# Patient Record
Sex: FEMALE | Race: WHITE | NOT HISPANIC OR LATINO | Employment: FULL TIME | URBAN - METROPOLITAN AREA
[De-identification: names, ages, dates, MRNs, and addresses within clinical notes are randomized per-mention and may not be internally consistent; named-entity substitution may affect disease eponyms.]

---

## 2017-01-10 ENCOUNTER — ALLSCRIPTS OFFICE VISIT (OUTPATIENT)
Dept: OTHER | Facility: OTHER | Age: 32
End: 2017-01-10

## 2017-01-12 DIAGNOSIS — R35.89 OTHER POLYURIA: ICD-10-CM

## 2017-01-12 DIAGNOSIS — R35.0 FREQUENCY OF MICTURITION: ICD-10-CM

## 2017-01-12 DIAGNOSIS — E78.5 HYPERLIPIDEMIA: ICD-10-CM

## 2017-01-12 DIAGNOSIS — R53.83 OTHER FATIGUE: ICD-10-CM

## 2017-01-13 ENCOUNTER — LAB (OUTPATIENT)
Dept: LAB | Facility: CLINIC | Age: 32
End: 2017-01-13
Payer: COMMERCIAL

## 2017-01-13 DIAGNOSIS — R53.83 OTHER FATIGUE: ICD-10-CM

## 2017-01-13 DIAGNOSIS — E78.5 HYPERLIPIDEMIA: ICD-10-CM

## 2017-01-13 DIAGNOSIS — R35.0 FREQUENCY OF MICTURITION: ICD-10-CM

## 2017-01-13 DIAGNOSIS — R35.89 OTHER POLYURIA: ICD-10-CM

## 2017-01-13 LAB
ALBUMIN SERPL BCP-MCNC: 4 G/DL (ref 3.5–5)
ALP SERPL-CCNC: 68 U/L (ref 46–116)
ALT SERPL W P-5'-P-CCNC: 24 U/L (ref 12–78)
ANION GAP SERPL CALCULATED.3IONS-SCNC: 5 MMOL/L (ref 4–13)
AST SERPL W P-5'-P-CCNC: 16 U/L (ref 5–45)
BACTERIA UR QL AUTO: ABNORMAL /HPF
BILIRUB SERPL-MCNC: 0.55 MG/DL (ref 0.2–1)
BILIRUB UR QL STRIP: NEGATIVE
BUN SERPL-MCNC: 12 MG/DL (ref 5–25)
CALCIUM SERPL-MCNC: 9.7 MG/DL (ref 8.3–10.1)
CHLORIDE SERPL-SCNC: 99 MMOL/L (ref 100–108)
CHOLEST SERPL-MCNC: 228 MG/DL (ref 50–200)
CLARITY UR: ABNORMAL
CO2 SERPL-SCNC: 30 MMOL/L (ref 21–32)
COLOR UR: YELLOW
CREAT SERPL-MCNC: 0.83 MG/DL (ref 0.6–1.3)
EST. AVERAGE GLUCOSE BLD GHB EST-MCNC: 100 MG/DL
GFR SERPL CREATININE-BSD FRML MDRD: >60 ML/MIN/1.73SQ M
GLUCOSE SERPL-MCNC: 96 MG/DL (ref 65–140)
GLUCOSE UR STRIP-MCNC: NEGATIVE MG/DL
HBA1C MFR BLD: 5.1 % (ref 4.2–6.3)
HDLC SERPL-MCNC: 59 MG/DL (ref 40–60)
HGB UR QL STRIP.AUTO: NEGATIVE
HYALINE CASTS #/AREA URNS LPF: ABNORMAL /LPF
KETONES UR STRIP-MCNC: NEGATIVE MG/DL
LDLC SERPL CALC-MCNC: 154 MG/DL (ref 0–100)
LEUKOCYTE ESTERASE UR QL STRIP: ABNORMAL
NITRITE UR QL STRIP: NEGATIVE
NON-SQ EPI CELLS URNS QL MICRO: ABNORMAL /HPF
PH UR STRIP.AUTO: 6 [PH] (ref 4.5–8)
POTASSIUM SERPL-SCNC: 3.7 MMOL/L (ref 3.5–5.3)
PROT SERPL-MCNC: 8.1 G/DL (ref 6.4–8.2)
PROT UR STRIP-MCNC: ABNORMAL MG/DL
RBC #/AREA URNS AUTO: ABNORMAL /HPF
SODIUM SERPL-SCNC: 134 MMOL/L (ref 136–145)
SP GR UR STRIP.AUTO: 1.02 (ref 1–1.03)
TRIGL SERPL-MCNC: 74 MG/DL
UROBILINOGEN UR QL STRIP.AUTO: 0.2 E.U./DL
WBC #/AREA URNS AUTO: ABNORMAL /HPF

## 2017-01-13 PROCEDURE — 87086 URINE CULTURE/COLONY COUNT: CPT

## 2017-01-13 PROCEDURE — 36415 COLL VENOUS BLD VENIPUNCTURE: CPT

## 2017-01-13 PROCEDURE — 80061 LIPID PANEL: CPT

## 2017-01-13 PROCEDURE — 83036 HEMOGLOBIN GLYCOSYLATED A1C: CPT

## 2017-01-13 PROCEDURE — 81001 URINALYSIS AUTO W/SCOPE: CPT

## 2017-01-13 PROCEDURE — 87147 CULTURE TYPE IMMUNOLOGIC: CPT

## 2017-01-13 PROCEDURE — 80053 COMPREHEN METABOLIC PANEL: CPT

## 2017-01-16 ENCOUNTER — GENERIC CONVERSION - ENCOUNTER (OUTPATIENT)
Dept: OTHER | Facility: OTHER | Age: 32
End: 2017-01-16

## 2017-01-16 LAB
BACTERIA UR CULT: NORMAL
BACTERIA UR CULT: NORMAL

## 2017-05-18 ENCOUNTER — APPOINTMENT (OUTPATIENT)
Dept: LAB | Facility: CLINIC | Age: 32
End: 2017-05-18
Payer: COMMERCIAL

## 2017-05-18 ENCOUNTER — TRANSCRIBE ORDERS (OUTPATIENT)
Dept: LAB | Facility: CLINIC | Age: 32
End: 2017-05-18

## 2017-05-18 DIAGNOSIS — R31.0 GROSS HEMATURIA: ICD-10-CM

## 2017-05-18 DIAGNOSIS — R31.0 GROSS HEMATURIA: Primary | ICD-10-CM

## 2017-05-18 LAB
ANION GAP SERPL CALCULATED.3IONS-SCNC: 6 MMOL/L (ref 4–13)
BUN SERPL-MCNC: 16 MG/DL (ref 5–25)
CALCIUM SERPL-MCNC: 9.1 MG/DL (ref 8.3–10.1)
CHLORIDE SERPL-SCNC: 105 MMOL/L (ref 100–108)
CO2 SERPL-SCNC: 28 MMOL/L (ref 21–32)
CREAT SERPL-MCNC: 0.72 MG/DL (ref 0.6–1.3)
GFR SERPL CREATININE-BSD FRML MDRD: >60 ML/MIN/1.73SQ M
GLUCOSE P FAST SERPL-MCNC: 83 MG/DL (ref 65–99)
POTASSIUM SERPL-SCNC: 3.9 MMOL/L (ref 3.5–5.3)
SODIUM SERPL-SCNC: 139 MMOL/L (ref 136–145)

## 2017-05-18 PROCEDURE — 36415 COLL VENOUS BLD VENIPUNCTURE: CPT

## 2017-05-18 PROCEDURE — 80048 BASIC METABOLIC PNL TOTAL CA: CPT

## 2018-01-13 NOTE — RESULT NOTES
Verified Results  (1) URINALYSIS w URINE C/S REFLEX (will reflex a microscopy if leukocytes, occult blood, or nitrites are not within normal limits) [id number] 07:51AM Jodi Valdez Order Number: [id number]     Test Name Result Flag Reference   COLOR Yellow     CLARITY Cloudy     PH UA 6 0  4 5-8 0   LEUKOCYTE ESTERASE UA Small A Negative   NITRITE UA Negative  Negative   PROTEIN UA Trace mg/dl A Negative   GLUCOSE UA Negative mg/dl  Negative   KETONES UA Negative mg/dl  Negative   UROBILINOGEN UA 0 2 E U /dl  0 2, 1 0 E U /dl   BILIRUBIN UA Negative  Negative   BLOOD UA Negative  Negative   SPECIFIC GRAVITY UA 1 019  1 003-1 030   BACTERIA Occasional /hpf  None Seen, Occasional   EPITHELIAL CELLS Moderate /hpf A None Seen, Occasional   HYALINE CASTS 10-25 /lpf A None Seen   RBC UA None Seen /hpf  None Seen   WBC UA 10-20 /hpf A None Seen   CLINICAL REPORT (Report)     Test:        Urine culture  Specimen Type:   Urine  Specimen Date:   1/13/2017 7:51 AM  Result Date:    1/15/2017 12:22 PM  Result Status:   Final result  Resulting Lab:   [facility]             Tel: [phone]      CULTURE                                       ------------------                                   >100,000 cfu/ml Mixed Contaminants X4

## 2018-01-14 VITALS
WEIGHT: 168 LBS | SYSTOLIC BLOOD PRESSURE: 110 MMHG | TEMPERATURE: 96.4 F | RESPIRATION RATE: 14 BRPM | DIASTOLIC BLOOD PRESSURE: 64 MMHG | HEART RATE: 74 BPM | HEIGHT: 60 IN | BODY MASS INDEX: 32.98 KG/M2

## 2018-02-27 ENCOUNTER — TELEPHONE (OUTPATIENT)
Dept: FAMILY MEDICINE CLINIC | Facility: CLINIC | Age: 33
End: 2018-02-27

## 2018-02-27 ENCOUNTER — OFFICE VISIT (OUTPATIENT)
Dept: FAMILY MEDICINE CLINIC | Facility: CLINIC | Age: 33
End: 2018-02-27
Payer: COMMERCIAL

## 2018-02-27 VITALS
HEIGHT: 62 IN | WEIGHT: 159 LBS | HEART RATE: 80 BPM | TEMPERATURE: 101.6 F | SYSTOLIC BLOOD PRESSURE: 120 MMHG | DIASTOLIC BLOOD PRESSURE: 82 MMHG | BODY MASS INDEX: 29.26 KG/M2

## 2018-02-27 DIAGNOSIS — R11.2 NAUSEA AND VOMITING, INTRACTABILITY OF VOMITING NOT SPECIFIED, UNSPECIFIED VOMITING TYPE: ICD-10-CM

## 2018-02-27 DIAGNOSIS — J01.90 ACUTE NON-RECURRENT SINUSITIS, UNSPECIFIED LOCATION: Primary | ICD-10-CM

## 2018-02-27 DIAGNOSIS — G44.40 DRUG-INDUCED HEADACHE, NOT ELSEWHERE CLASSIFIED, NOT INTRACTABLE: ICD-10-CM

## 2018-02-27 DIAGNOSIS — G43.809 OTHER MIGRAINE WITHOUT STATUS MIGRAINOSUS, NOT INTRACTABLE: ICD-10-CM

## 2018-02-27 PROCEDURE — 99213 OFFICE O/P EST LOW 20 MIN: CPT | Performed by: FAMILY MEDICINE

## 2018-02-27 PROCEDURE — 96372 THER/PROPH/DIAG INJ SC/IM: CPT | Performed by: FAMILY MEDICINE

## 2018-02-27 PROCEDURE — 3008F BODY MASS INDEX DOCD: CPT | Performed by: FAMILY MEDICINE

## 2018-02-27 RX ORDER — KETOROLAC TROMETHAMINE 30 MG/ML
60 INJECTION, SOLUTION INTRAMUSCULAR; INTRAVENOUS ONCE
Status: COMPLETED | OUTPATIENT
Start: 2018-02-27 | End: 2018-02-27

## 2018-02-27 RX ORDER — PREDNISONE 10 MG/1
TABLET ORAL
Qty: 26 TABLET | Refills: 0 | Status: SHIPPED | OUTPATIENT
Start: 2018-02-27 | End: 2018-10-03 | Stop reason: ALTCHOICE

## 2018-02-27 RX ORDER — ALBUTEROL SULFATE 90 UG/1
AEROSOL, METERED RESPIRATORY (INHALATION)
COMMUNITY
End: 2019-03-18 | Stop reason: SDUPTHER

## 2018-02-27 RX ORDER — BETAMETHASONE DIPROPIONATE 0.5 MG/G
CREAM TOPICAL
COMMUNITY
Start: 2018-02-05 | End: 2019-11-11 | Stop reason: ALTCHOICE

## 2018-02-27 RX ORDER — DEXTROMETHORPHAN HYDROBROMIDE AND PROMETHAZINE HYDROCHLORIDE 15; 6.25 MG/5ML; MG/5ML
5 SYRUP ORAL 4 TIMES DAILY PRN
Qty: 180 ML | Refills: 0 | Status: SHIPPED | OUTPATIENT
Start: 2018-02-27 | End: 2018-10-03 | Stop reason: ALTCHOICE

## 2018-02-27 RX ORDER — OSELTAMIVIR PHOSPHATE 75 MG/1
CAPSULE ORAL
COMMUNITY
Start: 2018-02-25 | End: 2018-10-03 | Stop reason: ALTCHOICE

## 2018-02-27 RX ORDER — ALPRAZOLAM 0.5 MG/1
1 TABLET ORAL 2 TIMES DAILY
COMMUNITY
Start: 2017-01-10 | End: 2018-10-26 | Stop reason: SDUPTHER

## 2018-02-27 RX ORDER — AZITHROMYCIN 250 MG/1
TABLET, FILM COATED ORAL
Qty: 6 TABLET | Refills: 0 | Status: SHIPPED | OUTPATIENT
Start: 2018-02-27 | End: 2018-03-04

## 2018-02-27 RX ADMIN — KETOROLAC TROMETHAMINE 60 MG: 30 INJECTION, SOLUTION INTRAMUSCULAR; INTRAVENOUS at 15:02

## 2018-02-27 NOTE — TELEPHONE ENCOUNTER
[facility]ent to [facility] on Sunday night, for flu symptoms, they gave her Tamiflu, they did WBC, it was low  She has a real bad headache, energy was low, nasal congestion  They suggest she see you

## 2018-02-27 NOTE — LETTER
February 27, 2018     Patient: Sharron King   YOB: 1985   Date of Visit: 2/27/2018       To Whom it May Concern:    Utenoe Sal is under my professional care  She was seen in my office on 2/27/2018  She may return to work on 3/5/18  Her first day out of work was Monday 2/26/18       If you have any questions or concerns, please don't hesitate to call           Sincerely,          Benito Post DO        CC: No Recipients

## 2018-02-28 NOTE — PROGRESS NOTES
Patient ID: Pratik Graham is a 28 y o  female  HPI: 28 y  o female presenting with symptoms of sinus pain,pressure, nasal congestion, pnd dry cough , ear and throat pain  She has had an intractable sinus headache for 4 days (pain above eyes/maxillary pain), + nausea and vomiting  She has tried motrin, advil, etc without relief  SUBJECTIVE    No family history on file  Social History     Social History    Marital status: /Civil Union     Spouse name: N/A    Number of children: N/A    Years of education: N/A     Occupational History    Not on file  Social History Main Topics    Smoking status: Never Smoker    Smokeless tobacco: Not on file    Alcohol use No    Drug use: No    Sexual activity: Not on file     Other Topics Concern    Not on file     Social History Narrative    No narrative on file     Past Medical History:   Diagnosis Date    Seasonal allergies      No past surgical history on file  Allergies   Allergen Reactions    Penicillins      Other reaction(s):  Other (See Comments)  "poly cillin" lip swelling       Current Outpatient Prescriptions:     albuterol (PROAIR HFA) 90 mcg/act inhaler, Inhale, Disp: , Rfl:     ALPRAZolam (XANAX) 0 5 mg tablet, Take 1 tablet by mouth 2 (two) times a day, Disp: , Rfl:     betamethasone, augmented, (DIPROLENE-AF) 0 05 % cream, , Disp: , Rfl:     Cetirizine HCl (ZYRTEC ALLERGY) 10 MG CAPS, Take 1 tablet by mouth daily, Disp: , Rfl:     oseltamivir (TAMIFLU) 75 mg capsule, , Disp: , Rfl:     azithromycin (ZITHROMAX) 250 mg tablet, Take 2 tablets today then 1 tablet daily x 4 days, Disp: 6 tablet, Rfl: 0    naproxen (NAPROSYN) 500 mg tablet, Take 1 tablet by mouth 2 (two) times a day with meals for 30 days, Disp: 60 tablet, Rfl: 0    predniSONE 10 mg tablet, 3 tabs po bid x2 days, then 2 tabs po bid x2 days, then 1 tab bid x2 days, then 1 daily until done , Disp: 26 tablet, Rfl: 0    promethazine-dextromethorphan (PHENERGAN-DM) 6 25-15 mg/5 mL oral syrup, Take 5 mL by mouth 4 (four) times a day as needed for cough, Disp: 180 mL, Rfl: 0    Current Facility-Administered Medications:     trimethobenzamide (TIGAN) IM injection 200 mg, 200 mg, Intramuscular, Q6H PRN, Sunita Dixon Vilma, DO, 200 mg at 02/27/18 1502    Review of Systems  Constitutional:     Denies fever, chills, fatigue, weakness ,weight loss, weight gain      ENT: Denies earache, loss of hearing, nosebleed, nasal discharge,but complains of nasal congestion, sore throat,hoarseness and sinus pain and pressure    Pulmonary: Denies shortness of breath ,cough , dyspnea on exertionon, orthopnea ,+ PND   Cardiovascular:  Denies bradycardia , tachycardia ,palpations, lower extremity, edema leg, claudication  Breast:  Denies new or changing breast lumps,  nipple discharge, nipple changes,  Abdomen:  Denies abdominal pain , anorexia ,indigestion,, constipation , diarrhea + nausea and vomiting   Musculoskeletal: Denies myalgias, arthralgias, joint swelling, joint stiffness ,limb pain, limb swelling  Lymph:+ swollen glands  Gu: no dysuria or urinary frequency  Skin: Denies skin rash, skin lesion, skin wound, itching,dry skin  Neuro:+ intractable headache,denies numbness, tingling, confusion, loss of consciousness, dizziness ,vertigo  Psychiatric: Denies feelings of depression, suicidal ideation, anxiety, sleep disturbances    OBJECTIVE    Constitutional:   NAD, well appearing and well nourished      ENT:   Conjunctiva and lids: no injection, edema, or discharge    Pupils and iris: CASANDRA bilaterally   External inspection of ears and nose: normal without deformities or discharge  Otoscopic exam: Canals patent ; tm are dull, with with erythem and effusions  ENasal mucosa, septum and turbinates: Turbinae injection with discharge   Oropharynx:  Moist mucosa, normal tongue and tonsils without lesions  Erythema and injection  of post pharynx with pnd Pulmonary:Respiratory effort normal rate and rhythm, no increased work of breathing  Auscultation of lungs:  Clear bilaterally with no adventitious breath sounds       Cardiovascular: regular rate and rhythm, S1 and S2, no murmur, no edema and/or varicosities of LE      Abdomen: Soft and non-distended    Positive bowel sounds    No heptomegaly or splenomegaly    Lymphatic: Anterior  cervical lymphadenopathy         Muscskeletal:  Gait and station: Normal gait     Digits and nails normal without clubbing or cyanosis     Inspection/palpation of joints, bones, and muscles:  No joint tenderness, swelling, full active and passive range of motion      Gu: no suprabubic tenderness, CVA tenderness or urethral discharge  Skin: Normal skin turgor and no rashes    Neuro:    Normal reflexes   Psych:   alert and oriented to person, place and time  normal mood and affec      Assessment/Plan:Diagnoses and all orders for this visit:    Acute non-recurrent sinusitis, unspecified location  -     azithromycin (ZITHROMAX) 250 mg tablet; Take 2 tablets today then 1 tablet daily x 4 days  -     promethazine-dextromethorphan (PHENERGAN-DM) 6 25-15 mg/5 mL oral syrup; Take 5 mL by mouth 4 (four) times a day as needed for cough  -     ketorolac (TORADOL) 60 mg/2 mL IM injection 60 mg; Inject 2 mL (60 mg total) into the shoulder, thigh, or buttocks once     Drug-induced headache, not elsewhere classified, not intractable    Nausea and vomiting, intractability of vomiting not specified, unspecified vomiting type  -     trimethobenzamide (TIGAN) IM injection 200 mg; Inject 2 mL (200 mg total) into the shoulder, thigh, or buttocks every 6 (six) hours as needed for nausea or vomiting     Other migraine without status migrainosus, not intractable  -     predniSONE 10 mg tablet; 3 tabs po bid x2 days, then 2 tabs po bid x2 days, then 1 tab bid x2 days, then 1 daily until done  Other orders  -     ALPRAZolam (XANAX) 0 5 mg tablet;  Take 1 tablet by mouth 2 (two) times a day  -     oseltamivir (TAMIFLU) 75 mg capsule;   -     Cetirizine HCl (ZYRTEC ALLERGY) 10 MG CAPS; Take 1 tablet by mouth daily  -     albuterol (PROAIR HFA) 90 mcg/act inhaler; Inhale  -     betamethasone, augmented, (DIPROLENE-AF) 0 05 % cream;         Reviewed with patient plan to treat with the meds as listed above  If headache does not ease up, she is to call     Patient instructed to call in 72 hours if not feeling better or if symptoms worsen

## 2018-03-06 ENCOUNTER — TELEPHONE (OUTPATIENT)
Dept: FAMILY MEDICINE CLINIC | Facility: CLINIC | Age: 33
End: 2018-03-06

## 2018-03-26 ENCOUNTER — TELEPHONE (OUTPATIENT)
Dept: FAMILY MEDICINE CLINIC | Facility: CLINIC | Age: 33
End: 2018-03-26

## 2018-03-26 NOTE — TELEPHONE ENCOUNTER
Mireya afternoon,  Fell on sat , she has a wound on her knee that needs stitches and prob  To late, its cleaned    Also prob needs tetnus shot but doesn't know when her last one was

## 2018-06-07 DIAGNOSIS — F41.9 ANXIETY: Primary | ICD-10-CM

## 2018-06-07 RX ORDER — ALPRAZOLAM 0.5 MG/1
0.5 TABLET ORAL
Qty: 30 TABLET | Refills: 0 | Status: SHIPPED | OUTPATIENT
Start: 2018-06-07 | End: 2018-10-26 | Stop reason: DRUGHIGH

## 2018-10-03 ENCOUNTER — HOSPITAL ENCOUNTER (OUTPATIENT)
Dept: RADIOLOGY | Facility: HOSPITAL | Age: 33
Discharge: HOME | End: 2018-10-03
Payer: COMMERCIAL

## 2018-10-03 ENCOUNTER — OFFICE VISIT (OUTPATIENT)
Dept: FAMILY MEDICINE CLINIC | Facility: CLINIC | Age: 33
End: 2018-10-03
Payer: COMMERCIAL

## 2018-10-03 VITALS
TEMPERATURE: 97.9 F | HEART RATE: 80 BPM | WEIGHT: 162 LBS | SYSTOLIC BLOOD PRESSURE: 102 MMHG | BODY MASS INDEX: 29.81 KG/M2 | HEIGHT: 62 IN | DIASTOLIC BLOOD PRESSURE: 70 MMHG

## 2018-10-03 DIAGNOSIS — M79.661 PAIN OF RIGHT LOWER LEG: ICD-10-CM

## 2018-10-03 DIAGNOSIS — M79.661 PAIN IN RIGHT LOWER LEG: ICD-10-CM

## 2018-10-03 DIAGNOSIS — M79.661 PAIN IN RIGHT LOWER LEG: Primary | ICD-10-CM

## 2018-10-03 PROCEDURE — 73590 X-RAY EXAM OF LOWER LEG: CPT

## 2018-10-03 PROCEDURE — 99213 OFFICE O/P EST LOW 20 MIN: CPT | Performed by: FAMILY MEDICINE

## 2018-10-03 RX ORDER — MELOXICAM 15 MG/1
15 TABLET ORAL DAILY
Qty: 30 TABLET | Refills: 0 | Status: SHIPPED | OUTPATIENT
Start: 2018-10-03 | End: 2019-11-11 | Stop reason: ALTCHOICE

## 2018-10-04 NOTE — PROGRESS NOTES
Patient ID: Vannessa Rodriguez is a 35 y o  female  HPI: 35 y  o female presenting with burning in her right shin with no trauma  No swelling, bruising are noted and it hurts worse with weight-bearing  SUBJECTIVE    Family History   Problem Relation Age of Onset    Lung cancer Mother     Heart disease Maternal Grandmother      Social History     Social History    Marital status: /Civil Union     Spouse name: N/A    Number of children: N/A    Years of education: N/A     Occupational History    Not on file  Social History Main Topics    Smoking status: Never Smoker    Smokeless tobacco: Not on file    Alcohol use No      Comment: Social alcohol use as per [employer]    Drug use: No    Sexual activity: Not on file     Other Topics Concern    Not on file     Social History Narrative    No narrative on file     Past Medical History:   Diagnosis Date    Cardiac arrhythmia     Last Assessed: 1/10/2017    Seasonal allergies      Past Surgical History:   Procedure Laterality Date    CARDIAC CATHETERIZATION      Last Assessed: 1/10/2017    LASIK      Corneal  Last Assessed: 1/10/2017     Allergies   Allergen Reactions    Penicillins Other (See Comments)     "poly cillin" lip swelling  Other reaction(s):  Other (See Comments)  "poly cillin" lip swelling       Current Outpatient Prescriptions:     albuterol (PROAIR HFA) 90 mcg/act inhaler, Inhale, Disp: , Rfl:     ALPRAZolam (XANAX) 0 5 mg tablet, Take 1 tablet (0 5 mg total) by mouth daily at bedtime as needed for anxiety, Disp: 30 tablet, Rfl: 0    Cetirizine HCl (ZYRTEC ALLERGY) 10 MG CAPS, Take 1 tablet by mouth daily, Disp: , Rfl:     ALPRAZolam (XANAX) 0 5 mg tablet, Take 1 tablet by mouth 2 (two) times a day, Disp: , Rfl:     betamethasone, augmented, (DIPROLENE-AF) 0 05 % cream, , Disp: , Rfl:     meloxicam (MOBIC) 15 mg tablet, Take 1 tablet (15 mg total) by mouth daily for 30 days, Disp: 30 tablet, Rfl: 0    Current Facility-Administered Medications:     trimethobenzamide (TIGAN) IM injection 200 mg, 200 mg, Intramuscular, Q6H PRN, Eladio Vila Broadt, DO, 200 mg at 02/27/18 1502    Review of Systems  Constitutional:     Denies fever, chills ,fatigue ,weakness ,weight loss, weight gain     ENT: Denies earache ,loss of hearing ,nosebleed, nasal discharge,nasal congestion ,sore throat ,hoarseness  Pulmonary: Denies shortness of breath ,cough  ,dyspnea on exertion, orthopnea  ,PND   Cardiovascular:  Denies bradycardia , tachycardia  ,palpations, lower extremity edema leg, claudication  Breast:  Denies new or changing breast lumps ,nipple discharge ,nipple changes  Abdomen:  Denies abdominal pain , anorexia , indigestion, nausea, vomiting, constipation, diarrhea  Musculoskeletal: Denies myalgias, arthralgias, joint swelling, joint stiffness , limb pain, limb swelling  Gu: denies dysuria, polyuria  Skin: Denies skin rash, skin lesion, skin wound, itching, dry skin; burnng of right shin numbness, tingling, confusion, loss of consciousnesss, vertigo  Psychiatric: Denies feelings of depression, suicidal ideation, anxiety, sleep disturbances    OBJECTIVE  /70   Pulse 80   Temp 97 9 °F (36 6 °C)   Ht 5' 2" (1 575 m)   Wt 73 5 kg (162 lb)   BMI 29 63 kg/m²   Constitutional:   NAD, well appearing and well nourished      ENT:   Conjunctiva and lids: no injection, edema, or discharge     Pupils and iris: CASANDRA bilaterally    External inspection of ears and nose: normal without deformities or discharge  Otoscopic exam: Canals patent without erythema  Nasal mucosa, septum and turbinates: Normal or edema or discharge         Oropharynx:  Moist mucosa, normal tongue and tonsils without lesions  No erythema        Pulmonary:Respiratory effort normal rate and rhythm, no increased work of breathing   Auscultation of lungs:  Clear bilaterally with no adventitious breath sounds       Cardiovascular: regular rate and rhythm, S1 and S2, no murmur, no edema and/or varicosities of LE      Abdomen: Soft and non-distended     Positive bowel sounds      No heptomegaly or splenomegaly      Gu: no suprapubic tenderness or CVA tenderness, no urethral discharge  Lymphatic:  No anterior or posterior cervical lymphadenopathy         Musculoskeletal:  Gait and station: Normal gait      Digits and nails normal without clubbing or cyanosis       Inspection/palpation of joints, bones, and muscles:  No joint tenderness, swelling, full active and passive range of motion; negative destiny's on right shin   + tenderness on palpation of right shin  Skin: Normal skin turgor and no rashes      Neuro:    Normal  CN 2-12   Normal reflexes     Normal sensation    Psych:   alert and oriented to person, place and time     normal mood and affect       Assessment/Plan:Diagnoses and all orders for this visit:    Pain in right lower leg  -     meloxicam (MOBIC) 15 mg tablet; Take 1 tablet (15 mg total) by mouth daily for 30 days  -     XR tibia fibula 2 vw right; Future    Pain of right lower leg        Reviewed with patient plan to treat with plan as above      Patient instructed to call in 72 hours if not feeling better or if symptoms worsen

## 2018-10-25 ENCOUNTER — TELEPHONE (OUTPATIENT)
Dept: FAMILY MEDICINE CLINIC | Facility: CLINIC | Age: 33
End: 2018-10-25

## 2018-10-25 NOTE — TELEPHONE ENCOUNTER
Patient called for a medication refill:     Alprazolam 0 5mg- 30 tablets     [facility]

## 2018-10-26 DIAGNOSIS — F41.9 ANXIETY: Primary | ICD-10-CM

## 2018-10-26 RX ORDER — ALPRAZOLAM 0.5 MG/1
0.5 TABLET ORAL 2 TIMES DAILY
Qty: 60 TABLET | Refills: 3 | Status: SHIPPED | OUTPATIENT
Start: 2018-10-26 | End: 2019-11-11 | Stop reason: SDUPTHER

## 2019-01-03 ENCOUNTER — APPOINTMENT (OUTPATIENT)
Dept: RADIOLOGY | Facility: MEDICAL CENTER | Age: 34
End: 2019-01-03
Payer: COMMERCIAL

## 2019-01-03 ENCOUNTER — OFFICE VISIT (OUTPATIENT)
Dept: OBGYN CLINIC | Facility: MEDICAL CENTER | Age: 34
End: 2019-01-03
Payer: COMMERCIAL

## 2019-01-03 VITALS
WEIGHT: 166 LBS | HEART RATE: 89 BPM | SYSTOLIC BLOOD PRESSURE: 132 MMHG | BODY MASS INDEX: 30.55 KG/M2 | DIASTOLIC BLOOD PRESSURE: 81 MMHG | HEIGHT: 62 IN

## 2019-01-03 DIAGNOSIS — M25.512 LEFT SHOULDER PAIN, UNSPECIFIED CHRONICITY: ICD-10-CM

## 2019-01-03 DIAGNOSIS — M75.22 BICEPS TENDINITIS OF LEFT UPPER EXTREMITY: Primary | ICD-10-CM

## 2019-01-03 PROCEDURE — 99203 OFFICE O/P NEW LOW 30 MIN: CPT | Performed by: ORTHOPAEDIC SURGERY

## 2019-01-03 PROCEDURE — 73030 X-RAY EXAM OF SHOULDER: CPT

## 2019-01-03 RX ORDER — METHYLPREDNISOLONE 4 MG/1
TABLET ORAL
Qty: 21 TABLET | Refills: 0 | Status: SHIPPED | OUTPATIENT
Start: 2019-01-03 | End: 2019-11-11 | Stop reason: ALTCHOICE

## 2019-01-03 NOTE — PROGRESS NOTES
Patient Name:  Danis Justice  MRN:  [id number]    Assessment     1  Biceps tendinitis of left upper extremity  Ambulatory referral to Physical Therapy   2  Left shoulder pain, unspecified chronicity  XR shoulder 2+ vw left    CANCELED: XR shoulder 2+ vw left    CANCELED: XR shoulder 2+ vw left       Plan     1  Activity modification for activities that reproduce pain   2  A CSI, medrol Dosepak, taking an antiinflammatory or starting PT was discussed in the office today   3  Patient will take Aleve for 5 day's and start PT   4  Medrol Dosepak was ordered that she will take if the Aleve is not helping   5  Follow up as needed     Return if symptoms worsen or fail to improve  Chief Complaint     Left shoulder pain       History of the Present Illness     Danis Justice is a 35 y o  female who presents to the office today for left shoulder pain  The patient states that she has been experiencing anterior shoulder pain from aprox  5 months  The patient states that she was working out at [employer], doing curlers with bar bells when she started to experience numbness and tingling to her left forearm  The patient notes the numbness and tingling subsided aprox  1 week later but she was still experiencing shoulder pain  She describes her pain as a pinching sensation  Surendra Bolanos states her pain is made worse with lifting, driving or laying on her left side  She states her pain is better at rest  She states at its worst her pain is 14/10 and at its best its 6/10  She denies associated numbness and tingling  She is not taking anything for pain control           Physical Exam     /81   Pulse 89   Ht 5' 2" (1 575 m)   Wt 75 3 kg (166 lb)   BMI 30 36 kg/m²     Left shoulder:  Non TTP bicep's tendon   160 degree's FF bilaterally  180 abduction bilaterally   Internal T7 symmetrically  T2 symmetric ROM  5/5 internal, external, empty can, flexion, abduction, elbow flexion, speed's strength   - Neer's test   Supination and pronation causes little pain    Ashleigh Dock' test     Eyes:  Anicteric sclerae  Neck:  Supple  Lungs:  Unlabored breathing  Cardiovascular:  Capillary refill is less than 2 seconds  Skin:  Intact without erythema  Neurologic:  Sensation intact to light touch  Psychiatric:  Mood and affect are appropriate  Data Review     I have personally reviewed pertinent films in PACS, and my interpretation follows: No fracture or dislocation       Past Medical History:   Diagnosis Date    Cardiac arrhythmia     Last Assessed: 1/10/2017    Seasonal allergies        Past Surgical History:   Procedure Laterality Date    CARDIAC CATHETERIZATION      Last Assessed: 1/10/2017    LASIK      Corneal  Last Assessed: 1/10/2017       Allergies   Allergen Reactions    Penicillins Other (See Comments)     "poly cillin" lip swelling  Other reaction(s):  Other (See Comments)  "poly cillin" lip swelling       Current Outpatient Prescriptions on File Prior to Visit   Medication Sig Dispense Refill    albuterol (PROAIR HFA) 90 mcg/act inhaler Inhale      ALPRAZolam (XANAX) 0 5 mg tablet Take 1 tablet (0 5 mg total) by mouth 2 (two) times a day 60 tablet 3    betamethasone, augmented, (DIPROLENE-AF) 0 05 % cream       Cetirizine HCl (ZYRTEC ALLERGY) 10 MG CAPS Take 1 tablet by mouth daily      meloxicam (MOBIC) 15 mg tablet Take 1 tablet (15 mg total) by mouth daily for 30 days 30 tablet 0     Current Facility-Administered Medications on File Prior to Visit   Medication Dose Route Frequency Provider Last Rate Last Dose    trimethobenzamide (TIGAN) IM injection 200 mg  200 mg Intramuscular Q6H PRN Torsten Esparza, DO   200 mg at 02/27/18 1502       Social History   Substance Use Topics    Smoking status: Never Smoker    Smokeless tobacco: Never Used    Alcohol use No      Comment: Social alcohol use as per [employer]       Family History   Problem Relation Age of Onset    Lung cancer Mother     Heart disease Maternal Grandmother          Review of Systems     As stated in the HPI  All other systems were reviewed and are negative        Scribe Attestation    I,:   Jimmy Mckinney am acting as a scribe while in the presence of the attending physician :        I,:   Nena Sees, DO personally performed the services described in this documentation    as scribed in my presence :

## 2019-03-07 ENCOUNTER — OFFICE VISIT (OUTPATIENT)
Dept: FAMILY MEDICINE CLINIC | Facility: CLINIC | Age: 34
End: 2019-03-07
Payer: COMMERCIAL

## 2019-03-07 VITALS
HEART RATE: 82 BPM | SYSTOLIC BLOOD PRESSURE: 118 MMHG | BODY MASS INDEX: 31.03 KG/M2 | HEIGHT: 62 IN | TEMPERATURE: 97.9 F | DIASTOLIC BLOOD PRESSURE: 78 MMHG | WEIGHT: 168.6 LBS

## 2019-03-07 DIAGNOSIS — J01.90 ACUTE SINUSITIS, RECURRENCE NOT SPECIFIED, UNSPECIFIED LOCATION: Primary | ICD-10-CM

## 2019-03-07 DIAGNOSIS — E78.00 HYPERCHOLESTEROLEMIA: ICD-10-CM

## 2019-03-07 DIAGNOSIS — J30.9 ALLERGIC RHINITIS, UNSPECIFIED SEASONALITY, UNSPECIFIED TRIGGER: ICD-10-CM

## 2019-03-07 DIAGNOSIS — R53.83 OTHER FATIGUE: ICD-10-CM

## 2019-03-07 PROCEDURE — 99214 OFFICE O/P EST MOD 30 MIN: CPT | Performed by: FAMILY MEDICINE

## 2019-03-07 PROCEDURE — 3008F BODY MASS INDEX DOCD: CPT | Performed by: FAMILY MEDICINE

## 2019-03-07 RX ORDER — METHYLPREDNISOLONE 4 MG/1
TABLET ORAL
Qty: 21 EACH | Refills: 0 | Status: SHIPPED | OUTPATIENT
Start: 2019-03-07 | End: 2019-11-11 | Stop reason: ALTCHOICE

## 2019-03-07 RX ORDER — AZITHROMYCIN 250 MG/1
TABLET, FILM COATED ORAL
Qty: 6 TABLET | Refills: 0 | Status: SHIPPED | OUTPATIENT
Start: 2019-03-07 | End: 2019-03-11

## 2019-03-08 ENCOUNTER — TRANSCRIBE ORDERS (OUTPATIENT)
Dept: LAB | Facility: CLINIC | Age: 34
End: 2019-03-08

## 2019-03-08 ENCOUNTER — APPOINTMENT (OUTPATIENT)
Dept: LAB | Facility: CLINIC | Age: 34
End: 2019-03-08
Payer: COMMERCIAL

## 2019-03-08 DIAGNOSIS — E78.00 HYPERCHOLESTEROLEMIA: ICD-10-CM

## 2019-03-08 DIAGNOSIS — R53.83 OTHER FATIGUE: ICD-10-CM

## 2019-03-08 LAB
ALBUMIN SERPL BCP-MCNC: 4.1 G/DL (ref 3.5–5)
ALP SERPL-CCNC: 53 U/L (ref 46–116)
ALT SERPL W P-5'-P-CCNC: 27 U/L (ref 12–78)
ANION GAP SERPL CALCULATED.3IONS-SCNC: 8 MMOL/L (ref 4–13)
AST SERPL W P-5'-P-CCNC: 14 U/L (ref 5–45)
BASOPHILS # BLD AUTO: 0.05 THOUSANDS/ΜL (ref 0–0.1)
BASOPHILS NFR BLD AUTO: 2 % (ref 0–1)
BILIRUB SERPL-MCNC: 0.67 MG/DL (ref 0.2–1)
BUN SERPL-MCNC: 11 MG/DL (ref 5–25)
CALCIUM SERPL-MCNC: 9.2 MG/DL (ref 8.3–10.1)
CHLORIDE SERPL-SCNC: 102 MMOL/L (ref 100–108)
CHOLEST SERPL-MCNC: 220 MG/DL (ref 50–200)
CO2 SERPL-SCNC: 28 MMOL/L (ref 21–32)
CREAT SERPL-MCNC: 0.8 MG/DL (ref 0.6–1.3)
EOSINOPHIL # BLD AUTO: 0.04 THOUSAND/ΜL (ref 0–0.61)
EOSINOPHIL NFR BLD AUTO: 1 % (ref 0–6)
ERYTHROCYTE [DISTWIDTH] IN BLOOD BY AUTOMATED COUNT: 12.6 % (ref 11.6–15.1)
GFR SERPL CREATININE-BSD FRML MDRD: 97 ML/MIN/1.73SQ M
GLUCOSE P FAST SERPL-MCNC: 89 MG/DL (ref 65–99)
HCT VFR BLD AUTO: 39.3 % (ref 34.8–46.1)
HDLC SERPL-MCNC: 61 MG/DL (ref 40–60)
HGB BLD-MCNC: 12.8 G/DL (ref 11.5–15.4)
IMM GRANULOCYTES # BLD AUTO: 0.01 THOUSAND/UL (ref 0–0.2)
IMM GRANULOCYTES NFR BLD AUTO: 0 % (ref 0–2)
LDLC SERPL CALC-MCNC: 147 MG/DL (ref 0–100)
LYMPHOCYTES # BLD AUTO: 1.38 THOUSANDS/ΜL (ref 0.6–4.47)
LYMPHOCYTES NFR BLD AUTO: 43 % (ref 14–44)
MCH RBC QN AUTO: 28.1 PG (ref 26.8–34.3)
MCHC RBC AUTO-ENTMCNC: 32.6 G/DL (ref 31.4–37.4)
MCV RBC AUTO: 86 FL (ref 82–98)
MONOCYTES # BLD AUTO: 0.52 THOUSAND/ΜL (ref 0.17–1.22)
MONOCYTES NFR BLD AUTO: 16 % (ref 4–12)
NEUTROPHILS # BLD AUTO: 1.21 THOUSANDS/ΜL (ref 1.85–7.62)
NEUTS SEG NFR BLD AUTO: 38 % (ref 43–75)
NRBC BLD AUTO-RTO: 0 /100 WBCS
PLATELET # BLD AUTO: 212 THOUSANDS/UL (ref 149–390)
PMV BLD AUTO: 10.6 FL (ref 8.9–12.7)
POTASSIUM SERPL-SCNC: 3.9 MMOL/L (ref 3.5–5.3)
PROT SERPL-MCNC: 7.7 G/DL (ref 6.4–8.2)
RBC # BLD AUTO: 4.55 MILLION/UL (ref 3.81–5.12)
SODIUM SERPL-SCNC: 138 MMOL/L (ref 136–145)
TRIGL SERPL-MCNC: 61 MG/DL
TSH SERPL DL<=0.05 MIU/L-ACNC: 1.82 UIU/ML (ref 0.36–3.74)
WBC # BLD AUTO: 3.21 THOUSAND/UL (ref 4.31–10.16)

## 2019-03-08 PROCEDURE — 84443 ASSAY THYROID STIM HORMONE: CPT

## 2019-03-08 PROCEDURE — 86800 THYROGLOBULIN ANTIBODY: CPT

## 2019-03-08 PROCEDURE — 80061 LIPID PANEL: CPT

## 2019-03-08 PROCEDURE — 80053 COMPREHEN METABOLIC PANEL: CPT

## 2019-03-08 PROCEDURE — 36415 COLL VENOUS BLD VENIPUNCTURE: CPT

## 2019-03-08 PROCEDURE — 85025 COMPLETE CBC W/AUTO DIFF WBC: CPT

## 2019-03-08 NOTE — PROGRESS NOTES
Patient ID: León Puente is a 35 y o  female  HPI: 35 y  o female presenting with symptoms of sinus pain,pressure, nasal congestion, pnd dry cough , ear and throat pain  She has also been having ongoing allergy issues and complains of intense fatigue  She would also like to have her cholesterol checked        SUBJECTIVE    Family History   Problem Relation Age of Onset    Lung cancer Mother     Heart disease Maternal Grandmother      Social History     Socioeconomic History    Marital status: /Civil Union     Spouse name: Not on file    Number of children: Not on file    Years of education: Not on file    Highest education level: Not on file   Occupational History    Not on file   Social Needs    Financial resource strain: Not on file    Food insecurity:     Worry: Not on file     Inability: Not on file    Transportation needs:     Medical: Not on file     Non-medical: Not on file   Tobacco Use    Smoking status: Never Smoker    Smokeless tobacco: Never Used   Substance and Sexual Activity    Alcohol use: No     Comment: Social alcohol use as per [employer]    Drug use: No    Sexual activity: Not on file   Lifestyle    Physical activity:     Days per week: Not on file     Minutes per session: Not on file    Stress: Not on file   Relationships    Social connections:     Talks on phone: Not on file     Gets together: Not on file     Attends Oriental orthodox service: Not on file     Active member of club or organization: Not on file     Attends meetings of clubs or organizations: Not on file     Relationship status: Not on file    Intimate partner violence:     Fear of current or ex partner: Not on file     Emotionally abused: Not on file     Physically abused: Not on file     Forced sexual activity: Not on file   Other Topics Concern    Not on file   Social History Narrative    Not on file     Past Medical History:   Diagnosis Date    Cardiac arrhythmia     Last Assessed: 1/10/2017   Jr Carlton Seasonal allergies      Past Surgical History:   Procedure Laterality Date    CARDIAC CATHETERIZATION      Last Assessed: 1/10/2017    LASIK      Corneal  Last Assessed: 1/10/2017     Allergies   Allergen Reactions    Penicillins Other (See Comments)     "poly cillin" lip swelling  Other reaction(s):  Other (See Comments)  "poly cillin" lip swelling       Current Outpatient Medications:     albuterol (PROAIR HFA) 90 mcg/act inhaler, Inhale, Disp: , Rfl:     ALPRAZolam (XANAX) 0 5 mg tablet, Take 1 tablet (0 5 mg total) by mouth 2 (two) times a day, Disp: 60 tablet, Rfl: 3    azithromycin (ZITHROMAX) 250 mg tablet, Take 2 tablets today then 1 tablet daily x 4 days, Disp: 6 tablet, Rfl: 0    betamethasone, augmented, (DIPROLENE-AF) 0 05 % cream, , Disp: , Rfl:     Cetirizine HCl (ZYRTEC ALLERGY) 10 MG CAPS, Take 1 tablet by mouth daily, Disp: , Rfl:     meloxicam (MOBIC) 15 mg tablet, Take 1 tablet (15 mg total) by mouth daily for 30 days, Disp: 30 tablet, Rfl: 0    methylPREDNISolone 4 MG tablet therapy pack, Use as directed on package, Disp: 21 each, Rfl: 0    Methylprednisolone 4 MG TBPK, Use as directed on package, Disp: 21 tablet, Rfl: 0    Current Facility-Administered Medications:     trimethobenzamide (TIGAN) IM injection 200 mg, 200 mg, Intramuscular, Q6H PRN, [employer] Broadt, DO, 200 mg at 02/27/18 1502    Review of Systems  Constitutional:     Denies fever, chills, weakness ,weight loss, weight gain  + fatigue    ENT: Denies earache, loss of hearing, nosebleed, nasal discharge,but complains of nasal congestion, sore throat,hoarseness and sinus pain and pressure    Pulmonary: Denies shortness of breath ,cough , dyspnea on exertionon, orthopnea ,+ PND   Cardiovascular:  Denies bradycardia , tachycardia ,palpations, lower extremity, edema leg, claudication  Breast:  Denies new or changing breast lumps,  nipple discharge, nipple changes,  Abdomen:  Denies abdominal pain , anorexia ,indigestion, nausea ,vomiting, constipation , diarrhea  Musculoskeletal: Denies myalgias, arthralgias, joint swelling, joint stiffness ,limb pain, limb swelling  Lymph:+ swollen glands  Gu: no dysuria or urinary frequency  Skin: Denies skin rash, skin lesion, skin wound, itching,dry skin  Neuro: Denies headache, numbness, tingling, confusion, loss of consciousness, dizziness ,vertigo  Psychiatric: Denies feelings of depression, suicidal ideation, anxiety, sleep disturbances    OBJECTIVE  /78 (BP Location: Right arm, Patient Position: Sitting, Cuff Size: Standard)   Pulse 82   Temp 97 9 °F (36 6 °C)   Ht 5' 2" (1 575 m)   Wt 76 5 kg (168 lb 9 6 oz)   LMP 02/23/2019   BMI 30 84 kg/m²   Constitutional:   NAD, well appearing and well nourished      ENT:   Conjunctiva and lids: no injection, edema, or discharge    Pupils and iris: CASANDRA bilaterally   External inspection of ears and nose: normal without deformities or discharge  Otoscopic exam: Canals patent ; tm are dull, with with erythem and effusions  ENasal mucosa, septum and turbinates: Turbinae injection with discharge   Oropharynx:  Moist mucosa, normal tongue and tonsils without lesions  Erythema and injection  of post pharynx with pnd      Pulmonary:Respiratory effort normal rate and rhythm, no increased work of breathing   Auscultation of lungs:  Clear bilaterally with no adventitious breath sounds       Cardiovascular: regular rate and rhythm, S1 and S2, no murmur, no edema and/or varicosities of LE      Abdomen: Soft and non-distended    Positive bowel sounds    No heptomegaly or splenomegaly    Lymphatic: Anterior  cervical lymphadenopathy         Muscskeletal:  Gait and station: Normal gait     Digits and nails normal without clubbing or cyanosis     Inspection/palpation of joints, bones, and muscles:  No joint tenderness, swelling, full active and passive range of motion      Gu: no suprabubic tenderness, CVA tenderness or urethral discharge  Skin: Normal skin turgor and no rashes    Neuro:    Normal reflexes   Psych:   alert and oriented to person, place and time  normal mood and affec      Assessment/Plan:Diagnoses and all orders for this visit:    Acute sinusitis, recurrence not specified, unspecified location  -     azithromycin (ZITHROMAX) 250 mg tablet; Take 2 tablets today then 1 tablet daily x 4 days  -     methylPREDNISolone 4 MG tablet therapy pack; Use as directed on package    Allergic rhinitis, unspecified seasonality, unspecified trigger    Hypercholesterolemia  -     Lipid Panel with Direct LDL reflex; Future    Other fatigue  -     Comprehensive metabolic panel; Future  -     CBC and differential; Future  -     TSH, 3rd generation; Future  -     Anti-thyroglobulin antibody; Future        Reviewed with patient plan to treat with above plan    Will await lab results and treat accordingly,    Patient instructed to call in 72 hours if not feeling better or if symptoms worsen

## 2019-03-09 LAB — THYROGLOB AB SERPL-ACNC: <1 IU/ML (ref 0–0.9)

## 2019-03-18 DIAGNOSIS — R06.2 WHEEZING: Primary | ICD-10-CM

## 2019-03-19 RX ORDER — ALBUTEROL SULFATE 90 UG/1
2 AEROSOL, METERED RESPIRATORY (INHALATION) EVERY 6 HOURS PRN
Qty: 1 INHALER | Refills: 2 | Status: SHIPPED | OUTPATIENT
Start: 2019-03-19 | End: 2021-01-04 | Stop reason: SDUPTHER

## 2019-03-19 NOTE — TELEPHONE ENCOUNTER
Patient stated that her dog chewed her albuterol inhaler and she would like to have a new one  Also, the one she had was

## 2019-03-21 ENCOUNTER — TELEPHONE (OUTPATIENT)
Dept: FAMILY MEDICINE CLINIC | Facility: CLINIC | Age: 34
End: 2019-03-21

## 2019-03-21 DIAGNOSIS — J32.9 RECURRENT SINUS INFECTIONS: Primary | ICD-10-CM

## 2019-03-21 NOTE — TELEPHONE ENCOUNTER
She finished steroid last wk, she was doing fine but now has, swollen lymph node  , nausea ,dizzy again,  Dr lucia discussed seeing an ENT  Pl adv

## 2019-04-01 PROBLEM — R09.82 POST-NASAL DRIP: Status: ACTIVE | Noted: 2019-04-01

## 2019-04-01 PROBLEM — J30.1 NON-SEASONAL ALLERGIC RHINITIS DUE TO POLLEN: Status: ACTIVE | Noted: 2019-04-01

## 2019-04-01 PROBLEM — R09.81 NASAL CONGESTION: Status: ACTIVE | Noted: 2019-04-01

## 2019-04-23 ENCOUNTER — TELEPHONE (OUTPATIENT)
Dept: FAMILY MEDICINE CLINIC | Facility: CLINIC | Age: 34
End: 2019-04-23

## 2019-04-23 DIAGNOSIS — R11.2 NAUSEA AND VOMITING, INTRACTABILITY OF VOMITING NOT SPECIFIED, UNSPECIFIED VOMITING TYPE: Primary | ICD-10-CM

## 2019-04-23 RX ORDER — ONDANSETRON HYDROCHLORIDE 8 MG/1
8 TABLET, FILM COATED ORAL EVERY 8 HOURS PRN
Qty: 20 TABLET | Refills: 0 | Status: SHIPPED | OUTPATIENT
Start: 2019-04-23 | End: 2019-11-11 | Stop reason: ALTCHOICE

## 2019-08-05 PROBLEM — J45.909 ASTHMA: Status: ACTIVE | Noted: 2017-01-10

## 2019-08-05 PROBLEM — R09.82 POST-NASAL DRIP: Status: RESOLVED | Noted: 2019-04-01 | Resolved: 2019-08-05

## 2019-08-05 PROBLEM — R09.81 NASAL CONGESTION: Status: RESOLVED | Noted: 2019-04-01 | Resolved: 2019-08-05

## 2019-08-05 PROBLEM — F41.9 ANXIETY: Status: ACTIVE | Noted: 2017-01-10

## 2019-08-05 NOTE — PROGRESS NOTES
Assessment/Plan   Diagnoses and all orders for this visit:    Well woman exam    Other orders  -     GP Liquid-Based Pap + HPV Plus        Discussion    All questions have been answered to her satisfaction  RTO for APE or sooner if needed      Subjective     Pt for annual GYN exam  NP to our office  Pt in good health, paps always WNL in the past  Pt monogamous w   Just recently started trying for pregnancy  I r/w pt best time to try, cycle tracking apps, etc  I also r/w pt CF and SMn testing as well as folic acid supplementation  Pt had VAricella as a child and is UTD on all vaccines  Pt denies vaginal complaints  No problems w IC  Giuliana Mathis is a 35 y o  female who presents for annual well woman exam    Menarche -15 ; LMP - 7/27/19; Periods are reg q 28 days and last 5 days; No excessive bleeding; No intermenstrual bleeding or spotting; Cramps are tolerable  No vulvar itch/burn; No vaginal itch/burn; No abn discharge or odor; No urinary sx - burning/pain/frequency/hematuria  (+) SBEs - no breast masses, asymmetry, nipple discharge or bleeding, changes in skin of breast, or breast tenderness bilaterally  No abd/pelvic pain or HAs; No menopausal symptoms: No hot flashes/night sweats, problems with intercourse, vaginal dryness; sleeping well;   Pt is sexually active in a mutually monog/ sexual relationship; No issues with intercourse; She declines std/hiv/hep testing; Feels safe at home  Current contraception: none    (+) PCP for routine Bw/care; Last Pap - 2017  History of abnormal Pap smear: none    Review of Systems   Constitutional: Negative  Respiratory: Negative  Gastrointestinal: Negative  Endocrine: Negative  Genitourinary: Negative          The following portions of the patient's history were reviewed and updated as appropriate: current medications, past family history, past medical history, past social history, past surgical history and problem list          OB History        0    Para   0    Term   0       0    AB   0    Living   0       SAB   0    TAB   0    Ectopic   0    Multiple   0    Live Births   0                 Past Medical History:   Diagnosis Date    Cardiac arrhythmia     Last Assessed: 1/10/2017    Seasonal allergies        Past Surgical History:   Procedure Laterality Date    AUGMENTATION BREAST      CARDIAC CATHETERIZATION      Last Assessed: 1/10/2017    LASIK      Corneal  Last Assessed: 1/10/2017       Family History   Problem Relation Age of Onset    Lung cancer Mother     Cancer Mother     Heart disease Maternal Grandmother        Social History     Socioeconomic History    Marital status: /Civil Union     Spouse name: Not on file    Number of children: Not on file    Years of education: Not on file    Highest education level: Not on file   Occupational History    Not on file   Social Needs    Financial resource strain: Not on file    Food insecurity:     Worry: Not on file     Inability: Not on file    Transportation needs:     Medical: Not on file     Non-medical: Not on file   Tobacco Use    Smoking status: Never Smoker    Smokeless tobacco: Never Used   Substance and Sexual Activity    Alcohol use: No     Comment: Social alcohol use as per [employer]    Drug use: No    Sexual activity: Yes     Partners: Male     Birth control/protection: None   Lifestyle    Physical activity:     Days per week: Not on file     Minutes per session: Not on file    Stress: Not on file   Relationships    Social connections:     Talks on phone: Not on file     Gets together: Not on file     Attends Amish service: Not on file     Active member of club or organization: Not on file     Attends meetings of clubs or organizations: Not on file     Relationship status: Not on file    Intimate partner violence:     Fear of current or ex partner: Not on file     Emotionally abused: Not on file     Physically abused: Not on file Forced sexual activity: Not on file   Other Topics Concern    Not on file   Social History Narrative    Not on file         Current Outpatient Medications:     albuterol (PROAIR HFA) 90 mcg/act inhaler, Inhale 2 puffs every 6 (six) hours as needed for wheezing, Disp: 1 Inhaler, Rfl: 2    ALPRAZolam (XANAX) 0 5 mg tablet, Take 1 tablet (0 5 mg total) by mouth 2 (two) times a day, Disp: 60 tablet, Rfl: 3    betamethasone, augmented, (DIPROLENE-AF) 0 05 % cream, , Disp: , Rfl:     Cetirizine HCl (ZYRTEC ALLERGY) 10 MG CAPS, Take 1 tablet by mouth daily, Disp: , Rfl:     meloxicam (MOBIC) 15 mg tablet, Take 1 tablet (15 mg total) by mouth daily for 30 days, Disp: 30 tablet, Rfl: 0    methylPREDNISolone 4 MG tablet therapy pack, Use as directed on package (Patient not taking: Reported on 4/1/2019), Disp: 21 each, Rfl: 0    Methylprednisolone 4 MG TBPK, Use as directed on package (Patient not taking: Reported on 4/1/2019), Disp: 21 tablet, Rfl: 0    ondansetron (ZOFRAN) 8 mg tablet, Take 1 tablet (8 mg total) by mouth every 8 (eight) hours as needed for nausea or vomiting for up to 7 days, Disp: 20 tablet, Rfl: 0    Current Facility-Administered Medications:     trimethobenzamide (TIGAN) IM injection 200 mg, 200 mg, Intramuscular, Q6H PRN, Dee Dee Esparza, DO, 200 mg at 02/27/18 1502    Allergies   Allergen Reactions    Penicillins Other (See Comments)     "poly cillin" lip swelling  Other reaction(s): Other (See Comments)  "poly cillin" lip swelling       Objective   Vitals:    08/06/19 1105   BP: 118/80   BP Location: Left arm   Patient Position: Sitting   Cuff Size: Standard   Weight: 77 1 kg (170 lb)   Height: 5' 2" (1 575 m)     Physical Exam   Constitutional: She is oriented to person, place, and time  She appears well-developed and well-nourished  HENT:   Head: Normocephalic and atraumatic  Cardiovascular: Normal rate and regular rhythm     Pulmonary/Chest: Effort normal and breath sounds normal  Right breast exhibits no inverted nipple, no mass, no nipple discharge, no skin change and no tenderness  Left breast exhibits no inverted nipple, no mass, no nipple discharge, no skin change and no tenderness  Breasts are symmetrical    Abdominal: Soft  She exhibits no distension and no mass  There is no rebound and no guarding  Genitourinary: Vagina normal and uterus normal  No vaginal discharge found  Neurological: She is alert and oriented to person, place, and time  Skin: Skin is warm and dry  Psychiatric: She has a normal mood and affect  Patient Instructions   Pap done today  Will look into CF/SMN coverage  I gave her CPT codes as well as ICD-10 codes  All questions answered  Also given info for Ovia mookie to help track cycles and ovulation

## 2019-08-06 ENCOUNTER — OFFICE VISIT (OUTPATIENT)
Dept: OBGYN CLINIC | Facility: CLINIC | Age: 34
End: 2019-08-06
Payer: COMMERCIAL

## 2019-08-06 VITALS
DIASTOLIC BLOOD PRESSURE: 80 MMHG | BODY MASS INDEX: 31.28 KG/M2 | WEIGHT: 170 LBS | SYSTOLIC BLOOD PRESSURE: 118 MMHG | HEIGHT: 62 IN

## 2019-08-06 DIAGNOSIS — Z01.419 WELL WOMAN EXAM: Primary | ICD-10-CM

## 2019-08-06 PROCEDURE — S0610 ANNUAL GYNECOLOGICAL EXAMINA: HCPCS | Performed by: PHYSICIAN ASSISTANT

## 2019-08-06 NOTE — PATIENT INSTRUCTIONS
Pap done today  Will look into CF/SMN coverage  I gave her CPT codes as well as ICD-10 codes  All questions answered  Also given info for Ovia mookie to help track cycles and ovulation

## 2019-08-10 LAB
HPV HR 12 DNA CVX QL NAA+PROBE: DETECTED
HPV16 DNA SPEC QL NAA+PROBE: NOT DETECTED
HPV18 DNA SPEC QL NAA+PROBE: NOT DETECTED
THIN PREP CVX: ABNORMAL

## 2019-08-14 ENCOUNTER — TELEPHONE (OUTPATIENT)
Dept: OBGYN CLINIC | Facility: CLINIC | Age: 34
End: 2019-08-14

## 2019-08-14 ENCOUNTER — TELEPHONE (OUTPATIENT)
Dept: FAMILY MEDICINE CLINIC | Facility: CLINIC | Age: 34
End: 2019-08-14

## 2019-08-14 DIAGNOSIS — R87.610 ASCUS OF CERVIX WITH NEGATIVE HIGH RISK HPV: Primary | ICD-10-CM

## 2019-08-14 NOTE — TELEPHONE ENCOUNTER
Pt returned call, asking specific names of diagnosis  Very anxious, did state Dave Ramirez explained everything in full but wanted names since she has never been through anything before  Pt told to call back with any further questions

## 2019-08-15 ENCOUNTER — APPOINTMENT (OUTPATIENT)
Dept: LAB | Facility: CLINIC | Age: 34
End: 2019-08-15
Payer: COMMERCIAL

## 2019-08-15 DIAGNOSIS — R87.610 ASCUS OF CERVIX WITH NEGATIVE HIGH RISK HPV: ICD-10-CM

## 2019-08-15 LAB
ERYTHROCYTE [DISTWIDTH] IN BLOOD BY AUTOMATED COUNT: 13.2 % (ref 11.6–15.1)
HCT VFR BLD AUTO: 37.5 % (ref 34.8–46.1)
HGB BLD-MCNC: 12.1 G/DL (ref 11.5–15.4)
MCH RBC QN AUTO: 28.2 PG (ref 26.8–34.3)
MCHC RBC AUTO-ENTMCNC: 32.3 G/DL (ref 31.4–37.4)
MCV RBC AUTO: 87 FL (ref 82–98)
PLATELET # BLD AUTO: 220 THOUSANDS/UL (ref 149–390)
PMV BLD AUTO: 11.3 FL (ref 8.9–12.7)
RBC # BLD AUTO: 4.29 MILLION/UL (ref 3.81–5.12)
WBC # BLD AUTO: 5.56 THOUSAND/UL (ref 4.31–10.16)

## 2019-08-15 PROCEDURE — 36415 COLL VENOUS BLD VENIPUNCTURE: CPT

## 2019-08-15 PROCEDURE — 85027 COMPLETE CBC AUTOMATED: CPT

## 2019-09-11 ENCOUNTER — PROCEDURE VISIT (OUTPATIENT)
Dept: OBGYN CLINIC | Facility: CLINIC | Age: 34
End: 2019-09-11
Payer: COMMERCIAL

## 2019-09-11 VITALS
SYSTOLIC BLOOD PRESSURE: 118 MMHG | DIASTOLIC BLOOD PRESSURE: 80 MMHG | BODY MASS INDEX: 31.47 KG/M2 | WEIGHT: 171 LBS | HEIGHT: 62 IN

## 2019-09-11 DIAGNOSIS — R87.810 ASCUS WITH POSITIVE HIGH RISK HPV CERVICAL: Primary | ICD-10-CM

## 2019-09-11 DIAGNOSIS — R87.610 ASCUS WITH POSITIVE HIGH RISK HPV CERVICAL: Primary | ICD-10-CM

## 2019-09-11 PROCEDURE — 57456 ENDOCERV CURETTAGE W/SCOPE: CPT | Performed by: PHYSICIAN ASSISTANT

## 2019-09-11 NOTE — PROGRESS NOTES
Assessment/Plan:    ASCUS with positive high risk HPV cervical  No intercourse, tampon use, soaking in bath tub, or swimming for the next 5 days to avoid risk of infection  Call office with excessive bleeding, cramping, fever, or chills  Office will call with results and appropriate follow up  Problem List Items Addressed This Visit        Other    ASCUS with positive high risk HPV cervical - Primary     No intercourse, tampon use, soaking in bath tub, or swimming for the next 5 days to avoid risk of infection  Call office with excessive bleeding, cramping, fever, or chills  Office will call with results and appropriate follow up  Relevant Orders    GP ENDO-CERVICAL BIOPSY            Subjective:      Patient ID: Mariano Borden is a 29 y o  female  HPI  28 yo seen for colposcopy  Pap smear on 8/6/2019 ASCUS (+)HRHPV non 16, 18  No history of abnormal pap smears in the past  Patient is trying to conceive  The following portions of the patient's history were reviewed and updated as appropriate:   She  has a past medical history of Cardiac arrhythmia and Seasonal allergies  She   Patient Active Problem List    Diagnosis Date Noted    ASCUS with positive high risk HPV cervical 09/11/2019    Well woman exam 08/06/2019    Non-seasonal allergic rhinitis due to pollen 04/01/2019    Hyperlipidemia 01/12/2017    Anxiety 01/10/2017    Asthma 01/10/2017     She  has a past surgical history that includes Cardiac catheterization; LASIK; and AUGMENTATION BREAST  Her family history includes Cancer in her mother; Heart disease in her maternal grandmother; Lung cancer in her mother  She  reports that she has never smoked  She has never used smokeless tobacco  She reports that she does not drink alcohol or use drugs    Current Outpatient Medications   Medication Sig Dispense Refill    albuterol (PROAIR HFA) 90 mcg/act inhaler Inhale 2 puffs every 6 (six) hours as needed for wheezing 1 Inhaler 2  ALPRAZolam (XANAX) 0 5 mg tablet Take 1 tablet (0 5 mg total) by mouth 2 (two) times a day 60 tablet 3    betamethasone, augmented, (DIPROLENE-AF) 0 05 % cream       Cetirizine HCl (ZYRTEC ALLERGY) 10 MG CAPS Take 1 tablet by mouth daily      meloxicam (MOBIC) 15 mg tablet Take 1 tablet (15 mg total) by mouth daily for 30 days 30 tablet 0    methylPREDNISolone 4 MG tablet therapy pack Use as directed on package (Patient not taking: Reported on 4/1/2019) 21 each 0    Methylprednisolone 4 MG TBPK Use as directed on package (Patient not taking: Reported on 4/1/2019) 21 tablet 0    omeprazole (PriLOSEC) 20 mg delayed release capsule Take 1 capsule (20 mg total) by mouth 2 (two) times a day before meals 60 capsule 5    ondansetron (ZOFRAN) 8 mg tablet Take 1 tablet (8 mg total) by mouth every 8 (eight) hours as needed for nausea or vomiting for up to 7 days 20 tablet 0     Current Facility-Administered Medications   Medication Dose Route Frequency Provider Last Rate Last Dose    trimethobenzamide (TIGAN) IM injection 200 mg  200 mg Intramuscular Q6H PRN Dolly Lute Broadt, DO   200 mg at 02/27/18 1502     She is allergic to penicillins       Review of Systems   Constitutional: Negative for fatigue, fever and unexpected weight change  HENT: Negative for dental problem and sinus pressure  Eyes: Negative for visual disturbance  Respiratory: Negative for cough, shortness of breath and wheezing  Cardiovascular: Negative for chest pain  Gastrointestinal: Negative for abdominal pain, blood in stool, constipation, diarrhea, nausea and vomiting  Endocrine: Negative for polydipsia  Genitourinary: Negative for difficulty urinating, dyspareunia, dysuria, frequency, hematuria, pelvic pain and urgency  Musculoskeletal: Negative for arthralgias and back pain  Neurological: Negative for dizziness, seizures, light-headedness and headaches  Psychiatric/Behavioral: Negative for suicidal ideas   The patient is not nervous/anxious  Objective:      /80 (BP Location: Left arm, Patient Position: Sitting, Cuff Size: Standard)   Ht 5' 2" (1 575 m)   Wt 77 6 kg (171 lb)   LMP 08/28/2019 (Approximate)   BMI 31 28 kg/m²          Physical Exam   Constitutional: She is oriented to person, place, and time  She appears well-developed and well-nourished  Genitourinary: There is no rash, tenderness, lesion or injury on the right labia  There is no rash, tenderness, lesion or injury on the left labia  Cervix exhibits no motion tenderness, no discharge and no friability  No erythema, tenderness or bleeding in the vagina  No foreign body in the vagina  No signs of injury around the vagina  No vaginal discharge found  Neurological: She is alert and oriented to person, place, and time  Skin: Skin is warm and dry  No rash noted  No erythema  No pallor           Colposcopy  Date/Time: 9/11/2019 6:05 PM  Performed by: Davey Chavez PA-C  Authorized by: Davey Chavez PA-C     Consent:     Consent obtained:  Verbal and written    Consent given by:  Patient    Procedural risks discussed:  Bleeding, failure rate, infection, repeat procedure and possible continued pain    Patient questions answered: yes      Patient agrees, verbalizes understanding, and wants to proceed: yes      Educational handouts given: yes      Instructions and paperwork completed: yes    Universal protocol:     Patient states understanding of procedure being performed: yes      Relevant documents present and verified: yes      Test results available and properly labeled: yes    Pre-procedure:     Premeds:  Ibuprofen    Prepped with: acetic acid    Indication:     Indication:  ASC-US  Procedure:     Procedure: Colposcopy w/ endocervical curettage      [place] speculum was placed in the vagina: yes      Under colposcopic examination the transition zone was seen in entirety: no (TZ not fully seen)      Endocervix was curetted using a Kevorkian curette: yes      Monsel's solution was applied: yes      Biopsy(s): yes      Location:  Endocervical    Specimen to pathology: yes    Post-procedure:     Impression: Low grade cervical dysplasia      Patient tolerance of procedure:   Tolerated well, no immediate complications

## 2019-09-14 LAB — BIOPSY SPEC-IMP: NORMAL

## 2019-09-16 ENCOUNTER — TELEPHONE (OUTPATIENT)
Dept: OBGYN CLINIC | Facility: CLINIC | Age: 34
End: 2019-09-16

## 2019-09-23 ENCOUNTER — TELEPHONE (OUTPATIENT)
Dept: OBGYN CLINIC | Facility: CLINIC | Age: 34
End: 2019-09-23

## 2019-09-23 ENCOUNTER — HOSPITAL ENCOUNTER (EMERGENCY)
Facility: HOSPITAL | Age: 34
Discharge: HOME | End: 2019-09-24
Attending: EMERGENCY MEDICINE
Payer: COMMERCIAL

## 2019-09-23 VITALS
HEART RATE: 89 BPM | RESPIRATION RATE: 18 BRPM | DIASTOLIC BLOOD PRESSURE: 67 MMHG | SYSTOLIC BLOOD PRESSURE: 142 MMHG | TEMPERATURE: 98.8 F | OXYGEN SATURATION: 100 % | WEIGHT: 155 LBS | BODY MASS INDEX: 28.35 KG/M2

## 2019-09-23 DIAGNOSIS — R10.2 PELVIC PAIN IN FEMALE: Primary | ICD-10-CM

## 2019-09-23 DIAGNOSIS — R10.9 LEFT SIDED ABDOMINAL PAIN OF UNKNOWN CAUSE: Primary | ICD-10-CM

## 2019-09-23 PROCEDURE — 99284 EMERGENCY DEPT VISIT MOD MDM: CPT

## 2019-09-23 PROCEDURE — 99284 EMERGENCY DEPT VISIT MOD MDM: CPT | Performed by: EMERGENCY MEDICINE

## 2019-09-23 PROCEDURE — 81025 URINE PREGNANCY TEST: CPT | Performed by: EMERGENCY MEDICINE

## 2019-09-23 RX ORDER — HYDROCODONE BITARTRATE AND ACETAMINOPHEN 5; 325 MG/1; MG/1
1 TABLET ORAL ONCE
Status: COMPLETED | OUTPATIENT
Start: 2019-09-23 | End: 2019-09-24

## 2019-09-23 NOTE — TELEPHONE ENCOUNTER
Pt called to discuss left lower abd pain lasting for the past two days  Pt states that her period is normal and last 28 days her next period starts this weeks  Pt took a pregnancy test which was negative  Ordered a non OB US for pt to schedule, provided central scheduling number to pt

## 2019-09-24 ENCOUNTER — HOSPITAL ENCOUNTER (OUTPATIENT)
Dept: RADIOLOGY | Facility: MEDICAL CENTER | Age: 34
Discharge: HOME | End: 2019-09-24
Payer: COMMERCIAL

## 2019-09-24 DIAGNOSIS — R10.9 LEFT SIDED ABDOMINAL PAIN OF UNKNOWN CAUSE: ICD-10-CM

## 2019-09-24 LAB
BACTERIA UR QL AUTO: ABNORMAL /HPF
BILIRUB UR QL STRIP: NEGATIVE
CLARITY UR: CLEAR
COLOR UR: YELLOW
EXT PREG TEST URINE: NEGATIVE
EXT. CONTROL ED NAV: NORMAL
GLUCOSE UR STRIP-MCNC: NEGATIVE MG/DL
HGB UR QL STRIP.AUTO: NEGATIVE
KETONES UR STRIP-MCNC: ABNORMAL MG/DL
LEUKOCYTE ESTERASE UR QL STRIP: NEGATIVE
MUCOUS THREADS UR QL AUTO: ABNORMAL
NITRITE UR QL STRIP: NEGATIVE
NON-SQ EPI CELLS URNS QL MICRO: ABNORMAL /HPF
PH UR STRIP.AUTO: 6 [PH] (ref 4.5–8)
PROT UR STRIP-MCNC: ABNORMAL MG/DL
RBC #/AREA URNS AUTO: ABNORMAL /HPF
SP GR UR STRIP.AUTO: >=1.03 (ref 1–1.03)
UROBILINOGEN UR QL STRIP.AUTO: 0.2 E.U./DL
WBC #/AREA URNS AUTO: ABNORMAL /HPF

## 2019-09-24 PROCEDURE — 76856 US EXAM PELVIC COMPLETE: CPT

## 2019-09-24 PROCEDURE — 81001 URINALYSIS AUTO W/SCOPE: CPT

## 2019-09-24 PROCEDURE — 76830 TRANSVAGINAL US NON-OB: CPT

## 2019-09-24 RX ORDER — OXYCODONE HYDROCHLORIDE AND ACETAMINOPHEN 5; 325 MG/1; MG/1
1 TABLET ORAL EVERY 6 HOURS PRN
Qty: 8 TABLET | Refills: 0 | Status: SHIPPED | OUTPATIENT
Start: 2019-09-24 | End: 2019-10-04

## 2019-09-24 RX ADMIN — HYDROCODONE BITARTRATE AND ACETAMINOPHEN 1 TABLET: 5; 325 TABLET ORAL at 00:06

## 2019-09-24 NOTE — ED PROVIDER NOTES
History  Chief Complaint   Patient presents with    Pelvic Pain     patient reports left sided groin pain since yesterday  denies dysuria, N/V, or discharge       History provided by:  Patient   used: No     70-year-old female presented for evaluation of left lower quadrant/suprapubic pain beginning yesterday and remaining fairly constant  States that today it was more intense, more sharp and is now just achy but ongoing  No radiation  No associated symptoms  No urinary complaints, discharge, nausea, vomiting, constipation, diarrhea, fever, chills  No history of similar pain  No abdominal surgeries  LMP was 3-4 weeks ago  Patient took home pregnancy test which was negative  Called OBGYN and was ordered pelvic ultrasound which is scheduled for tomorrow at 3:00 p m  Sha Velásquez Patient states that she was uncomfortable with the pain  No relief with ibuprofen so she presented to the emergency department  On exam she has focal tenderness in the left suprapubic area  Differential diagnosis includes ovarian cyst, less likely diverticulitis, pregnancy, UTI  Prior to Admission Medications   Prescriptions Last Dose Informant Patient Reported? Taking?    ALPRAZolam (XANAX) 0 5 mg tablet  Self No No   Sig: Take 1 tablet (0 5 mg total) by mouth 2 (two) times a day   Cetirizine HCl (ZYRTEC ALLERGY) 10 MG CAPS  Self Yes No   Sig: Take 1 tablet by mouth daily   Methylprednisolone 4 MG TBPK  Self No No   Sig: Use as directed on package   Patient not taking: Reported on 4/1/2019   albuterol (PROAIR HFA) 90 mcg/act inhaler  Self No No   Sig: Inhale 2 puffs every 6 (six) hours as needed for wheezing   betamethasone, augmented, (DIPROLENE-AF) 0 05 % cream  Self Yes No   meloxicam (MOBIC) 15 mg tablet   No No   Sig: Take 1 tablet (15 mg total) by mouth daily for 30 days   methylPREDNISolone 4 MG tablet therapy pack  Self No No   Sig: Use as directed on package   Patient not taking: Reported on 4/1/2019 omeprazole (PriLOSEC) 20 mg delayed release capsule   No No   Sig: Take 1 capsule (20 mg total) by mouth 2 (two) times a day before meals   ondansetron (ZOFRAN) 8 mg tablet   No No   Sig: Take 1 tablet (8 mg total) by mouth every 8 (eight) hours as needed for nausea or vomiting for up to 7 days      Facility-Administered Medications Last Administration Doses Remaining   trimethobenzamide (TIGAN) IM injection 200 mg 2/27/2018  3:02 PM           Past Medical History:   Diagnosis Date    Cardiac arrhythmia     Last Assessed: 1/10/2017    Seasonal allergies        Past Surgical History:   Procedure Laterality Date    AUGMENTATION BREAST      CARDIAC CATHETERIZATION      Last Assessed: 1/10/2017    LASIK      Corneal  Last Assessed: 1/10/2017       Family History   Problem Relation Age of Onset    Lung cancer Mother     Cancer Mother     Heart disease Maternal Grandmother      I have reviewed and agree with the history as documented  Social History     Tobacco Use    Smoking status: Never Smoker    Smokeless tobacco: Never Used   Substance Use Topics    Alcohol use: No     Comment: Social alcohol use as per [employer]    Drug use: No        Review of Systems   Constitutional: Negative for activity change, appetite change, fatigue and fever  Respiratory: Negative for chest tightness and shortness of breath  Gastrointestinal: Positive for abdominal pain  Genitourinary: Positive for pelvic pain  Negative for difficulty urinating, dysuria and flank pain  Musculoskeletal: Negative for back pain and neck pain  Skin: Negative for color change and pallor  Neurological: Negative for dizziness and weakness  All other systems reviewed and are negative  Physical Exam  Physical Exam   Constitutional: She is oriented to person, place, and time  She appears well-developed and well-nourished  No distress  HENT:   Head: Normocephalic     Mouth/Throat: Oropharynx is clear and moist    Cardiovascular: Normal rate and regular rhythm  Pulmonary/Chest: Effort normal  No respiratory distress  Abdominal: Soft  She exhibits no distension  Focal tenderness in the left suprapubic area  Musculoskeletal: Normal range of motion  She exhibits no edema  Neurological: She is alert and oriented to person, place, and time  Skin: Skin is warm and dry  No rash noted  Psychiatric: She has a normal mood and affect  Her behavior is normal    Nursing note and vitals reviewed        Vital Signs  ED Triage Vitals [09/23/19 2221]   Temperature Pulse Respirations Blood Pressure SpO2   98 8 °F (37 1 °C) 89 18 142/67 100 %      Temp Source Heart Rate Source Patient Position - Orthostatic VS BP Location FiO2 (%)   Oral Monitor Lying Right arm --      Pain Score       --           Vitals:    09/23/19 2221   BP: 142/67   Pulse: 89   Patient Position - Orthostatic VS: Lying         Visual Acuity      ED Medications  Medications   HYDROcodone-acetaminophen (NORCO) 5-325 mg per tablet 1 tablet (1 tablet Oral Given 9/24/19 0006)       Diagnostic Studies  Results Reviewed     Procedure Component Value Units Date/Time    Urine Microscopic [[id number]]  (Abnormal) Collected:  09/24/19 0016    Lab Status:  Final result Specimen:  Urine Updated:  09/24/19 0016     RBC, UA None Seen /hpf      WBC, UA 1-2 /hpf      Epithelial Cells Occasional /hpf      Bacteria, UA Occasional /hpf      MUCUS THREADS Occasional    POCT pregnancy, urine [[id number]]  (Normal) Resulted:  09/24/19 0005    Lab Status:  Final result Updated:  09/24/19 0005     EXT PREG TEST UR (Ref: Negative) Negative     Control Valid    ED Urine Macroscopic [[id number]]  (Abnormal) Collected:  09/24/19 0016    Lab Status:  Final result Specimen:  Urine Updated:  09/24/19 0002     Color, UA Yellow     Clarity, UA Clear     pH, UA 6 0     Leukocytes, UA Negative     Nitrite, UA Negative     Protein, UA Trace mg/dl      Glucose, UA Negative mg/dl      Ketones, UA Trace mg/dl Urobilinogen, UA 0 2 E U /dl      Bilirubin, UA Negative     Blood, UA Negative     Specific Gravity, UA >=1 030    Narrative:       CLINITEK RESULT                 No orders to display              Procedures  Procedures       ED Course                               MDM  Number of Diagnoses or Management Options  Pelvic pain in female: new and requires workup  Diagnosis management comments:   51-year-old female presented with 2 days of left-sided pelvic pain  No associated nausea, vomiting, urinary symptoms, vaginal discharge, fever, chills  Some mild left suprapubic tenderness on exam   UA normal   Negative pregnancy  Patient had called her OBGYN and is scheduled for a pelvic ultrasound tomorrow  Discussed risks/benefits of CT imaging today versus waiting for ultrasound tomorrow  Patient comfortable with waiting  She is in no acute distress  Given analgesics and will discharge  To return with worsening symptoms  Amount and/or Complexity of Data Reviewed  Clinical lab tests: ordered and reviewed    Patient Progress  Patient progress: improved      Disposition  Final diagnoses:   Pelvic pain in female     Time reflects when diagnosis was documented in both MDM as applicable and the Disposition within this note     Time User Action Codes Description Comment    9/24/2019 12:25 AM Zelalem HANKINS Add [R10 2] Pelvic pain in female     9/24/2019 12:25 AM Ann Welsh Modify [R10 2] Pelvic pain in female       ED Disposition     ED Disposition Condition Date/Time Comment    Discharge Stable Tue Sep 24, 2019 12:25 AM Cameron Archibald 99 discharge to home/self care              Follow-up Information     Follow up With Specialties Details Why Contact Info Additional Information    [facility] Emergency Medicine  If symptoms worsen [address] [facility] [address], [address], [place], [place], [address] Discharge Medication List as of 9/24/2019 12:26 AM      START taking these medications    Details   oxyCODONE-acetaminophen (PERCOCET) 5-325 mg per tablet Take 1 tablet by mouth every 6 (six) hours as needed for moderate pain for up to 10 daysMax Daily Amount: 4 tablets, Starting Tue 9/24/2019, Until Fri 10/4/2019, Print         CONTINUE these medications which have NOT CHANGED    Details   albuterol (PROAIR HFA) 90 mcg/act inhaler Inhale 2 puffs every 6 (six) hours as needed for wheezing, Starting Tue 3/19/2019, Normal      ALPRAZolam (XANAX) 0 5 mg tablet Take 1 tablet (0 5 mg total) by mouth 2 (two) times a day, Starting Fri 10/26/2018, Normal      betamethasone, augmented, (DIPROLENE-AF) 0 05 % cream Starting Mon 2/5/2018, Historical Med      Cetirizine HCl (ZYRTEC ALLERGY) 10 MG CAPS Take 1 tablet by mouth daily, Historical Med      meloxicam (MOBIC) 15 mg tablet Take 1 tablet (15 mg total) by mouth daily for 30 days, Starting Wed 10/3/2018, Until Fri 11/2/2018, Normal      !! methylPREDNISolone 4 MG tablet therapy pack Use as directed on package, Normal      !! Methylprednisolone 4 MG TBPK Use as directed on package, Normal      omeprazole (PriLOSEC) 20 mg delayed release capsule Take 1 capsule (20 mg total) by mouth 2 (two) times a day before meals, Starting Thu 8/8/2019, Normal      ondansetron (ZOFRAN) 8 mg tablet Take 1 tablet (8 mg total) by mouth every 8 (eight) hours as needed for nausea or vomiting for up to 7 days, Starting Tue 4/23/2019, Until Tue 4/30/2019, Normal       !! - Potential duplicate medications found  Please discuss with provider  No discharge procedures on file      ED Provider  Electronically Signed by           Blade Blackburn MD  09/30/19 1119

## 2019-11-06 NOTE — PROGRESS NOTES
BMI Counseling: Body mass index is 30 84 kg/m²  The BMI is above normal  Nutrition recommendations include reducing portion sizes, decreasing overall calorie intake and 3-5 servings of fruits/vegetables daily  Exercise recommendations include exercising 3-5 times per week

## 2019-11-11 ENCOUNTER — OFFICE VISIT (OUTPATIENT)
Dept: FAMILY MEDICINE CLINIC | Facility: CLINIC | Age: 34
End: 2019-11-11
Payer: COMMERCIAL

## 2019-11-11 VITALS
BODY MASS INDEX: 31.28 KG/M2 | DIASTOLIC BLOOD PRESSURE: 70 MMHG | SYSTOLIC BLOOD PRESSURE: 110 MMHG | WEIGHT: 170 LBS | TEMPERATURE: 98.1 F | HEIGHT: 62 IN | HEART RATE: 72 BPM

## 2019-11-11 DIAGNOSIS — J03.90 ACUTE TONSILLITIS, UNSPECIFIED ETIOLOGY: Primary | ICD-10-CM

## 2019-11-11 DIAGNOSIS — F41.9 ANXIETY: ICD-10-CM

## 2019-11-11 LAB — S PYO AG THROAT QL: NEGATIVE

## 2019-11-11 PROCEDURE — 99213 OFFICE O/P EST LOW 20 MIN: CPT | Performed by: FAMILY MEDICINE

## 2019-11-11 PROCEDURE — 87880 STREP A ASSAY W/OPTIC: CPT | Performed by: FAMILY MEDICINE

## 2019-11-11 PROCEDURE — 96372 THER/PROPH/DIAG INJ SC/IM: CPT | Performed by: FAMILY MEDICINE

## 2019-11-11 RX ORDER — AZITHROMYCIN 250 MG/1
TABLET, FILM COATED ORAL
Qty: 6 TABLET | Refills: 0 | Status: SHIPPED | OUTPATIENT
Start: 2019-11-11 | End: 2019-11-15

## 2019-11-11 RX ORDER — METHYLPREDNISOLONE 4 MG/1
TABLET ORAL
Qty: 21 EACH | Refills: 0 | Status: SHIPPED | OUTPATIENT
Start: 2019-11-11 | End: 2020-02-17 | Stop reason: ALTCHOICE

## 2019-11-11 RX ORDER — OXYCODONE HYDROCHLORIDE 5 MG/1
5 TABLET ORAL EVERY 4 HOURS PRN
Qty: 30 TABLET | Refills: 0 | Status: SHIPPED | OUTPATIENT
Start: 2019-11-11 | End: 2020-02-17 | Stop reason: ALTCHOICE

## 2019-11-11 RX ORDER — KETOROLAC TROMETHAMINE 30 MG/ML
60 INJECTION, SOLUTION INTRAMUSCULAR; INTRAVENOUS ONCE
Status: COMPLETED | OUTPATIENT
Start: 2019-11-11 | End: 2019-11-11

## 2019-11-11 RX ORDER — ALPRAZOLAM 0.5 MG/1
0.5 TABLET ORAL 2 TIMES DAILY
Qty: 60 TABLET | Refills: 3 | Status: SHIPPED | OUTPATIENT
Start: 2019-11-11 | End: 2020-11-06

## 2019-11-11 RX ADMIN — KETOROLAC TROMETHAMINE 60 MG: 30 INJECTION, SOLUTION INTRAMUSCULAR; INTRAVENOUS at 10:58

## 2019-11-11 NOTE — LETTER
November 11, 2019     Patient: Yasmine King   YOB: 1985   Date of Visit: 11/11/2019       To Whom it May Concern:    Jax Hernandez is under my professional care  She was seen in my office on 11/11/2019  She may return to work on 11/13/19  If you have any questions or concerns, please don't hesitate to call           Sincerely,          Leni Ridley DO        CC: No Recipients

## 2019-11-12 NOTE — PROGRESS NOTES
Patient ID: Diego Blackburn is a 29 y o  female  HPI: 29 y  o female presenting with 2 day history of sore throat and swollen glands  It is painful to swallow  Her tonsils are significantly swollen and covered in pus  Pt denies any fever, chills, or body aches  BMI Counseling: Body mass index is 31 09 kg/m²  The BMI is above normal  Nutrition recommendations include reducing portion sizes and decreasing overall calorie intake    SUBJECTIVE    Family History   Problem Relation Age of Onset    Lung cancer Mother     Cancer Mother     Heart disease Maternal Grandmother      Social History     Socioeconomic History    Marital status: /Civil Union     Spouse name: Not on file    Number of children: Not on file    Years of education: Not on file    Highest education level: Not on file   Occupational History    Not on file   Social Needs    Financial resource strain: Not on file    Food insecurity:     Worry: Not on file     Inability: Not on file    Transportation needs:     Medical: Not on file     Non-medical: Not on file   Tobacco Use    Smoking status: Never Smoker    Smokeless tobacco: Never Used   Substance and Sexual Activity    Alcohol use: Yes     Frequency: Never     Drinks per session: 1 or 2     Binge frequency: Never    Drug use: Never    Sexual activity: Yes     Partners: Male     Birth control/protection: None   Lifestyle    Physical activity:     Days per week: Not on file     Minutes per session: Not on file    Stress: Not on file   Relationships    Social connections:     Talks on phone: Not on file     Gets together: Not on file     Attends Mosque service: Not on file     Active member of club or organization: Not on file     Attends meetings of clubs or organizations: Not on file     Relationship status: Not on file    Intimate partner violence:     Fear of current or ex partner: Not on file     Emotionally abused: Not on file     Physically abused: Not on file     Forced sexual activity: Not on file   Other Topics Concern    Not on file   Social History Narrative    Not on file     Past Medical History:   Diagnosis Date    Cardiac arrhythmia     Last Assessed: 1/10/2017    Seasonal allergies      Past Surgical History:   Procedure Laterality Date    AUGMENTATION BREAST      CARDIAC CATHETERIZATION      Last Assessed: 1/10/2017    LASIK      Corneal  Last Assessed: 1/10/2017     Allergies   Allergen Reactions    Penicillins Other (See Comments)     "poly cillin" lip swelling  Other reaction(s):  Other (See Comments)  "poly cillin" lip swelling       Current Outpatient Medications:     albuterol (PROAIR HFA) 90 mcg/act inhaler, Inhale 2 puffs every 6 (six) hours as needed for wheezing, Disp: 1 Inhaler, Rfl: 2    Cetirizine HCl (ZYRTEC ALLERGY) 10 MG CAPS, Take 1 tablet by mouth daily, Disp: , Rfl:     ALPRAZolam (XANAX) 0 5 mg tablet, Take 1 tablet (0 5 mg total) by mouth 2 (two) times a day, Disp: 60 tablet, Rfl: 3    azithromycin (ZITHROMAX) 250 mg tablet, Take 2 tablets today then 1 tablet daily x 4 days, Disp: 6 tablet, Rfl: 0    methylPREDNISolone 4 MG tablet therapy pack, Use as directed on package, Disp: 21 each, Rfl: 0    oxyCODONE (ROXICODONE) 5 mg immediate release tablet, Take 1 tablet (5 mg total) by mouth every 4 (four) hours as needed for moderate painMax Daily Amount: 30 mg, Disp: 30 tablet, Rfl: 0    Current Facility-Administered Medications:     trimethobenzamide (TIGAN) IM injection 200 mg, 200 mg, Intramuscular, Q6H PRN, Lili Esparza DO, 200 mg at 02/27/18 1502    Review of Systems  Constitutional:     Denies fever, chills ,fatigue ,weakness ,weight loss, weight gain     ENT: Denies loss of hearing ,nosebleed, nasal discharge ,hoarseness;  nasal congestion  and ear pain, but admits to sore throat and significant trouble and pain swallowing+ pus-covered tonsils  Pulmonary: Denies shortness of breath ,dyspnea on exertion, orthopnea, cough and  pnd  Cardiovascular:  Denies bradycardia , tachycardia  ,palpations, lower extremity edema leg, claudication  Breast:  Denies new or changing breast lumps ,nipple discharge ,nipple changes  Abdomen:  Denies abdominal pain , anorexia , indigestion, nausea, vomiting, constipation, diarrhea  Musculoskeletal: Denies myalgias, arthralgias, joint swelling, joint stiffness , limb pain, limb swelling  Gu: Denies polyuria or dysuria  Lymph:+ swollen glands that are painful  Skin: Denies skin rash, skin lesion, skin wound, itching, dry skin  Neuro: Denies headache, numbness, tingling, confusion, loss of consciousness, dizziness, vertigo  Psychiatric: Denies feelings of depression, suicidal ideation, anxiety, sleep disturbances    OBJECTIVE  /70   Pulse 72   Temp 98 1 °F (36 7 °C)   Ht 5' 2" (1 575 m)   Wt 77 1 kg (170 lb)   BMI 31 09 kg/m²   Constitutional:   NAD, well appearing and well nourished      ENT:   Conjunctiva and lids: no injection, edema, or discharge     Pupils and iris: CASANDRA bilaterally    External inspection of ears and nose: normal without deformities or discharge  Otoscopic exam: Canals patent without erythema or effusions  Nasal mucosa, septum and turbinates: Negative for turbinate injection, nasal discharge         Oropharynx:  Moist mucosa, normal tongue and tonsils without lesions  + erythema and injection of posterior pharynx; tonsilar hypertrophy with exudates covering tonsils  Pulmonary:Respiratory effort normal rate and rhythm, no increased work of breathing   Auscultation of lungs:  Clear bilaterally with no adventitious breath sounds       Cardiovascular: regular rate and rhythm, S1 and S2, no murmur, no edema and/or varicosities of LE      Abdomen: Soft and non-distended     Positive bowel sounds      No heptomegaly or splenomegaly      Gu: no suprapubic tenderness or CVA tenderness  Lymphatic: +significant  anterior and  posterior cervical lymphadenopathy with tenderness on palpaiton       Musculoskeletal:  Gait and station: Normal gait    Digits and nails normal without clubbing or cyanosis       Inspection/palpation of joints, bones, and muscles:  No joint tenderness, swelling, full active and passive range of motion       Skin: Normal skin turgor and no rashes      Neuro:      Normal reflexes     Psych:   alert and oriented to person, place and time     normal mood and affect       Assessment/Plan:Diagnoses and all orders for this visit:    Acute tonsillitis, unspecified etiology  Comments:  rx given for a z-pac as well as medrol dosepak  Orders:  -     POCT rapid strepA  -     ketorolac (TORADOL) injection 60 mg        Reviewed with patient plan to treat with above meds  Patient instructed to call in 72 hours if not feeling better or if she unable to swallow due to tonsillar swelling or swollen glands  Encouraged her to stay with soft foods and increase fluid intake

## 2020-01-21 ENCOUNTER — TELEPHONE (OUTPATIENT)
Dept: FAMILY MEDICINE CLINIC | Facility: CLINIC | Age: 35
End: 2020-01-21

## 2020-01-21 DIAGNOSIS — R11.2 NAUSEA AND VOMITING, INTRACTABILITY OF VOMITING NOT SPECIFIED, UNSPECIFIED VOMITING TYPE: Primary | ICD-10-CM

## 2020-01-21 RX ORDER — ONDANSETRON HYDROCHLORIDE 8 MG/1
8 TABLET, FILM COATED ORAL EVERY 8 HOURS PRN
Qty: 30 TABLET | Refills: 0 | Status: SHIPPED | OUTPATIENT
Start: 2020-01-21 | End: 2020-02-17 | Stop reason: ALTCHOICE

## 2020-01-21 NOTE — TELEPHONE ENCOUNTER
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

## 2020-01-22 DIAGNOSIS — J11.1 INFLUENZA: Primary | ICD-10-CM

## 2020-01-22 RX ORDER — OSELTAMIVIR PHOSPHATE 75 MG/1
75 CAPSULE ORAL DAILY
Qty: 10 CAPSULE | Refills: 0 | Status: SHIPPED | OUTPATIENT
Start: 2020-01-22 | End: 2020-02-01

## 2020-02-17 ENCOUNTER — OFFICE VISIT (OUTPATIENT)
Dept: FAMILY MEDICINE CLINIC | Facility: CLINIC | Age: 35
End: 2020-02-17
Payer: COMMERCIAL

## 2020-02-17 VITALS
WEIGHT: 170 LBS | SYSTOLIC BLOOD PRESSURE: 118 MMHG | DIASTOLIC BLOOD PRESSURE: 78 MMHG | HEART RATE: 72 BPM | TEMPERATURE: 98 F | HEIGHT: 62 IN | BODY MASS INDEX: 31.28 KG/M2

## 2020-02-17 DIAGNOSIS — R51.9 PERSISTENT HEADACHES: ICD-10-CM

## 2020-02-17 DIAGNOSIS — R11.2 NAUSEA AND VOMITING, INTRACTABILITY OF VOMITING NOT SPECIFIED, UNSPECIFIED VOMITING TYPE: Primary | ICD-10-CM

## 2020-02-17 PROCEDURE — 3008F BODY MASS INDEX DOCD: CPT | Performed by: FAMILY MEDICINE

## 2020-02-17 PROCEDURE — 1036F TOBACCO NON-USER: CPT | Performed by: FAMILY MEDICINE

## 2020-02-17 PROCEDURE — 99213 OFFICE O/P EST LOW 20 MIN: CPT | Performed by: FAMILY MEDICINE

## 2020-02-17 PROCEDURE — 96372 THER/PROPH/DIAG INJ SC/IM: CPT | Performed by: FAMILY MEDICINE

## 2020-02-17 RX ORDER — KETOROLAC TROMETHAMINE 30 MG/ML
30 INJECTION, SOLUTION INTRAMUSCULAR; INTRAVENOUS ONCE
Status: COMPLETED | OUTPATIENT
Start: 2020-02-17 | End: 2020-02-17

## 2020-02-17 RX ORDER — ONDANSETRON HYDROCHLORIDE 8 MG/1
8 TABLET, FILM COATED ORAL EVERY 8 HOURS PRN
Qty: 20 TABLET | Refills: 1 | Status: SHIPPED | OUTPATIENT
Start: 2020-02-17 | End: 2021-04-26

## 2020-02-17 RX ADMIN — KETOROLAC TROMETHAMINE 30 MG: 30 INJECTION, SOLUTION INTRAMUSCULAR; INTRAVENOUS at 11:22

## 2020-02-18 NOTE — PROGRESS NOTES
Patient ID: Natividad Turk is a 29 y o  female  HPI: 29 y  o female presenting with nausea and vomiting and diarrhea approx 2 hrs after eating at a diner  This has been going on for approx 48 hrs  She also has a headache, but no fever, chills, or myalgia        SUBJECTIVE    Family History   Problem Relation Age of Onset    Lung cancer Mother     Cancer Mother     Heart disease Maternal Grandmother      Social History     Socioeconomic History    Marital status: /Civil Union     Spouse name: Not on file    Number of children: Not on file    Years of education: Not on file    Highest education level: Not on file   Occupational History    Not on file   Social Needs    Financial resource strain: Not on file    Food insecurity:     Worry: Not on file     Inability: Not on file    Transportation needs:     Medical: Not on file     Non-medical: Not on file   Tobacco Use    Smoking status: Never Smoker    Smokeless tobacco: Never Used   Substance and Sexual Activity    Alcohol use: Yes     Frequency: Never     Drinks per session: 1 or 2     Binge frequency: Never    Drug use: Never    Sexual activity: Yes     Partners: Male     Birth control/protection: None   Lifestyle    Physical activity:     Days per week: Not on file     Minutes per session: Not on file    Stress: Not on file   Relationships    Social connections:     Talks on phone: Not on file     Gets together: Not on file     Attends Episcopal service: Not on file     Active member of club or organization: Not on file     Attends meetings of clubs or organizations: Not on file     Relationship status: Not on file    Intimate partner violence:     Fear of current or ex partner: Not on file     Emotionally abused: Not on file     Physically abused: Not on file     Forced sexual activity: Not on file   Other Topics Concern    Not on file   Social History Narrative    Not on file     Past Medical History:   Diagnosis Date    Cardiac arrhythmia     Last Assessed: 1/10/2017    Seasonal allergies      Past Surgical History:   Procedure Laterality Date    AUGMENTATION BREAST      CARDIAC CATHETERIZATION      Last Assessed: 1/10/2017    LASIK      Corneal  Last Assessed: 1/10/2017     Allergies   Allergen Reactions    Penicillins Other (See Comments)     "poly cillin" lip swelling  Other reaction(s):  Other (See Comments)  "poly cillin" lip swelling       Current Outpatient Medications:     albuterol (PROAIR HFA) 90 mcg/act inhaler, Inhale 2 puffs every 6 (six) hours as needed for wheezing, Disp: 1 Inhaler, Rfl: 2    ALPRAZolam (XANAX) 0 5 mg tablet, Take 1 tablet (0 5 mg total) by mouth 2 (two) times a day, Disp: 60 tablet, Rfl: 3    Cetirizine HCl (ZYRTEC ALLERGY) 10 MG CAPS, Take 1 tablet by mouth daily, Disp: , Rfl:     ondansetron (ZOFRAN) 8 mg tablet, Take 1 tablet (8 mg total) by mouth every 8 (eight) hours as needed for nausea or vomiting, Disp: 20 tablet, Rfl: 1    Current Facility-Administered Medications:     trimethobenzamide (TIGAN) IM injection 200 mg, 200 mg, Intramuscular, Q6H PRN, Jodie Abler Broadt, DO, 200 mg at 02/27/18 1502    trimethobenzamide (TIGAN) IM injection 200 mg, 200 mg, Intramuscular, Q6H PRN, Jodie Abler Broadt, DO, 200 mg at 02/17/20 1123    Review of Systems  Constitutional:     Denies fever, chills ,fatigue ,weakness ,weight loss, weight gain     ENT: Denies earache ,loss of hearing ,nosebleed, nasal discharge,nasal congestion ,sore throat ,hoarseness  Pulmonary: Denies shortness of breath ,cough  ,dyspnea on exertion, orthopnea  ,PND   Cardiovascular:  Denies bradycardia , tachycardia  ,palpations, lower extremity edema leg, claudication  Breast:  Denies new or changing breast lumps ,nipple discharge ,nipple changes  Abdomen:  Denies abdominal pain , anorexia , indigestion, +nausea, vomiting,, diarrhea  Musculoskeletal: Denies myalgias, arthralgias, joint swelling, joint stiffness , limb pain, limb swelling  Gu: denies dysuria, polyuria  Skin: Denies skin rash, skin lesion, skin wound, itching, dry skin  Neuro: + headache,denies any  numbness, tingling, confusion, loss of consciousness, dizziness, vertigo  Psychiatric: Denies feelings of depression, suicidal ideation, anxiety, sleep disturbances    OBJECTIVE  /78   Pulse 72   Temp 98 °F (36 7 °C)   Ht 5' 2" (1 575 m)   Wt 77 1 kg (170 lb)   LMP 01/23/2020   BMI 31 09 kg/m²   Constitutional:   NAD, well appearing and well nourished      ENT:   Conjunctiva and lids: no injection, edema, or discharge     Pupils and iris: CASANDRA bilaterally    External inspection of ears and nose: normal without deformities or discharge  Otoscopic exam: Canals patent without erythema  Nasal mucosa, septum and turbinates: Normal or edema or discharge         Oropharynx:  Moist mucosa, normal tongue and tonsils without lesions  No erythema        Pulmonary:Respiratory effort normal rate and rhythm, no increased work of breathing   Auscultation of lungs:  Clear bilaterally with no adventitious breath sounds       Cardiovascular: regular rate and rhythm, S1 and S2, no murmur, no edema and/or varicosities of LE      Abdomen: Soft and non-distended     Positive bowel sounds      No heptomegaly or splenomegaly      Gu: no suprapubic tenderness or CVA tenderness, no urethral discharge  Lymphatic:  No anterior or posterior cervical lymphadenopathy         Musculoskeletal:  Gait and station: Normal gait      Digits and nails normal without clubbing or cyanosis       Inspection/palpation of joints, bones, and muscles:  No joint tenderness, swelling, full active and passive range of motion       Skin: Normal skin turgor and no rashes      Neuro:    Normal reflexes and no nuchal rigidity     Psych:   alert and oriented to person, place and time     normal mood and affect       Assessment/Plan:Diagnoses and all orders for this visit:    Nausea and vomiting, intractability of vomiting not specified, unspecified vomiting type  -     trimethobenzamide (TIGAN) IM injection 200 mg  -     ondansetron (ZOFRAN) 8 mg tablet; Take 1 tablet (8 mg total) by mouth every 8 (eight) hours as needed for nausea or vomiting    Persistent headaches  -     ketorolac (TORADOL) injection 30 mg  -     ketorolac (TORADOL) injection 30 mg        Reviewed with patient plan to treat with above plan      Patient instructed to call in 72 hours if not feeling better or if symptoms worsen

## 2020-05-18 ENCOUNTER — TELEPHONE (OUTPATIENT)
Dept: FAMILY MEDICINE CLINIC | Facility: CLINIC | Age: 35
End: 2020-05-18

## 2020-05-18 DIAGNOSIS — N83.202 BILATERAL OVARIAN CYSTS: Primary | ICD-10-CM

## 2020-05-18 DIAGNOSIS — N83.201 BILATERAL OVARIAN CYSTS: Primary | ICD-10-CM

## 2020-05-18 RX ORDER — OXYCODONE HYDROCHLORIDE 5 MG/1
5 TABLET ORAL EVERY 6 HOURS PRN
Qty: 30 TABLET | Refills: 0 | Status: SHIPPED | OUTPATIENT
Start: 2020-05-18 | End: 2020-05-25

## 2020-07-22 DIAGNOSIS — N97.9 INFERTILITY, FEMALE, SECONDARY: Primary | ICD-10-CM

## 2020-08-17 ENCOUNTER — APPOINTMENT (OUTPATIENT)
Dept: LAB | Facility: CLINIC | Age: 35
End: 2020-08-17
Payer: COMMERCIAL

## 2020-08-17 DIAGNOSIS — N97.9 INFERTILITY, FEMALE, SECONDARY: ICD-10-CM

## 2020-08-17 LAB
ANION GAP SERPL CALCULATED.3IONS-SCNC: 6 MMOL/L (ref 4–13)
BUN SERPL-MCNC: 13 MG/DL (ref 5–25)
CALCIUM SERPL-MCNC: 9.4 MG/DL (ref 8.3–10.1)
CHLORIDE SERPL-SCNC: 106 MMOL/L (ref 100–108)
CO2 SERPL-SCNC: 26 MMOL/L (ref 21–32)
CREAT SERPL-MCNC: 0.76 MG/DL (ref 0.6–1.3)
ERYTHROCYTE [DISTWIDTH] IN BLOOD BY AUTOMATED COUNT: 12.8 % (ref 11.6–15.1)
ESTRADIOL SERPL-MCNC: 101 PG/ML
FSH SERPL-ACNC: 1.8 MIU/ML
GFR SERPL CREATININE-BSD FRML MDRD: 103 ML/MIN/1.73SQ M
GLUCOSE SERPL-MCNC: 95 MG/DL (ref 65–140)
HCT VFR BLD AUTO: 39.9 % (ref 34.8–46.1)
HGB BLD-MCNC: 13 G/DL (ref 11.5–15.4)
LH SERPL-ACNC: 3.9 MIU/ML
MCH RBC QN AUTO: 28.4 PG (ref 26.8–34.3)
MCHC RBC AUTO-ENTMCNC: 32.6 G/DL (ref 31.4–37.4)
MCV RBC AUTO: 87 FL (ref 82–98)
PLATELET # BLD AUTO: 220 THOUSANDS/UL (ref 149–390)
PMV BLD AUTO: 11.7 FL (ref 8.9–12.7)
POTASSIUM SERPL-SCNC: 4 MMOL/L (ref 3.5–5.3)
PROGEST SERPL-MCNC: 11.9 NG/ML
RBC # BLD AUTO: 4.58 MILLION/UL (ref 3.81–5.12)
SODIUM SERPL-SCNC: 138 MMOL/L (ref 136–145)
TSH SERPL DL<=0.05 MIU/L-ACNC: 1.79 UIU/ML (ref 0.36–3.74)
WBC # BLD AUTO: 5.81 THOUSAND/UL (ref 4.31–10.16)

## 2020-08-17 PROCEDURE — 80048 BASIC METABOLIC PNL TOTAL CA: CPT

## 2020-08-17 PROCEDURE — 84144 ASSAY OF PROGESTERONE: CPT

## 2020-08-17 PROCEDURE — 84402 ASSAY OF FREE TESTOSTERONE: CPT

## 2020-08-17 PROCEDURE — 84403 ASSAY OF TOTAL TESTOSTERONE: CPT

## 2020-08-17 PROCEDURE — 36415 COLL VENOUS BLD VENIPUNCTURE: CPT

## 2020-08-17 PROCEDURE — 83001 ASSAY OF GONADOTROPIN (FSH): CPT

## 2020-08-17 PROCEDURE — 85027 COMPLETE CBC AUTOMATED: CPT

## 2020-08-17 PROCEDURE — 84443 ASSAY THYROID STIM HORMONE: CPT

## 2020-08-17 PROCEDURE — 83002 ASSAY OF GONADOTROPIN (LH): CPT

## 2020-08-17 PROCEDURE — 82670 ASSAY OF TOTAL ESTRADIOL: CPT

## 2020-08-18 LAB
TESTOST FREE SERPL-MCNC: 1.4 PG/ML (ref 0–4.2)
TESTOST SERPL-MCNC: 13 NG/DL (ref 8–48)

## 2020-09-13 NOTE — TELEPHONE ENCOUNTER
Gyno suggested she have some lab work, a panel to help detect  She had abnormal cells, Ascus  She is going back for screening, but is having anxiety about this and would like to have labs now  No need to print, just going to [facility]  Call patient 
Patient called back she said the name of the blood work test is a CBC  Can we please call patient when order is placed 
Patient is going to call Gyno to ask more questions 
Pt aware
Would you please clarify? There aren't any labs I typically order for a patient with ASCUS  She just needs to have her f/u colposcopy with the GYN done  Please let me know, thanks!
no weight-bearing restrictions

## 2020-09-16 DIAGNOSIS — Z31.69 INFERTILITY COUNSELING: Primary | ICD-10-CM

## 2020-09-21 ENCOUNTER — OFFICE VISIT (OUTPATIENT)
Dept: FAMILY MEDICINE CLINIC | Facility: CLINIC | Age: 35
End: 2020-09-21
Payer: COMMERCIAL

## 2020-09-21 VITALS
WEIGHT: 164 LBS | SYSTOLIC BLOOD PRESSURE: 120 MMHG | HEIGHT: 62 IN | BODY MASS INDEX: 30.18 KG/M2 | HEART RATE: 70 BPM | DIASTOLIC BLOOD PRESSURE: 80 MMHG | TEMPERATURE: 98 F

## 2020-09-21 DIAGNOSIS — J03.90 TONSILLITIS: Primary | ICD-10-CM

## 2020-09-21 LAB — S PYO AG THROAT QL: NEGATIVE

## 2020-09-21 PROCEDURE — 99213 OFFICE O/P EST LOW 20 MIN: CPT | Performed by: NURSE PRACTITIONER

## 2020-09-21 PROCEDURE — 1036F TOBACCO NON-USER: CPT | Performed by: NURSE PRACTITIONER

## 2020-09-21 PROCEDURE — 3725F SCREEN DEPRESSION PERFORMED: CPT | Performed by: NURSE PRACTITIONER

## 2020-09-21 PROCEDURE — 87880 STREP A ASSAY W/OPTIC: CPT | Performed by: NURSE PRACTITIONER

## 2020-09-21 RX ORDER — AZITHROMYCIN 250 MG/1
TABLET, FILM COATED ORAL
Qty: 6 TABLET | Refills: 0 | Status: SHIPPED | OUTPATIENT
Start: 2020-09-21 | End: 2020-09-25

## 2020-09-21 NOTE — PROGRESS NOTES
Assessment/Plan:     Diagnoses and all orders for this visit:    Tonsillitis  -     POCT rapid strepA  -     azithromycin (ZITHROMAX) 250 mg tablet; Take 2 tabs on Day 1 than 1 tab Days 2-5        Patient instructed to call if no improvement in 72 hours or symptoms worsen    Subjective:      Patient ID: Robby Jones is a 28 y o  female  28 y  o female presenting with sore throat, cough and nasal congestion and white spots in back of her throat for the past few days  She denies fever, chills or generalized body aches being associated with her other symptoms  She reports having allergies and was cleaning out a basement with her father this weekend, so concerned it is just allergies          Family History   Problem Relation Age of Onset    Lung cancer Mother     Cancer Mother     Heart disease Maternal Grandmother      Social History     Socioeconomic History    Marital status: /Civil Union     Spouse name: Not on file    Number of children: Not on file    Years of education: Not on file    Highest education level: Not on file   Occupational History    Not on file   Social Needs    Financial resource strain: Not on file    Food insecurity     Worry: Not on file     Inability: Not on file   [employer] needs     Medical: Not on file     Non-medical: Not on file   Tobacco Use    Smoking status: Never Smoker    Smokeless tobacco: Never Used   Substance and Sexual Activity    Alcohol use: Yes     Frequency: Never     Drinks per session: 1 or 2     Binge frequency: Never    Drug use: Never    Sexual activity: Yes     Partners: Male     Birth control/protection: None   Lifestyle    Physical activity     Days per week: Not on file     Minutes per session: Not on file    Stress: Not on file   Relationships    Social connections     Talks on phone: Not on file     Gets together: Not on file     Attends Sabianism service: Not on file     Active member of club or organization: Not on file Attends meetings of clubs or organizations: Not on file     Relationship status: Not on file    Intimate partner violence     Fear of current or ex partner: Not on file     Emotionally abused: Not on file     Physically abused: Not on file     Forced sexual activity: Not on file   Other Topics Concern    Not on file   Social History Narrative    Not on file     E-Cigarette/Vaping     E-Cigarette/Vaping Substances     Past Medical History:   Diagnosis Date    Cardiac arrhythmia     Last Assessed: 1/10/2017    Seasonal allergies      Past Surgical History:   Procedure Laterality Date    AUGMENTATION BREAST      CARDIAC CATHETERIZATION      Last Assessed: 1/10/2017    LASIK      Corneal  Last Assessed: 1/10/2017     Allergies   Allergen Reactions    Penicillins Other (See Comments)     "poly cillin" lip swelling  Other reaction(s): Other (See Comments)  "poly cillin" lip swelling       Current Outpatient Medications:     albuterol (PROAIR HFA) 90 mcg/act inhaler, Inhale 2 puffs every 6 (six) hours as needed for wheezing, Disp: 1 Inhaler, Rfl: 2    ALPRAZolam (XANAX) 0 5 mg tablet, Take 1 tablet (0 5 mg total) by mouth 2 (two) times a day, Disp: 60 tablet, Rfl: 3    azithromycin (ZITHROMAX) 250 mg tablet, Take 2 tabs on Day 1 than 1 tab Days 2-5, Disp: 6 tablet, Rfl: 0    Cetirizine HCl (ZYRTEC ALLERGY) 10 MG CAPS, Take 1 tablet by mouth daily, Disp: , Rfl:     ondansetron (ZOFRAN) 8 mg tablet, Take 1 tablet (8 mg total) by mouth every 8 (eight) hours as needed for nausea or vomiting, Disp: 20 tablet, Rfl: 1    Current Facility-Administered Medications:     trimethobenzamide (TIGAN) IM injection 200 mg, 200 mg, Intramuscular, Q6H PRN, Fronie Mix Broadt, DO, 200 mg at 02/27/18 1502    trimethobenzamide (TIGAN) IM injection 200 mg, 200 mg, Intramuscular, Q6H PRN, Fronie Mix Broadt, DO, 200 mg at 02/17/20 1123    Review of Systems   Constitutional: Negative for chills and fever     HENT: Positive for congestion, postnasal drip and sore throat  Eyes: Negative for visual disturbance  Respiratory: Positive for cough  Negative for chest tightness, shortness of breath and wheezing  Cardiovascular: Negative for chest pain, palpitations and leg swelling  Gastrointestinal: Negative for abdominal pain, constipation, diarrhea and nausea  Endocrine: Negative  Genitourinary: Negative  Musculoskeletal: Negative  Allergic/Immunologic: Positive for environmental allergies  Negative for food allergies  Neurological: Negative for dizziness, weakness, light-headedness and headaches  Hematological: Positive for adenopathy  Psychiatric/Behavioral: Negative  Objective:    /80   Pulse 70   Temp 98 °F (36 7 °C)   Ht 5' 2" (1 575 m)   Wt 74 4 kg (164 lb)   BMI 30 00 kg/m² (Reviewed)     Physical Exam  Vitals signs reviewed  Constitutional:       General: She is not in acute distress  Appearance: Normal appearance  She is well-developed and well-groomed  She is not ill-appearing  HENT:      Head: Normocephalic and atraumatic  Right Ear: Ear canal and external ear normal  Tympanic membrane is erythematous  Left Ear: Ear canal and external ear normal  Tympanic membrane is erythematous  Nose: Mucosal edema and congestion present  Comments: Patient had a facial covering in place as per office protocol     Mouth/Throat:      Lips: Pink  Mouth: Mucous membranes are moist       Pharynx: Oropharynx is clear  Eyes:      General: Lids are normal       Extraocular Movements: Extraocular movements intact  Conjunctiva/sclera: Conjunctivae normal       Pupils: Pupils are equal, round, and reactive to light  Neck:      Musculoskeletal: Normal range of motion  Trachea: Trachea normal    Cardiovascular:      Rate and Rhythm: Normal rate and regular rhythm  Heart sounds: Normal heart sounds  No murmur     Pulmonary:      Effort: Pulmonary effort is normal       Breath sounds: Normal breath sounds  Lymphadenopathy:      Cervical: Cervical adenopathy present  Skin:     General: Skin is warm and dry  Capillary Refill: Capillary refill takes less than 2 seconds  Neurological:      General: No focal deficit present  Mental Status: She is alert and oriented to person, place, and time  Cranial Nerves: Cranial nerves are intact  Motor: Motor function is intact  Psychiatric:         Mood and Affect: Mood normal          Behavior: Behavior normal  Behavior is cooperative

## 2020-10-22 ENCOUNTER — TELEPHONE (OUTPATIENT)
Dept: FAMILY MEDICINE CLINIC | Facility: CLINIC | Age: 35
End: 2020-10-22

## 2020-10-23 ENCOUNTER — LAB (OUTPATIENT)
Dept: LAB | Facility: CLINIC | Age: 35
End: 2020-10-23
Payer: COMMERCIAL

## 2020-10-23 ENCOUNTER — TRANSCRIBE ORDERS (OUTPATIENT)
Dept: LAB | Facility: CLINIC | Age: 35
End: 2020-10-23

## 2020-10-23 DIAGNOSIS — Z31.41 FERTILITY TESTING: ICD-10-CM

## 2020-10-23 DIAGNOSIS — Z31.41 FERTILITY TESTING: Primary | ICD-10-CM

## 2020-10-23 PROCEDURE — 83516 IMMUNOASSAY NONANTIBODY: CPT

## 2020-10-28 LAB — MIS SERPL-MCNC: 3.22 NG/ML

## 2020-11-05 DIAGNOSIS — F41.9 ANXIETY: ICD-10-CM

## 2020-11-06 RX ORDER — ALPRAZOLAM 0.5 MG/1
0.5 TABLET ORAL 2 TIMES DAILY
Qty: 60 TABLET | Refills: 3 | Status: SHIPPED | OUTPATIENT
Start: 2020-11-06 | End: 2021-09-15

## 2021-01-04 DIAGNOSIS — N83.202 BILATERAL OVARIAN CYSTS: Primary | ICD-10-CM

## 2021-01-04 DIAGNOSIS — N83.201 BILATERAL OVARIAN CYSTS: Primary | ICD-10-CM

## 2021-01-04 DIAGNOSIS — R06.2 WHEEZING: ICD-10-CM

## 2021-01-04 RX ORDER — ALBUTEROL SULFATE 90 UG/1
2 AEROSOL, METERED RESPIRATORY (INHALATION) EVERY 6 HOURS PRN
Qty: 1 INHALER | Refills: 2 | Status: SHIPPED | OUTPATIENT
Start: 2021-01-04

## 2021-01-04 RX ORDER — OXYCODONE HYDROCHLORIDE 5 MG/1
5 TABLET ORAL EVERY 4 HOURS PRN
Qty: 30 TABLET | Refills: 0 | Status: SHIPPED | OUTPATIENT
Start: 2021-01-04 | End: 2021-04-26

## 2021-01-13 DIAGNOSIS — T21.21XA PARTIAL THICKNESS BURN OF CHEST WALL, INITIAL ENCOUNTER: Primary | ICD-10-CM

## 2021-02-05 ENCOUNTER — TRANSCRIBE ORDERS (OUTPATIENT)
Dept: LAB | Facility: CLINIC | Age: 36
End: 2021-02-05

## 2021-02-05 ENCOUNTER — LAB (OUTPATIENT)
Dept: LAB | Facility: CLINIC | Age: 36
End: 2021-02-05
Payer: COMMERCIAL

## 2021-02-05 DIAGNOSIS — Z13.0 SCREENING FOR IRON DEFICIENCY ANEMIA: ICD-10-CM

## 2021-02-05 DIAGNOSIS — Z13.29 THYROID DISORDER SCREENING: Primary | ICD-10-CM

## 2021-02-05 DIAGNOSIS — Z11.3 SCREENING FOR STDS (SEXUALLY TRANSMITTED DISEASES): ICD-10-CM

## 2021-02-05 DIAGNOSIS — Z13.1 SCREENING FOR DIABETES MELLITUS: ICD-10-CM

## 2021-02-05 DIAGNOSIS — Z11.59 NEED FOR HEPATITIS B SCREENING TEST: ICD-10-CM

## 2021-02-05 DIAGNOSIS — Z31.49 PROCREATIVE INVESTIGATION AND TESTING: ICD-10-CM

## 2021-02-05 DIAGNOSIS — Z11.4 ENCOUNTER FOR SCREENING FOR HIV: ICD-10-CM

## 2021-02-05 DIAGNOSIS — Z13.29 THYROID DISORDER SCREENING: ICD-10-CM

## 2021-02-05 DIAGNOSIS — Z01.83 ENCOUNTER FOR BLOOD TYPING: ICD-10-CM

## 2021-02-05 DIAGNOSIS — Z11.59 NEED FOR HEPATITIS C SCREENING TEST: ICD-10-CM

## 2021-02-05 PROCEDURE — 84439 ASSAY OF FREE THYROXINE: CPT

## 2021-02-05 PROCEDURE — 83036 HEMOGLOBIN GLYCOSYLATED A1C: CPT

## 2021-02-05 PROCEDURE — 86592 SYPHILIS TEST NON-TREP QUAL: CPT

## 2021-02-05 PROCEDURE — 87340 HEPATITIS B SURFACE AG IA: CPT

## 2021-02-05 PROCEDURE — 36415 COLL VENOUS BLD VENIPUNCTURE: CPT

## 2021-02-05 PROCEDURE — 87491 CHLMYD TRACH DNA AMP PROBE: CPT

## 2021-02-05 PROCEDURE — 87389 HIV-1 AG W/HIV-1&-2 AB AG IA: CPT

## 2021-02-05 PROCEDURE — 85025 COMPLETE CBC W/AUTO DIFF WBC: CPT

## 2021-02-05 PROCEDURE — 87591 N.GONORRHOEAE DNA AMP PROB: CPT

## 2021-02-05 PROCEDURE — 86803 HEPATITIS C AB TEST: CPT

## 2021-02-05 PROCEDURE — 86901 BLOOD TYPING SEROLOGIC RH(D): CPT

## 2021-02-05 PROCEDURE — 80053 COMPREHEN METABOLIC PANEL: CPT

## 2021-02-05 PROCEDURE — 86900 BLOOD TYPING SEROLOGIC ABO: CPT

## 2021-02-05 PROCEDURE — 84443 ASSAY THYROID STIM HORMONE: CPT

## 2021-02-06 LAB
ALBUMIN SERPL BCP-MCNC: 4.1 G/DL (ref 3.5–5)
ALP SERPL-CCNC: 53 U/L (ref 46–116)
ALT SERPL W P-5'-P-CCNC: 29 U/L (ref 12–78)
ANION GAP SERPL CALCULATED.3IONS-SCNC: 5 MMOL/L (ref 4–13)
AST SERPL W P-5'-P-CCNC: 35 U/L (ref 5–45)
BASOPHILS # BLD AUTO: 0.05 THOUSANDS/ΜL (ref 0–0.1)
BASOPHILS NFR BLD AUTO: 1 % (ref 0–1)
BILIRUB SERPL-MCNC: 0.39 MG/DL (ref 0.2–1)
BUN SERPL-MCNC: 15 MG/DL (ref 5–25)
CALCIUM SERPL-MCNC: 9.6 MG/DL (ref 8.3–10.1)
CHLORIDE SERPL-SCNC: 103 MMOL/L (ref 100–108)
CO2 SERPL-SCNC: 28 MMOL/L (ref 21–32)
CREAT SERPL-MCNC: 0.84 MG/DL (ref 0.6–1.3)
EOSINOPHIL # BLD AUTO: 0.04 THOUSAND/ΜL (ref 0–0.61)
EOSINOPHIL NFR BLD AUTO: 1 % (ref 0–6)
ERYTHROCYTE [DISTWIDTH] IN BLOOD BY AUTOMATED COUNT: 13 % (ref 11.6–15.1)
EST. AVERAGE GLUCOSE BLD GHB EST-MCNC: 103 MG/DL
GFR SERPL CREATININE-BSD FRML MDRD: 90 ML/MIN/1.73SQ M
GLUCOSE SERPL-MCNC: 68 MG/DL (ref 65–140)
HBA1C MFR BLD: 5.2 %
HBV SURFACE AG SER QL: NORMAL
HCT VFR BLD AUTO: 40.8 % (ref 34.8–46.1)
HCV AB SER QL: NORMAL
HGB BLD-MCNC: 12.8 G/DL (ref 11.5–15.4)
IMM GRANULOCYTES # BLD AUTO: 0.01 THOUSAND/UL (ref 0–0.2)
IMM GRANULOCYTES NFR BLD AUTO: 0 % (ref 0–2)
LYMPHOCYTES # BLD AUTO: 1.66 THOUSANDS/ΜL (ref 0.6–4.47)
LYMPHOCYTES NFR BLD AUTO: 22 % (ref 14–44)
MCH RBC QN AUTO: 27.9 PG (ref 26.8–34.3)
MCHC RBC AUTO-ENTMCNC: 31.4 G/DL (ref 31.4–37.4)
MCV RBC AUTO: 89 FL (ref 82–98)
MONOCYTES # BLD AUTO: 0.44 THOUSAND/ΜL (ref 0.17–1.22)
MONOCYTES NFR BLD AUTO: 6 % (ref 4–12)
NEUTROPHILS # BLD AUTO: 5.31 THOUSANDS/ΜL (ref 1.85–7.62)
NEUTS SEG NFR BLD AUTO: 70 % (ref 43–75)
NRBC BLD AUTO-RTO: 0 /100 WBCS
PLATELET # BLD AUTO: 253 THOUSANDS/UL (ref 149–390)
PMV BLD AUTO: 13 FL (ref 8.9–12.7)
POTASSIUM SERPL-SCNC: 3.8 MMOL/L (ref 3.5–5.3)
PROT SERPL-MCNC: 7.9 G/DL (ref 6.4–8.2)
RBC # BLD AUTO: 4.58 MILLION/UL (ref 3.81–5.12)
SODIUM SERPL-SCNC: 136 MMOL/L (ref 136–145)
T4 FREE SERPL-MCNC: 0.91 NG/DL (ref 0.76–1.46)
TSH SERPL DL<=0.05 MIU/L-ACNC: 1.25 UIU/ML (ref 0.36–3.74)
WBC # BLD AUTO: 7.51 THOUSAND/UL (ref 4.31–10.16)

## 2021-02-07 LAB
ABO GROUP BLD: NORMAL
RH BLD: POSITIVE

## 2021-02-08 LAB
HIV 1+2 AB+HIV1 P24 AG SERPL QL IA: NORMAL
RPR SER QL: NORMAL

## 2021-02-11 LAB
C TRACH DNA SPEC QL NAA+PROBE: NEGATIVE
N GONORRHOEA DNA SPEC QL NAA+PROBE: NEGATIVE

## 2021-04-26 ENCOUNTER — ANNUAL EXAM (OUTPATIENT)
Dept: OBGYN CLINIC | Facility: CLINIC | Age: 36
End: 2021-04-26
Payer: COMMERCIAL

## 2021-04-26 VITALS
HEIGHT: 62 IN | WEIGHT: 172.2 LBS | SYSTOLIC BLOOD PRESSURE: 128 MMHG | DIASTOLIC BLOOD PRESSURE: 76 MMHG | BODY MASS INDEX: 31.69 KG/M2

## 2021-04-26 DIAGNOSIS — Z01.419 WELL WOMAN EXAM: Primary | ICD-10-CM

## 2021-04-26 PROCEDURE — G0145 SCR C/V CYTO,THINLAYER,RESCR: HCPCS | Performed by: PATHOLOGY

## 2021-04-26 PROCEDURE — S0612 ANNUAL GYNECOLOGICAL EXAMINA: HCPCS | Performed by: PHYSICIAN ASSISTANT

## 2021-04-26 PROCEDURE — G0124 SCREEN C/V THIN LAYER BY MD: HCPCS | Performed by: PATHOLOGY

## 2021-04-26 PROCEDURE — 87624 HPV HI-RISK TYP POOLED RSLT: CPT | Performed by: PHYSICIAN ASSISTANT

## 2021-04-26 NOTE — PATIENT INSTRUCTIONS
Pap done  Will plan continued f/u w Dr Sigala Bolus for fertility care  F/u 1 year, earlier as needed based on results

## 2021-04-26 NOTE — PROGRESS NOTES
Assessment/Plan   Diagnoses and all orders for this visit:    Well woman exam  -     Liquid-based pap, screening        Discussion    All questions have been answered to her satisfaction  RTO for APE or sooner if needed      Subjective     HPI   Efrain Vicente is a 28 y o  female who presents for annual well woman exam     LMP - 3/20/21 ; Periods are normal  No excessive bleeding; No intermenstrual bleeding or spotting; Cramps are tolerable  Pt just finished first fresh IVF cycle  She has f/u planned later this week to plan next FET cycle  Pt did not like the bloating and wt gain on the meds but feels better this month off of them  She was noted to have a danielle small (3 5mm) polyp on a recent scan x 2 per pt  Her [facility] specialist does not seem concerned but pt is anxious about it remaining with a pregnancy  We did review the possible option to consider Ebx to try to disrupt stalk of the polyp  Will further review w [facility]  No vulvar itch/burn; No vaginal itch/burn; No abn discharge or odor; No urinary sx - burning/pain/frequency/hematuria    (+) SBEs - no breast masses, asymmetry, nipple discharge or bleeding, changes in skin of breast, or breast tenderness bilaterally    No abd/pelvic pain or HAs; No menopausal symptoms: No hot flashes/night sweats, problems with intercourse, vaginal dryness; sleeping well;     Pt is sexually active in a mutually monog/ sexual relationship; No issues with intercourse; She declines std/hiv/hep testing; Feels safe at home    Current contraception: none   Condom use:  Gardasil -   (+) PCP for routine Bw/care; Last Pap - 8/6/19 ASCUS + HPV, benign colpo      Review of Systems   Constitutional: Negative  Respiratory: Negative  Gastrointestinal: Negative  Endocrine: Negative  Genitourinary: Negative          The following portions of the patient's history were reviewed and updated as appropriate: allergies, current medications, past family history, past medical history, past social history, past surgical history and problem list          OB History        0    Para   0    Term   0       0    AB   0    Living   0       SAB   0    TAB   0    Ectopic   0    Multiple   0    Live Births   0                 Past Medical History:   Diagnosis Date    Cardiac arrhythmia     Last Assessed: 1/10/2017    Seasonal allergies        Past Surgical History:   Procedure Laterality Date    AUGMENTATION BREAST      CARDIAC CATHETERIZATION      Last Assessed: 1/10/2017    LASIK      Corneal  Last Assessed: 1/10/2017       Family History   Problem Relation Age of Onset    Lung cancer Mother     Cancer Mother     Heart disease Maternal Grandmother        Social History     Socioeconomic History    Marital status: /Civil Union     Spouse name: Not on file    Number of children: Not on file    Years of education: Not on file    Highest education level: Not on file   Occupational History    Not on file   Social Needs    Financial resource strain: Not on file    Food insecurity     Worry: Not on file     Inability: Not on file    Transportation needs     Medical: Not on file     Non-medical: Not on file   Tobacco Use    Smoking status: Never Smoker    Smokeless tobacco: Never Used   Substance and Sexual Activity    Alcohol use: Yes     Frequency: Never     Drinks per session: 1 or 2     Binge frequency: Never    Drug use: Never    Sexual activity: Yes     Partners: Male     Birth control/protection: None   Lifestyle    Physical activity     Days per week: Not on file     Minutes per session: Not on file    Stress: Not on file   Relationships    Social connections     Talks on phone: Not on file     Gets together: Not on file     Attends Faith service: Not on file     Active member of club or organization: Not on file     Attends meetings of clubs or organizations: Not on file     Relationship status: Not on file    Intimate partner violence Fear of current or ex partner: Not on file     Emotionally abused: Not on file     Physically abused: Not on file     Forced sexual activity: Not on file   Other Topics Concern    Not on file   Social History Narrative    Not on file         Current Outpatient Medications:     albuterol (ProAir HFA) 90 mcg/act inhaler, Inhale 2 puffs every 6 (six) hours as needed for wheezing, Disp: 1 Inhaler, Rfl: 2    ALPRAZolam (XANAX) 0 5 mg tablet, TAKE 1 TABLET (0 5 MG TOTAL) BY MOUTH 2 (TWO) TIMES A DAY, Disp: 60 tablet, Rfl: 3    Cetirizine HCl (ZYRTEC ALLERGY) 10 MG CAPS, Take 1 tablet by mouth daily, Disp: , Rfl:     Current Facility-Administered Medications:     trimethobenzamide (TIGAN) IM injection 200 mg, 200 mg, Intramuscular, Q6H PRN, Drena Nuvia Broadt, DO, 200 mg at 02/17/20 1123    Allergies   Allergen Reactions    Penicillins Other (See Comments)     "poly cillin" lip swelling  Other reaction(s): Other (See Comments)  "poly cillin" lip swelling       Objective   Vitals:    04/26/21 0758   BP: 128/76   BP Location: Left arm   Patient Position: Sitting   Cuff Size: Standard   Weight: 78 1 kg (172 lb 3 2 oz)   Height: 5' 2" (1 575 m)     Physical Exam  Constitutional:       Appearance: She is well-developed  HENT:      Head: Normocephalic and atraumatic  Cardiovascular:      Rate and Rhythm: Normal rate and regular rhythm  Pulmonary:      Effort: Pulmonary effort is normal       Breath sounds: Normal breath sounds  Chest:      Breasts: Breasts are symmetrical          Right: No inverted nipple, mass, nipple discharge, skin change or tenderness  Left: No inverted nipple, mass, nipple discharge, skin change or tenderness  Abdominal:      General: There is no distension  Palpations: Abdomen is soft  There is no mass  Tenderness: There is no guarding or rebound  Genitourinary:     Exam position: Supine  Labia:         Right: No rash  Left: No rash         Vagina: No signs of injury and foreign body  No vaginal discharge or erythema  Cervix: No cervical motion tenderness  Adnexa:         Right: No mass  Left: No mass  Skin:     General: Skin is warm and dry  Neurological:      Mental Status: She is alert and oriented to person, place, and time  Patient Instructions   Pap done  Will plan continued f/u w Dr Layla Turk for fertility care  F/u 1 year, earlier as needed based on results

## 2021-04-27 LAB
HPV HR 12 DNA CVX QL NAA+PROBE: NEGATIVE
HPV16 DNA CVX QL NAA+PROBE: NEGATIVE
HPV18 DNA CVX QL NAA+PROBE: NEGATIVE

## 2021-05-04 LAB
LAB AP GYN PRIMARY INTERPRETATION: ABNORMAL
Lab: ABNORMAL
PATH INTERP SPEC-IMP: ABNORMAL

## 2021-08-28 ENCOUNTER — OFFICE VISIT (OUTPATIENT)
Dept: URGENT CARE | Facility: CLINIC | Age: 36
End: 2021-08-28
Payer: COMMERCIAL

## 2021-08-28 DIAGNOSIS — Z3A.01 LESS THAN 8 WEEKS GESTATION OF PREGNANCY: ICD-10-CM

## 2021-08-28 DIAGNOSIS — J06.9 VIRAL UPPER RESPIRATORY TRACT INFECTION: Primary | ICD-10-CM

## 2021-08-28 DIAGNOSIS — Z20.822 ENCOUNTER FOR SCREENING LABORATORY TESTING FOR COVID-19 VIRUS: ICD-10-CM

## 2021-08-28 PROCEDURE — 0241U HB NFCT DS VIR RESP RNA 4 TRGT: CPT

## 2021-08-28 PROCEDURE — 99213 OFFICE O/P EST LOW 20 MIN: CPT | Performed by: NURSE PRACTITIONER

## 2021-08-28 RX ORDER — ESTRADIOL 2 MG/1
TABLET ORAL
COMMUNITY
Start: 2021-08-03 | End: 2021-09-15

## 2021-08-28 RX ORDER — PROGESTERONE 200 MG/1
CAPSULE ORAL
COMMUNITY
Start: 2021-08-06 | End: 2021-09-15

## 2021-08-28 NOTE — LETTER
August 28, 2021     Patient: Angeles King   YOB: 1985   Date of Visit: 8/28/2021       To Whom it May Concern:    Khloe Avina was seen in my clinic on 8/28/2021  She is currently awaiting COVID test results and should stay home from work, quarantining at home, until test results obtained at the earliest (anticipate 48-72 hours)  If you have any questions or concerns, please don't hesitate to call           Sincerely,          BE FORKS VIRGIE        CC: Khloe Avina

## 2021-08-28 NOTE — PROGRESS NOTES
[facility]        NAME: Casandra Pineda is a 28 y o  female  : 1985    MRN: [id number]  DATE: 2021  TIME: 3:28 PM    Assessment and Plan   Viral upper respiratory tract infection [J06 9]  1  Viral upper respiratory tract infection     2  Encounter for screening laboratory testing for COVID-19 virus  COVID19, Influenza A/B, RSV PCR, [facility]   3  Less than 8 weeks gestation of pregnancy           Patient Instructions       --Rest, drink plenty of fluids  --COVID testing sent  Will call with results  Average turn around time is 48-72 hours  --Advise self-quarantining until you hear back from us regarding test results (at the earliest)  This involves not leaving your house, wearing a mask at all times while outside your immediate living area, and disinfecting all commonly used surfaces and personal items  If your COVID test results are positive, you will need to self-quarantine at home for at least 10 days from the onset of your symptoms, until you are feeling better, and until you are without a fever for at least 72 hours  --For nasal/sinus congestion, helpful measures include steam, warm compresses, an OTC saline nasal spray or Neti pot  --For cough, you can take an OTC expectorant such as plain Robitussion or Mucinex (active ingredient guaifenesin)  A spoonful of honey at bedtime may also be helpful, as may a prescription cough medicine  Also recommended is the use of a cool mist humidifier (with or without Vicks) in the bedroom at night  --For sore throat, you can take OTC lozenges, use warm gargles (salt water or apple cider vinegar and honey), herbal teas, or an OTC throat spray (Chloraseptic)  --You can take Tylenol or Motrin/Advil as needed for fever, headache, body aches  Motrin/Advil should be avoided, however, if you have a history of heart disease, bleeding ulcers, or if you take blood thinners     --You should contact your primary care provider and/or go to the ER if your symptoms are not improved or get worse over the next 7 days  This includes new onset fever, localized ear pain, sinus pain, as well as worsening cough, chest pain, shortness of breath, or significant weakness/fatigue  Chief Complaint     Chief Complaint   Patient presents with    Cough     all started about three days ago    Sore Throat    Headache    Wheezing         History of Present Illness         Here with complaints of cough, headache, sore throat x 3 days  No nasal congestion, rhinorrhea, anosmia  No fever, body aches  Mild wheezing  No dyspnea  No known sick contacts  Has allergic asthma  Inhaler at home  6 weeks pregnant with some nausea  No vomiting, diarrhea  Not vaccinated against COVID  Review of Systems   Review of Systems   Constitutional: Negative for fever  HENT: Positive for sore throat  Respiratory: Positive for cough  Negative for shortness of breath  Gastrointestinal: Positive for nausea  Negative for vomiting  Musculoskeletal: Negative for myalgias  Neurological: Negative for headaches           Current Medications       Current Outpatient Medications:     albuterol (ProAir HFA) 90 mcg/act inhaler, Inhale 2 puffs every 6 (six) hours as needed for wheezing, Disp: 1 Inhaler, Rfl: 2    Cetirizine HCl (ZYRTEC ALLERGY) 10 MG CAPS, Take 1 tablet by mouth daily, Disp: , Rfl:     estradiol (ESTRACE) 2 MG tablet, TAKE 1 TABLET BY MOUTH TWICE A DAY AND TAKE 1 TABLET VAGINALLY AT BEDTIME, Disp: , Rfl:     Progesterone 200 MG CAPS, INSERT 1 VAGINALLY 3 TIMES A DAY, Disp: , Rfl:     ALPRAZolam (XANAX) 0 5 mg tablet, TAKE 1 TABLET (0 5 MG TOTAL) BY MOUTH 2 (TWO) TIMES A DAY (Patient not taking: Reported on 8/28/2021), Disp: 60 tablet, Rfl: 3    Current Facility-Administered Medications:     trimethobenzamide (TIGAN) IM injection 200 mg, 200 mg, Intramuscular, Q6H PRN, Somervell Mutton Broadt, DO, 200 mg at 02/17/20 1123    Current Allergies Allergies as of 08/28/2021 - Reviewed 08/28/2021   Allergen Reaction Noted    Penicillins Other (See Comments)             The following portions of the patient's history were reviewed and updated as appropriate: allergies, current medications, past family history, past medical history, past social history, past surgical history and problem list      Past Medical History:   Diagnosis Date    Cardiac arrhythmia     Last Assessed: 1/10/2017    Seasonal allergies        Past Surgical History:   Procedure Laterality Date    AUGMENTATION BREAST      CARDIAC CATHETERIZATION      Last Assessed: 1/10/2017    LASIK      Corneal  Last Assessed: 1/10/2017       Family History   Problem Relation Age of Onset    Lung cancer Mother     Cancer Mother     Heart disease Maternal Grandmother          Medications have been verified  Objective   There were no vitals taken for this visit  No LMP recorded  Physical Exam     Physical Exam  Constitutional:       General: She is not in acute distress  Appearance: She is not ill-appearing or diaphoretic  HENT:      Head: Normocephalic  Nose: No congestion  Mouth/Throat:      Pharynx: No oropharyngeal exudate or posterior oropharyngeal erythema  Comments: Normal appearing sore throat  Cardiovascular:      Rate and Rhythm: Normal rate and regular rhythm  Pulmonary:      Effort: Pulmonary effort is normal  No respiratory distress  Breath sounds: Normal breath sounds  No stridor  No wheezing, rhonchi or rales  Chest:      Chest wall: No tenderness  Lymphadenopathy:      Cervical: No cervical adenopathy  Neurological:      Mental Status: She is alert     Psychiatric:         Mood and Affect: Mood normal

## 2021-08-28 NOTE — PATIENT INSTRUCTIONS
--Rest, drink plenty of fluids  --COVID testing sent  Will call with results  Average turn around time is 48-72 hours  --Advise self-quarantining until you hear back from us regarding test results (at the earliest)  This involves not leaving your house, wearing a mask at all times while outside your immediate living area, and disinfecting all commonly used surfaces and personal items  If your COVID test results are positive, you will need to self-quarantine at home for at least 10 days from the onset of your symptoms, until you are feeling better, and until you are without a fever for at least 72 hours  --For nasal/sinus congestion, helpful measures include steam, warm compresses, an OTC saline nasal spray or Neti pot  --For cough, you can take an OTC expectorant such as plain Robitussion or Mucinex (active ingredient guaifenesin)  A spoonful of honey at bedtime may also be helpful, as may a prescription cough medicine  Also recommended is the use of a cool mist humidifier (with or without Vicks) in the bedroom at night  --For sore throat, you can take OTC lozenges, use warm gargles (salt water or apple cider vinegar and honey), herbal teas, or an OTC throat spray (Chloraseptic)  --You can take Tylenol or Motrin/Advil as needed for fever, headache, body aches  Motrin/Advil should be avoided, however, if you have a history of heart disease, bleeding ulcers, or if you take blood thinners  --You should contact your primary care provider and/or go to the ER if your symptoms are not improved or get worse over the next 7 days  This includes new onset fever, localized ear pain, sinus pain, as well as worsening cough, chest pain, shortness of breath, or significant weakness/fatigue

## 2021-08-29 LAB
FLUAV RNA RESP QL NAA+PROBE: NEGATIVE
FLUBV RNA RESP QL NAA+PROBE: NEGATIVE
RSV RNA RESP QL NAA+PROBE: NEGATIVE
SARS-COV-2 RNA RESP QL NAA+PROBE: NEGATIVE

## 2021-09-14 NOTE — PROGRESS NOTES
She transfer back to us from Dr Jesus Wei after an IVF pregnancy  NIKI 22 by IVF transfer date  Pap up to date, will plan to repeat pp  Will plan Chlam/GC cx off of urine today  Will plan to reorder ID labs as most screening labs were drawn  (>6 mths ago)  Info given to pt contact MFM to be schedule genetic testing and to establish with them  NOB done today  Pt presents for initial OB appointment  PE done in office today  Breast feeding/bottle feeding plans: will attempt breast feeding  MRSA:denies   Varicella: had as a child   STD history: denies   Drug/alcohol use history: denies   Slip written for initial OB panel plus carrier screening  Genetic testing: briefly r/w pt  Will plan to call to make appt w MFM today  US done in office today: TVUS + CRL c/w 9w0d  C/w dates by FET   bpm guerrier IUP  Consents signed: for appt and delivery    Pt with complaints of nausea  We reviewed OTC tc including vitamin B6 and Unisom  If no relief can trial Zofran  She does vomit 3 times per day most days  Aware of importance of hydration  To keep in contact with sx updates  PMHX:anxiety, cardiac arrythmia, h/o abnormal pap    POBHX: IVF pregnancy    PSURGHX:cardiac cath       All questions answered  Ultrasound Probe Disinfection    A transvaginal ultrasound was performed     Probe identification: probe serial number [id number]  Disinfection process: Disinfection was performed with High Level Disinfection Process ([employer])    Suzanne Zavala PA-C

## 2021-09-15 ENCOUNTER — ULTRASOUND (OUTPATIENT)
Dept: OBGYN CLINIC | Facility: CLINIC | Age: 36
End: 2021-09-15

## 2021-09-15 VITALS — WEIGHT: 165 LBS | DIASTOLIC BLOOD PRESSURE: 82 MMHG | BODY MASS INDEX: 30.18 KG/M2 | SYSTOLIC BLOOD PRESSURE: 122 MMHG

## 2021-09-15 DIAGNOSIS — Z34.90 PREGNANCY AT EARLY STAGE: Primary | ICD-10-CM

## 2021-09-15 PROCEDURE — 87491 CHLMYD TRACH DNA AMP PROBE: CPT | Performed by: PHYSICIAN ASSISTANT

## 2021-09-15 PROCEDURE — PNV: Performed by: PHYSICIAN ASSISTANT

## 2021-09-15 PROCEDURE — 87591 N.GONORRHOEAE DNA AMP PROB: CPT | Performed by: PHYSICIAN ASSISTANT

## 2021-09-16 ENCOUNTER — TELEPHONE (OUTPATIENT)
Dept: OBGYN CLINIC | Facility: CLINIC | Age: 36
End: 2021-09-16

## 2021-09-17 LAB
C TRACH DNA SPEC QL NAA+PROBE: NEGATIVE
N GONORRHOEA DNA SPEC QL NAA+PROBE: NEGATIVE

## 2021-09-20 ENCOUNTER — APPOINTMENT (OUTPATIENT)
Dept: LAB | Facility: CLINIC | Age: 36
End: 2021-09-20
Payer: COMMERCIAL

## 2021-09-20 DIAGNOSIS — Z34.90 PREGNANCY AT EARLY STAGE: ICD-10-CM

## 2021-09-20 LAB
ABO GROUP BLD: NORMAL
BACTERIA UR QL AUTO: ABNORMAL /HPF
BILIRUB UR QL STRIP: NEGATIVE
BLD GP AB SCN SERPL QL: NEGATIVE
CLARITY UR: ABNORMAL
COLOR UR: YELLOW
GLUCOSE UR STRIP-MCNC: NEGATIVE MG/DL
HGB UR QL STRIP.AUTO: NEGATIVE
KETONES UR STRIP-MCNC: NEGATIVE MG/DL
LEUKOCYTE ESTERASE UR QL STRIP: NEGATIVE
MUCOUS THREADS UR QL AUTO: ABNORMAL
NITRITE UR QL STRIP: NEGATIVE
NON-SQ EPI CELLS URNS QL MICRO: ABNORMAL /HPF
PH UR STRIP.AUTO: 6.5 [PH]
PROT UR STRIP-MCNC: ABNORMAL MG/DL
RBC #/AREA URNS AUTO: ABNORMAL /HPF
RH BLD: POSITIVE
SP GR UR STRIP.AUTO: 1.03 (ref 1–1.03)
SPECIMEN EXPIRATION DATE: NORMAL
UROBILINOGEN UR QL STRIP.AUTO: 0.2 E.U./DL
WBC #/AREA URNS AUTO: ABNORMAL /HPF

## 2021-09-20 PROCEDURE — 81329 SMN1 GENE DOS/DELETION ALYS: CPT

## 2021-09-20 PROCEDURE — 83020 HEMOGLOBIN ELECTROPHORESIS: CPT

## 2021-09-20 PROCEDURE — 86803 HEPATITIS C AB TEST: CPT

## 2021-09-20 PROCEDURE — 36415 COLL VENOUS BLD VENIPUNCTURE: CPT

## 2021-09-20 PROCEDURE — 81220 CFTR GENE COM VARIANTS: CPT

## 2021-09-20 PROCEDURE — 81001 URINALYSIS AUTO W/SCOPE: CPT | Performed by: PHYSICIAN ASSISTANT

## 2021-09-21 ENCOUNTER — TELEPHONE (OUTPATIENT)
Dept: OBGYN CLINIC | Facility: CLINIC | Age: 36
End: 2021-09-21

## 2021-09-21 LAB
HBV SURFACE AG SER QL: NORMAL
HCV AB SER QL: NORMAL
HIV 1+2 AB+HIV1 P24 AG SERPL QL IA: NORMAL
RPR SER QL: NORMAL
RUBV IGG SERPL IA-ACNC: >175 IU/ML

## 2021-09-21 NOTE — TELEPHONE ENCOUNTER
What are her sx? Would likely just hydrate well, avoid bladder irritants and tx based on culture results

## 2021-09-21 NOTE — TELEPHONE ENCOUNTER
Pt called and would like to talk about he Urine test results  She has questions and would like a call back

## 2021-09-21 NOTE — TELEPHONE ENCOUNTER
Reviewed urinalysis with patient  For some reason lab did not perform the culture, so requested it to be added  Patient is having some symptoms of a UTI, states has some burning  Aware will review with [facility] to see if she wants to treat based on results from urinalysis or if wants to wait for culture  Aware to push fluids for now

## 2021-09-22 LAB
HGB A MFR BLD: 2.7 % (ref 1.8–3.2)
HGB A MFR BLD: 97.3 % (ref 96.4–98.8)
HGB F MFR BLD: 0 % (ref 0–2)
HGB FRACT BLD-IMP: NORMAL
HGB S MFR BLD: 0 %

## 2021-09-22 NOTE — TELEPHONE ENCOUNTER
Called lab since culture was still showing as active and not in process  Lab did not draw for culture and urine discarded after 24 hours  Called patient to review  Patient aware need for recollection and order added  Patient still only having sx of burning with urination  Patient is aware to call if her symptoms were to change while we wait for results  Patient will likely be able to do sample tomorrow

## 2021-09-23 ENCOUNTER — APPOINTMENT (OUTPATIENT)
Dept: LAB | Facility: CLINIC | Age: 36
End: 2021-09-23
Payer: COMMERCIAL

## 2021-09-23 DIAGNOSIS — R30.0 BURNING WITH URINATION: ICD-10-CM

## 2021-09-23 PROCEDURE — 87086 URINE CULTURE/COLONY COUNT: CPT

## 2021-09-24 LAB
CF COMMENT: NORMAL
CFTR MUT ANL BLD/T: NORMAL

## 2021-09-25 LAB — BACTERIA UR CULT: NORMAL

## 2021-09-27 ENCOUNTER — OFFICE VISIT (OUTPATIENT)
Dept: URGENT CARE | Facility: CLINIC | Age: 36
End: 2021-09-27
Payer: COMMERCIAL

## 2021-09-27 ENCOUNTER — NURSE TRIAGE (OUTPATIENT)
Dept: OTHER | Facility: OTHER | Age: 36
End: 2021-09-27

## 2021-09-27 VITALS
BODY MASS INDEX: 30.73 KG/M2 | WEIGHT: 167 LBS | HEIGHT: 62 IN | HEART RATE: 91 BPM | RESPIRATION RATE: 16 BRPM | DIASTOLIC BLOOD PRESSURE: 71 MMHG | SYSTOLIC BLOOD PRESSURE: 123 MMHG | TEMPERATURE: 97.1 F

## 2021-09-27 DIAGNOSIS — R30.0 DYSURIA: Primary | ICD-10-CM

## 2021-09-27 LAB
SL AMB  POCT GLUCOSE, UA: NORMAL
SL AMB LEUKOCYTE ESTERASE,UA: NORMAL
SL AMB POCT BILIRUBIN,UA: NORMAL
SL AMB POCT BLOOD,UA: NORMAL
SL AMB POCT CLARITY,UA: NORMAL
SL AMB POCT COLOR,UA: YELLOW
SL AMB POCT KETONES,UA: NORMAL
SL AMB POCT NITRITE,UA: NORMAL
SL AMB POCT PH,UA: 7.5
SL AMB POCT SPECIFIC GRAVITY,UA: 1.02
SL AMB POCT URINE PROTEIN: NORMAL
SL AMB POCT UROBILINOGEN: 0.2

## 2021-09-27 PROCEDURE — 87086 URINE CULTURE/COLONY COUNT: CPT | Performed by: PREVENTIVE MEDICINE

## 2021-09-27 PROCEDURE — 81002 URINALYSIS NONAUTO W/O SCOPE: CPT | Performed by: PREVENTIVE MEDICINE

## 2021-09-27 PROCEDURE — 99213 OFFICE O/P EST LOW 20 MIN: CPT | Performed by: PREVENTIVE MEDICINE

## 2021-09-27 RX ORDER — CEPHALEXIN 250 MG/1
250 CAPSULE ORAL 4 TIMES DAILY
Qty: 20 CAPSULE | Refills: 0 | Status: SHIPPED | OUTPATIENT
Start: 2021-09-27 | End: 2021-10-02

## 2021-09-27 NOTE — TELEPHONE ENCOUNTER
Regarding:  Think I have a UTI and need for antibiotic to be call in   ----- Message from Jackie Jeffries sent at 9/27/2021  7:13 PM EDT -----  " I think I have a UTI infection and I would like for antibiotic to be call in "

## 2021-09-28 LAB — BACTERIA UR CULT: NORMAL

## 2021-09-30 LAB
CLINICAL INFO: NORMAL
ETHNIC BACKGROUND STATED: NORMAL
GENE MUT TESTED BLD/T: NORMAL
GENERAL COMMENTS:: NORMAL
LAB DIRECTOR NAME PROVIDER: NORMAL
REASON FOR REFERRAL (NARRATIVE): NORMAL
REF LAB TEST METHOD: NORMAL
SL AMB DISCLAIMER: NORMAL
SL AMB GENETIC COUNSELOR: NORMAL
SMN1 GENE MUT ANL BLD/T: NORMAL
SPECIMEN SOURCE: NORMAL

## 2021-10-05 ENCOUNTER — ULTRASOUND (OUTPATIENT)
Dept: OBGYN CLINIC | Facility: CLINIC | Age: 36
End: 2021-10-05

## 2021-10-05 VITALS
BODY MASS INDEX: 30.55 KG/M2 | SYSTOLIC BLOOD PRESSURE: 124 MMHG | HEIGHT: 62 IN | WEIGHT: 166 LBS | DIASTOLIC BLOOD PRESSURE: 78 MMHG

## 2021-10-05 DIAGNOSIS — Z34.91 FIRST TRIMESTER PREGNANCY: Primary | ICD-10-CM

## 2021-10-05 PROCEDURE — PNV: Performed by: PHYSICIAN ASSISTANT

## 2021-10-13 ENCOUNTER — TELEPHONE (OUTPATIENT)
Dept: PERINATAL CARE | Facility: OTHER | Age: 36
End: 2021-10-13

## 2021-10-20 ENCOUNTER — ROUTINE PRENATAL (OUTPATIENT)
Dept: PERINATAL CARE | Facility: OTHER | Age: 36
End: 2021-10-20
Payer: COMMERCIAL

## 2021-10-20 VITALS
SYSTOLIC BLOOD PRESSURE: 104 MMHG | HEART RATE: 92 BPM | WEIGHT: 166.45 LBS | DIASTOLIC BLOOD PRESSURE: 68 MMHG | HEIGHT: 62 IN | BODY MASS INDEX: 30.63 KG/M2

## 2021-10-20 DIAGNOSIS — O09.521 SUPERVISION OF ELDERLY MULTIGRAVIDA, FIRST TRIMESTER: Primary | ICD-10-CM

## 2021-10-20 DIAGNOSIS — Z3A.13 13 WEEKS GESTATION OF PREGNANCY: ICD-10-CM

## 2021-10-20 DIAGNOSIS — Z34.90 PREGNANCY AT EARLY STAGE: ICD-10-CM

## 2021-10-20 DIAGNOSIS — Z36.82 NUCHAL TRANSLUCENCY OF FETUS ON PRENATAL ULTRASOUND: ICD-10-CM

## 2021-10-20 DIAGNOSIS — O09.811 PREGNANCY RESULTING FROM IN VITRO FERTILIZATION IN FIRST TRIMESTER: ICD-10-CM

## 2021-10-20 PROCEDURE — 76801 OB US < 14 WKS SINGLE FETUS: CPT | Performed by: OBSTETRICS & GYNECOLOGY

## 2021-10-20 PROCEDURE — 76813 OB US NUCHAL MEAS 1 GEST: CPT | Performed by: OBSTETRICS & GYNECOLOGY

## 2021-10-20 PROCEDURE — 99241 PR OFFICE CONSULTATION NEW/ESTAB PATIENT 15 MIN: CPT | Performed by: OBSTETRICS & GYNECOLOGY

## 2021-10-20 PROCEDURE — 3008F BODY MASS INDEX DOCD: CPT | Performed by: OBSTETRICS & GYNECOLOGY

## 2021-10-28 LAB
# FETUSES US: 1
CFDNA.FET/TOTAL PLAS.CFDNA: NORMAL
FET CHR 13 TS PLAS.CFDNA QL: NEGATIVE
FET CHR 18 TS PLAS.CFDNA QL: NEGATIVE
FET CHR 21 TS PLAS.CFDNA QL: NEGATIVE
FET CHR X + Y ANEUP PLAS.CFDNA QL: NORMAL
FET CHROM X + Y ANEUP CFDNA IMP: NORMAL
FET Y CHROM PLAS.CFDNA QL: DETECTED
FET Y CHROM PLAS.CFDNA: NORMAL
GA (DAYS): 6 D
GA (WEEKS): 13 WK
MICRODELETION SYND BLD/T FISH: NORMAL
REF LAB TEST METHOD: NORMAL
SERVICE CMNT-IMP: NORMAL
SERVICE CMNT-IMP: NORMAL
SL AMB ABNORMAL MSS?: NORMAL
SL AMB ABNORMAL US?: NORMAL
SL AMB ADVANCED MATERNAL AGE?: NORMAL
SL AMB MICRODELETION INTERP: NORMAL
SL AMB MICRODELETION: NOT DETECTED
SL AMB PERSONAL/FAM HISTORY?: NORMAL
SL AMB SPECIFICATIONS: NORMAL

## 2021-11-01 ENCOUNTER — ROUTINE PRENATAL (OUTPATIENT)
Dept: OBGYN CLINIC | Facility: CLINIC | Age: 36
End: 2021-11-01

## 2021-11-01 VITALS
DIASTOLIC BLOOD PRESSURE: 74 MMHG | WEIGHT: 167 LBS | HEIGHT: 62 IN | BODY MASS INDEX: 30.73 KG/M2 | SYSTOLIC BLOOD PRESSURE: 116 MMHG

## 2021-11-01 DIAGNOSIS — O09.811 PREGNANCY RESULTING FROM IN VITRO FERTILIZATION IN FIRST TRIMESTER: ICD-10-CM

## 2021-11-01 DIAGNOSIS — Z3A.15 PREGNANCY WITH 15 COMPLETED WEEKS GESTATION: Primary | ICD-10-CM

## 2021-11-01 PROCEDURE — PNV: Performed by: OBSTETRICS & GYNECOLOGY

## 2021-11-01 RX ORDER — ASPIRIN 81 MG/1
162 TABLET ORAL DAILY
COMMUNITY
End: 2022-03-28

## 2021-11-10 ENCOUNTER — TELEPHONE (OUTPATIENT)
Dept: PERINATAL CARE | Facility: OTHER | Age: 36
End: 2021-11-10

## 2021-11-15 DIAGNOSIS — F41.9 ANXIETY: Primary | ICD-10-CM

## 2021-11-15 RX ORDER — HYDROXYZINE HYDROCHLORIDE 25 MG/1
25 TABLET, FILM COATED ORAL 3 TIMES DAILY PRN
Qty: 30 TABLET | Refills: 1 | Status: SHIPPED | OUTPATIENT
Start: 2021-11-15 | End: 2021-11-18

## 2021-11-18 ENCOUNTER — ROUTINE PRENATAL (OUTPATIENT)
Dept: OBGYN CLINIC | Facility: CLINIC | Age: 36
End: 2021-11-18

## 2021-11-18 VITALS
SYSTOLIC BLOOD PRESSURE: 112 MMHG | DIASTOLIC BLOOD PRESSURE: 80 MMHG | BODY MASS INDEX: 31.65 KG/M2 | HEIGHT: 62 IN | WEIGHT: 172 LBS

## 2021-11-18 DIAGNOSIS — O09.812 PREGNANCY RESULTING FROM IN VITRO FERTILIZATION IN SECOND TRIMESTER: ICD-10-CM

## 2021-11-18 DIAGNOSIS — Z3A.18 18 WEEKS GESTATION OF PREGNANCY: Primary | ICD-10-CM

## 2021-11-18 PROCEDURE — PNV: Performed by: OBSTETRICS & GYNECOLOGY

## 2021-11-18 PROCEDURE — 3008F BODY MASS INDEX DOCD: CPT | Performed by: OBSTETRICS & GYNECOLOGY

## 2021-12-02 ENCOUNTER — ROUTINE PRENATAL (OUTPATIENT)
Dept: PERINATAL CARE | Facility: CLINIC | Age: 36
End: 2021-12-02
Payer: COMMERCIAL

## 2021-12-02 VITALS
BODY MASS INDEX: 31.94 KG/M2 | SYSTOLIC BLOOD PRESSURE: 123 MMHG | HEART RATE: 93 BPM | WEIGHT: 173.6 LBS | DIASTOLIC BLOOD PRESSURE: 58 MMHG | HEIGHT: 62 IN

## 2021-12-02 DIAGNOSIS — Z3A.20 20 WEEKS GESTATION OF PREGNANCY: ICD-10-CM

## 2021-12-02 DIAGNOSIS — Z36.86 ENCOUNTER FOR ANTENATAL SCREENING FOR CERVICAL LENGTH: ICD-10-CM

## 2021-12-02 DIAGNOSIS — O09.812 PREGNANCY RESULTING FROM IN VITRO FERTILIZATION IN SECOND TRIMESTER: Primary | ICD-10-CM

## 2021-12-02 PROCEDURE — 76817 TRANSVAGINAL US OBSTETRIC: CPT | Performed by: OBSTETRICS & GYNECOLOGY

## 2021-12-02 PROCEDURE — 76811 OB US DETAILED SNGL FETUS: CPT | Performed by: OBSTETRICS & GYNECOLOGY

## 2021-12-02 PROCEDURE — 99214 OFFICE O/P EST MOD 30 MIN: CPT | Performed by: OBSTETRICS & GYNECOLOGY

## 2021-12-02 PROCEDURE — 3008F BODY MASS INDEX DOCD: CPT | Performed by: NURSE PRACTITIONER

## 2021-12-13 ENCOUNTER — TELEPHONE (OUTPATIENT)
Dept: PERINATAL CARE | Facility: OTHER | Age: 36
End: 2021-12-13

## 2021-12-15 ENCOUNTER — TELEMEDICINE (OUTPATIENT)
Dept: FAMILY MEDICINE CLINIC | Facility: CLINIC | Age: 36
End: 2021-12-15
Payer: COMMERCIAL

## 2021-12-15 DIAGNOSIS — B34.9 VIRAL INFECTION, UNSPECIFIED: ICD-10-CM

## 2021-12-15 DIAGNOSIS — J01.40 ACUTE NON-RECURRENT PANSINUSITIS: Primary | ICD-10-CM

## 2021-12-15 PROCEDURE — 87636 SARSCOV2 & INF A&B AMP PRB: CPT | Performed by: NURSE PRACTITIONER

## 2021-12-15 PROCEDURE — 99213 OFFICE O/P EST LOW 20 MIN: CPT | Performed by: NURSE PRACTITIONER

## 2021-12-15 PROCEDURE — 1036F TOBACCO NON-USER: CPT | Performed by: NURSE PRACTITIONER

## 2021-12-15 RX ORDER — AZITHROMYCIN 250 MG/1
TABLET, FILM COATED ORAL
Qty: 6 TABLET | Refills: 0 | Status: SHIPPED | OUTPATIENT
Start: 2021-12-15 | End: 2021-12-19

## 2021-12-16 ENCOUNTER — TELEPHONE (OUTPATIENT)
Dept: OBGYN CLINIC | Facility: CLINIC | Age: 36
End: 2021-12-16

## 2021-12-17 LAB
FLUAV RNA RESP QL NAA+PROBE: NEGATIVE
FLUBV RNA RESP QL NAA+PROBE: NEGATIVE
SARS-COV-2 RNA RESP QL NAA+PROBE: NEGATIVE

## 2021-12-21 DIAGNOSIS — B37.9 YEAST INFECTION: Primary | ICD-10-CM

## 2021-12-21 RX ORDER — FLUCONAZOLE 150 MG/1
150 TABLET ORAL EVERY OTHER DAY
Qty: 2 TABLET | Refills: 0 | Status: SHIPPED | OUTPATIENT
Start: 2021-12-21 | End: 2021-12-21

## 2021-12-23 ENCOUNTER — HOSPITAL ENCOUNTER (OUTPATIENT)
Facility: HOSPITAL | Age: 36
Discharge: HOME | End: 2021-12-23
Attending: OBSTETRICS & GYNECOLOGY | Admitting: OBSTETRICS & GYNECOLOGY
Payer: COMMERCIAL

## 2021-12-23 VITALS — RESPIRATION RATE: 18 BRPM | BODY MASS INDEX: 31.94 KG/M2 | TEMPERATURE: 98.6 F | WEIGHT: 173.6 LBS | HEIGHT: 62 IN

## 2021-12-23 DIAGNOSIS — B96.89 BACTERIAL VAGINOSIS IN PREGNANCY: Primary | ICD-10-CM

## 2021-12-23 DIAGNOSIS — O23.599 BACTERIAL VAGINOSIS IN PREGNANCY: Primary | ICD-10-CM

## 2021-12-23 LAB
BACTERIA UR QL AUTO: ABNORMAL /HPF
BILIRUB UR QL STRIP: NEGATIVE
CLARITY UR: CLEAR
COLOR UR: YELLOW
GLUCOSE UR STRIP-MCNC: NEGATIVE MG/DL
HGB UR QL STRIP.AUTO: ABNORMAL
KETONES UR STRIP-MCNC: NEGATIVE MG/DL
LEUKOCYTE ESTERASE UR QL STRIP: ABNORMAL
NITRITE UR QL STRIP: NEGATIVE
NON-SQ EPI CELLS URNS QL MICRO: ABNORMAL /HPF
PH UR STRIP.AUTO: 7 [PH] (ref 4.5–8)
PROT UR STRIP-MCNC: NEGATIVE MG/DL
RBC #/AREA URNS AUTO: ABNORMAL /HPF
SP GR UR STRIP.AUTO: 1.02 (ref 1–1.03)
UROBILINOGEN UR QL STRIP.AUTO: 0.2 E.U./DL
WBC #/AREA URNS AUTO: ABNORMAL /HPF

## 2021-12-23 PROCEDURE — 81001 URINALYSIS AUTO W/SCOPE: CPT

## 2021-12-23 PROCEDURE — G0463 HOSPITAL OUTPT CLINIC VISIT: HCPCS

## 2021-12-23 PROCEDURE — 99213 OFFICE O/P EST LOW 20 MIN: CPT

## 2021-12-23 PROCEDURE — NC001 PR NO CHARGE: Performed by: OBSTETRICS & GYNECOLOGY

## 2021-12-23 PROCEDURE — 87086 URINE CULTURE/COLONY COUNT: CPT | Performed by: STUDENT IN AN ORGANIZED HEALTH CARE EDUCATION/TRAINING PROGRAM

## 2021-12-23 RX ORDER — METRONIDAZOLE 500 MG/1
500 TABLET ORAL EVERY 12 HOURS SCHEDULED
Qty: 14 TABLET | Refills: 0 | Status: SHIPPED | OUTPATIENT
Start: 2021-12-23 | End: 2021-12-31 | Stop reason: SDUPTHER

## 2021-12-24 LAB — BACTERIA UR CULT: NORMAL

## 2021-12-27 PROBLEM — O09.522 MULTIGRAVIDA OF ADVANCED MATERNAL AGE IN SECOND TRIMESTER: Status: ACTIVE | Noted: 2021-10-20

## 2021-12-27 PROBLEM — Z3A.23 23 WEEKS GESTATION OF PREGNANCY: Status: ACTIVE | Noted: 2021-11-18

## 2021-12-28 ENCOUNTER — TELEPHONE (OUTPATIENT)
Dept: OBGYN CLINIC | Facility: CLINIC | Age: 36
End: 2021-12-28

## 2021-12-31 DIAGNOSIS — B96.89 BACTERIAL VAGINOSIS IN PREGNANCY: ICD-10-CM

## 2021-12-31 DIAGNOSIS — O23.599 BACTERIAL VAGINOSIS IN PREGNANCY: ICD-10-CM

## 2021-12-31 RX ORDER — METRONIDAZOLE 500 MG/1
500 TABLET ORAL EVERY 12 HOURS SCHEDULED
Qty: 14 TABLET | Refills: 0 | Status: SHIPPED | OUTPATIENT
Start: 2021-12-31 | End: 2022-01-07

## 2022-01-03 ENCOUNTER — ROUTINE PRENATAL (OUTPATIENT)
Dept: OBGYN CLINIC | Facility: CLINIC | Age: 37
End: 2022-01-03

## 2022-01-03 ENCOUNTER — TELEPHONE (OUTPATIENT)
Dept: PERINATAL CARE | Facility: CLINIC | Age: 37
End: 2022-01-03

## 2022-01-03 VITALS
WEIGHT: 176.4 LBS | SYSTOLIC BLOOD PRESSURE: 110 MMHG | BODY MASS INDEX: 32.26 KG/M2 | DIASTOLIC BLOOD PRESSURE: 70 MMHG | TEMPERATURE: 97.8 F

## 2022-01-03 DIAGNOSIS — Z3A.24 PREGNANCY WITH 24 COMPLETED WEEKS GESTATION: Primary | ICD-10-CM

## 2022-01-03 PROCEDURE — PNV: Performed by: OBSTETRICS & GYNECOLOGY

## 2022-01-03 NOTE — TELEPHONE ENCOUNTER
Received a VMM on nurse line from 12/31/2021 @ 1136  PT made urgent request for a return call r/t an medication that was ordered on 12/23/2021  After review of pts chart, it appears she sent a My Chart message to her OB and family medicine refilled her prescription  I did call and leave a VMM informing pt if she needed further assistance to contact her OB with requests for this medication

## 2022-01-03 NOTE — PROGRESS NOTES
Got first covid vaccine, Patient reports good fm, no, bleeding, loss of fluid, edema, dom violence, or smoking    blake pnv  Some reflux causing nausea vomiting discussed using Pepcid for reflux call if not improving given slip for Glucola CBC RPR to do a little prior to next visit return in 1 month or sooner as needed

## 2022-01-10 ENCOUNTER — TELEPHONE (OUTPATIENT)
Dept: OBGYN CLINIC | Facility: CLINIC | Age: 37
End: 2022-01-10

## 2022-01-10 ENCOUNTER — APPOINTMENT (OUTPATIENT)
Dept: LAB | Facility: CLINIC | Age: 37
End: 2022-01-10
Payer: COMMERCIAL

## 2022-01-10 DIAGNOSIS — R82.71 BACTERIA IN URINE: ICD-10-CM

## 2022-01-10 DIAGNOSIS — Z3A.24 PREGNANCY WITH 24 COMPLETED WEEKS GESTATION: ICD-10-CM

## 2022-01-10 DIAGNOSIS — Z3A.15 PREGNANCY WITH 15 COMPLETED WEEKS GESTATION: ICD-10-CM

## 2022-01-10 LAB
BASOPHILS # BLD AUTO: 0.02 THOUSANDS/ΜL (ref 0–0.1)
BASOPHILS NFR BLD AUTO: 0 % (ref 0–1)
EOSINOPHIL # BLD AUTO: 0.04 THOUSAND/ΜL (ref 0–0.61)
EOSINOPHIL NFR BLD AUTO: 1 % (ref 0–6)
ERYTHROCYTE [DISTWIDTH] IN BLOOD BY AUTOMATED COUNT: 13.2 % (ref 11.6–15.1)
GLUCOSE 1H P 50 G GLC PO SERPL-MCNC: 114 MG/DL (ref 40–134)
HCT VFR BLD AUTO: 34.2 % (ref 34.8–46.1)
HGB BLD-MCNC: 11 G/DL (ref 11.5–15.4)
IMM GRANULOCYTES # BLD AUTO: 0.01 THOUSAND/UL (ref 0–0.2)
IMM GRANULOCYTES NFR BLD AUTO: 0 % (ref 0–2)
LYMPHOCYTES # BLD AUTO: 1.37 THOUSANDS/ΜL (ref 0.6–4.47)
LYMPHOCYTES NFR BLD AUTO: 21 % (ref 14–44)
MCH RBC QN AUTO: 27.8 PG (ref 26.8–34.3)
MCHC RBC AUTO-ENTMCNC: 32.2 G/DL (ref 31.4–37.4)
MCV RBC AUTO: 86 FL (ref 82–98)
MONOCYTES # BLD AUTO: 0.34 THOUSAND/ΜL (ref 0.17–1.22)
MONOCYTES NFR BLD AUTO: 5 % (ref 4–12)
NEUTROPHILS # BLD AUTO: 4.8 THOUSANDS/ΜL (ref 1.85–7.62)
NEUTS SEG NFR BLD AUTO: 73 % (ref 43–75)
NRBC BLD AUTO-RTO: 0 /100 WBCS
PLATELET # BLD AUTO: 227 THOUSANDS/UL (ref 149–390)
PMV BLD AUTO: 10.4 FL (ref 8.9–12.7)
RBC # BLD AUTO: 3.96 MILLION/UL (ref 3.81–5.12)
RPR SER QL: NORMAL
WBC # BLD AUTO: 6.58 THOUSAND/UL (ref 4.31–10.16)

## 2022-01-10 PROCEDURE — 82105 ALPHA-FETOPROTEIN SERUM: CPT

## 2022-01-10 PROCEDURE — 36415 COLL VENOUS BLD VENIPUNCTURE: CPT

## 2022-01-10 PROCEDURE — 86592 SYPHILIS TEST NON-TREP QUAL: CPT

## 2022-01-10 PROCEDURE — 82950 GLUCOSE TEST: CPT

## 2022-01-10 PROCEDURE — 85025 COMPLETE CBC W/AUTO DIFF WBC: CPT

## 2022-01-10 PROCEDURE — 87086 URINE CULTURE/COLONY COUNT: CPT

## 2022-01-10 NOTE — TELEPHONE ENCOUNTER
Reviewed results with patient  Aware to increase iron in diet, all other completed labs normal for pregnancy  Aware 2 labs are still pending

## 2022-01-11 LAB — BACTERIA UR CULT: NORMAL

## 2022-01-13 PROBLEM — Z3A.26 26 WEEKS GESTATION OF PREGNANCY: Status: ACTIVE | Noted: 2021-11-18

## 2022-01-14 ENCOUNTER — ROUTINE PRENATAL (OUTPATIENT)
Dept: OBGYN CLINIC | Facility: CLINIC | Age: 37
End: 2022-01-14

## 2022-01-14 VITALS — DIASTOLIC BLOOD PRESSURE: 74 MMHG | SYSTOLIC BLOOD PRESSURE: 116 MMHG | BODY MASS INDEX: 32.01 KG/M2 | WEIGHT: 175 LBS

## 2022-01-14 DIAGNOSIS — Z3A.26 26 WEEKS GESTATION OF PREGNANCY: Primary | ICD-10-CM

## 2022-01-14 DIAGNOSIS — O09.812 PREGNANCY RESULTING FROM IN VITRO FERTILIZATION IN SECOND TRIMESTER: ICD-10-CM

## 2022-01-14 DIAGNOSIS — O09.522 MULTIGRAVIDA OF ADVANCED MATERNAL AGE IN SECOND TRIMESTER: ICD-10-CM

## 2022-01-14 PROCEDURE — PNV: Performed by: STUDENT IN AN ORGANIZED HEALTH CARE EDUCATION/TRAINING PROGRAM

## 2022-01-14 NOTE — PROGRESS NOTES
OB/GYN prenatal visit    S: 39 y o  Paloma Lind 26w1d here for PN visit  She has no obstetric complaints, including pelvic pain, contractions, vaginal bleeding, loss of fluid, or decreased fetal movement       O:  Vitals:    01/14/22 0900   BP: 116/74       Gen: no acute distress, nonlabored breathing  Fundal Height (cm): 26 cm  Fetal Heart Rate: 130    A/P:  IUP at 26w1d  IVF AMA, on baby ASA   Normal anatomy, but recommend fetal echo and growth--scheduled 02/21/22  Cancelled fetal echo, but will call to reschedule  No obstetric complaints today    Third tri labs wnl  Rh status POS    Flu vaccine--NA, TDAP vaccine--not due, covid vaccine--s/p part 1, due for part 2    Contraception: NA  Breastfeeding: NA  Birth plan: def    Discussed pre-term labor precautions and fetal kick counts    Return to office in 2 weeks    COVID 19 precautions were discussed with patient at length, reviewed symptoms, hygiene, social distancing, patient to call office 24/7 with questions/concerns      Marie Cedillo MD  1/14/2022  10:06 AM

## 2022-01-14 NOTE — PATIENT INSTRUCTIONS
Pregnancy at 23 to 26 Weeks   AMBULATORY CARE:   What changes are happening to your body: You are now close to or at the beginning of the third trimester  The third trimester starts at 24 weeks and ends with delivery  As your baby gets larger, you may develop certain symptoms  These may include pain in your back or down the sides of your abdomen  You may also have stretch marks on your abdomen, breasts, thighs, or buttocks  You may also have constipation  Seek care immediately if:   · You develop a severe headache that does not go away  · You have new or increased vision changes, such as blurred or spotted vision  · You have new or increased swelling in your face or hands  · You have vaginal spotting or bleeding  · Your water broke or you feel warm water gushing or trickling from your vagina  Call your doctor or obstetrician if:   · You have abdominal cramps, pressure, or tightening  · You have a change in vaginal discharge  · You have light bleeding  · You have chills or a fever  · You have vaginal itching, burning, or pain  · You have yellow, green, white, or foul-smelling vaginal discharge  · You have pain or burning when you urinate, less urine than usual, or pink or bloody urine  · You have questions or concerns about your condition or care  How to care for yourself at this stage of your pregnancy:       · Eat a variety of healthy foods  Healthy foods include fruits, vegetables, whole-grain breads, low-fat dairy foods, beans, lean meats, and fish  Drink liquids as directed  Ask how much liquid to drink each day and which liquids are best for you  Limit caffeine to less than 200 milligrams each day  Limit your intake of fish to 2 servings each week  Choose fish low in mercury such as canned light tuna, shrimp, salmon, cod, or tilapia  Do not  eat fish high in mercury such as swordfish, tilefish, basilio mackerel, and shark  · Manage back pain    Do not stand for long periods of time or lift heavy items  Use good posture while you stand, squat, or bend  Wear low-heeled shoes with good support  Rest may also help to relieve back pain  Ask your healthcare provider about exercises you can do to strengthen your back muscles  · Take prenatal vitamins as directed  Your need for certain vitamins and minerals, such as folic acid, increases during pregnancy  Prenatal vitamins provide some of the extra vitamins and minerals you need  Prenatal vitamins may also help to decrease the risk of certain birth defects  · Talk to your healthcare provider about exercise  Moderate exercise can help you stay fit  Your healthcare provider will help you plan an exercise program that is safe for you during pregnancy  · Do not smoke  Smoking increases your risk of a miscarriage and other health problems during your pregnancy  Smoking can cause your baby to be born too early or weigh less at birth  Ask your healthcare provider for information if you need help quitting  · Do not drink alcohol  Alcohol passes from your body to your baby through the placenta  It can affect your baby's brain development and cause fetal alcohol syndrome (FAS)  FAS is a group of conditions that causes mental, behavior, and growth problems  · Talk to your healthcare provider before you take any medicines  Many medicines may harm your baby if you take them when you are pregnant  Do not take any medicines, vitamins, herbs, or supplements without first talking to your healthcare provider  Never use illegal or street drugs (such as marijuana or cocaine) while you are pregnant  Safety tips during pregnancy:   · Avoid hot tubs and saunas  Do not use a hot tub or sauna while you are pregnant, especially during your first trimester  Hot tubs and saunas may raise your baby's temperature and increase the risk of birth defects  · Avoid toxoplasmosis    This is an infection caused by eating raw meat or being around infected cat feces  It can cause birth defects, miscarriages, and other problems  Wash your hands after you touch raw meat  Make sure any meat is well-cooked before you eat it  Avoid raw eggs and unpasteurized milk  Use gloves or ask someone else to clean your cat's litter box while you are pregnant  Changes that are happening with your baby:  By 26 weeks, your baby will weigh about 2 pounds  Your baby will be about 10 inches long from the top of the head to the rump (baby's bottom)  Your baby's movements are much stronger now  Your baby's eyes are almost completely formed and can partially open  Your baby also sleeps and wakes up  What you need to know about prenatal care: Your healthcare provider will check your blood pressure and weight  You may also need the following:  · A urine test  may also be done to check for sugar and protein  These can be signs of gestational diabetes or infection  Protein in your urine may also be a sign of preeclampsia  Preeclampsia is a condition that can develop during week 20 or later of your pregnancy  It causes high blood pressure, and it can cause problems with your kidneys and other organs  · Fundal height  is a measurement of your uterus to check your baby's growth  This number is usually the same as the number of weeks that you have been pregnant  · Your baby's heart rate  will be checked  Follow up with your doctor or obstetrician as directed:  Write down your questions so you remember to ask them during your visits  © [employer] 2021 Information is for End User's use only and may not be sold, redistributed or otherwise used for commercial purposes  All illustrations and images included in CareNotes® are the copyrighted property of A D A M , Inc  or Kavitha Joseph   The above information is an  only  It is not intended as medical advice for individual conditions or treatments   Talk to your doctor, nurse or pharmacist before following any medical regimen to see if it is safe and effective for you

## 2022-01-20 LAB — 2ND TRIMESTER 4 SCREEN SERPL-IMP: NORMAL

## 2022-01-29 ENCOUNTER — NURSE TRIAGE (OUTPATIENT)
Dept: OTHER | Facility: OTHER | Age: 37
End: 2022-01-29

## 2022-01-29 NOTE — TELEPHONE ENCOUNTER
Reason for Disposition   Back pain    Answer Assessment - Initial Assessment Questions  1  ONSET: "When did the pain begin?"       The other night  2  LOCATION: "Where does it hurt?" (upper, mid or lower back)      Lower back in the middle  3  SEVERITY: "How bad is the pain?"  (e g , Scale 1-10; mild, moderate, or severe)    - MILD (1-3): doesn't interfere with normal activities     - MODERATE (4-7): interferes with normal activities or awakens from sleep     - SEVERE (8-10): excruciating pain, unable to do any normal activities       Under 5  4  PATTERN: "Is the pain constant?" (e g , yes, no; constant, intermittent)       Comes and goes  5  RADIATION: "Does the pain shoot into your legs or elsewhere?"      Did a little bit the other night  6  CAUSE:  "What do you think is causing the back pain?"       Unsure  7  BACK OVERUSE:  "Any recent lifting of heavy objects, strenuous work or exercise?"      Denies  8  MEDICATIONS: "What have you taken so far for the pain?" (e g , nothing, acetaminophen)      Denies today; took tylenol the other night, effective  9  NEUROLOGIC SYMPTOMS: "Do you have any weakness, numbness, or problems with bowel/bladder control?"      Denies  10  OTHER SYMPTOMS: "Do you have any other symptoms?" (e g , fever, abdominal pain, burning with urination, blood in urine, fluid leaking from vagina)        Denies  11   NIKI: "What date are you expecting to deliver?"        4/21    Protocols used: PREGNANCY - BACK PAIN-ADULT-

## 2022-01-29 NOTE — TELEPHONE ENCOUNTER
Regarding: Bad cramping/[facility]  ----- Message from Jero Souza sent at 1/29/2022 12:01 PM EST -----  "I am about 28 weeks pregnant, and I am have lower back cramping "

## 2022-02-04 DIAGNOSIS — B96.89 BV (BACTERIAL VAGINOSIS): Primary | ICD-10-CM

## 2022-02-04 DIAGNOSIS — N76.0 BV (BACTERIAL VAGINOSIS): Primary | ICD-10-CM

## 2022-02-04 DIAGNOSIS — Z3A.29 29 WEEKS GESTATION OF PREGNANCY: ICD-10-CM

## 2022-02-04 RX ORDER — METRONIDAZOLE 500 MG/1
500 TABLET ORAL 2 TIMES DAILY
Qty: 14 TABLET | Refills: 0 | Status: SHIPPED | OUTPATIENT
Start: 2022-02-04 | End: 2022-02-11

## 2022-02-04 NOTE — TELEPHONE ENCOUNTER
Pt 29w1d vaginal discomfort, burning, discharge- yellow/white, mix of watery/thick  Same symptoms as in December when she was treated with the Flagyl, 12/31/21  Denies vaginal pain or pelvic pain  Denies spotting or bleeding  Reviewed probiotic  Allergy free products

## 2022-02-04 NOTE — TELEPHONE ENCOUNTER
Shai test sent to Dr Myranda Stallings - Can try Monistat and offer visit next week, If flagyl worked last time can have it again   Next OB check 12/10/22 with Dr Tyler Gauthier

## 2022-02-04 NOTE — TELEPHONE ENCOUNTER
Reviewed with pt  Pt aware if s/s change to contact the on call service over the weekend  Rx sent to Dr Neal Parada

## 2022-02-06 PROBLEM — Z3A.29 29 WEEKS GESTATION OF PREGNANCY: Status: ACTIVE | Noted: 2021-11-18

## 2022-02-07 ENCOUNTER — ROUTINE PRENATAL (OUTPATIENT)
Dept: PERINATAL CARE | Facility: OTHER | Age: 37
End: 2022-02-07
Payer: COMMERCIAL

## 2022-02-07 VITALS
WEIGHT: 183.64 LBS | BODY MASS INDEX: 33.79 KG/M2 | DIASTOLIC BLOOD PRESSURE: 66 MMHG | HEIGHT: 62 IN | SYSTOLIC BLOOD PRESSURE: 123 MMHG | HEART RATE: 93 BPM

## 2022-02-07 DIAGNOSIS — Z3A.29 29 WEEKS GESTATION OF PREGNANCY: Primary | ICD-10-CM

## 2022-02-07 DIAGNOSIS — O09.812 PREGNANCY RESULTING FROM IN VITRO FERTILIZATION IN SECOND TRIMESTER: ICD-10-CM

## 2022-02-07 PROCEDURE — 93325 DOPPLER ECHO COLOR FLOW MAPG: CPT | Performed by: OBSTETRICS & GYNECOLOGY

## 2022-02-07 PROCEDURE — 76820 UMBILICAL ARTERY ECHO: CPT | Performed by: OBSTETRICS & GYNECOLOGY

## 2022-02-07 PROCEDURE — 76827 ECHO EXAM OF FETAL HEART: CPT | Performed by: OBSTETRICS & GYNECOLOGY

## 2022-02-07 PROCEDURE — 99213 OFFICE O/P EST LOW 20 MIN: CPT | Performed by: OBSTETRICS & GYNECOLOGY

## 2022-02-07 PROCEDURE — 76825 ECHO EXAM OF FETAL HEART: CPT | Performed by: OBSTETRICS & GYNECOLOGY

## 2022-02-07 NOTE — LETTER
February 7, 2022     César Murcia MD  [address]    Patient: Tarah King   YOB: 1985   Date of Visit: 2/7/2022       Dear Dr Tierney Hagan: Thank you for referring Deidrechelita Reyeslon to me for evaluation  Below are my notes for this consultation  If you have questions, please do not hesitate to call me  I look forward to following your patient along with you  Sincerely,        Lex Smith MD        CC: No Recipients  Lex Smith MD  2/7/2022 12:01 PM  Sign when Signing Visit  [address]: Ms Vika Mares was seen today at 29w4d for fetal echocardiogram   See ultrasound report under "OB Procedures" tab    Please don't hesitate to contact our office with any concerns or questions   -Lex Smith MD

## 2022-02-07 NOTE — PROGRESS NOTES
[facility] Ifeoma Henderson was seen today at 29w4d for fetal echocardiogram   See ultrasound report under "OB Procedures" tab    Please don't hesitate to contact our office with any concerns or questions   -Almita Carpenter MD

## 2022-02-10 ENCOUNTER — ROUTINE PRENATAL (OUTPATIENT)
Dept: OBGYN CLINIC | Facility: CLINIC | Age: 37
End: 2022-02-10
Payer: COMMERCIAL

## 2022-02-10 VITALS — WEIGHT: 185 LBS | BODY MASS INDEX: 33.84 KG/M2 | SYSTOLIC BLOOD PRESSURE: 118 MMHG | DIASTOLIC BLOOD PRESSURE: 74 MMHG

## 2022-02-10 DIAGNOSIS — Z23 NEED FOR TDAP VACCINATION: Primary | ICD-10-CM

## 2022-02-10 DIAGNOSIS — O09.812 PREGNANCY RESULTING FROM IN VITRO FERTILIZATION IN SECOND TRIMESTER: ICD-10-CM

## 2022-02-10 DIAGNOSIS — Z3A.30 30 WEEKS GESTATION OF PREGNANCY: ICD-10-CM

## 2022-02-10 PROBLEM — O23.599 BACTERIAL VAGINOSIS IN PREGNANCY: Status: RESOLVED | Noted: 2021-12-23 | Resolved: 2022-02-10

## 2022-02-10 PROBLEM — B96.89 BACTERIAL VAGINOSIS IN PREGNANCY: Status: RESOLVED | Noted: 2021-12-23 | Resolved: 2022-02-10

## 2022-02-10 PROBLEM — O09.522 MULTIGRAVIDA OF ADVANCED MATERNAL AGE IN SECOND TRIMESTER: Status: RESOLVED | Noted: 2021-10-20 | Resolved: 2022-02-10

## 2022-02-10 PROCEDURE — PNV: Performed by: OBSTETRICS & GYNECOLOGY

## 2022-02-10 PROCEDURE — 90715 TDAP VACCINE 7 YRS/> IM: CPT | Performed by: OBSTETRICS & GYNECOLOGY

## 2022-02-10 PROCEDURE — 90471 IMMUNIZATION ADMIN: CPT | Performed by: OBSTETRICS & GYNECOLOGY

## 2022-02-10 NOTE — PROGRESS NOTES
Visit:  Good FM - urine neg / neg - given checking in information - to get tdap - has f/u with  MFM scheduled

## 2022-02-16 ENCOUNTER — OFFICE VISIT (OUTPATIENT)
Dept: FAMILY MEDICINE CLINIC | Facility: CLINIC | Age: 37
End: 2022-02-16
Payer: COMMERCIAL

## 2022-02-16 VITALS
SYSTOLIC BLOOD PRESSURE: 112 MMHG | OXYGEN SATURATION: 98 % | HEIGHT: 62 IN | HEART RATE: 102 BPM | WEIGHT: 184.8 LBS | BODY MASS INDEX: 34.01 KG/M2 | TEMPERATURE: 97.7 F | DIASTOLIC BLOOD PRESSURE: 68 MMHG

## 2022-02-16 DIAGNOSIS — H69.83 DYSFUNCTION OF BOTH EUSTACHIAN TUBES: Primary | ICD-10-CM

## 2022-02-16 PROCEDURE — 1036F TOBACCO NON-USER: CPT | Performed by: FAMILY MEDICINE

## 2022-02-16 PROCEDURE — 99213 OFFICE O/P EST LOW 20 MIN: CPT | Performed by: FAMILY MEDICINE

## 2022-02-16 PROCEDURE — 3725F SCREEN DEPRESSION PERFORMED: CPT | Performed by: FAMILY MEDICINE

## 2022-02-16 RX ORDER — ONDANSETRON HYDROCHLORIDE 8 MG/1
8 TABLET, FILM COATED ORAL EVERY 8 HOURS PRN
Qty: 30 TABLET | Refills: 1 | Status: SHIPPED | OUTPATIENT
Start: 2022-02-16 | End: 2022-03-28

## 2022-02-16 RX ORDER — PREDNISONE 10 MG/1
TABLET ORAL
Qty: 26 TABLET | Refills: 0 | Status: SHIPPED | OUTPATIENT
Start: 2022-02-16 | End: 2022-03-28

## 2022-02-16 RX ORDER — AZITHROMYCIN 250 MG/1
TABLET, FILM COATED ORAL
Qty: 6 TABLET | Refills: 0 | Status: SHIPPED | OUTPATIENT
Start: 2022-02-16 | End: 2022-02-20

## 2022-02-21 NOTE — PROGRESS NOTES
Patient ID: Zaida Jackman is a 39 y o  female  HPI: 39 y  o female presenting with symptoms of ear pressure, crackling of ears and dizziness associated with positional changes  Symptoms worsening over the past two weeks  She is 7 mos pregnant  SUBJECTIVE    Family History   Problem Relation Age of Onset    Lung cancer Mother     Cancer Mother     Heart disease Maternal Grandmother      Social History     Socioeconomic History    Marital status: /Civil Union     Spouse name: Not on file    Number of children: Not on file    Years of education: Not on file    Highest education level: Not on file   Occupational History    Not on file   Tobacco Use    Smoking status: Never Smoker    Smokeless tobacco: Never Used   Substance and Sexual Activity    Alcohol use: Yes    Drug use: Never    Sexual activity: Yes     Partners: Male     Birth control/protection: None   Other Topics Concern    Not on file   Social History Narrative    Not on file     Social Determinants of Health     Financial Resource Strain: Not on file   Food Insecurity: Not on file   Transportation Needs: Not on file   Physical Activity: Not on file   Stress: Not on file   Social Connections: Not on file   Intimate Partner Violence: Not on file   Housing Stability: Not on file     Past Medical History:   Diagnosis Date    Abnormal Pap smear of cervix     Cardiac arrhythmia     Last Assessed: 1/10/2017    Female infertility     Seasonal allergies      Past Surgical History:   Procedure Laterality Date    AUGMENTATION BREAST      CARDIAC CATHETERIZATION      Last Assessed: 1/10/2017    LASIK      Corneal  Last Assessed: 1/10/2017     Allergies   Allergen Reactions    Penicillins Other (See Comments)     "poly cillin" lip swelling  Other reaction(s):  Other (See Comments)  "poly cillin" lip swelling       Current Outpatient Medications:     albuterol (ProAir HFA) 90 mcg/act inhaler, Inhale 2 puffs every 6 (six) hours as needed for wheezing, Disp: 1 Inhaler, Rfl: 2    aspirin (ECOTRIN LOW STRENGTH) 81 mg EC tablet, Take 162 mg by mouth daily, Disp: , Rfl:     Prenatal Vit-Fe Fumarate-FA (PRENATAL PO), Take by mouth, Disp: , Rfl:     ondansetron (ZOFRAN) 8 mg tablet, Take 1 tablet (8 mg total) by mouth every 8 (eight) hours as needed for nausea or vomiting, Disp: 30 tablet, Rfl: 1    predniSONE 10 mg tablet, 3 tabs po bid x2 days, then 2 tabs po bid x2 days, then 1 tab bid x2 days, then 1 daily until done , Disp: 26 tablet, Rfl: 0    Review of Systems  Constitutional:     Denies fever, chills, fatigue, weakness ,weight loss, weight gain       ENT: Denies earache, loss of hearing, nosebleed, nasal discharge,nasal congestion, sore throat,hoarseness, but complains of ear pressure,and crackling    Pulmonary: Denies shortness of breath ,cough , dyspnea on exertionon, orthopnea , PND   Cardiovascular:  Denies bradycardia , tachycardia ,palpations, lower extremity, edema leg, claudication  Breast:  Denies new or changing breast lumps,  nipple discharge, nipple changes,  Abdomen:  Denies abdominal pain , anorexia ,indigestion, nausea ,vomiting, constipation , diarrhea  Musculoskeletal: Denies myalgias, arthralgias, joint swelling, joint stiffness ,limb pain, limb swelling  Lymph:denies swollen glands  Gu: no dysuria or urinary frequency  Skin: Denies skin rash, skin lesion, skin wound, itching,dry skin  Neuro: Denies headache, numbness, tingling, confusion, loss of consciousness, but complains of postitional vertigo  Psychiatric: Denies feelings of depression, suicidal ideation, anxiety, sleep disturbances    OBJECTIVE  /68   Pulse 102   Temp 97 7 °F (36 5 °C)   Ht 5' 2" (1 575 m)   Wt 83 8 kg (184 lb 12 8 oz)   LMP 07/15/2021   SpO2 98%   BMI 33 80 kg/m²   Constitutional:   NAD, well appearing and well nourished      ENT:   Conjunctiva and lids: no injection, edema, or discharge     Pupils and iris: CASANDRA bilaterally External inspection of ears and nose: normal without deformities or discharge  Otoscopic exam: Canals patent with tm dull, no erythema,but large effusions noted bilaterally  ENasal mucosa, septum and turbinates: Turbinae injection with discharge   Oropharynx:  Moist mucosa, normal tongue and tonsils without lesions  Erythema and injection  of post pharynx with pnd      Pulmonary:Respiratory effort normal rate and rhythm, no increased work of breathing  Auscultation of lungs:  Clear bilaterally with no adventitious breath sounds       Cardiovascular: regular rate and rhythm, S1 and S2, no murmur, no edema and/or varicosities of LE      Abdomen: Soft and non-distended     Positive bowel sounds      No heptomegaly or splenomegaly      Lymphatic: Anterior and posterior cervical lymphadenopathy         Muscskeletal:  Gait and station: Normal gait      Digits and nails normal without clubbing or cyanosis       Inspection/palpation of joints, bones, and muscles:  No joint tenderness, swelling, full active and passive range of motion       Gu: no suprabubic tenderness, CVA tenderness or urethral discharge  Skin: Normal skin turgor and no rashes      Neuro:    Normal reflexes      Psych:   alert and oriented to person, place and time     normal mood and affect       Assessment/Plan:Diagnoses and all orders for this visit:    Dysfunction of both eustachian tubes  -     predniSONE 10 mg tablet; 3 tabs po bid x2 days, then 2 tabs po bid x2 days, then 1 tab bid x2 days, then 1 daily until done  -     azithromycin (ZITHROMAX) 250 mg tablet; Take 2 tablets today then 1 tablet daily x 4 days  -     ondansetron (ZOFRAN) 8 mg tablet; Take 1 tablet (8 mg total) by mouth every 8 (eight) hours as needed for nausea or vomiting        Reviewed with patient plan to treat with above plan      Patient instructed to call in 72 hours if not feeling better or if symptoms worsen

## 2022-02-24 ENCOUNTER — ROUTINE PRENATAL (OUTPATIENT)
Dept: OBGYN CLINIC | Facility: CLINIC | Age: 37
End: 2022-02-24

## 2022-02-24 VITALS
SYSTOLIC BLOOD PRESSURE: 130 MMHG | BODY MASS INDEX: 32.76 KG/M2 | HEIGHT: 62 IN | WEIGHT: 178 LBS | DIASTOLIC BLOOD PRESSURE: 78 MMHG

## 2022-02-24 DIAGNOSIS — O09.813 PREGNANCY RESULTING FROM IN VITRO FERTILIZATION IN THIRD TRIMESTER: ICD-10-CM

## 2022-02-24 DIAGNOSIS — Z3A.32 32 WEEKS GESTATION OF PREGNANCY: Primary | ICD-10-CM

## 2022-02-24 PROCEDURE — PNV: Performed by: OBSTETRICS & GYNECOLOGY

## 2022-02-24 PROCEDURE — 3008F BODY MASS INDEX DOCD: CPT | Performed by: FAMILY MEDICINE

## 2022-02-24 NOTE — PROGRESS NOTES
Visit:  Good FM - has growth US next week - urine tr / neg - some edema that resolves with rest - few cramps and reviewed

## 2022-03-01 ENCOUNTER — ULTRASOUND (OUTPATIENT)
Dept: PERINATAL CARE | Facility: OTHER | Age: 37
End: 2022-03-01
Payer: COMMERCIAL

## 2022-03-01 VITALS
DIASTOLIC BLOOD PRESSURE: 68 MMHG | SYSTOLIC BLOOD PRESSURE: 102 MMHG | WEIGHT: 187.17 LBS | HEART RATE: 109 BPM | BODY MASS INDEX: 34.23 KG/M2

## 2022-03-01 DIAGNOSIS — Z3A.32 32 WEEKS GESTATION OF PREGNANCY: ICD-10-CM

## 2022-03-01 DIAGNOSIS — O09.813 PREGNANCY RESULTING FROM IN VITRO FERTILIZATION IN THIRD TRIMESTER: Primary | ICD-10-CM

## 2022-03-01 PROCEDURE — 99213 OFFICE O/P EST LOW 20 MIN: CPT | Performed by: OBSTETRICS & GYNECOLOGY

## 2022-03-01 PROCEDURE — 76816 OB US FOLLOW-UP PER FETUS: CPT | Performed by: OBSTETRICS & GYNECOLOGY

## 2022-03-02 DIAGNOSIS — O23.599 BACTERIAL VAGINOSIS IN PREGNANCY: Primary | ICD-10-CM

## 2022-03-02 DIAGNOSIS — B96.89 BACTERIAL VAGINOSIS IN PREGNANCY: Primary | ICD-10-CM

## 2022-03-02 RX ORDER — METRONIDAZOLE 500 MG/1
500 TABLET ORAL EVERY 12 HOURS SCHEDULED
Qty: 14 TABLET | Refills: 0 | Status: SHIPPED | OUTPATIENT
Start: 2022-03-02 | End: 2022-03-09

## 2022-03-06 NOTE — PROGRESS NOTES
The patient was seen today for an ultrasound  Please see ultrasound report (located under Ob Procedures) for additional details  Thank you very much for allowing us to participate in the care of this very nice patient  Should you have any questions, please do not hesitate to contact me  Teo Del Rio MD [address]  Attending Physician, Adrian

## 2022-03-10 ENCOUNTER — ROUTINE PRENATAL (OUTPATIENT)
Dept: OBGYN CLINIC | Facility: CLINIC | Age: 37
End: 2022-03-10

## 2022-03-10 VITALS
BODY MASS INDEX: 35.79 KG/M2 | WEIGHT: 194.5 LBS | DIASTOLIC BLOOD PRESSURE: 80 MMHG | HEIGHT: 62 IN | SYSTOLIC BLOOD PRESSURE: 124 MMHG

## 2022-03-10 DIAGNOSIS — O09.813 PREGNANCY RESULTING FROM IN VITRO FERTILIZATION IN THIRD TRIMESTER: Primary | ICD-10-CM

## 2022-03-10 PROCEDURE — PNV: Performed by: PHYSICIAN ASSISTANT

## 2022-03-10 NOTE — PROGRESS NOTES
Pt doing well  No vb/lof  Good Fm  Doing FKCs  Will plan 36 weeks APFS per MFM  Does note increased edema over the last few weeks  Denies headaches, no visual changes, no RUQ pain  We reviewed s/sx preE to call with  Pt denies any sx other than edema  Urine tr/neg  BP repeat 122/82  We also reviewed overall weight gain in pregnancy and current weight  Pt with increased low back and pelvic discomfort  Getting more uncomfortable  More insomnia as well  Can trial maternity support belt  GBS next visit  Had tdap,  will also plan tdap

## 2022-03-18 ENCOUNTER — ROUTINE PRENATAL (OUTPATIENT)
Dept: OBGYN CLINIC | Facility: CLINIC | Age: 37
End: 2022-03-18

## 2022-03-18 VITALS — WEIGHT: 197.8 LBS | SYSTOLIC BLOOD PRESSURE: 120 MMHG | DIASTOLIC BLOOD PRESSURE: 84 MMHG | BODY MASS INDEX: 36.18 KG/M2

## 2022-03-18 DIAGNOSIS — O09.813 PREGNANCY RESULTING FROM IN VITRO FERTILIZATION IN THIRD TRIMESTER: ICD-10-CM

## 2022-03-18 DIAGNOSIS — Z3A.35 35 WEEKS GESTATION OF PREGNANCY: Primary | ICD-10-CM

## 2022-03-18 PROCEDURE — PNV: Performed by: OBSTETRICS & GYNECOLOGY

## 2022-03-18 NOTE — PROGRESS NOTES
Patient reports good fm, no  Headache, bleeding, loss of fluid, dom violence, or smoking    blake pnv urine tr/neg, increased edema, reviewed, support band helping but still uncomfortable, return in 1 week for gbs, nst

## 2022-03-25 ENCOUNTER — ROUTINE PRENATAL (OUTPATIENT)
Dept: OBGYN CLINIC | Facility: CLINIC | Age: 37
End: 2022-03-25

## 2022-03-25 VITALS — DIASTOLIC BLOOD PRESSURE: 80 MMHG | SYSTOLIC BLOOD PRESSURE: 112 MMHG | WEIGHT: 202.6 LBS | BODY MASS INDEX: 37.06 KG/M2

## 2022-03-25 DIAGNOSIS — Z3A.36 PREGNANCY WITH 36 COMPLETED WEEKS GESTATION: ICD-10-CM

## 2022-03-25 PROCEDURE — PNV: Performed by: OBSTETRICS & GYNECOLOGY

## 2022-03-25 NOTE — PROGRESS NOTES
Patient reports good fm, no n/v, headache, bleeding, loss of fluid, dom violence, or smoking  blake pnv urine +1 protein neg glu, some increased swelling  Has pnc on Monday return in 1 week or sooner as needed   gbs done labor talk next visit

## 2022-03-27 PROBLEM — Z3A.36 36 WEEKS GESTATION OF PREGNANCY: Status: ACTIVE | Noted: 2021-11-18

## 2022-03-27 LAB — GP B STREP DNA SPEC QL NAA+PROBE: NEGATIVE

## 2022-03-28 ENCOUNTER — ULTRASOUND (OUTPATIENT)
Dept: PERINATAL CARE | Facility: OTHER | Age: 37
End: 2022-03-28
Payer: COMMERCIAL

## 2022-03-28 VITALS
WEIGHT: 204.37 LBS | HEIGHT: 62 IN | DIASTOLIC BLOOD PRESSURE: 73 MMHG | BODY MASS INDEX: 37.61 KG/M2 | SYSTOLIC BLOOD PRESSURE: 112 MMHG | HEART RATE: 88 BPM

## 2022-03-28 DIAGNOSIS — O09.813 PREGNANCY RESULTING FROM IN VITRO FERTILIZATION IN THIRD TRIMESTER: Primary | ICD-10-CM

## 2022-03-28 DIAGNOSIS — Z3A.36 36 WEEKS GESTATION OF PREGNANCY: ICD-10-CM

## 2022-03-28 PROCEDURE — 76815 OB US LIMITED FETUS(S): CPT | Performed by: OBSTETRICS & GYNECOLOGY

## 2022-03-28 PROCEDURE — 59025 FETAL NON-STRESS TEST: CPT | Performed by: OBSTETRICS & GYNECOLOGY

## 2022-03-28 NOTE — LETTER
March 28, 2022     [facility], [address] [id number] [address]    Patient: Nayan King   YOB: 1985   Date of Visit: 3/28/2022       Dear Dr Mary Gomes: Thank you for referring Bo Peraza to me for evaluation  Below are my notes for this consultation  If you have questions, please do not hesitate to call me  I look forward to following your patient along with you  Sincerely,        Emma Chandler MD        CC: No Recipients  Emma Chandler MD  3/28/2022  3:59 PM  Sign when Signing Visit  NST is reactive       Emma Chandler MD

## 2022-03-28 NOTE — PATIENT INSTRUCTIONS
Kick Counts in Pregnancy   AMBULATORY CARE:   Kick counts  measure how much your baby is moving in your womb  A kick from your baby can be felt as a twist, turn, swish, roll, or jab  It is common to feel your baby kicking at 26 to 28 weeks of pregnancy  You may feel your baby kick as early as 20 weeks of pregnancy  You may want to start counting at 28 weeks  Contact your doctor immediately if:   · You feel a change in the number of kicks or movements of your baby  · You feel fewer than 10 kicks within 2 hours  · You have questions or concerns about your baby's movements  Why measure kick counts:  Your baby's movement may provide information about your baby's health  He or she may move less, or not at all, if there are problems  Your baby may move less if he or she is not getting enough oxygen or nutrition from the placenta  Do not smoke while you are pregnant  Smoking decreases the amount of oxygen that gets to your baby  Talk to your healthcare provider if you need help to quit smoking  Tell your healthcare provider as soon as you feel a change in your baby's movements  When to measure kick counts:   · Measure kick counts at the same time every day  · Measure kick counts when your baby is awake and most active  Your baby may be most active in the evening  How to measure kick counts:  Check that your baby is awake before you measure kick counts  You can wake up your baby by lightly pushing on your belly, walking, or drinking something cold  Your healthcare provider may tell you different ways to measure kick counts  You may be told to do the following:  · Use a chart or clock to keep track of the time you start and finish counting  · Sit in a chair or lie on your left side  · Place your hands on the largest part of your belly  · Count until you reach 10 kicks  Write down how much time it takes to count 10 kicks  · It may take 30 minutes to 2 hours to count 10 kicks   It should not take more than 2 hours to count 10 kicks  Follow up with your doctor as directed:  Write down your questions so you remember to ask them during your visits  © Copyright [employer] 2022 Information is for End User's use only and may not be sold, redistributed or otherwise used for commercial purposes  All illustrations and images included in CareNotes® are the copyrighted property of A D [employer] M [employer] Inc  or [facility] Jack Joseph   The above information is an  only  It is not intended as medical advice for individual conditions or treatments  Talk to your doctor, nurse or pharmacist before following any medical regimen to see if it is safe and effective for you

## 2022-03-28 NOTE — LETTER
LG sleeve cover sheet    Patient name: Earle Jenkins  : 1985  MRN: [id number]    NIKI: Estimated Date of Delivery: 22    Obstetrician: [facility]    Reason(s) for testing:    IVF  __________________________________________      Testing frequency:    ___ 2x/wk  __X_ 1x/wk  ___ Dopplers  ___ BPP?       Last growth scan: __________________________________________

## 2022-03-31 ENCOUNTER — ROUTINE PRENATAL (OUTPATIENT)
Dept: OBGYN CLINIC | Facility: CLINIC | Age: 37
End: 2022-03-31

## 2022-03-31 VITALS
WEIGHT: 201 LBS | HEIGHT: 62 IN | DIASTOLIC BLOOD PRESSURE: 74 MMHG | SYSTOLIC BLOOD PRESSURE: 118 MMHG | BODY MASS INDEX: 36.99 KG/M2

## 2022-03-31 DIAGNOSIS — Z3A.37 37 WEEKS GESTATION OF PREGNANCY: Primary | ICD-10-CM

## 2022-03-31 DIAGNOSIS — O09.813 PREGNANCY RESULTING FROM IN VITRO FERTILIZATION IN THIRD TRIMESTER: ICD-10-CM

## 2022-03-31 PROCEDURE — 3008F BODY MASS INDEX DOCD: CPT | Performed by: OBSTETRICS & GYNECOLOGY

## 2022-03-31 PROCEDURE — PNV: Performed by: STUDENT IN AN ORGANIZED HEALTH CARE EDUCATION/TRAINING PROGRAM

## 2022-03-31 NOTE — PATIENT INSTRUCTIONS
Pregnancy at 28 to 38 Weeks   AMBULATORY CARE:   Changes happening with your body: You are considered full term at the beginning of 37 weeks  Your breathing may be easier if your baby has moved down into a head-down position  You may need to urinate more often because the baby may be pressing on your bladder  You may also feel more discomfort and get tired easily  Seek care immediately if:   · You develop a severe headache that does not go away  · You have new or increased vision changes, such as blurred or spotted vision  · You have new or increased swelling in your face or hands  · You have vaginal spotting or bleeding  · Your water broke or you feel warm water gushing or trickling from your vagina  Call your obstetrician if:   · You have more than 5 contractions in 1 hour  · You notice any changes in your baby's movements  · You have abdominal cramps, pressure, or tightening  · You have a change in vaginal discharge  · You have chills or a fever  · You have vaginal itching, burning, or pain  · You have yellow, green, white, or foul-smelling vaginal discharge  · You have pain or burning when you urinate, less urine than usual, or pink or bloody urine  · You have questions or concerns about your condition or care  How to care for yourself at this stage of your pregnancy:       · Eat a variety of healthy foods  Healthy foods include fruits, vegetables, whole-grain breads, low-fat dairy foods, beans, lean meats, and fish  Drink liquids as directed  Ask how much liquid to drink each day and which liquids are best for you  Limit caffeine to less than 200 milligrams each day  Limit your intake of fish to 2 servings each week  Choose fish low in mercury such as canned light tuna, shrimp, salmon, cod, or tilapia  Do not  eat fish high in mercury such as swordfish, tilefish, basilio mackerel, and shark  · Take prenatal vitamins as directed    Your need for certain vitamins and minerals, such as folic acid, increases during pregnancy  Prenatal vitamins provide some of the extra vitamins and minerals you need  Prenatal vitamins may also help to decrease the risk of certain birth defects  · Rest as needed  Put your feet up if you have swelling in your ankles and feet  · Talk to your healthcare provider about exercise  Moderate exercise can help you stay fit  Your healthcare provider will help you plan an exercise program that is safe for you during pregnancy  · Do not smoke  Smoking increases your risk of a miscarriage and other health problems during your pregnancy  Smoking can cause your baby to be born early or weigh less at birth  Ask your healthcare provider for information if you need help quitting  · Do not drink alcohol  Alcohol passes from your body to your baby through the placenta  It can affect your baby's brain development and cause fetal alcohol syndrome (FAS)  FAS is a group of conditions that causes mental, behavior, and growth problems  · Talk to your healthcare provider before you take any medicines  Many medicines may harm your baby if you take them when you are pregnant  Do not take any medicines, vitamins, herbs, or supplements without first talking to your healthcare provider  Never use illegal or street drugs (such as marijuana or cocaine) while you are pregnant  Safety tips during pregnancy:   · Avoid hot tubs and saunas  Do not use a hot tub or sauna while you are pregnant, especially during your first trimester  Hot tubs and saunas may raise your baby's temperature and increase the risk of birth defects  · Avoid toxoplasmosis  This is an infection caused by eating raw meat or being around infected cat feces  It can cause birth defects, miscarriages, and other problems  Wash your hands after you touch raw meat  Make sure any meat is well-cooked before you eat it  Avoid raw eggs and unpasteurized milk   Use gloves or ask someone else to clean your cat's litter box while you are pregnant  · Ask your healthcare provider about travel  The most comfortable time to travel is during the second trimester  Ask your provider if you can travel after 36 weeks  You may not be able to travel in an airplane after 36 weeks  He or she may also recommend you avoid long road trips  Changes happening with your baby:  By 38 weeks, your baby may weigh between 6 and 9 pounds  Your baby may be about 14 inches long from the top of the head to the rump (baby's bottom)  Your baby hears well enough to know your voice  As your baby gets larger, you may feel fewer kicks and more stretching and rolling  Your baby may move into a head-down position  Your baby will also rest lower in your abdomen  What you need to know about prenatal care: Your healthcare provider will check your blood pressure and weight  You may also need the following:  · A urine test  may also be done to check for sugar and protein  These can be signs of gestational diabetes or infection  Protein in your urine may also be a sign of preeclampsia  Preeclampsia is a condition that can develop during week 20 or later of your pregnancy  It causes high blood pressure, and it can cause problems with your kidneys and other organs  · A gestational diabetes screen  may be done  Your healthcare provider may order either a 1-step or 2-step oral glucose tolerance test (OGTT)  ? 1-step OGTT:  Your blood sugar level will be tested after you have not eaten for 8 hours (fasting)  You will then be given a glucose drink  Your level will be tested again 1 hour and 2 hours after you finish the drink  ? 2-step OGTT:  You do not have to fast for the first part of the test  You will have the glucose drink at any time of day  Your blood sugar level will be checked 1 hour later  If your blood sugar is higher than a certain level, another test will be ordered   You will fast and your blood sugar level will be tested  You will have the glucose drink  Your blood will be tested again 1 hour, 2 hours, and 3 hours after you finish the glucose drink  · A blood test  may be done to check for anemia (low iron level)  · A Tdap vaccine  may be recommended by your healthcare provider  · A group B strep test  is a test that is done to check for group B strep infection  Group B strep is a type of bacteria that may be found in the vagina or rectum  It can be passed to your baby during delivery if you have it  Your healthcare provider will take swab your vagina or rectum and send the sample to the lab for tests  · Fundal height  is a measurement of your uterus to check your baby's growth  This number is usually the same as the number of weeks that you have been pregnant  Your healthcare provider may also check your baby's position  · Your baby's heart rate  will be checked  Follow up with your obstetrician as directed:  Write down your questions so you remember to ask them during your visits  © Copyright [employer] 2022 Information is for End User's use only and may not be sold, redistributed or otherwise used for commercial purposes  All illustrations and images included in CareNotes® are the copyrighted property of A [employer]  or Kavitha Joseph   The above information is an  only  It is not intended as medical advice for individual conditions or treatments  Talk to your doctor, nurse or pharmacist before following any medical regimen to see if it is safe and effective for you

## 2022-04-03 NOTE — PATIENT INSTRUCTIONS
Please go to Labor and Delivery now for evaluation  The address of [address] [place] [address] is [facility] [address] ([facility])  Nonstress Test for Pregnancy   WHAT YOU NEED TO KNOW:   What do I need to know about a nonstress test?  A nonstress test measures your baby's heart rate and movements  Nonstress means that no stress will be placed on your baby during the test   How do I prepare for a nonstress test?  Your healthcare provider will talk to you about how to prepare for this test  He or she may tell you to eat and drink plenty of fluids before your test  If you smoke, you may be asked not to smoke within 2 hours before the test  He or she will also tell you which medicines to take or not take on the day of your test   What will happen during a nonstress test?  You may be asked to lie down or recline back for the test on a bed  One or 2 belts with sensors will be placed around your abdomen  Your baby's heart rate will be recorded with a machine  If your baby does not move, your baby may be asleep  Your healthcare provider may make a noise near your abdomen to try to wake your baby  The test usually takes about 20 minutes, but can take longer if your baby needs to be awakened  What do I need to know about the test results? Your baby will be expected to move at least 2 times for a certain amount of time  Your baby's heart rate will be expected to go up by a certain number of beats per minute during movement  If your baby does not move as expected, the test may need to be repeated or you may need other tests  CARE AGREEMENT:   You have the right to help plan your care  Learn about your health condition and how it may be treated  Discuss treatment options with your healthcare providers to decide what care you want to receive  You always have the right to refuse treatment  The above information is an  only   It is not intended as medical advice for individual conditions or treatments  Talk to your doctor, nurse or pharmacist before following any medical regimen to see if it is safe and effective for you  © Copyright [employer] 2022 Information is for End User's use only and may not be sold, redistributed or otherwise used for commercial purposes  All illustrations and images included in CareNotes® are the copyrighted property of A [employer]  or [email or web address] [facility] Durga Amezcua in Pregnancy   AMBULATORY CARE:   Kick counts  measure how much your baby is moving in your womb  A kick from your baby can be felt as a twist, turn, swish, roll, or jab  It is common to feel your baby kicking at 26 to 28 weeks of pregnancy  You may feel your baby kick as early as 20 weeks of pregnancy  You may want to start counting at 28 weeks  Contact your doctor immediately if:   · You feel a change in the number of kicks or movements of your baby  · You feel fewer than 10 kicks within 2 hours  · You have questions or concerns about your baby's movements  Why measure kick counts:  Your baby's movement may provide information about your baby's health  He or she may move less, or not at all, if there are problems  Your baby may move less if he or she is not getting enough oxygen or nutrition from the placenta  Do not smoke while you are pregnant  Smoking decreases the amount of oxygen that gets to your baby  Talk to your healthcare provider if you need help to quit smoking  Tell your healthcare provider as soon as you feel a change in your baby's movements  When to measure kick counts:   · Measure kick counts at the same time every day  · Measure kick counts when your baby is awake and most active  Your baby may be most active in the evening  How to measure kick counts:  Check that your baby is awake before you measure kick counts  You can wake up your baby by lightly pushing on your belly, walking, or drinking something cold   Your healthcare provider may tell you different ways to measure kick counts  You may be told to do the following:  · Use a chart or clock to keep track of the time you start and finish counting  · Sit in a chair or lie on your left side  · Place your hands on the largest part of your belly  · Count until you reach 10 kicks  Write down how much time it takes to count 10 kicks  · It may take 30 minutes to 2 hours to count 10 kicks  It should not take more than 2 hours to count 10 kicks  Follow up with your doctor as directed:  Write down your questions so you remember to ask them during your visits  © Copyright [employer] 2022 Information is for End User's use only and may not be sold, redistributed or otherwise used for commercial purposes  All illustrations and images included in CareNotes® are the copyrighted property of A [employer]  or Kavitha Joseph   The above information is an  only  It is not intended as medical advice for individual conditions or treatments  Talk to your doctor, nurse or pharmacist before following any medical regimen to see if it is safe and effective for you

## 2022-04-05 ENCOUNTER — ULTRASOUND (OUTPATIENT)
Dept: PERINATAL CARE | Facility: CLINIC | Age: 37
End: 2022-04-05
Payer: COMMERCIAL

## 2022-04-05 ENCOUNTER — HOSPITAL ENCOUNTER (INPATIENT)
Facility: HOSPITAL | Age: 37
LOS: 8 days | Discharge: HOME | End: 2022-04-14
Attending: STUDENT IN AN ORGANIZED HEALTH CARE EDUCATION/TRAINING PROGRAM | Admitting: STUDENT IN AN ORGANIZED HEALTH CARE EDUCATION/TRAINING PROGRAM
Payer: COMMERCIAL

## 2022-04-05 VITALS
HEIGHT: 62 IN | SYSTOLIC BLOOD PRESSURE: 123 MMHG | HEART RATE: 80 BPM | DIASTOLIC BLOOD PRESSURE: 68 MMHG | WEIGHT: 204.8 LBS | BODY MASS INDEX: 37.69 KG/M2

## 2022-04-05 DIAGNOSIS — R60.0 BILATERAL LOWER EXTREMITY EDEMA: ICD-10-CM

## 2022-04-05 DIAGNOSIS — Z86.79 H/O CARDIAC ARRHYTHMIA: Primary | ICD-10-CM

## 2022-04-05 DIAGNOSIS — Z3A.37 37 WEEKS GESTATION OF PREGNANCY: ICD-10-CM

## 2022-04-05 DIAGNOSIS — Z98.891 S/P CESAREAN SECTION: ICD-10-CM

## 2022-04-05 DIAGNOSIS — D62 ANEMIA DUE TO ACUTE BLOOD LOSS: ICD-10-CM

## 2022-04-05 DIAGNOSIS — O09.813 PREGNANCY RESULTING FROM IN VITRO FERTILIZATION IN THIRD TRIMESTER: Primary | ICD-10-CM

## 2022-04-05 DIAGNOSIS — A41.9 SEPSIS (HCC): ICD-10-CM

## 2022-04-05 LAB
ABO GROUP BLD: NORMAL
BASOPHILS # BLD AUTO: 0.03 THOUSANDS/ΜL (ref 0–0.1)
BASOPHILS NFR BLD AUTO: 0 % (ref 0–1)
BLD GP AB SCN SERPL QL: NEGATIVE
EOSINOPHIL # BLD AUTO: 0.03 THOUSAND/ΜL (ref 0–0.61)
EOSINOPHIL NFR BLD AUTO: 0 % (ref 0–6)
ERYTHROCYTE [DISTWIDTH] IN BLOOD BY AUTOMATED COUNT: 13.6 % (ref 11.6–15.1)
HCT VFR BLD AUTO: 35.2 % (ref 34.8–46.1)
HGB BLD-MCNC: 11.2 G/DL (ref 11.5–15.4)
HOLD SPECIMEN: NORMAL
IMM GRANULOCYTES # BLD AUTO: 0.07 THOUSAND/UL (ref 0–0.2)
IMM GRANULOCYTES NFR BLD AUTO: 1 % (ref 0–2)
LYMPHOCYTES # BLD AUTO: 1.68 THOUSANDS/ΜL (ref 0.6–4.47)
LYMPHOCYTES NFR BLD AUTO: 16 % (ref 14–44)
MCH RBC QN AUTO: 26 PG (ref 26.8–34.3)
MCHC RBC AUTO-ENTMCNC: 31.8 G/DL (ref 31.4–37.4)
MCV RBC AUTO: 82 FL (ref 82–98)
MONOCYTES # BLD AUTO: 0.67 THOUSAND/ΜL (ref 0.17–1.22)
MONOCYTES NFR BLD AUTO: 6 % (ref 4–12)
NEUTROPHILS # BLD AUTO: 7.91 THOUSANDS/ΜL (ref 1.85–7.62)
NEUTS SEG NFR BLD AUTO: 77 % (ref 43–75)
NRBC BLD AUTO-RTO: 0 /100 WBCS
PLATELET # BLD AUTO: 236 THOUSANDS/UL (ref 149–390)
PMV BLD AUTO: 11.7 FL (ref 8.9–12.7)
RBC # BLD AUTO: 4.3 MILLION/UL (ref 3.81–5.12)
RH BLD: POSITIVE
SPECIMEN EXPIRATION DATE: NORMAL
WBC # BLD AUTO: 10.39 THOUSAND/UL (ref 4.31–10.16)

## 2022-04-05 PROCEDURE — 76818 FETAL BIOPHYS PROFILE W/NST: CPT | Performed by: OBSTETRICS & GYNECOLOGY

## 2022-04-05 PROCEDURE — 86920 COMPATIBILITY TEST SPIN: CPT

## 2022-04-05 PROCEDURE — 59025 FETAL NON-STRESS TEST: CPT | Performed by: OBSTETRICS & GYNECOLOGY

## 2022-04-05 PROCEDURE — 86900 BLOOD TYPING SEROLOGIC ABO: CPT | Performed by: STUDENT IN AN ORGANIZED HEALTH CARE EDUCATION/TRAINING PROGRAM

## 2022-04-05 PROCEDURE — 86592 SYPHILIS TEST NON-TREP QUAL: CPT | Performed by: STUDENT IN AN ORGANIZED HEALTH CARE EDUCATION/TRAINING PROGRAM

## 2022-04-05 PROCEDURE — 1036F TOBACCO NON-USER: CPT | Performed by: OBSTETRICS & GYNECOLOGY

## 2022-04-05 PROCEDURE — 85025 COMPLETE CBC W/AUTO DIFF WBC: CPT | Performed by: STUDENT IN AN ORGANIZED HEALTH CARE EDUCATION/TRAINING PROGRAM

## 2022-04-05 PROCEDURE — NC001 PR NO CHARGE: Performed by: OBSTETRICS & GYNECOLOGY

## 2022-04-05 PROCEDURE — 4A1HXCZ MONITORING OF PRODUCTS OF CONCEPTION, CARDIAC RATE, EXTERNAL APPROACH: ICD-10-PCS | Performed by: STUDENT IN AN ORGANIZED HEALTH CARE EDUCATION/TRAINING PROGRAM

## 2022-04-05 PROCEDURE — G0463 HOSPITAL OUTPT CLINIC VISIT: HCPCS

## 2022-04-05 PROCEDURE — 99218 PR INITIAL OBSERVATION CARE/DAY 30 MINUTES: CPT | Performed by: STUDENT IN AN ORGANIZED HEALTH CARE EDUCATION/TRAINING PROGRAM

## 2022-04-05 PROCEDURE — 99214 OFFICE O/P EST MOD 30 MIN: CPT | Performed by: OBSTETRICS & GYNECOLOGY

## 2022-04-05 PROCEDURE — 99213 OFFICE O/P EST LOW 20 MIN: CPT

## 2022-04-05 PROCEDURE — 86901 BLOOD TYPING SEROLOGIC RH(D): CPT | Performed by: STUDENT IN AN ORGANIZED HEALTH CARE EDUCATION/TRAINING PROGRAM

## 2022-04-05 PROCEDURE — 86850 RBC ANTIBODY SCREEN: CPT | Performed by: STUDENT IN AN ORGANIZED HEALTH CARE EDUCATION/TRAINING PROGRAM

## 2022-04-05 RX ORDER — PANTOPRAZOLE SODIUM 20 MG/1
20 TABLET, DELAYED RELEASE ORAL DAILY
Status: DISCONTINUED | OUTPATIENT
Start: 2022-04-05 | End: 2022-04-14 | Stop reason: HOSPADM

## 2022-04-05 RX ORDER — ALBUTEROL SULFATE 90 UG/1
2 AEROSOL, METERED RESPIRATORY (INHALATION) EVERY 6 HOURS PRN
Status: DISCONTINUED | OUTPATIENT
Start: 2022-04-05 | End: 2022-04-08

## 2022-04-05 NOTE — PROGRESS NOTES
Repeat Non-Stress Testing:    Pt verbalizes +FM  Pt denies ALL:               Leaking of fluid   Contractions   Vaginal bleeding   Decreased fetal movement    Pt had a spontaneous decel , turn pt to [facility] and RLR  Dr Becky Frederick at bedside and viewed NST strip  Pulse ox applied, maternal pulse 89, 99 %  Bedside U/S done for location of FHR  FHR  100-110  BPP ordered by Dr Leslie Frias  and pt will be transferred  [address] for further evaluation after BPP  Tiger Text LD charge Ham Smith Rn  Confirmation received   Pt verbalized understanding of plan of care

## 2022-04-05 NOTE — LETTER
LG sleeve cover sheet    Patient name: Dallin Ramsay  : 1985  MRN: [id number]    NIKI: Estimated Date of Delivery: 22    Obstetrician: [facility]     Reason(s) for testing:  __________________________________________      Testing frequency:    ___ 2x/wk  ___ 1x/wk  ___ Dopplers  ___ BPP?       Last growth scan: __________________________________________

## 2022-04-05 NOTE — PROGRESS NOTES
[id number] [address]: Ms Oswaldo Tolbert was seen today for NST/DIETER  DIETER was done first and showed normal fluid, normal FHM, and vertex  NST was next; showed moderate variability with accelerations and with normal FHM (approxm 120s)  Subsequently a deceleration occured with spontaneous recovery to baseline of 100s-110  I spoke to her there and recommended repeat US assessment  FHM was repeatedly shows as normal in 110s-120s, with BPP 8 of 8  Advised extended monitoring on L&D and low threshold for delivery given 37w5d GA  OB team notified  Pt in agreement as was partner         MDM:   I  Diagnoses/Problems addressed:  Undiagnosed new problem(s) with uncertain prognosis: deceleration at 37 weeks 5 days  III  Risk of morbidity: Moderate (dispositioned to L&D)    Please don't hesitate to contact our office with any concerns or questions    Jose Salazar MD

## 2022-04-05 NOTE — PROGRESS NOTES
Triage Note - OB  Gopal King 39 y o  female MRN: [id number]  Pt of [facility]  Unit/Bed#: LD TRIAGE 1-01 Encounter: [id number]    Chief Complaint: Sent from clinic for spontaneous deceleration    SUBJECTIVE    HPI: 39 y o  Marcus Douse at 37w5d with sent over from [facility] for spontaneous deceleration  Patient was seen in the clinic today for NST/DIETER  DIETER showed normal fluid, normal FHM, and vertex with anterior placenta  NST showed mod variability with acceleration with baseline FHTs approximately 120s  Subsequently, FHM demonstrated deceleration with spontaneous recovery to baseline of 100s-110  Subsequent FHM shows 110s-120s with BPP 8 of 8  Patient sent to L&D for extended monitoring  Patient has low threshold for delivery given 37w5d GA  Patient feels tolerable contractions that she can talk through  Onset 2 days ago, today becoming more frequent      neg vaginal bleeding  neg loss of fluid  pos contractions since 2 days  pos fetal movement    Pain 2-3/10  Constitutional sx: Pt denies dysuria, hematuria, vaginal pruritis, vaginal pain, fever, chills, nausea, vomiting, diarrhea, constipation  OBJECTIVE  Complications of pregnancy: IVF    GBS: negative as of 36w1d  Blood type: A+    Estimated Date of Delivery: 4/21/22    Vitals: VSS  Vitals:    04/05/22 1100   BP: 127/78   Pulse: 88   Resp: 18   Temp:      There is no height or weight on file to calculate BMI  FHR: 120s, reactive  [place]: q3-5m    Physical Exam  Constitutional:       Appearance: Normal appearance  HENT:      Head: Normocephalic and atraumatic  Right Ear: External ear normal       Left Ear: External ear normal    Eyes:      Extraocular Movements: Extraocular movements intact  Conjunctiva/sclera: Conjunctivae normal    Cardiovascular:      Rate and Rhythm: Normal rate  Pulmonary:      Effort: Pulmonary effort is normal  No respiratory distress  Abdominal:      Tenderness: There is no abdominal tenderness        Comments: Freeman   Genitourinary:     General: Normal vulva  Musculoskeletal:      Right lower leg: Edema present  Left lower leg: Edema present  Skin:     General: Skin is warm and dry  Neurological:      Mental Status: She is alert  Motor: No weakness  Psychiatric:         Mood and Affect: Mood normal          Behavior: Behavior normal          Thought Content: Thought content normal          Judgment: Judgment normal           Labs:   No visits with results within 1 Day(s) from this visit  Latest known visit with results is:   Routine Prenatal on 2022   Component Date Value    Strep Grp B LUZ 2022 Negative            A/P  39 y o  female  at 37w6d with c/o ***  1) Discussed with   ***: d/c home with labor precautions  2) Discharge instructions *** given to patient and labor precautions reviewed      ***  2022 12:24 PM

## 2022-04-05 NOTE — NURSING NOTE
Spoke with Dr Tresa Bueno regarding pt's IV  Per MD, it is ok to leave pt's IV out at this time  Monitor strip is category 1

## 2022-04-05 NOTE — H&P
[address] [facility] 39 y o  female MRN: [id number]  Unit/Bed#: LD TRIAGE 1-01 Encounter: [id number]      Assessment: 39 y o  Jennifer [place] at 37w5d admitted for observation for FHM after being sent over from [facility] for spontaneous deceleration  SVE: 0 5/30/-3  FHT: 120s  Clinical EFW: 6 5-7lbs; Vertex confirmed by US on 4/5/22  GBS status: Negative as of 36w1d   Postpartum contraception plan: undecided    Plan:   · Admit for FHM  · Analgesia at maternal request  · Expectant management    Dr Madhuri Ponce aware      SUBJECTIVE:    Chief Complaint: none     HPI: Santana Bernard is a 39 y o  Jennifer [place] with an NIKI of 4/21/2022, by Last Menstrual Period at 37w6d who [facility] after being sent over from [facility] for spontaneous deceleration  She complains of uterine contractions, occurring every 5 minutes, has no LOF, and reports no VB  She states she has felt good FM  Patient was sent over from [facility] for spontaneous deceleration  Patient was seen in the clinic today for NST/DIETER  DIETER showed normal fluid, normal FHM, and vertex with anterior placenta  NST showed mod variability with acceleration with baseline FHTs approximately 120s  Subsequently, FHM demonstrated deceleration with spontaneous recovery to baseline of 100s-110  Subsequent FHM shows 110s-120s with BPP 8 of 8  Patient sent to L&D for extended monitoring  Patient has low threshold for delivery given 37w5d GA  Patient feels tolerable contractions that she can talk through  Onset 2 days ago, today becoming more frequent       Pregnancy complications: IVF pregnancy, advanced maternal age    [facility] complications/comments: none    Patient Active Problem List   Diagnosis    Non-seasonal allergic rhinitis due to pollen    Anxiety    Asthma    Hyperlipidemia    ASCUS with positive high risk HPV cervical    Pregnancy resulting from in vitro fertilization in third trimester    37 weeks gestation of pregnancy       OB History    Para Term  AB Living   1 0 0 0 0 0   SAB IAB Ectopic Multiple Live Births   0 0 0 0 0      # Outcome Date GA Lbr Santhosh/2nd Weight Sex Delivery Anes PTL Lv   1 Current                Past Medical History:   Diagnosis Date    Abnormal Pap smear of cervix     Cardiac arrhythmia     Last Assessed: 1/10/2017    Female infertility     Seasonal allergies        Past Surgical History:   Procedure Laterality Date    AUGMENTATION BREAST      CARDIAC CATHETERIZATION      Last Assessed: 1/10/2017    LASIK      Corneal  Last Assessed: 1/10/2017       Social History     Tobacco Use    Smoking status: Never Smoker    Smokeless tobacco: Never Used   Substance Use Topics    Alcohol use: Yes       Allergies   Allergen Reactions    Penicillins Other (See Comments)     "poly cillin" lip swelling  Other reaction(s): Other (See Comments)  "poly cillin" lip swelling       Medications Prior to Admission   Medication    albuterol (ProAir HFA) 90 mcg/act inhaler    Esomeprazole Magnesium (NEXIUM PO)    Prenatal Vit-Fe Fumarate-FA (PRENATAL PO)           OBJECTIVE:  Vitals:  Temp:  [98 1 °F (36 7 °C)] 98 1 °F (36 7 °C)  HR:  [80-88] 88  Resp:  [18] 18  BP: (123-127)/(68-78) 127/78  There is no height or weight on file to calculate BMI  Physical Exam:  Physical Exam  Constitutional:       Appearance: Normal appearance  HENT:      Head: Normocephalic and atraumatic  Right Ear: External ear normal       Left Ear: External ear normal    Eyes:      Extraocular Movements: Extraocular movements intact  Conjunctiva/sclera: Conjunctivae normal    Cardiovascular:      Rate and Rhythm: Normal rate  Pulmonary:      Effort: Pulmonary effort is normal  No respiratory distress  Abdominal:      Tenderness: There is no abdominal tenderness  Comments: Freeman   Genitourinary:     General: Normal vulva  Musculoskeletal:      Right lower leg: Edema present  Left lower leg: Edema present  Skin:     General: Skin is warm and dry  Neurological:      Mental Status: She is alert  Motor: No weakness  Psychiatric:         Mood and Affect: Mood normal          Behavior: Behavior normal          Thought Content:  Thought content normal          Judgment: Judgment normal       SVE: 0 5/soft 30/-3       FHT:  Baseline Rate: 120 bpm (115-120)  Variability: Moderate 6-25 bpm  Accelerations: 15 x 15 or greater,At variable times  Decelerations: None    TOCO:   Contraction Frequency (minutes): 1 5-5  Contraction Duration (seconds):   Contraction Quality: Mild    Lab Results   Component Value Date    WBC 6 58 01/10/2022    HGB 11 0 (L) 01/10/2022    HCT 34 2 (L) 01/10/2022     01/10/2022     Lab Results   Component Value Date    K 3 8 02/05/2021     02/05/2021    CO2 28 02/05/2021    BUN 15 02/05/2021    CREATININE 0 84 02/05/2021    AST 35 02/05/2021    ALT 29 02/05/2021       Prenatal Labs   Blood type: A+  Antibody: negative  Group B strep: negative  HIV: negative  Hepatitis B: negative  RPR: non-reactive  Rubella: Immune  Varicella: Unknown  1 hour Glucose: 114  3 hour glucose: n/a  Platelets: 876 on 8/75/42  Hgb: 11 on 1/10/22    <2 Midnights  OBSERVATION      JOHN Jara   PGY-1 Family Medicine  4/5/2022 2:17 PM

## 2022-04-05 NOTE — LETTER
2022    Ira Vo MD  [facility] [address]    Patient: Suma King   YOB: 1985   Date of Visit: 2022   Gestational age 37w6d   Gwendel Holes of this communication: priority:  she is coming to L&D for extended monitoring after deceleration       Dear Dr Halie Noble,    This patient was seen recently in our  office  The content of my evaluation today is in the ultrasound report under "OB Procedures" tab  Please don't hesitate to contact our office with any concerns or questions       Sincerely,      Sis Rendon MD  Attending Physician, [place]

## 2022-04-06 LAB — RPR SER QL: NORMAL

## 2022-04-06 PROCEDURE — NC001 PR NO CHARGE: Performed by: STUDENT IN AN ORGANIZED HEALTH CARE EDUCATION/TRAINING PROGRAM

## 2022-04-06 PROCEDURE — 3E033VJ INTRODUCTION OF OTHER HORMONE INTO PERIPHERAL VEIN, PERCUTANEOUS APPROACH: ICD-10-PCS | Performed by: STUDENT IN AN ORGANIZED HEALTH CARE EDUCATION/TRAINING PROGRAM

## 2022-04-06 RX ORDER — CETIRIZINE HYDROCHLORIDE 10 MG/1
10 TABLET ORAL DAILY
Status: DISCONTINUED | OUTPATIENT
Start: 2022-04-06 | End: 2022-04-08

## 2022-04-06 RX ORDER — CETIRIZINE HYDROCHLORIDE 10 MG/1
10 TABLET ORAL DAILY
Status: DISCONTINUED | OUTPATIENT
Start: 2022-04-06 | End: 2022-04-06

## 2022-04-06 RX ORDER — LORATADINE 10 MG/1
10 TABLET ORAL DAILY
Status: DISCONTINUED | OUTPATIENT
Start: 2022-04-06 | End: 2022-04-06

## 2022-04-06 RX ORDER — OXYTOCIN/RINGER'S LACTATE 30/500 ML
1-30 PLASTIC BAG, INJECTION (ML) INTRAVENOUS
Status: DISCONTINUED | OUTPATIENT
Start: 2022-04-06 | End: 2022-04-08

## 2022-04-06 RX ORDER — SODIUM CHLORIDE, SODIUM LACTATE, POTASSIUM CHLORIDE, CALCIUM CHLORIDE 600; 310; 30; 20 MG/100ML; MG/100ML; MG/100ML; MG/100ML
125 INJECTION, SOLUTION INTRAVENOUS CONTINUOUS
Status: DISCONTINUED | OUTPATIENT
Start: 2022-04-06 | End: 2022-04-08

## 2022-04-06 RX ORDER — BUTORPHANOL TARTRATE 1 MG/ML
1 INJECTION, SOLUTION INTRAMUSCULAR; INTRAVENOUS
Status: DISCONTINUED | OUTPATIENT
Start: 2022-04-06 | End: 2022-04-08

## 2022-04-06 RX ADMIN — SODIUM CHLORIDE, SODIUM LACTATE, POTASSIUM CHLORIDE, AND CALCIUM CHLORIDE 1000 ML: .6; .31; .03; .02 INJECTION, SOLUTION INTRAVENOUS at 12:35

## 2022-04-06 RX ADMIN — SODIUM CHLORIDE, SODIUM LACTATE, POTASSIUM CHLORIDE, AND CALCIUM CHLORIDE 125 ML/HR: .6; .31; .03; .02 INJECTION, SOLUTION INTRAVENOUS at 17:00

## 2022-04-06 RX ADMIN — Medication 10 MG: at 11:30

## 2022-04-06 RX ADMIN — PANTOPRAZOLE SODIUM 20 MG: 20 TABLET, DELAYED RELEASE ORAL at 10:54

## 2022-04-06 RX ADMIN — Medication 2 MILLI-UNITS/MIN: at 14:33

## 2022-04-06 RX ADMIN — BUTORPHANOL TARTRATE 1 MG: 1 INJECTION, SOLUTION INTRAMUSCULAR; INTRAVENOUS at 13:03

## 2022-04-06 RX ADMIN — BUTORPHANOL TARTRATE 1 MG: 1 INJECTION, SOLUTION INTRAMUSCULAR; INTRAVENOUS at 20:57

## 2022-04-06 NOTE — OB LABOR/OXYTOCIN SAFETY PROGRESS
Labor Progress Note - Laura King 39 y o  female MRN: [id number]    Unit/Bed#: -01 Encounter: [id number]    Dose (kori-units/min) Oxytocin: 8 kori-units/min  Contraction Frequency (minutes): irregular/irritability  Contraction Quality: Mild  Tachysystole: No   Cervical Dilation: 1-2        Cervical Effacement: 50  Fetal Station: -3  Baseline Rate: 120 bpm  Fetal Heart Rate: 138 BPM  FHR Category: Category I             Vital Signs:   Vitals:    04/06/22 1608   BP: 122/67   Pulse: (!) 114   Resp:    Temp:    SpO2:        Notes/comments:  SVE 1 5/50/-3, post, soft  Discussed can reattempt FB, but prefers to defer at this time given progressing on pitocin  Will continue to uptitrate per protocol  Will change position and continue moving prior to PCEA in order to help labor progress  Cat I tracing  All questions answered to the best of my ability     Francine Cagle MD 4/6/2022 5:13 PM

## 2022-04-06 NOTE — PLAN OF CARE
Problem: ANTEPARTUM  Goal: Maintain pregnancy as long as maternal and/or fetal condition is stable  Description: INTERVENTIONS:  - Maternal surveillance  - Fetal surveillance  - Monitor uterine activity  - Medications as ordered  - Bedrest  Outcome: Progressing     Problem: PAIN - ADULT  Goal: Verbalizes/displays adequate comfort level or baseline comfort level  Description: Interventions:  - Encourage patient to monitor pain and request assistance  - Assess pain using appropriate pain scale  - Administer analgesics based on type and severity of pain and evaluate response  - Implement non-pharmacological measures as appropriate and evaluate response  - Consider cultural and social influences on pain and pain management  - Notify physician/advanced practitioner if interventions unsuccessful or patient reports new pain  Outcome: Progressing     Problem: INFECTION - ADULT  Goal: Absence or prevention of progression during hospitalization  Description: INTERVENTIONS:  - Assess and monitor for signs and symptoms of infection  - Monitor lab/diagnostic results  - Monitor all insertion sites, i e  indwelling lines, tubes, and drains  - Monitor endotracheal if appropriate and nasal secretions for changes in amount and color  - [place] appropriate cooling/warming therapies per order  - Administer medications as ordered  - Instruct and encourage patient and family to use good hand hygiene technique  - Identify and instruct in appropriate isolation precautions for identified infection/condition  Outcome: Progressing  Goal: Absence of fever/infection during neutropenic period  Description: INTERVENTIONS:  - Monitor WBC    Outcome: Progressing     Problem: SAFETY ADULT  Goal: Patient will remain free of falls  Description: INTERVENTIONS:  - Educate patient/family on patient safety including physical limitations  - Instruct patient to call for assistance with activity   - Consult OT/PT to assist with strengthening/mobility   - Keep Call bell within reach  - Keep bed low and locked with side rails adjusted as appropriate  - Keep care items and personal belongings within reach  - Initiate and maintain comfort rounds  - Make Fall Risk Sign visible to staff  - Offer Toileting in advance of need  - Obtain necessary fall risk management equipment  - Apply yellow socks and bracelet for high fall risk patients  - Consider moving patient to room near nurses station  Outcome: Progressing  Goal: Maintain or return to baseline ADL function  Description: INTERVENTIONS:  -  Assess patient's ability to carry out ADLs; assess patient's baseline for ADL function and identify physical deficits which impact ability to perform ADLs (bathing, care of mouth/teeth, toileting, grooming, dressing, etc )  - Assess/evaluate cause of self-care deficits   - Assess range of motion  - Assess patient's mobility; develop plan if impaired  - Assess patient's need for assistive devices and provide as appropriate  - Encourage maximum independence but intervene and supervise when necessary  - Involve family in performance of ADLs  - Assess for home care needs following discharge   - Consider OT consult to assist with ADL evaluation and planning for discharge  - Provide patient education as appropriate  Outcome: Progressing  Goal: Maintains/Returns to pre admission functional level  Description: INTERVENTIONS:  - Perform BMAT or MOVE assessment daily    - Set and communicate daily mobility goal to care team and patient/family/caregiver     - Collaborate with rehabilitation services on mobility goals if consulted  - Perform Range of Motion    - Reposition patient   - Dangle patient   - Stand patient  - Ambulate patient   - Out of bed to chair   - Out of bed for meals   - Out of bed for toileting  - Record patient progress and toleration of activity level   Outcome: Progressing     Problem: Knowledge Deficit  Goal: Patient/family/caregiver demonstrates understanding of disease process, treatment plan, medications, and discharge instructions  Description: Complete learning assessment and assess knowledge base    Interventions:  - Provide teaching at level of understanding  - Provide teaching via preferred learning methods  Outcome: Progressing     Problem: DISCHARGE PLANNING  Goal: Discharge to home or other facility with appropriate resources  Description: INTERVENTIONS:  - Identify barriers to discharge w/patient and caregiver  - Arrange for needed discharge resources and transportation as appropriate  - Identify discharge learning needs (meds, wound care, etc )  - Arrange for interpretive services to assist at discharge as needed  - Refer to Case Management Department for coordinating discharge planning if the patient needs post-hospital services based on physician/advanced practitioner order or complex needs related to functional status, cognitive ability, or social support system  Outcome: Progressing

## 2022-04-06 NOTE — PROGRESS NOTES
Progress Note - OB/GYN   Josue Soulier Bilotti 39 y o  female MRN: [id number]  Unit/Bed#:  330-01 Encounter: [id number]    Assessment:  39 y o  Konrad Styles @37w6d who is currently admitted for extended fetal monitoring  Pregnancy complicated by AMA; conceived via IVF  Patient recovering well, Stable    Plan:  1  Extended Monitoring  At [facility] yesterday, DIETER/NST performed, "moderate variability with accelerations and with normal FHM (approxm 120s)  Subsequently a deceleration occured with spontaneous recovery to baseline of 100s-110 "  - Decel to 60s  BPP then performed, 8/8  Fetal monitoring overnight with baseline of 115, moderate variability, multiple positive 15x15 accels, no decels  Occasional baseline in 105s      2  Discharge  Anticipate d/c today pending continued reactive tracing  Plan for close follow-up in office      Subjective/Objective   Chief Complaint:    Deedee Nicely in office; extended fetal monitoring    Subjective:   Pain: no  Tolerating PO: yes  Voiding: yes  Flatus: yes  BM: yes  Ambulating: yes  Chest pain: no  Shortness of breath: no  Leg pain: no    Objective:     Vitals: Temp:  [98 °F (36 7 °C)-99 °F (37 2 °C)] 98 4 °F (36 9 °C)  HR:  [80-99] 94  Resp:  [18] 18  BP: (107-131)/(59-80) 129/59     No intake or output data in the 24 hours ending 04/06/22 0558      Physical Exam:   General: NAD, alert, oriented  Cardio: Regular rate and rhythm, no murmur  Resp: nonlabored breathing, clear to auscultation bilaterally  Abdomen: Soft, no distension/rebound/guarding/tenderness, gravid  Lower Extremities: Non-tender, no palpable cords    Medications:  Current Facility-Administered Medications   Medication Dose Route Frequency    albuterol (PROVENTIL HFA,VENTOLIN HFA) inhaler 2 puff  2 puff Inhalation Q6H PRN    lactated ringers bolus 1,000 mL  1,000 mL Intravenous Once    pantoprazole (PROTONIX) EC tablet 20 mg  20 mg Oral Daily    prenatal multivitamin tablet 1 tablet  1 tablet Oral Daily       Labs:   Recent Results (from the past 24 hour(s))   Type and screen    Collection Time: 04/05/22  2:36 PM   Result Value Ref Range    ABO Grouping A     Rh Factor Positive     Antibody Screen Negative     Specimen Expiration Date [id number]    CBC and differential    Collection Time: 04/05/22  2:36 PM   Result Value Ref Range    WBC 10 39 (H) 4 31 - 10 16 Thousand/uL    RBC 4 30 3 81 - 5 12 Million/uL    Hemoglobin 11 2 (L) 11 5 - 15 4 g/dL    Hematocrit 35 2 34 8 - 46 1 %    MCV 82 82 - 98 fL    MCH 26 0 (L) 26 8 - 34 3 pg    MCHC 31 8 31 4 - 37 4 g/dL    RDW 13 6 11 6 - 15 1 %    MPV 11 7 8 9 - 12 7 fL    Platelets 624 173 - 679 Thousands/uL    nRBC 0 /100 WBCs    Neutrophils Relative 77 (H) 43 - 75 %    Immat GRANS % 1 0 - 2 %    Lymphocytes Relative 16 14 - 44 %    Monocytes Relative 6 4 - 12 %    Eosinophils Relative 0 0 - 6 %    Basophils Relative 0 0 - 1 %    Neutrophils Absolute 7 91 (H) 1 85 - 7 62 Thousands/µL    Immature Grans Absolute 0 07 0 00 - 0 20 Thousand/uL    Lymphocytes Absolute 1 68 0 60 - 4 47 Thousands/µL    Monocytes Absolute 0 67 0 17 - 1 22 Thousand/µL    Eosinophils Absolute 0 03 0 00 - 0 61 Thousand/µL    Basophils Absolute 0 03 0 00 - 0 10 Thousands/µL   Green Top 4 ml on hold    Collection Time: 04/05/22  2:37 PM   Result Value Ref Range    Extra Tube on hold          Denise Barraza  Ob/Gyn PGY-2  4/6/2022  5:58 AM

## 2022-04-06 NOTE — UTILIZATION REVIEW
Initial Clinical Review    04-05-22 OBS @ 0302 converted to inpatient admission 04-06-22 @ 1006 for IOL DUE TO NON REACTIVE FETAL HEART WITH PROLONG FETAL DECELERATIONS      Ordered     04/06/22 1006  Inpatient Admission  (Inpatient Admission)  Once        Transfer Service: OB/GYN       Question Answer Comment   Level of Care Med Surg    Estimated length of stay More than 2 Midnights    Certification I certify that inpatient services are medically necessary for this patient for a duration of greater than two midnights  See H&P and MD Progress Notes for additional information about the patient's course of treatment  04/06/22 1006               Admission: Date/Time/Statement:   Admission Orders (From admission, onward)     Ordered        04/05/22 1419  Place in Observation  Once                      Orders Placed This Encounter   Procedures    Place in Observation     Standing Status:   Standing     Number of Occurrences:   1     Order Specific Question:   Level of Care     Answer:   Med Surg [16]       Chief Complaint   Patient presents with    Decreased Fetal Heart Tones     decel in MFM office       Initial Presentation: 39 y o  female presented to L&D as observation for extended fetal monitoring @ 37 6/7 weeks  G 1  Patient was sent over from [facility] for spontaneous deceleration  Patient was seen in the clinic today for NST/DIETER  DIETER showed normal fluid, normal FHM, and vertex with anterior placenta  NST showed mod variability with acceleration with baseline FHTs approximately 120s  Subsequently, FHM demonstrated deceleration with spontaneous recovery to baseline of 100s-110  Subsequent FHM shows 110s-120s with BPP 8 of 8        OB  Triage Vitals   Temperature Pulse Respirations Blood Pressure SpO2   04/05/22 1057 04/05/22 1100 04/05/22 1100 04/05/22 1100 04/05/22 1600   98 1 °F (36 7 °C) 88 18 127/78 99 %      Temp Source Heart Rate Source Patient Position - Orthostatic VS BP Location FiO2 (%)   04/05/22 1057 04/05/22 1100 04/05/22 1100 04/05/22 1100 --   Oral Monitor Lying Left arm       Pain Score       04/05/22 1057       3          Wt Readings from Last 1 Encounters:   04/05/22 92 9 kg (204 lb 12 8 oz)     Additional Vital Signs:   Date/Time Temp Pulse Resp BP MAP (mmHg) SpO2 O2 Device Cardiac (WDL) Patient Position - Orthostatic VS   04/06/22 0413 98 4 °F (36 9 °C) 94 18 129/59 -- 96 % None (Room air) -- Sitting   04/06/22 0010 98 °F (36 7 °C) 88 18 107/64 -- 99 % None (Room air) -- Lying   04/05/22 2034 98 °F (36 7 °C) 91 18 126/80 99 99 % None (Room air) -- Sitting   04/05/22 2030 -- -- -- -- -- -- None (Room air) WDL --   04/05/22 1628 99 °F (37 2 °C) 99 18 131/59 85 99 % None (Room air) -- Sitting   04/05/22 1600 -- 86 -- -- -- 99 % --                     Pertinent Labs/Diagnostic Test Results:   No orders to display         Results from last 7 days   Lab Units 04/05/22  1436   WBC Thousand/uL 10 39*   HEMOGLOBIN g/dL 11 2*   HEMATOCRIT % 35 2   PLATELETS Thousands/uL 236   NEUTROS ABS Thousands/µL 7 91*         Past Medical History:   Diagnosis Date    Abnormal Pap smear of cervix     Asthma     only with allergies, utilizes rescue inhaler     Cardiac arrhythmia     Last Assessed: 1/10/2017    Female infertility     Seasonal allergies      Present on Admission:  **None**      Admitting Diagnosis: Antepartum variable deceleration [O36 8390]  37 weeks gestation of pregnancy [Z3A 37]  Age/Sex: 39 y o  female  Admission Orders:  Scheduled Medications:  lactated ringers, 1,000 mL, Intravenous, Once  pantoprazole, 20 mg, Oral, Daily  prenatal multivitamin, 1 tablet, Oral, Daily      Continuous IV Infusions:     PRN Meds:  albuterol, 2 puff, Inhalation, Q6H PRN        None    Network Utilization Review Department  ATTENTION: Please call with any questions or concerns to [phone] and carefully listen to the prompts so that you are directed to the right person   All voicemails are confidential   Apoorva Chowdhury all requests for admission clinical reviews, approved or denied determinations and any other requests to dedicated fax number below belonging to the campus where the patient is receiving treatment   List of dedicated fax numbers for the Facilities:  [address] [place] [address] DENIALS (Administrative/Medical Necessity) [phone]   [address]  (Maternity/NICU/Pediatrics) [phone]   [address]  [address] [place] [id number] [address] [facility] [facility] [phone]

## 2022-04-06 NOTE — OB LABOR/OXYTOCIN SAFETY PROGRESS
Labor Progress Note - Raul King 39 y o  female MRN: [id number]    Unit/Bed#: -01 Encounter: [id number]       Contraction Frequency (minutes): irregular/irritability  Contraction Quality: Mild  Tachysystole: No   Cervical Dilation: Closed        Cervical Effacement: 30  Fetal Station: -3  Baseline Rate: 125 bpm  Fetal Heart Rate: 122 BPM  FHR Category: Category I           Vital Signs:   Vitals:    04/06/22 1005   BP: 123/73   Pulse: (!) 106   Resp: 18   Temp:    SpO2:        Notes/comments:   After multiple attempts at both manual and speculum kessler placement  Kessler not successful  Very soft and posterior cervix  Very narrow pubic arch and tight pelvic floor muscle prevent manual kessler  placement suspect would be successful with full relaxation after PCEA  Will plan for pitocin at this time  Pit bundle completed    Halle Loja MD 4/6/2022 1:40 PM

## 2022-04-06 NOTE — PLAN OF CARE
Problem: PAIN - ADULT  Goal: Verbalizes/displays adequate comfort level or baseline comfort level  Description: Interventions:  - Encourage patient to monitor pain and request assistance  - Assess pain using appropriate pain scale  - Administer analgesics based on type and severity of pain and evaluate response  - Implement non-pharmacological measures as appropriate and evaluate response  - Consider cultural and social influences on pain and pain management  - Notify physician/advanced practitioner if interventions unsuccessful or patient reports new pain  Outcome: Progressing     Problem: INFECTION - ADULT  Goal: Absence or prevention of progression during hospitalization  Description: INTERVENTIONS:  - Assess and monitor for signs and symptoms of infection  - Monitor lab/diagnostic results  - Monitor all insertion sites, i e  indwelling lines, tubes, and drains  - Monitor endotracheal if appropriate and nasal secretions for changes in amount and color  - [place] appropriate cooling/warming therapies per order  - Administer medications as ordered  - Instruct and encourage patient and family to use good hand hygiene technique  - Identify and instruct in appropriate isolation precautions for identified infection/condition  Outcome: Progressing  Goal: Absence of fever/infection during neutropenic period  Description: INTERVENTIONS:  - Monitor WBC    Outcome: Progressing     Problem: SAFETY ADULT  Goal: Patient will remain free of falls  Description: INTERVENTIONS:  - Educate patient/family on patient safety including physical limitations  - Instruct patient to call for assistance with activity   - Consult OT/PT to assist with strengthening/mobility   - Keep Call bell within reach  - Keep bed low and locked with side rails adjusted as appropriate  - Keep care items and personal belongings within reach  - Initiate and maintain comfort rounds  - Make Fall Risk Sign visible to staff  - Offer Toileting in advance of need  - Obtain necessary fall risk management equipment  - Apply yellow socks and bracelet for high fall risk patients  - Consider moving patient to room near nurses station  Outcome: Progressing  Goal: Maintain or return to baseline ADL function  Description: INTERVENTIONS:  -  Assess patient's ability to carry out ADLs; assess patient's baseline for ADL function and identify physical deficits which impact ability to perform ADLs (bathing, care of mouth/teeth, toileting, grooming, dressing, etc )  - Assess/evaluate cause of self-care deficits   - Assess range of motion  - Assess patient's mobility; develop plan if impaired  - Assess patient's need for assistive devices and provide as appropriate  - Encourage maximum independence but intervene and supervise when necessary  - Involve family in performance of ADLs  - Assess for home care needs following discharge   - Consider OT consult to assist with ADL evaluation and planning for discharge  - Provide patient education as appropriate  Outcome: Progressing  Goal: Maintains/Returns to pre admission functional level  Description: INTERVENTIONS:  - Perform BMAT or MOVE assessment daily    - Set and communicate daily mobility goal to care team and patient/family/caregiver  - Collaborate with rehabilitation services on mobility goals if consulted  - Perform Range of Motion    - Reposition patient   - Dangle patient   - Stand patient  - Ambulate patient   - Out of bed to chair   - Out of bed for meals   - Out of bed for toileting  - Record patient progress and toleration of activity level   Outcome: Progressing     Problem: Knowledge Deficit  Goal: Patient/family/caregiver demonstrates understanding of disease process, treatment plan, medications, and discharge instructions  Description: Complete learning assessment and assess knowledge base    Interventions:  - Provide teaching at level of understanding  - Provide teaching via preferred learning methods  Outcome: Progressing  Goal: Verbalizes understanding of labor plan  Description: Assess patient/family/caregiver's baseline knowledge level and ability to understand information  Provide education via patient/family/caregiver's preferred learning method at appropriate level of understanding  1  Provide teaching at level of understanding  2  Provide teaching via preferred learning method(s)  Outcome: Progressing     Problem: DISCHARGE PLANNING  Goal: Discharge to home or other facility with appropriate resources  Description: INTERVENTIONS:  - Identify barriers to discharge w/patient and caregiver  - Arrange for needed discharge resources and transportation as appropriate  - Identify discharge learning needs (meds, wound care, etc )  - Arrange for interpretive services to assist at discharge as needed  - Refer to Case Management Department for coordinating discharge planning if the patient needs post-hospital services based on physician/advanced practitioner order or complex needs related to functional status, cognitive ability, or social support system  Outcome: Progressing     Problem: Labor & Delivery  Goal: Manages discomfort  Description: Assess and monitor for signs and symptoms of discomfort  Assess patient's pain level regularly and per hospital policy  Administer medications as ordered  Support use of nonpharmacological methods to help control pain such as distraction, imagery, relaxation, and application of heat and cold  Collaborate with interdisciplinary team and patient to determine appropriate pain management plan  1  Include patient in decisions related to comfort  2  Offer non-pharmacological pain management interventions  3  Report ineffective pain management to physician  Outcome: Progressing  Goal: Patient vital signs are stable  Description: 1  Assess vital signs - vaginal delivery    Outcome: Progressing     Problem: BIRTH - VAGINAL/ SECTION  Goal: Fetal and maternal status remain reassuring during the birth process  Description: INTERVENTIONS:  - Monitor vital signs  - Monitor fetal heart rate  - Monitor uterine activity  - Monitor labor progression (vaginal delivery)  - DVT prophylaxis  - Antibiotic prophylaxis  Outcome: Progressing  Goal: Emotionally satisfying birthing experience for mother/fetus  Description: Interventions:  - Assess, plan, implement and evaluate the nursing care given to the patient in labor  - Advocate the philosophy that each childbirth experience is a unique experience and support the family's chosen level of involvement and control during the labor process   - Actively participate in both the patient's and family's teaching of the birth process  - Consider cultural, [employer]ology and age-specific factors and plan care for the patient in labor  Outcome: Progressing

## 2022-04-06 NOTE — OB LABOR/OXYTOCIN SAFETY PROGRESS
Labor Progress Note - Maxi King 39 y o  female MRN: [id number]    Unit/Bed#: LD  Encounter: [id number]       Contraction Frequency (minutes): none  Contraction Quality: Mild  Tachysystole: No   Cervical Dilation: Fingertip        Cervical Effacement: 30  Fetal Station: -3  Baseline Rate: 125 bpm  Fetal Heart Rate: 123 BPM  FHR Category: Category I             Vital Signs:   Vitals:    22 1005   BP: 123/73   Pulse: (!) 106   Resp: 18   Temp:    SpO2:        Notes/comments: In to see patient for spontaneous 6-minute decel nadiring to 62s    Resolved with repositioning  Returned to baseline on 110s with mod variability   Abdomen soft  Discussed if frequently recurrent or prolonged without resolution (terminal bradycardia) would need , however, still believe reasonable to pursue induction  Kessler balloon placement attempted but unsuccessful given patient discomfort  If tracing continues cat I for 20  Minutes, will give stadol and reattempt kessler placement otherwise will need PCEA prior to FB    Colonel Alessandro MD 2022 12:51 PM

## 2022-04-06 NOTE — PLAN OF CARE
Problem: ANTEPARTUM  Goal: Maintain pregnancy as long as maternal and/or fetal condition is stable  Description: INTERVENTIONS:  - Maternal surveillance  - Fetal surveillance  - Monitor uterine activity  - Medications as ordered  - Bedrest  Outcome: Completed     Problem: BIRTH - VAGINAL/ SECTION  Goal: Fetal and maternal status remain reassuring during the birth process  Description: INTERVENTIONS:  - Monitor vital signs  - Monitor fetal heart rate  - Monitor uterine activity  - Monitor labor progression (vaginal delivery)  - DVT prophylaxis  - Antibiotic prophylaxis  Outcome: Progressing  Goal: Emotionally satisfying birthing experience for mother/fetus  Description: Interventions:  - Assess, plan, implement and evaluate the nursing care given to the patient in labor  - Advocate the philosophy that each childbirth experience is a unique experience and support the family's chosen level of involvement and control during the labor process   - Actively participate in both the patient's and family's teaching of the birth process  - Consider cultural, L[employer] and age-specific factors and plan care for the patient in labor  Outcome: Progressing     Problem: PAIN - ADULT  Goal: Verbalizes/displays adequate comfort level or baseline comfort level  Description: Interventions:  - Encourage patient to monitor pain and request assistance  - Assess pain using appropriate pain scale  - Administer analgesics based on type and severity of pain and evaluate response  - Implement non-pharmacological measures as appropriate and evaluate response  - Consider cultural and social influences on pain and pain management  - Notify physician/advanced practitioner if interventions unsuccessful or patient reports new pain  Outcome: Progressing     Problem: INFECTION - ADULT  Goal: Absence or prevention of progression during hospitalization  Description: INTERVENTIONS:  - Assess and monitor for signs and symptoms of infection  - Monitor lab/diagnostic results  - Monitor all insertion sites, i e  indwelling lines, tubes, and drains  - Monitor endotracheal if appropriate and nasal secretions for changes in amount and color  - [place] appropriate cooling/warming therapies per order  - Administer medications as ordered  - Instruct and encourage patient and family to use good hand hygiene technique  - Identify and instruct in appropriate isolation precautions for identified infection/condition  Outcome: Progressing  Goal: Absence of fever/infection during neutropenic period  Description: INTERVENTIONS:  - Monitor WBC    Outcome: Progressing     Problem: SAFETY ADULT  Goal: Patient will remain free of falls  Description: INTERVENTIONS:  - Educate patient/family on patient safety including physical limitations  - Instruct patient to call for assistance with activity   - Consult OT/PT to assist with strengthening/mobility   - Keep Call bell within reach  - Keep bed low and locked with side rails adjusted as appropriate  - Keep care items and personal belongings within reach  - Initiate and maintain comfort rounds  - Make Fall Risk Sign visible to staff  - Offer Toileting in advance of need  - Obtain necessary fall risk management equipment  - Apply yellow socks and bracelet for high fall risk patients  - Consider moving patient to room near nurses station  Outcome: Progressing  Goal: Maintain or return to baseline ADL function  Description: INTERVENTIONS:  -  Assess patient's ability to carry out ADLs; assess patient's baseline for ADL function and identify physical deficits which impact ability to perform ADLs (bathing, care of mouth/teeth, toileting, grooming, dressing, etc )  - Assess/evaluate cause of self-care deficits   - Assess range of motion  - Assess patient's mobility; develop plan if impaired  - Assess patient's need for assistive devices and provide as appropriate  - Encourage maximum independence but intervene and supervise when necessary  - Involve family in performance of ADLs  - Assess for home care needs following discharge   - Consider OT consult to assist with ADL evaluation and planning for discharge  - Provide patient education as appropriate  Outcome: Progressing  Goal: Maintains/Returns to pre admission functional level  Description: INTERVENTIONS:  - Perform BMAT or MOVE assessment daily    - Set and communicate daily mobility goal to care team and patient/family/caregiver  - Collaborate with rehabilitation services on mobility goals if consulted  - Perform Range of Motion    - Reposition patient   - Dangle patient   - Stand patient  - Ambulate patient   - Out of bed to chair   - Out of bed for meals   - Out of bed for toileting  - Record patient progress and toleration of activity level   Outcome: Progressing     Problem: Knowledge Deficit  Goal: Patient/family/caregiver demonstrates understanding of disease process, treatment plan, medications, and discharge instructions  Description: Complete learning assessment and assess knowledge base  Interventions:  - Provide teaching at level of understanding  - Provide teaching via preferred learning methods  Outcome: Progressing  Goal: Verbalizes understanding of labor plan  Description: Assess patient/family/caregiver's baseline knowledge level and ability to understand information  Provide education via patient/family/caregiver's preferred learning method at appropriate level of understanding  1  Provide teaching at level of understanding  2  Provide teaching via preferred learning method(s)    Outcome: Progressing     Problem: DISCHARGE PLANNING  Goal: Discharge to home or other facility with appropriate resources  Description: INTERVENTIONS:  - Identify barriers to discharge w/patient and caregiver  - Arrange for needed discharge resources and transportation as appropriate  - Identify discharge learning needs (meds, wound care, etc )  - Arrange for interpretive services to assist at discharge as needed  - Refer to Case Management Department for coordinating discharge planning if the patient needs post-hospital services based on physician/advanced practitioner order or complex needs related to functional status, cognitive ability, or social support system  Outcome: Progressing     Problem: Labor & Delivery  Goal: Manages discomfort  Description: Assess and monitor for signs and symptoms of discomfort  Assess patient's pain level regularly and per hospital policy  Administer medications as ordered  Support use of nonpharmacological methods to help control pain such as distraction, imagery, relaxation, and application of heat and cold  Collaborate with interdisciplinary team and patient to determine appropriate pain management plan  1  Include patient in decisions related to comfort  2  Offer non-pharmacological pain management interventions  3  Report ineffective pain management to physician  Outcome: Progressing  Goal: Patient vital signs are stable  Description: 1  Assess vital signs - vaginal delivery    Outcome: Progressing

## 2022-04-07 ENCOUNTER — ANESTHESIA EVENT (INPATIENT)
Dept: LABOR AND DELIVERY | Facility: HOSPITAL | Age: 37
End: 2022-04-07
Payer: COMMERCIAL

## 2022-04-07 ENCOUNTER — ANESTHESIA (INPATIENT)
Dept: LABOR AND DELIVERY | Facility: HOSPITAL | Age: 37
End: 2022-04-07
Payer: COMMERCIAL

## 2022-04-07 PROCEDURE — 10907ZC DRAINAGE OF AMNIOTIC FLUID, THERAPEUTIC FROM PRODUCTS OF CONCEPTION, VIA NATURAL OR ARTIFICIAL OPENING: ICD-10-PCS | Performed by: OBSTETRICS & GYNECOLOGY

## 2022-04-07 RX ORDER — ROPIVACAINE HYDROCHLORIDE 2 MG/ML
INJECTION, SOLUTION EPIDURAL; INFILTRATION; PERINEURAL AS NEEDED
Status: DISCONTINUED | OUTPATIENT
Start: 2022-04-07 | End: 2022-04-08

## 2022-04-07 RX ORDER — HYDROXYZINE HYDROCHLORIDE 25 MG/1
25 TABLET, FILM COATED ORAL ONCE
Status: COMPLETED | OUTPATIENT
Start: 2022-04-07 | End: 2022-04-07

## 2022-04-07 RX ORDER — BUPIVACAINE HYDROCHLORIDE 2.5 MG/ML
INJECTION, SOLUTION EPIDURAL; INFILTRATION; INTRACAUDAL AS NEEDED
Status: DISCONTINUED | OUTPATIENT
Start: 2022-04-07 | End: 2022-04-08

## 2022-04-07 RX ORDER — BUPIVACAINE HYDROCHLORIDE 5 MG/ML
INJECTION, SOLUTION EPIDURAL; INTRACAUDAL AS NEEDED
Status: DISCONTINUED | OUTPATIENT
Start: 2022-04-07 | End: 2022-04-08

## 2022-04-07 RX ORDER — ONDANSETRON 2 MG/ML
4 INJECTION INTRAMUSCULAR; INTRAVENOUS EVERY 6 HOURS PRN
Status: DISCONTINUED | OUTPATIENT
Start: 2022-04-07 | End: 2022-04-08

## 2022-04-07 RX ORDER — LIDOCAINE HYDROCHLORIDE AND EPINEPHRINE 15; 5 MG/ML; UG/ML
INJECTION, SOLUTION EPIDURAL
Status: COMPLETED | OUTPATIENT
Start: 2022-04-07 | End: 2022-04-07

## 2022-04-07 RX ADMIN — LIDOCAINE HYDROCHLORIDE AND EPINEPHRINE 2 ML: 15; 5 INJECTION, SOLUTION EPIDURAL at 14:07

## 2022-04-07 RX ADMIN — HYDROXYZINE HYDROCHLORIDE 25 MG: 25 TABLET ORAL at 21:20

## 2022-04-07 RX ADMIN — BUPIVACAINE HYDROCHLORIDE 2.5 ML: 2.5 INJECTION, SOLUTION EPIDURAL; INFILTRATION; INTRACAUDAL at 20:37

## 2022-04-07 RX ADMIN — BUPIVACAINE HYDROCHLORIDE 2.5 ML: 5 INJECTION, SOLUTION EPIDURAL; INTRACAUDAL at 20:33

## 2022-04-07 RX ADMIN — SODIUM CHLORIDE, SODIUM LACTATE, POTASSIUM CHLORIDE, AND CALCIUM CHLORIDE 125 ML/HR: .6; .31; .03; .02 INJECTION, SOLUTION INTRAVENOUS at 00:13

## 2022-04-07 RX ADMIN — SODIUM CHLORIDE, SODIUM LACTATE, POTASSIUM CHLORIDE, AND CALCIUM CHLORIDE 125 ML/HR: .6; .31; .03; .02 INJECTION, SOLUTION INTRAVENOUS at 09:00

## 2022-04-07 RX ADMIN — Medication 10 MG: at 09:41

## 2022-04-07 RX ADMIN — ROPIVACAINE HYDROCHLORIDE 4 MG: 2 INJECTION, SOLUTION EPIDURAL; INFILTRATION at 14:09

## 2022-04-07 RX ADMIN — BUTORPHANOL TARTRATE 1 MG: 1 INJECTION, SOLUTION INTRAMUSCULAR; INTRAVENOUS at 02:26

## 2022-04-07 RX ADMIN — ROPIVACAINE HYDROCHLORIDE: 2 INJECTION, SOLUTION EPIDURAL; INFILTRATION at 14:17

## 2022-04-07 RX ADMIN — Medication 2 MILLI-UNITS/MIN: at 09:33

## 2022-04-07 RX ADMIN — ROPIVACAINE HYDROCHLORIDE: 2 INJECTION, SOLUTION EPIDURAL; INFILTRATION at 20:49

## 2022-04-07 RX ADMIN — ONDANSETRON 4 MG: 2 INJECTION INTRAMUSCULAR; INTRAVENOUS at 16:57

## 2022-04-07 RX ADMIN — BUPIVACAINE HYDROCHLORIDE 2.5 ML: 2.5 INJECTION, SOLUTION EPIDURAL; INFILTRATION; INTRACAUDAL at 20:33

## 2022-04-07 RX ADMIN — SODIUM CHLORIDE, SODIUM LACTATE, POTASSIUM CHLORIDE, AND CALCIUM CHLORIDE 125 ML/HR: .6; .31; .03; .02 INJECTION, SOLUTION INTRAVENOUS at 14:42

## 2022-04-07 RX ADMIN — LIDOCAINE HYDROCHLORIDE AND EPINEPHRINE 3 ML: 15; 5 INJECTION, SOLUTION EPIDURAL at 14:02

## 2022-04-07 RX ADMIN — PANTOPRAZOLE SODIUM 20 MG: 20 TABLET, DELAYED RELEASE ORAL at 09:41

## 2022-04-07 RX ADMIN — SODIUM CHLORIDE, SODIUM LACTATE, POTASSIUM CHLORIDE, AND CALCIUM CHLORIDE 125 ML/HR: .6; .31; .03; .02 INJECTION, SOLUTION INTRAVENOUS at 22:54

## 2022-04-07 RX ADMIN — BUPIVACAINE HYDROCHLORIDE 2.5 ML: 5 INJECTION, SOLUTION EPIDURAL; INTRACAUDAL at 20:37

## 2022-04-07 NOTE — OB LABOR/OXYTOCIN SAFETY PROGRESS
Oxytocin Safety Progress Check Note - Fer King 39 y o  female MRN: [id number]    Unit/Bed#: -01 Encounter: [id number]    Dose (kori-units/min) Oxytocin: 22 kori-units/min  Contraction Frequency (minutes): 3  Contraction Quality: Mild  Tachysystole: No   Cervical Dilation: 1-2        Cervical Effacement: 60  Fetal Station: -3  Baseline Rate: 115 bpm  Fetal Heart Rate: 115 BPM  FHR Category: Category I           Vital Signs:   Vitals:    04/06/22 2308   BP: 110/59   Pulse: 89   Resp:    Temp:    SpO2:        Notes/comments:   Pt is resting comfortably  SVE deferred  FHT cat 1 with contractions q2-7 mins  Will recheck in 2h or as clinically indicated      Dr Blanca Yarbrough aware     Burton Odonnell MD 4/6/2022 11:27 PM

## 2022-04-07 NOTE — OB LABOR/OXYTOCIN SAFETY PROGRESS
Oxytocin Safety Progress Check Note - Constanza King 39 y o  female MRN: [id number]    Unit/Bed#: -01 Encounter: [id number]    Dose (kori-units/min) Oxytocin: 12 kori-units/min  Contraction Frequency (minutes): irreg on toco  Contraction Quality: Mild  Tachysystole: No     Cervical Dilation: 2-3  Cervical Effacement: 70  Fetal Station: -2     Baseline Rate: 130 bpm  Fetal Heart Rate: 130 BPM  FHR Category: Category I    Pt nervous about allowing an 8th nelson placement  Discussed risks and benefits; patient agreed  Nelson placed without issue  Continue pit and uptitrate as able  PROCEDURE:  NELSON BALLOON PLACEMENT    A 24F nelson with a 30cc balloon was selected, SVE was performed and cervix was located, nelson was introduced over sterile gloved hands  Balloon advanced through cervix beyond the internal cervical os  A small amount amount of sterile saline solution was instilled in the balloon to confirm placement  Placement was confirmed to be beyond the internal cervical os  A total of 60cc of sterile saline solution was placed into the balloon  Pt tolerated well  Instructions left with RN to place nelson to gravity with a 1L bag of IV fluid        Vital Signs:   Vitals:    04/07/22 1423   BP: 119/57   Pulse: 104   Resp:    Temp:    SpO2: 100%       D/w Dr Leah Ashton MD 4/7/2022 3:35 PM

## 2022-04-07 NOTE — OB LABOR/OXYTOCIN SAFETY PROGRESS
Oxytocin Safety Progress Check Note - Suma King 39 y o  female MRN: [id number]    Unit/Bed#: -01 Encounter: [id number]    Dose (kori-units/min) Oxytocin: 14 kori-units/min  Contraction Frequency (minutes): irreg on toco  Contraction Quality: Mild  Tachysystole: No   Cervical Dilation: 4        Cervical Effacement: 70  Fetal Station: -3  Baseline Rate: 130 bpm  Fetal Heart Rate: 130 BPM  FHR Category: Category I      Vital Signs:   Vitals:    04/07/22 1638   BP: 115/57   Pulse: 98   Resp:    Temp:    SpO2:        Notes/comments:   Stewart balloon came out, continue to monitor and manage, hopefully can arom after more descent of head      Donte Mendez MD 4/7/2022 4:52 PM

## 2022-04-07 NOTE — OB LABOR/OXYTOCIN SAFETY PROGRESS
Oxytocin Safety Progress Check Note - Vic King 39 y o  female MRN: [id number]    Unit/Bed#: -01 Encounter: [id number]    Dose (kori-units/min) Oxytocin: 0 kori-units/min  Contraction Frequency (minutes): 1 5-2 5  Contraction Quality: Mild  Tachysystole: No   Cervical Dilation: 2        Cervical Effacement: 60  Fetal Station: -3  Baseline Rate: 115 bpm  Fetal Heart Rate: 118 BPM  FHR Category: Category I    Vital Signs:   Vitals:    04/07/22 0712   BP: 141/67   Pulse: 89   Resp:    Temp:    SpO2:        Notes/comments:     Vic Tay is feeling well  Defer cervical check at this time  FHT is category I  Plan for a pit break and shower/eat  Plan to restart pitocin  D/W Dr Modesto Holt           [place], [place] 4/7/2022 9:17 AM

## 2022-04-07 NOTE — OB LABOR/OXYTOCIN SAFETY PROGRESS
Oxytocin Safety Progress Check Note - Bradly King 39 y o  female MRN: [id number]    Unit/Bed#: -01 Encounter: [id number]    Dose (kori-units/min) Oxytocin: 22 kori-units/min  Contraction Frequency (minutes): 1 5-6  Contraction Quality: Mild,Moderate  Tachysystole: No   Cervical Dilation: 1-2        Cervical Effacement: 60  Fetal Station: -3  Baseline Rate: 110 bpm  Fetal Heart Rate: 140 BPM  FHR Category: Category I           Vital Signs:   Vitals:    04/07/22 0157   BP: 120/72   Pulse: 80   Resp:    Temp:    SpO2:        Notes/comments:   Pt is comfortable in bed  SVE as above  FHT cat 1 with contractions q5-7 mins  Will continue titrating pitocin at this time      Dr Phil Heard MD 4/7/2022 2:11 AM

## 2022-04-07 NOTE — OB LABOR/OXYTOCIN SAFETY PROGRESS
Oxytocin Safety Progress Check Note - Chaya King 39 y o  female MRN: [id number]    Unit/Bed#: -01 Encounter: [id number]    Dose (kori-units/min) Oxytocin: 26 kori-units/min  Contraction Frequency (minutes): 1 5-4  Contraction Quality: Mild  Tachysystole: No   Cervical Dilation: 2        Cervical Effacement: 60  Fetal Station: -3  Baseline Rate: 120 bpm  Fetal Heart Rate: 120 BPM  FHR Category: Category I           Vital Signs:   Vitals:    04/07/22 0612   BP: 126/74   Pulse: 81   Resp:    Temp:    SpO2:        Notes/comments:   Pt is comfortable  SVE as above  FHT cat 1 stacy q3 mins  Will continue titrating pitocin at this time and recheck in 2h or as clinically indicated      Dr Chinyere Wu MD 4/7/2022 6:28 AM

## 2022-04-07 NOTE — OB LABOR/OXYTOCIN SAFETY PROGRESS
Oxytocin Safety Progress Check Note - Jb King 39 y o  female MRN: [id number]    Unit/Bed#: -01 Encounter: [id number]    Dose (kori-units/min) Oxytocin: 18 kori-units/min  Contraction Frequency (minutes): 3-4  Contraction Quality: Mild  Tachysystole: No   Cervical Dilation: 4        Cervical Effacement: 70  Fetal Station: -3  Baseline Rate: 120 bpm  Fetal Heart Rate: 122 BPM  FHR Category: Category I           Vital Signs:   Vitals:    04/07/22 1858   BP: 118/59   Pulse: 94   Resp:    Temp:    SpO2:        Notes/comments:   Pt is resting comfortably  SVE as above and unchanged  FHT cat 1 with contractions q3-4 mins  Will continue titrating pitocin at this time  Plan to AROM at next check      Dr Dennys Rushing aware     July Reddy MD 4/7/2022 7:17 PM

## 2022-04-07 NOTE — OB LABOR/OXYTOCIN SAFETY PROGRESS
Oxytocin Safety Progress Check Note - Anton King 39 y o  female MRN: [id number]    Unit/Bed#: -01 Encounter: [id number]    Dose (kori-units/min) Oxytocin: 8 kori-units/min  Contraction Frequency (minutes): 4-6  Contraction Quality: Mild  Tachysystole: No   Cervical Dilation: 2        Cervical Effacement: 60  Fetal Station: -3  Baseline Rate: 125 bpm  Fetal Heart Rate: 132 BPM  FHR Category: Category I    Vital Signs:   Vitals:    04/07/22 1100   BP: 126/58   Pulse: 97   Resp:    Temp:    SpO2:        Notes/comments:     Anton Ochoa is doing well  Defer cervical exam at this time  FHT is category I  D/W Dr Yuan Holliday           [place], [place] 4/7/2022 11:32 AM

## 2022-04-07 NOTE — OB LABOR/OXYTOCIN SAFETY PROGRESS
Oxytocin Safety Progress Check Note - Laura King 39 y o  female MRN: [id number]    Unit/Bed#: -01 Encounter: [id number]    Dose (kori-units/min) Oxytocin: 12 kori-units/min  Contraction Frequency (minutes): 4-5  Contraction Quality: Mild  Tachysystole: No   Cervical Dilation: 2-3        Cervical Effacement: 70  Fetal Station: -2  Baseline Rate: 115 bpm  Fetal Heart Rate: 110 BPM  FHR Category: Category I      Vital Signs:   Vitals:    04/07/22 1423   BP: 119/57   Pulse: 104   Resp:    Temp:    SpO2: 100%       Notes/comments:   Patient comfortable with epidural, attempted kessler balloon unable to place, consulted residents for placement    (was not able to have stylet that fit down catheter)    Rneay Epstein MD 4/7/2022 3:18 PM

## 2022-04-07 NOTE — PLAN OF CARE
Problem: PAIN - ADULT  Goal: Verbalizes/displays adequate comfort level or baseline comfort level  Description: Interventions:  - Encourage patient to monitor pain and request assistance  - Assess pain using appropriate pain scale  - Administer analgesics based on type and severity of pain and evaluate response  - Implement non-pharmacological measures as appropriate and evaluate response  - Consider cultural and social influences on pain and pain management  - Notify physician/advanced practitioner if interventions unsuccessful or patient reports new pain  Outcome: Progressing     Problem: INFECTION - ADULT  Goal: Absence or prevention of progression during hospitalization  Description: INTERVENTIONS:  - Assess and monitor for signs and symptoms of infection  - Monitor lab/diagnostic results  - Monitor all insertion sites, i e  indwelling lines, tubes, and drains  - Monitor endotracheal if appropriate and nasal secretions for changes in amount and color  - [place] appropriate cooling/warming therapies per order  - Administer medications as ordered  - Instruct and encourage patient and family to use good hand hygiene technique  - Identify and instruct in appropriate isolation precautions for identified infection/condition  Outcome: Progressing  Goal: Absence of fever/infection during neutropenic period  Description: INTERVENTIONS:  - Monitor WBC    Outcome: Progressing     Problem: SAFETY ADULT  Goal: Patient will remain free of falls  Description: INTERVENTIONS:  - Educate patient/family on patient safety including physical limitations  - Instruct patient to call for assistance with activity   - Consult OT/PT to assist with strengthening/mobility   - Keep Call bell within reach  - Keep bed low and locked with side rails adjusted as appropriate  - Keep care items and personal belongings within reach  - Initiate and maintain comfort rounds  - Make Fall Risk Sign visible to staff  - Offer Toileting in advance of need  - Obtain necessary fall risk management equipment  - Apply yellow socks and bracelet for high fall risk patients  - Consider moving patient to room near nurses station  Outcome: Progressing  Goal: Maintain or return to baseline ADL function  Description: INTERVENTIONS:  -  Assess patient's ability to carry out ADLs; assess patient's baseline for ADL function and identify physical deficits which impact ability to perform ADLs (bathing, care of mouth/teeth, toileting, grooming, dressing, etc )  - Assess/evaluate cause of self-care deficits   - Assess range of motion  - Assess patient's mobility; develop plan if impaired  - Assess patient's need for assistive devices and provide as appropriate  - Encourage maximum independence but intervene and supervise when necessary  - Involve family in performance of ADLs  - Assess for home care needs following discharge   - Consider OT consult to assist with ADL evaluation and planning for discharge  - Provide patient education as appropriate  Outcome: Progressing  Goal: Maintains/Returns to pre admission functional level  Description: INTERVENTIONS:  - Perform BMAT or MOVE assessment daily    - Set and communicate daily mobility goal to care team and patient/family/caregiver  - Collaborate with rehabilitation services on mobility goals if consulted  - Perform Range of Motion    - Reposition patient   - Dangle patient   - Stand patient  - Ambulate patient   - Out of bed to chair   - Out of bed for meals   - Out of bed for toileting  - Record patient progress and toleration of activity level   Outcome: Progressing     Problem: Knowledge Deficit  Goal: Patient/family/caregiver demonstrates understanding of disease process, treatment plan, medications, and discharge instructions  Description: Complete learning assessment and assess knowledge base    Interventions:  - Provide teaching at level of understanding  - Provide teaching via preferred learning methods  Outcome: Progressing  Goal: Verbalizes understanding of labor plan  Description: Assess patient/family/caregiver's baseline knowledge level and ability to understand information  Provide education via patient/family/caregiver's preferred learning method at appropriate level of understanding  1  Provide teaching at level of understanding  2  Provide teaching via preferred learning method(s)  Outcome: Progressing     Problem: DISCHARGE PLANNING  Goal: Discharge to home or other facility with appropriate resources  Description: INTERVENTIONS:  - Identify barriers to discharge w/patient and caregiver  - Arrange for needed discharge resources and transportation as appropriate  - Identify discharge learning needs (meds, wound care, etc )  - Arrange for interpretive services to assist at discharge as needed  - Refer to Case Management Department for coordinating discharge planning if the patient needs post-hospital services based on physician/advanced practitioner order or complex needs related to functional status, cognitive ability, or social support system  Outcome: Progressing     Problem: Labor & Delivery  Goal: Manages discomfort  Description: Assess and monitor for signs and symptoms of discomfort  Assess patient's pain level regularly and per hospital policy  Administer medications as ordered  Support use of nonpharmacological methods to help control pain such as distraction, imagery, relaxation, and application of heat and cold  Collaborate with interdisciplinary team and patient to determine appropriate pain management plan  1  Include patient in decisions related to comfort  2  Offer non-pharmacological pain management interventions  3  Report ineffective pain management to physician  Outcome: Progressing  Goal: Patient vital signs are stable  Description: 1  Assess vital signs - vaginal delivery    Outcome: Progressing     Problem: BIRTH - VAGINAL/ SECTION  Goal: Fetal and maternal status remain reassuring during the birth process  Description: INTERVENTIONS:  - Monitor vital signs  - Monitor fetal heart rate  - Monitor uterine activity  - Monitor labor progression (vaginal delivery)  - DVT prophylaxis  - Antibiotic prophylaxis  Outcome: Progressing  Goal: Emotionally satisfying birthing experience for mother/fetus  Description: Interventions:  - Assess, plan, implement and evaluate the nursing care given to the patient in labor  - Advocate the philosophy that each childbirth experience is a unique experience and support the family's chosen level of involvement and control during the labor process   - Actively participate in both the patient's and family's teaching of the birth process  - Consider cultural, [employer] and age-specific factors and plan care for the patient in labor  Outcome: Progressing

## 2022-04-07 NOTE — ANESTHESIA PREPROCEDURE EVALUATION
Procedure:  LABOR ANALGESIA    Relevant Problems   CARDIO   (+) Hyperlipidemia      GYN   (+) 37 weeks gestation of pregnancy      NEURO/PSYCH   (+) Anxiety      PULMONARY   (+) Asthma             Anesthesia Plan  ASA Score- 2     Anesthesia Type- epidural with ASA Monitors  Additional Monitors:   Airway Plan:     Comment: Discussed the risks/benefits of neuraxial anesthesia, including risk of bleeding/infection/damage to underlying structures, the likely side effect of nausea, and unlikely complication of spinal headache          Plan Factors-    Induction-     Postoperative Plan-     Informed Consent- Anesthetic plan and risks discussed with patient  I personally reviewed this patient with the CRNA  Discussed and agreed on the Anesthesia Plan with the CRNA  Mirtha Schultz

## 2022-04-07 NOTE — OB LABOR/OXYTOCIN SAFETY PROGRESS
Labor Progress Note - Ariana King 39 y o  female MRN: [id number]    Unit/Bed#: -01 Encounter: [id number]    Dose (kori-units/min) Oxytocin: 14 kori-units/min  Contraction Frequency (minutes): 3-4  Contraction Quality: Mild  Tachysystole: No   Cervical Dilation: 1-2        Cervical Effacement: 60  Fetal Station: -3  Baseline Rate: 115 bpm  Fetal Heart Rate: 137 BPM  FHR Category: Category I           Vital Signs:   Vitals:    04/06/22 2039   BP: 117/60   Pulse: 90   Resp:    Temp:    SpO2:        Notes/comments:   SVE minimally changed at 1 5/60/-3  FB attempted again after stadol, but still unsuccessful limited by patient discomfort   Reviewed options of PCEA for FB or simply continuing to go up on pitocin  Reviewed once PCEA placed will be confined to bed which may prolong labor  Utilization of FB may shorten labor  Either option reasonable  Ariana Chase is comfortable now and would prefer to keep walking and being able to sit in chair so will continue increasing pitocin  recommend no further attempts at [facility]   All questions answered to the best of my ability        Elyse Burk MD 4/6/2022 9:29 PM

## 2022-04-07 NOTE — ANESTHESIA PROCEDURE NOTES
Epidural Block    Start time: 4/7/2022 2:00 PM  Reason for block: procedure for pain and at surgeon's request  Staffing  Performed: CRNA   Anesthesiologist: Dorean Kanner, MD  Resident/CRNA: Hany Rousseau CRNA  Preanesthetic Checklist  Completed: patient identified, IV checked, site marked, risks and benefits discussed, surgical consent, monitors and equipment checked, pre-op evaluation and timeout performed  Epidural  Patient position: sitting  Prep: ChloraPrep  Patient monitoring: cardiac monitor and frequent blood pressure checks  Approach: midline  Location: lumbar  Injection technique: CATY air  Needle  Needle type: Tuohy   Needle gauge: 18 G  Catheter type: side hole  Catheter size: 20 G  Test dose: negativelidocaine 1 5% with epinephrine 1:200,000 test dose, 3 mL  Assessment  Sensory level: T10  Number of attempts: 1negative aspiration for CSF, negative aspiration for heme and no paresthesia on injection  patient tolerated the procedure well with no immediate complications  Additional Notes  Unremarkable epidural

## 2022-04-07 NOTE — OB LABOR/OXYTOCIN SAFETY PROGRESS
Oxytocin Safety Progress Check Note - Erven Mail Christine 39 y o  female MRN: [id number]    Unit/Bed#: -01 Encounter: [id number]    Dose (kori-units/min) Oxytocin: 8 kori-units/min  Contraction Frequency (minutes): 4-4 5  Contraction Quality: Mild  Tachysystole: No   Cervical Dilation: 2        Cervical Effacement: 70  Fetal Station: -2  Baseline Rate: 125 bpm  Fetal Heart Rate: 136 BPM  FHR Category: Category I      Vital Signs:   Vitals:    04/07/22 1200   BP: 113/57   Pulse: 100   Resp:    Temp:    SpO2:        Notes/comments:   Reviewed options with patient of epidural and arom or epidural and kessler ball both of which would help accelerate induction process  Patient feels she could be ready for epidural in about 30 minutes and would like to consider arom after comfortable      Rajesh Triana MD 4/7/2022 12:35 PM

## 2022-04-08 PROBLEM — Z98.891 S/P CESAREAN SECTION: Status: ACTIVE | Noted: 2022-04-08

## 2022-04-08 LAB
ANION GAP SERPL CALCULATED.3IONS-SCNC: 10 MMOL/L (ref 4–13)
ATRIAL RATE: 113 BPM
BASE EXCESS BLDCOA CALC-SCNC: -1.9 MMOL/L (ref 3–11)
BASE EXCESS BLDCOV CALC-SCNC: -3.1 MMOL/L (ref 1–9)
BUN SERPL-MCNC: 10 MG/DL (ref 5–25)
CALCIUM SERPL-MCNC: 8.4 MG/DL (ref 8.3–10.1)
CHLORIDE SERPL-SCNC: 100 MMOL/L (ref 100–108)
CO2 SERPL-SCNC: 22 MMOL/L (ref 21–32)
CREAT SERPL-MCNC: 0.91 MG/DL (ref 0.6–1.3)
ERYTHROCYTE [DISTWIDTH] IN BLOOD BY AUTOMATED COUNT: 13.3 % (ref 11.6–15.1)
ERYTHROCYTE [DISTWIDTH] IN BLOOD BY AUTOMATED COUNT: 14.1 % (ref 11.6–15.1)
GFR SERPL CREATININE-BSD FRML MDRD: 81 ML/MIN/1.73SQ M
GLUCOSE SERPL-MCNC: 154 MG/DL (ref 65–140)
HCO3 BLDCOA-SCNC: 24.7 MMOL/L (ref 17.3–27.3)
HCO3 BLDCOV-SCNC: 22.3 MMOL/L (ref 12.2–28.6)
HCT VFR BLD AUTO: 21.8 % (ref 34.8–46.1)
HCT VFR BLD AUTO: 21.9 % (ref 34.8–46.1)
HGB BLD-MCNC: 7 G/DL (ref 11.5–15.4)
HGB BLD-MCNC: 7.3 G/DL (ref 11.5–15.4)
MCH RBC QN AUTO: 26.5 PG (ref 26.8–34.3)
MCH RBC QN AUTO: 27.8 PG (ref 26.8–34.3)
MCHC RBC AUTO-ENTMCNC: 32 G/DL (ref 31.4–37.4)
MCHC RBC AUTO-ENTMCNC: 33.5 G/DL (ref 31.4–37.4)
MCV RBC AUTO: 83 FL (ref 82–98)
MCV RBC AUTO: 83 FL (ref 82–98)
O2 CT VFR BLDCOA CALC: 7.1 ML/DL
OXYHGB MFR BLDCOA: 34.8 %
OXYHGB MFR BLDCOV: 63 %
P AXIS: 66 DEGREES
PCO2 BLDCOA: 49.1 MM[HG] (ref 30–60)
PCO2 BLDCOV: 41.3 MM HG (ref 27–43)
PH BLDCOA: 7.32 [PH] (ref 7.23–7.43)
PH BLDCOV: 7.35 [PH] (ref 7.19–7.49)
PLATELET # BLD AUTO: 173 THOUSANDS/UL (ref 149–390)
PLATELET # BLD AUTO: 199 THOUSANDS/UL (ref 149–390)
PMV BLD AUTO: 11 FL (ref 8.9–12.7)
PMV BLD AUTO: 11.4 FL (ref 8.9–12.7)
PO2 BLDCOA: 17 MM HG (ref 5–25)
PO2 BLDCOV: 26.2 MM HG (ref 15–45)
POTASSIUM SERPL-SCNC: 4.4 MMOL/L (ref 3.5–5.3)
PR INTERVAL: 126 MS
QRS AXIS: 9 DEGREES
QRSD INTERVAL: 76 MS
QT INTERVAL: 324 MS
QTC INTERVAL: 444 MS
RBC # BLD AUTO: 2.63 MILLION/UL (ref 3.81–5.12)
RBC # BLD AUTO: 2.64 MILLION/UL (ref 3.81–5.12)
SAO2 % BLDCOV: 12.9 ML/DL
SODIUM SERPL-SCNC: 132 MMOL/L (ref 136–145)
T WAVE AXIS: 42 DEGREES
VENTRICULAR RATE: 113 BPM
WBC # BLD AUTO: 18.87 THOUSAND/UL (ref 4.31–10.16)
WBC # BLD AUTO: 18.93 THOUSAND/UL (ref 4.31–10.16)

## 2022-04-08 PROCEDURE — 30233N1 TRANSFUSION OF NONAUTOLOGOUS RED BLOOD CELLS INTO PERIPHERAL VEIN, PERCUTANEOUS APPROACH: ICD-10-PCS | Performed by: OBSTETRICS & GYNECOLOGY

## 2022-04-08 PROCEDURE — 82805 BLOOD GASES W/O2 SATURATION: CPT | Performed by: OBSTETRICS & GYNECOLOGY

## 2022-04-08 PROCEDURE — 99024 POSTOP FOLLOW-UP VISIT: CPT | Performed by: OBSTETRICS & GYNECOLOGY

## 2022-04-08 PROCEDURE — P9016 RBC LEUKOCYTES REDUCED: HCPCS

## 2022-04-08 PROCEDURE — 93005 ELECTROCARDIOGRAM TRACING: CPT

## 2022-04-08 PROCEDURE — 80048 BASIC METABOLIC PNL TOTAL CA: CPT | Performed by: OBSTETRICS & GYNECOLOGY

## 2022-04-08 PROCEDURE — 93010 ELECTROCARDIOGRAM REPORT: CPT | Performed by: INTERNAL MEDICINE

## 2022-04-08 PROCEDURE — 85027 COMPLETE CBC AUTOMATED: CPT | Performed by: OBSTETRICS & GYNECOLOGY

## 2022-04-08 PROCEDURE — 59510 CESAREAN DELIVERY: CPT | Performed by: OBSTETRICS & GYNECOLOGY

## 2022-04-08 RX ORDER — KETOROLAC TROMETHAMINE 30 MG/ML
30 INJECTION, SOLUTION INTRAMUSCULAR; INTRAVENOUS EVERY 6 HOURS
Status: DISCONTINUED | OUTPATIENT
Start: 2022-04-08 | End: 2022-04-08 | Stop reason: SDUPTHER

## 2022-04-08 RX ORDER — HYDROMORPHONE HCL/PF 1 MG/ML
0.5 SYRINGE (ML) INJECTION
Status: DISPENSED | OUTPATIENT
Start: 2022-04-08 | End: 2022-04-09

## 2022-04-08 RX ORDER — ACETAMINOPHEN 325 MG/1
650 TABLET ORAL EVERY 6 HOURS
Status: DISCONTINUED | OUTPATIENT
Start: 2022-04-08 | End: 2022-04-08 | Stop reason: SDUPTHER

## 2022-04-08 RX ORDER — FENTANYL CITRATE 50 UG/ML
INJECTION, SOLUTION INTRAMUSCULAR; INTRAVENOUS
Status: COMPLETED
Start: 2022-04-08 | End: 2022-04-08

## 2022-04-08 RX ORDER — HYDRALAZINE HYDROCHLORIDE 20 MG/ML
5 INJECTION INTRAMUSCULAR; INTRAVENOUS
Status: DISCONTINUED | OUTPATIENT
Start: 2022-04-08 | End: 2022-04-14 | Stop reason: HOSPADM

## 2022-04-08 RX ORDER — SODIUM CHLORIDE, SODIUM LACTATE, POTASSIUM CHLORIDE, CALCIUM CHLORIDE 600; 310; 30; 20 MG/100ML; MG/100ML; MG/100ML; MG/100ML
125 INJECTION, SOLUTION INTRAVENOUS CONTINUOUS
Status: DISCONTINUED | OUTPATIENT
Start: 2022-04-08 | End: 2022-04-11

## 2022-04-08 RX ORDER — SIMETHICONE 80 MG
80 TABLET,CHEWABLE ORAL 4 TIMES DAILY PRN
Status: DISCONTINUED | OUTPATIENT
Start: 2022-04-08 | End: 2022-04-14 | Stop reason: HOSPADM

## 2022-04-08 RX ORDER — MIDAZOLAM HYDROCHLORIDE 2 MG/2ML
INJECTION, SOLUTION INTRAMUSCULAR; INTRAVENOUS AS NEEDED
Status: DISCONTINUED | OUTPATIENT
Start: 2022-04-08 | End: 2022-04-08

## 2022-04-08 RX ORDER — OXYCODONE HYDROCHLORIDE 5 MG/1
5 TABLET ORAL EVERY 4 HOURS PRN
Status: DISCONTINUED | OUTPATIENT
Start: 2022-04-09 | End: 2022-04-14 | Stop reason: HOSPADM

## 2022-04-08 RX ORDER — ACETAMINOPHEN 325 MG/1
650 TABLET ORAL EVERY 6 HOURS SCHEDULED
Status: DISPENSED | OUTPATIENT
Start: 2022-04-08 | End: 2022-04-13

## 2022-04-08 RX ORDER — PROMETHAZINE HYDROCHLORIDE 25 MG/ML
12.5 INJECTION, SOLUTION INTRAMUSCULAR; INTRAVENOUS ONCE AS NEEDED
Status: DISCONTINUED | OUTPATIENT
Start: 2022-04-08 | End: 2022-04-14 | Stop reason: HOSPADM

## 2022-04-08 RX ORDER — DEXAMETHASONE SODIUM PHOSPHATE 4 MG/ML
8 INJECTION, SOLUTION INTRA-ARTICULAR; INTRALESIONAL; INTRAMUSCULAR; INTRAVENOUS; SOFT TISSUE ONCE AS NEEDED
Status: ACTIVE | OUTPATIENT
Start: 2022-04-08 | End: 2022-04-09

## 2022-04-08 RX ORDER — ALBUTEROL SULFATE 2.5 MG/3ML
2.5 SOLUTION RESPIRATORY (INHALATION) ONCE AS NEEDED
Status: DISCONTINUED | OUTPATIENT
Start: 2022-04-08 | End: 2022-04-14 | Stop reason: HOSPADM

## 2022-04-08 RX ORDER — BUPIVACAINE HYDROCHLORIDE 7.5 MG/ML
INJECTION, SOLUTION INTRASPINAL AS NEEDED
Status: DISCONTINUED | OUTPATIENT
Start: 2022-04-08 | End: 2022-04-08

## 2022-04-08 RX ORDER — NALOXONE HYDROCHLORIDE 0.4 MG/ML
0.1 INJECTION, SOLUTION INTRAMUSCULAR; INTRAVENOUS; SUBCUTANEOUS
Status: ACTIVE | OUTPATIENT
Start: 2022-04-08 | End: 2022-04-09

## 2022-04-08 RX ORDER — ONDANSETRON 2 MG/ML
4 INJECTION INTRAMUSCULAR; INTRAVENOUS ONCE AS NEEDED
Status: COMPLETED | OUTPATIENT
Start: 2022-04-08 | End: 2022-04-10

## 2022-04-08 RX ORDER — IBUPROFEN 600 MG/1
600 TABLET ORAL EVERY 6 HOURS
Status: DISCONTINUED | OUTPATIENT
Start: 2022-04-10 | End: 2022-04-14 | Stop reason: HOSPADM

## 2022-04-08 RX ORDER — FENTANYL CITRATE/PF 50 MCG/ML
25 SYRINGE (ML) INJECTION
Status: DISCONTINUED | OUTPATIENT
Start: 2022-04-08 | End: 2022-04-13

## 2022-04-08 RX ORDER — DIPHENHYDRAMINE HYDROCHLORIDE 50 MG/ML
25 INJECTION INTRAMUSCULAR; INTRAVENOUS EVERY 6 HOURS PRN
Status: DISCONTINUED | OUTPATIENT
Start: 2022-04-08 | End: 2022-04-08 | Stop reason: SDUPTHER

## 2022-04-08 RX ORDER — ONDANSETRON 2 MG/ML
4 INJECTION INTRAMUSCULAR; INTRAVENOUS EVERY 4 HOURS PRN
Status: DISCONTINUED | OUTPATIENT
Start: 2022-04-08 | End: 2022-04-08 | Stop reason: SDUPTHER

## 2022-04-08 RX ORDER — METOCLOPRAMIDE HYDROCHLORIDE 5 MG/ML
5 INJECTION INTRAMUSCULAR; INTRAVENOUS EVERY 6 HOURS PRN
Status: ACTIVE | OUTPATIENT
Start: 2022-04-08 | End: 2022-04-09

## 2022-04-08 RX ORDER — CALCIUM CARBONATE 200(500)MG
1000 TABLET,CHEWABLE ORAL DAILY PRN
Status: DISCONTINUED | OUTPATIENT
Start: 2022-04-08 | End: 2022-04-14 | Stop reason: HOSPADM

## 2022-04-08 RX ORDER — DIPHENHYDRAMINE HCL 25 MG
25 TABLET ORAL EVERY 6 HOURS PRN
Status: DISCONTINUED | OUTPATIENT
Start: 2022-04-08 | End: 2022-04-14 | Stop reason: HOSPADM

## 2022-04-08 RX ORDER — OXYTOCIN/RINGER'S LACTATE 30/500 ML
62.5 PLASTIC BAG, INJECTION (ML) INTRAVENOUS ONCE
Status: COMPLETED | OUTPATIENT
Start: 2022-04-08 | End: 2022-04-08

## 2022-04-08 RX ORDER — ONDANSETRON 2 MG/ML
4 INJECTION INTRAMUSCULAR; INTRAVENOUS EVERY 8 HOURS PRN
Status: DISCONTINUED | OUTPATIENT
Start: 2022-04-08 | End: 2022-04-14 | Stop reason: HOSPADM

## 2022-04-08 RX ORDER — DIAPER,BRIEF,INFANT-TODD,DISP
1 EACH MISCELLANEOUS DAILY PRN
Status: DISCONTINUED | OUTPATIENT
Start: 2022-04-08 | End: 2022-04-14 | Stop reason: HOSPADM

## 2022-04-08 RX ORDER — DOCUSATE SODIUM 100 MG/1
100 CAPSULE, LIQUID FILLED ORAL 2 TIMES DAILY
Status: DISCONTINUED | OUTPATIENT
Start: 2022-04-08 | End: 2022-04-14 | Stop reason: HOSPADM

## 2022-04-08 RX ORDER — METOCLOPRAMIDE HYDROCHLORIDE 5 MG/ML
10 INJECTION INTRAMUSCULAR; INTRAVENOUS ONCE AS NEEDED
Status: DISCONTINUED | OUTPATIENT
Start: 2022-04-08 | End: 2022-04-14 | Stop reason: HOSPADM

## 2022-04-08 RX ORDER — ONDANSETRON 2 MG/ML
INJECTION INTRAMUSCULAR; INTRAVENOUS AS NEEDED
Status: DISCONTINUED | OUTPATIENT
Start: 2022-04-08 | End: 2022-04-08

## 2022-04-08 RX ORDER — DEXAMETHASONE SODIUM PHOSPHATE 4 MG/ML
INJECTION, SOLUTION INTRA-ARTICULAR; INTRALESIONAL; INTRAMUSCULAR; INTRAVENOUS; SOFT TISSUE AS NEEDED
Status: DISCONTINUED | OUTPATIENT
Start: 2022-04-08 | End: 2022-04-08

## 2022-04-08 RX ORDER — MORPHINE SULFATE 1 MG/ML
INJECTION, SOLUTION EPIDURAL; INTRATHECAL; INTRAVENOUS AS NEEDED
Status: DISCONTINUED | OUTPATIENT
Start: 2022-04-08 | End: 2022-04-08

## 2022-04-08 RX ORDER — HYDROMORPHONE HCL/PF 1 MG/ML
0.2 SYRINGE (ML) INJECTION
Status: DISCONTINUED | OUTPATIENT
Start: 2022-04-08 | End: 2022-04-14 | Stop reason: HOSPADM

## 2022-04-08 RX ORDER — LABETALOL 20 MG/4 ML (5 MG/ML) INTRAVENOUS SYRINGE
10
Status: DISCONTINUED | OUTPATIENT
Start: 2022-04-08 | End: 2022-04-14 | Stop reason: HOSPADM

## 2022-04-08 RX ORDER — HYDROMORPHONE HCL/PF 1 MG/ML
0.5 SYRINGE (ML) INJECTION
Status: DISCONTINUED | OUTPATIENT
Start: 2022-04-08 | End: 2022-04-14 | Stop reason: HOSPADM

## 2022-04-08 RX ORDER — KETOROLAC TROMETHAMINE 30 MG/ML
INJECTION, SOLUTION INTRAMUSCULAR; INTRAVENOUS AS NEEDED
Status: DISCONTINUED | OUTPATIENT
Start: 2022-04-08 | End: 2022-04-08

## 2022-04-08 RX ORDER — HYDROMORPHONE HCL/PF 1 MG/ML
1 SYRINGE (ML) INJECTION EVERY 2 HOUR PRN
Status: DISCONTINUED | OUTPATIENT
Start: 2022-04-09 | End: 2022-04-14 | Stop reason: HOSPADM

## 2022-04-08 RX ORDER — NALBUPHINE HCL 10 MG/ML
5 AMPUL (ML) INJECTION EVERY 4 HOURS PRN
Status: DISCONTINUED | OUTPATIENT
Start: 2022-04-08 | End: 2022-04-14 | Stop reason: HOSPADM

## 2022-04-08 RX ORDER — FENTANYL CITRATE 50 UG/ML
INJECTION, SOLUTION INTRAMUSCULAR; INTRAVENOUS AS NEEDED
Status: DISCONTINUED | OUTPATIENT
Start: 2022-04-08 | End: 2022-04-08

## 2022-04-08 RX ORDER — SODIUM CHLORIDE, SODIUM LACTATE, POTASSIUM CHLORIDE, CALCIUM CHLORIDE 600; 310; 30; 20 MG/100ML; MG/100ML; MG/100ML; MG/100ML
125 INJECTION, SOLUTION INTRAVENOUS CONTINUOUS
Status: DISCONTINUED | OUTPATIENT
Start: 2022-04-08 | End: 2022-04-08

## 2022-04-08 RX ORDER — OXYTOCIN/RINGER'S LACTATE 30/500 ML
PLASTIC BAG, INJECTION (ML) INTRAVENOUS CONTINUOUS PRN
Status: DISCONTINUED | OUTPATIENT
Start: 2022-04-08 | End: 2022-04-08

## 2022-04-08 RX ORDER — KETOROLAC TROMETHAMINE 30 MG/ML
30 INJECTION, SOLUTION INTRAMUSCULAR; INTRAVENOUS EVERY 6 HOURS SCHEDULED
Status: COMPLETED | OUTPATIENT
Start: 2022-04-08 | End: 2022-04-09

## 2022-04-08 RX ORDER — CLINDAMYCIN PHOSPHATE 900 MG/50ML
900 INJECTION INTRAVENOUS ONCE
Status: COMPLETED | OUTPATIENT
Start: 2022-04-08 | End: 2022-04-08

## 2022-04-08 RX ORDER — OXYCODONE HYDROCHLORIDE 5 MG/1
10 TABLET ORAL EVERY 4 HOURS PRN
Status: DISCONTINUED | OUTPATIENT
Start: 2022-04-09 | End: 2022-04-14 | Stop reason: HOSPADM

## 2022-04-08 RX ADMIN — SODIUM CHLORIDE, SODIUM LACTATE, POTASSIUM CHLORIDE, AND CALCIUM CHLORIDE 125 ML/HR: .6; .31; .03; .02 INJECTION, SOLUTION INTRAVENOUS at 07:00

## 2022-04-08 RX ADMIN — SODIUM CHLORIDE, SODIUM LACTATE, POTASSIUM CHLORIDE, AND CALCIUM CHLORIDE 999 ML/HR: .6; .31; .03; .02 INJECTION, SOLUTION INTRAVENOUS at 09:31

## 2022-04-08 RX ADMIN — KETOROLAC TROMETHAMINE 30 MG: 30 INJECTION, SOLUTION INTRAMUSCULAR at 05:47

## 2022-04-08 RX ADMIN — ONDANSETRON 4 MG: 2 INJECTION INTRAMUSCULAR; INTRAVENOUS at 05:13

## 2022-04-08 RX ADMIN — NALBUPHINE HYDROCHLORIDE 5 MG: 10 INJECTION, SOLUTION INTRAMUSCULAR; INTRAVENOUS; SUBCUTANEOUS at 08:21

## 2022-04-08 RX ADMIN — KETOROLAC TROMETHAMINE 30 MG: 30 INJECTION, SOLUTION INTRAMUSCULAR at 13:45

## 2022-04-08 RX ADMIN — DOCUSATE SODIUM 100 MG: 100 CAPSULE, LIQUID FILLED ORAL at 19:23

## 2022-04-08 RX ADMIN — MORPHINE SULFATE 0.2 MG: 1 INJECTION, SOLUTION EPIDURAL; INTRATHECAL; INTRAVENOUS at 04:58

## 2022-04-08 RX ADMIN — BUPIVACAINE HYDROCHLORIDE 2.5 ML: 5 INJECTION, SOLUTION EPIDURAL; INTRACAUDAL at 01:21

## 2022-04-08 RX ADMIN — DIPHENHYDRAMINE HCL 25 MG: 25 TABLET, COATED ORAL at 13:44

## 2022-04-08 RX ADMIN — ACETAMINOPHEN 650 MG: 325 TABLET, FILM COATED ORAL at 13:44

## 2022-04-08 RX ADMIN — BUPIVACAINE HYDROCHLORIDE IN DEXTROSE 1.6 ML: 7.5 INJECTION, SOLUTION SUBARACHNOID at 04:58

## 2022-04-08 RX ADMIN — BUPIVACAINE HYDROCHLORIDE 2.5 ML: 2.5 INJECTION, SOLUTION EPIDURAL; INFILTRATION; INTRACAUDAL at 01:23

## 2022-04-08 RX ADMIN — SODIUM CHLORIDE, SODIUM LACTATE, POTASSIUM CHLORIDE, AND CALCIUM CHLORIDE 125 ML/HR: .6; .31; .03; .02 INJECTION, SOLUTION INTRAVENOUS at 10:41

## 2022-04-08 RX ADMIN — PHENYLEPHRINE HYDROCHLORIDE 50 MCG/MIN: 10 INJECTION INTRAVENOUS at 04:59

## 2022-04-08 RX ADMIN — DEXAMETHASONE SODIUM PHOSPHATE 10 MG: 4 INJECTION INTRA-ARTICULAR; INTRALESIONAL; INTRAMUSCULAR; INTRAVENOUS; SOFT TISSUE at 05:13

## 2022-04-08 RX ADMIN — GENTAMICIN SULFATE 250 MG: 40 INJECTION, SOLUTION INTRAMUSCULAR; INTRAVENOUS at 05:00

## 2022-04-08 RX ADMIN — FENTANYL CITRATE 100 MCG: 50 INJECTION, SOLUTION INTRAMUSCULAR; INTRAVENOUS at 01:21

## 2022-04-08 RX ADMIN — Medication 500 MG: at 05:05

## 2022-04-08 RX ADMIN — ACETAMINOPHEN 650 MG: 325 TABLET, FILM COATED ORAL at 19:23

## 2022-04-08 RX ADMIN — Medication 62.5 MILLI-UNITS/MIN: at 06:39

## 2022-04-08 RX ADMIN — KETOROLAC TROMETHAMINE 30 MG: 30 INJECTION, SOLUTION INTRAMUSCULAR at 19:23

## 2022-04-08 RX ADMIN — CLINDAMYCIN PHOSPHATE 900 MG: 18 INJECTION, SOLUTION INTRAMUSCULAR; INTRAVENOUS at 04:43

## 2022-04-08 RX ADMIN — BUPIVACAINE HYDROCHLORIDE 2.5 ML: 2.5 INJECTION, SOLUTION EPIDURAL; INFILTRATION; INTRACAUDAL at 01:21

## 2022-04-08 RX ADMIN — BUPIVACAINE HYDROCHLORIDE 2.5 ML: 5 INJECTION, SOLUTION EPIDURAL; INTRACAUDAL at 01:23

## 2022-04-08 RX ADMIN — HYDROMORPHONE HYDROCHLORIDE 0.5 MG: 1 INJECTION, SOLUTION INTRAMUSCULAR; INTRAVENOUS; SUBCUTANEOUS at 22:20

## 2022-04-08 RX ADMIN — Medication 800 MILLI-UNITS/MIN: at 05:20

## 2022-04-08 RX ADMIN — MIDAZOLAM HYDROCHLORIDE 2 MG: 1 INJECTION, SOLUTION INTRAMUSCULAR; INTRAVENOUS at 05:42

## 2022-04-08 NOTE — OP NOTE
OPERATIVE REPORT  PATIENT NAME: Asif Current    :  1985  MRN: [id number]  Pt Location: AN L&D OR ROOM 02    SURGERY DATE: 2022    Surgeon(s) and Role:     * Eleazar Guerra MD - Primary     * July Reddy MD - Assisting    Preop Diagnosis:  Maternal exhaustion complicating labor and delivery [O75 81]    Post-Op Diagnosis Codes:     * Maternal exhaustion complicating labor and delivery [O75 81]    Procedure(s) (LRB):   SECTION () (N/A)    Specimen(s):  ID Type Source Tests Collected by Time Destination   A : gases Cord Blood Cord BLOOD GAS, VENOUS, CORD, BLOOD GAS, ARTERIAL, CORD Eleazar Guerra MD 2022 9997    B :  Tissue (Placenta on Hold) OB Only Placenta PLACENTA IN STORAGE Eleazar Guerra MD 2022 0521        Quantitative Blood Loss: 364 cc    Drains:  Urethral Catheter Non-latex 16 Fr  (Active)   Reasons to continue Urinary Catheter  Post-operative urological requirements 22 0452   Goal for Removal Remove POD#1 22 0452   Site Assessment Clean;Skin intact; Pink 22 0452   Stewart Care Done 22 0452   Collection Container Standard drainage bag 22 1933   Securement Method Securing device (Describe) 22   Output (mL) 425 mL 22 2345   Number of days: 1       Anesthesia Type:   Spinal    Operative Indications:  Maternal exhaustion complicating labor and delivery [O75 81]    Operative Findings:  1  Viable male  at 0 with APGARs of 8 and 8 at 1 and 5 minutes  Fetus weighted 6lb 8oz  2  Normal intact placenta with centrally inserted 3VC  3  Normal uterus, bilateral tubes and ovaries      Umbilical Cord Venous Blood Gas:  Results from last 7 days   Lab Units 22  0521   PH COV  7 350   PCO2 COV mm HG 41 3   HCO3 COV mmol/L 22 3   BASE EXC COV mmol/L -3 1*   O2 CT CD VB mL/dL 12 9   O2 HGB, VENOUS CORD % 76 4     Umbilical Cord Arterial Blood Gas:  Results from last 7 days   Lab Units 22  0521   PH COA  7 320 PCO2 COA  49 1   PO2 COA mm HG 17 0   HCO3 COA mmol/L 24 7   BASE EXC COA mmol/L -1 9*   O2 CONTENT CORD ART ml/dl 7 1   O2 HGB, ARTERIAL CORD % 34 8     Procedure: The patient was taken to the operating room  Spinal anesthesia was adequately established and Clindamycin 900 mg IV, Gentamicin 5 mg/kg, and Azithromycin 500 mg IV were given for preoperative prophylaxis  The patient was then placed in the dorsal supine position with a left tilt of the hips  The patient was then prepped with betadine for vaginal prep and chloraprep for abdominal prep and draped in the usual sterile fashion for a Pfannenstiel skin incision  A time out was performed to confirm correct patient and correct procedure  An incision was made in the skin with a surgical scalpel and sharp dissection was carried out over subsequent layers of tissue including the fascia  The fascia was incised at the midline and the fascial incision was extended bilaterally using the curved Duffy scissors  The superior edge of the fascial incision was grasped with Kocher clamps, tented up and the underlying rectus muscles were dissected off bluntly and sharply using the Duffy scissors  The rectus muscles were then divided at midline and the peritoneum was identified, tented up at its upper margin taking care to avoid the bladder, and then entered  The peritoneal incision was extended superiorly and inferiorly  The Renzo retractor was inserted and a transverse incision was made in the lower uterine segment using a new surgical blade  The uterine incision was extended cephalad and caudal using blunt dissection  The amniotic sac was entered and the amniotic fluid was noted to be clear  The surgeon's hand was placed into the uterine cavity  The fetal head was identified and elevated through the uterine incision with the assistance of fundal pressure  With gentle traction, the shoulder was delivered, followed by the rest of the fetal body   There was one loose loop of nuchal cord noted and reduced easily  On delivery the cord was doubly clamped and cut after delayed cord clamping  The infant was then passed off the table to the awaiting  staff  The  was noted to cry spontaneously and moved all extremities  Venous and arterial blood gas, cord blood, and portion of cord was obtained for analysis and routine blood testing  The placenta delivery was then sent to storage  Placenta was noted to be intact with a centrally inserted three-vessel cord  Oxytocin was administered by IV infusion to enhance uterine contraction  The uterus was cleared of all clots and remaining products of conception  The uterine incision was re approximated using a 0-Monocryl in a running locked fashion  A second horizontal imbricating stitch with 0-Monocryl was applied  The uterine incision was examined and noted to be hemostatic  The posterior cul-de-sac was cleared of all clots and products of conception  The pericolic gutters were cleared of all clots  The uterine incision was once again reexamined and noted to be hemostatic  The Renzo retractor was removed  The fascia was re approximated using 0-Vicryl in a running nonlocked fashion  The subcutaneous tissue was irrigated and cleared of all clots and debris  Good hemostasis was noted  The subcutaneous tissue was reapproximated with 3-0 plain gut suture  The skin incision was closed using 4-0 Vicryl  Good hemostasis was noted  Patient tolerated the procedure well  All needle, sponge, and instrument counts were noted to be correct x 2 at the end of the procedure  Patient was transferred to the recovery room in stable condition  Dr Erna Neff was present for the procedure        Anjel Lucio MD  DATE: 2022  TIME: 6:12 AM

## 2022-04-08 NOTE — ANESTHESIA PROCEDURE NOTES
Spinal Block    Patient location during procedure: OR  Start time: 4/8/2022 4:58 AM  Reason for block: procedure for pain and at surgeon's request  Staffing  Performed: Anesthesiologist   Anesthesiologist: Cheryl Alvarado MD  Resident/CRNA: Roselia Shen CRNA  Preanesthetic Checklist  Completed: patient identified, IV checked, site marked, risks and benefits discussed, surgical consent, monitors and equipment checked, pre-op evaluation and timeout performed  Spinal Block  Patient position: sitting  Prep: ChloraPrep  Patient monitoring: cardiac monitor and frequent blood pressure checks  Approach: midline  Location: L3-4  Injection technique: single-shot  Needle  Needle type: pencil-tip   Needle gauge: 25 G  Needle length: 10 cm  Assessment  Sensory level: T4  Injection Assessment:  negative aspiration for heme, no paresthesia on injection and positive aspiration for clear CSF    Post-procedure:  adhesive bandage applied, pressure dressing applied, secured with tape, site cleaned and sterile dressing applied

## 2022-04-08 NOTE — UTILIZATION REVIEW
Inpatient Admission Authorization Request   Notification of Maternity/Delivery &  Birth Information for Admission   SERVICING FACILITY:   [address]  Tax ID: [id number]  NPI: [id number]  Place of Service: Inpatient [facility]  [address]  Place of Service Code: 24     ATTENDING PROVIDER:  Attending Name and NPI#: Lisette Saezma [[id number]]  Address: [place] [address]  Phone: [phone]     UTILIZATION REVIEW CONTACT:  Giancarlo James, Utilization Review Supervisor  Network Utilization Review Department  Phone: [phone]  Fax [phone]  Email: [email or web address]     PHYSICIAN ADVISORY SERVICES:  FOR [id number] REVIEW - MEDICAL NECESSITY DENIAL  Phone: [phone]  Fax: [phone]  Email: [email or web address]     TYPE OF REQUEST:  Inpatient Status     ADMISSION INFORMATION:  ADMISSION DATE/TIME: 22  9:30 AM  PATIENT DIAGNOSIS CODE/DESCRIPTION:  Antepartum variable deceleration [O36 8390]  37 weeks gestation of pregnancy [Z3A 37]  Encounter for  delivery without indication [O82] The encounter diagnosis was Maternal exhaustion complicating labor and delivery  1  Maternal exhaustion complicating labor and delivery      DISCHARGE DATE/TIME: No discharge date for patient encounter  MOTHER AND  INFORMATION:  Mother: Santana Bernard 1985   Delivering clinician:    OB History        1    Para   0    Term   0       0    AB   0    Living   0       SAB   0    IAB   0    Ectopic   0    Multiple   0    Live Births   0                Name & MRN:   Information for the patient's :  Gopal Chin CampbelltonEdmund [[id number]]      Delivery Information:  Sex: male  Delivered 2022 5:19 AM by , Low Transverse; Gestational Age: 43w4d     Measurements:  Weight: 6 lb 8 2 oz (2955 g);   Height: 19"    APGAR 1 minute 5 minutes 10 minutes   Totals: 8 8       Birth Information: 39 y o  female MRN: [id number] Unit/Bed#: -01 Estimated Date of Delivery: 22  Birthweight: No birth weight on file  Gestational Age: <None> Delivery Type: , Low Transverse    IMPORTANT INFORMATION:  Please contact the [facility] directly with any questions or concerns regarding this request  Department voicemails are confidential     Send requests for admission clinical reviews, concurrent reviews, approvals, and administrative denials due to lack of clinical to fax [phone]  Initial Clinical Review    22 OBS @ 5680 converted to inpatient admission 22 @ 1006 for IOL DUE TO NON REACTIVE FETAL HEART WITH PROLONG FETAL DECELERATIONS      Ordered     22 1006  Inpatient Admission  (Inpatient Admission)  Once        Transfer Service: OB/GYN       Question Answer Comment   Level of Care Med Surg    Estimated length of stay More than 2 Midnights    Certification I certify that inpatient services are medically necessary for this patient for a duration of greater than two midnights  See H&P and MD Progress Notes for additional information about the patient's course of treatment          22 1006               Admission: Date/Time/Statement:   Admission Orders (From admission, onward)     Ordered        22 1006  Inpatient Admission  Once            22 1419  Place in Observation  Once                      Orders Placed This Encounter   Procedures    Place in Observation     Standing Status:   Standing     Number of Occurrences:   1     Order Specific Question:   Level of Care     Answer:   Med Surg [16]    Inpatient Admission     Standing Status:   Standing     Number of Occurrences:   1     Order Specific Question:   Level of Care     Answer:   Med Surg [16]     Order Specific Question:   Estimated length of stay     Answer:   More than 2 Midnights     Order Specific Question: Certification     Answer:   I certify that inpatient services are medically necessary for this patient for a duration of greater than two midnights  See H&P and MD Progress Notes for additional information about the patient's course of treatment  Chief Complaint   Patient presents with    Decreased Fetal Heart Tones     decel in MFM office       Initial Presentation: 39 y o  female presented to L&D as observation for extended fetal monitoring @ 37 6/7 weeks  G 1  Patient was sent over from [facility] for spontaneous deceleration  Patient was seen in the clinic today for NST/DIETER  DIETER showed normal fluid, normal FHM, and vertex with anterior placenta  NST showed mod variability with acceleration with baseline FHTs approximately 120s  Subsequently, FHM demonstrated deceleration with spontaneous recovery to baseline of 100s-110  Subsequent FHM shows 110s-120s with BPP 8 of 8        OB  Triage Vitals   Temperature Pulse Respirations Blood Pressure SpO2   04/05/22 1057 04/05/22 1100 04/05/22 1100 04/05/22 1100 04/05/22 1600   98 1 °F (36 7 °C) 88 18 127/78 99 %      Temp Source Heart Rate Source Patient Position - Orthostatic VS BP Location FiO2 (%)   04/05/22 1057 04/05/22 1100 04/05/22 1100 04/05/22 1100 --   Oral Monitor Lying Left arm       Pain Score       04/05/22 1057       3          Wt Readings from Last 1 Encounters:   04/06/22 92 5 kg (204 lb)     Additional Vital Signs:   Date/Time Temp Pulse Resp BP MAP (mmHg) SpO2 O2 Device Cardiac (WDL) Patient Position - Orthostatic VS   04/06/22 0413 98 4 °F (36 9 °C) 94 18 129/59 -- 96 % None (Room air) -- Sitting   04/06/22 0010 98 °F (36 7 °C) 88 18 107/64 -- 99 % None (Room air) -- Lying   04/05/22 2034 98 °F (36 7 °C) 91 18 126/80 99 99 % None (Room air) -- Sitting   04/05/22 2030 -- -- -- -- -- -- None (Room air) WDL --   04/05/22 1628 99 °F (37 2 °C) 99 18 131/59 85 99 % None (Room air) -- Sitting   04/05/22 1600 -- 86 -- -- -- 99 % -- Pertinent Labs/Diagnostic Test Results:   No orders to display         Results from last 7 days   Lab Units 22  1436   WBC Thousand/uL 10 39*   HEMOGLOBIN g/dL 11 2*   HEMATOCRIT % 35 2   PLATELETS Thousands/uL 236   NEUTROS ABS Thousands/µL 7 91*         Past Medical History:   Diagnosis Date    Abnormal Pap smear of cervix     Asthma     only with allergies, utilizes rescue inhaler     Cardiac arrhythmia     Last Assessed: 1/10/2017    Female infertility     Seasonal allergies      Present on Admission:  **None**      Admitting Diagnosis: Antepartum variable deceleration [O36 8390]  37 weeks gestation of pregnancy [Z3A 37]  Encounter for  delivery without indication [O82]  Age/Sex: 39 y o  female  Admission Orders:  Scheduled Medications:  acetaminophen, 650 mg, Oral, Q6H [address]  docusate sodium, 100 mg, Oral, BID  ketorolac, 30 mg, Intravenous, Q6H [address]   Followed by  Alex Vigil ON 4/10/2022] ibuprofen, 600 mg, Oral, Q6H  pantoprazole, 20 mg, Oral, Daily      Continuous IV Infusions:  lactated ringers, 125 mL/hr, Intravenous, Continuous      PRN Meds:  albuterol, 2 5 mg, Nebulization, Once PRN  benzocaine-menthol-lanolin-aloe, 1 application, Topical, H4Z PRN  calcium carbonate, 1,000 mg, Oral, Daily PRN  dexamethasone, 8 mg, Intravenous, Once PRN  diphenhydrAMINE, 25 mg, Oral, Q6H PRN  fentaNYL, 25 mcg, Intravenous, Q5 Min PRN  hydrALAZINE, 5 mg, Intravenous, Q20 Min PRN  hydrocortisone, 1 application, Topical, Daily PRN  HYDROmorphone, 0 2 mg, Intravenous, Q5 Min PRN  HYDROmorphone, 0 5 mg, Intravenous, Q5 Min PRN  HYDROmorphone, 0 5 mg, Intravenous, Q1H PRN  [START ON 2022] HYDROmorphone, 1 mg, Intravenous, Q2H PRN  ipratropium, 0 5 mg, Nebulization, Once PRN  Labetalol HCl, 10 mg, Intravenous, Q10 Min PRN  metoclopramide, 10 mg, Intravenous, Once PRN  metoclopramide, 5 mg, Intravenous, Q6H PRN  nalbuphine, 5 mg, Intravenous, Q4H PRN  naloxone, 0 1 mg, Intravenous, Q3 min PRN  ondansetron, 4 mg, Intravenous, Q8H PRN  ondansetron, 4 mg, Intravenous, Once PRN  [START ON 4/9/2022] oxyCODONE, 10 mg, Oral, Q4H PRN  [START ON 4/9/2022] oxyCODONE, 5 mg, Oral, Q4H PRN  promethazine, 12 5 mg, Intravenous, Once PRN  simethicone, 80 mg, Oral, 4x Daily PRN  witch hazel-glycerin, 1 pad, Topical, Q4H PRN        None    Network Utilization Review Department  ATTENTION: Please call with any questions or concerns to [phone] and carefully listen to the prompts so that you are directed to the right person  All voicemails are confidential   Ashu Robertson all requests for admission clinical reviews, approved or denied determinations and any other requests to dedicated fax number below belonging to the campus where the patient is receiving treatment   List of dedicated fax numbers for the Facilities:  [address] DENIALS (Administrative/Medical Necessity) [phone]   [address] (Maternity/NICU/Pediatrics) [phone]   [address] [place] [address] [address] [place] [address]  [address] [facility] [place] [address] [address] [place] [address] [address] [facility] [place] [address] [phone]

## 2022-04-08 NOTE — OB LABOR/OXYTOCIN SAFETY PROGRESS
Oxytocin Safety Progress Check Note - Ariana King 39 y o  female MRN: [id number]    Unit/Bed#: -01 Encounter: [id number]    Dose (kori-units/min) Oxytocin: 0 kori-units/min  Contraction Frequency (minutes): 1 5-4  Contraction Quality: Moderate  Tachysystole: No   Cervical Dilation: 4        Cervical Effacement: 70  Fetal Station: -3  Baseline Rate: 125 bpm  Fetal Heart Rate: 135 BPM  FHR Category: Category I           Vital Signs:   Vitals:    04/08/22 0045   BP: 107/58   Pulse: 102   Resp:    Temp:    SpO2:        Notes/comments:   Pt is complaining of vaginal cramping and pain with contractions  SVE as above  Anesthesia bolus given  FHT noted to have recurrent late decelerations with prolonged deceleration lasting 2 mins with kimberly of 80s immediately after  Pitocin held, IVF bolus started, O2 given, and maternal positioning changed with return to baseline and resolution of decelerations  FHT now cat 1  Patient received relief of vaginal pain after bolus  Will restart pitocin after 20-30 mins of cat 1 FHT      Dr Jose Antonio Adair MD 4/8/2022 1:55 AM

## 2022-04-08 NOTE — LACTATION NOTE
This note was copied from a baby's chart  Follow up Lactation: mom is attempting to latch to the right breast  Cross cradle  Education on alignment of nipple to nose, drag down to chin to achieve a wide, deep latch  Few sucks, Gaudencio is placing tongue up at palate  Enc  s2s for feeds and non-nutritive suck  Enc  To watch for signs of satiation and small sucking bursts with breast compression  Family came to visit  En  To call lactation for next latch  Provided education on alignment of nose to breast; bring baby to breast and not breast to baby; support head with opp  Hand in cross cradle; use pillows to lift baby to be belly to belly; ear, shoulder, hip alignment; Support mother's back and place self in comfortable position to support bringing baby to the breast  Shoulders should be down and away from ears  Mom is encouraged to place baby skin to skin for feedings  Skin to skin education provided for baby placement on mother's chest, baby only in diaper, blankets below shoulders on baby's back  Skin to skin is encouraged to continue at home for feedings and between feedings  Compress your breast to make it narrow in the same direction your baby's  mouth is positioned  Move your baby onto your breast so their chin touches first and their head tips back  Your nipple should be at their nose  When they open their mouth wide, move them onto the breast so your nipple enters their mouth pointing upward

## 2022-04-08 NOTE — DISCHARGE SUMMARY
Discharge Summary - Josue Soulier Bilotti 39 y o  female MRN: [id number]    Unit/Bed#: LD  Encounter: [id number]    Admission Date: 2022     Discharge Date: 22    Admitting Attending: Dr Gabriel Coats  Delivering Attending: Dr Viki Quiroz  Discharge Attending: Vkii uQiroz    Diagnosis:  1  Pregnancy conceived by IVF  2  Obesity, BMI 37    Procedures: primary low transverse  section     Complications: none apparent     Patti Worley is a 38 yo  who was admitted for an induction of labor for non-reassuring fetal heart tones at 37w5d  Her induction was started with a pitocin titration  Multiple attempt at kessler balloon placement were made, which were eventually successful  She received an epidural for analgesia, and was artificially ruptured for clear fluid  After failing to make change for several hours, the patient elected to undergo a primary  delivery  She underwent an uncomplicated  delivery  She delivered a viable male  at 0 on 2022  Weight was 6lbs 8oz (2955g) with APGARs of 8 and 8 at 1 and 5 minutes  Her preoperative Hb was 11 2  Her postoperative Hb was 6 7  Her postoperative course was complicated by possible transfusion reaction and enterococcus in urine and blood, more recent blood cultures negative  Patient had iv antibiotics and was seen by cardiology for increased heart rate and ID for infection  Condition at discharge: good     On day of discharge pain was well controlled, patient was tolerating PO, passing flatus, had a bowel movement  She was discharged with standard post partum/ post operative instructions to follow up with her physician in 1 week for an incision check and in 3-6 weeks for a postpartum appointment  Discharge instructions/Information to patient and family:   -Do not place anything (no partner, tampons or douche) in your vagina for 6 weeks    -You may walk for exercise for the first 6 weeks then gradually return to your usual activities    -Please do not drive for 1 week if you have no stitches and for 2 weeks if you have stitches or underwent a  delivery     -You may take baths or shower per your preference    -Please look at your bust (breasts) in the mirror daily and call for redness or tenderness or increased warmth    -Please call us for temperature > 100 4*F or 38* C, worsening pain or a foul discharge       Discharge Medications:   Prenatal vitamin daily for 6 months or the duration of nursing whichever is longer  Motrin 600 mg orally every 6 hours as needed for pain  Tylenol (over the counter) per bottle directions as needed for pain: do NOT use with percocet  Hydrocortisone cream 1% (over the counter) applied 1-2x daily to hemorrhoids as needed  Percocet as needed  Zyvox  Lasix  Potassium  Iron    Provisions for Follow-Up Care: Follow up with your doctor in 1 week for incision site check       Planned Readmission: no

## 2022-04-08 NOTE — PLAN OF CARE
Problem: Knowledge Deficit  Goal: Patient/family/caregiver demonstrates understanding of disease process, treatment plan, medications, and discharge instructions  Description: Complete learning assessment and assess knowledge base  Interventions:  - Provide teaching at level of understanding  - Provide teaching via preferred learning methods  Outcome: Completed  Goal: Verbalizes understanding of labor plan  Description: Assess patient/family/caregiver's baseline knowledge level and ability to understand information  Provide education via patient/family/caregiver's preferred learning method at appropriate level of understanding  1  Provide teaching at level of understanding  2  Provide teaching via preferred learning method(s)  Outcome: Completed     Problem: Knowledge Deficit  Goal: Patient/family/caregiver demonstrates understanding of disease process, treatment plan, medications, and discharge instructions  Description: Complete learning assessment and assess knowledge base  Interventions:  - Provide teaching at level of understanding  - Provide teaching via preferred learning methods  Outcome: Completed  Goal: Verbalizes understanding of labor plan  Description: Assess patient/family/caregiver's baseline knowledge level and ability to understand information  Provide education via patient/family/caregiver's preferred learning method at appropriate level of understanding  1  Provide teaching at level of understanding  2  Provide teaching via preferred learning method(s)    Outcome: Completed     Problem: BIRTH - VAGINAL/ SECTION  Goal: Fetal and maternal status remain reassuring during the birth process  Description: INTERVENTIONS:  - Monitor vital signs  - Monitor fetal heart rate  - Monitor uterine activity  - Monitor labor progression (vaginal delivery)  - DVT prophylaxis  - Antibiotic prophylaxis  Outcome: Completed  Goal: Emotionally satisfying birthing experience for mother/fetus  Description: Interventions:  - Assess, plan, implement and evaluate the nursing care given to the patient in labor  - Advocate the philosophy that each childbirth experience is a unique experience and support the family's chosen level of involvement and control during the labor process   - Actively participate in both the patient's and family's teaching of the birth process  - Consider cultural, Nondenominational and age-specific factors and plan care for the patient in labor  Outcome: Completed     Problem: Labor & Delivery  Goal: Manages discomfort  Description: Assess and monitor for signs and symptoms of discomfort  Assess patient's pain level regularly and per hospital policy  Administer medications as ordered  Support use of nonpharmacological methods to help control pain such as distraction, imagery, relaxation, and application of heat and cold  Collaborate with interdisciplinary team and patient to determine appropriate pain management plan  1  Include patient in decisions related to comfort  2  Offer non-pharmacological pain management interventions  3  Report ineffective pain management to physician  Outcome: Completed  Goal: Patient vital signs are stable  Description: 1  Assess vital signs - vaginal delivery    Outcome: Completed

## 2022-04-08 NOTE — OB LABOR/OXYTOCIN SAFETY PROGRESS
Oxytocin Safety Progress Check Note - Shon King 39 y o  female MRN: [id number]  RAMÓN NOTE  Unit/Bed#: LD  Encounter: [id number]    Dose (kori-units/min) Oxytocin: 0 kori-units/min  Contraction Frequency (minutes): 1 5-4  Contraction Quality: Moderate  Tachysystole: No   Cervical Dilation: 5        Cervical Effacement: 80  Fetal Station: -2  Baseline Rate: 125 bpm  Fetal Heart Rate: 135 BPM  FHR Category: Category I      Patient had oxytocin up to 26 then oxytocin off for fhr deceleration  Good recovery and oxytocin back on at 20  Extensive discussion with patient and family  Reviewed risks and benefits and procedure of  with patient and family as well as expected outcomes  After family had further discussion patient and family request  delivery  They report no further questions and desire to proceed  Vital Signs:   Vitals:    22 0216   BP: 103/55   Pulse: 102   Resp:    Temp:    SpO2:        Notes/comments:    section for maternal request, maternal concern for fetal well being and maternal exhaustion  Patient and family fully counseled to  section procedure, risks and benefits, expected outcomes  They report no further questions and desire to proceed      Derrell Enrique MD 2022 3:01 AM

## 2022-04-08 NOTE — PLAN OF CARE
Problem: PAIN - ADULT  Goal: Verbalizes/displays adequate comfort level or baseline comfort level  Description: Interventions:  - Encourage patient to monitor pain and request assistance  - Assess pain using appropriate pain scale  - Administer analgesics based on type and severity of pain and evaluate response  - Implement non-pharmacological measures as appropriate and evaluate response  - Consider cultural and social influences on pain and pain management  - Notify physician/advanced practitioner if interventions unsuccessful or patient reports new pain  Outcome: Progressing     Problem: INFECTION - ADULT  Goal: Absence or prevention of progression during hospitalization  Description: INTERVENTIONS:  - Assess and monitor for signs and symptoms of infection  - Monitor lab/diagnostic results  - Monitor all insertion sites, i e  indwelling lines, tubes, and drains  - Monitor endotracheal if appropriate and nasal secretions for changes in amount and color  - [place] appropriate cooling/warming therapies per order  - Administer medications as ordered  - Instruct and encourage patient and family to use good hand hygiene technique  - Identify and instruct in appropriate isolation precautions for identified infection/condition  Outcome: Progressing  Goal: Absence of fever/infection during neutropenic period  Description: INTERVENTIONS:  - Monitor WBC    Outcome: Progressing     Problem: SAFETY ADULT  Goal: Patient will remain free of falls  Description: INTERVENTIONS:  - Educate patient/family on patient safety including physical limitations  - Instruct patient to call for assistance with activity   - Consult OT/PT to assist with strengthening/mobility   - Keep Call bell within reach  - Keep bed low and locked with side rails adjusted as appropriate  - Keep care items and personal belongings within reach  - Initiate and maintain comfort rounds  - Make Fall Risk Sign visible to staff  - Offer Toileting in advance of need  - Obtain necessary fall risk management equipment  - Apply yellow socks and bracelet for high fall risk patients  - Consider moving patient to room near nurses station  Outcome: Progressing  Goal: Maintain or return to baseline ADL function  Description: INTERVENTIONS:  -  Assess patient's ability to carry out ADLs; assess patient's baseline for ADL function and identify physical deficits which impact ability to perform ADLs (bathing, care of mouth/teeth, toileting, grooming, dressing, etc )  - Assess/evaluate cause of self-care deficits   - Assess range of motion  - Assess patient's mobility; develop plan if impaired  - Assess patient's need for assistive devices and provide as appropriate  - Encourage maximum independence but intervene and supervise when necessary  - Involve family in performance of ADLs  - Assess for home care needs following discharge   - Consider OT consult to assist with ADL evaluation and planning for discharge  - Provide patient education as appropriate  Outcome: Progressing  Goal: Maintains/Returns to pre admission functional level  Description: INTERVENTIONS:  - Perform BMAT or MOVE assessment daily    - Set and communicate daily mobility goal to care team and patient/family/caregiver     - Collaborate with rehabilitation services on mobility goals if consulted  - Perform Range of Motion    - Reposition patient   - Dangle patient   - Stand patient  - Ambulate patient   - Out of bed to chair   - Out of bed for meals   - Out of bed for toileting  - Record patient progress and toleration of activity level   Outcome: Progressing     Problem: DISCHARGE PLANNING  Goal: Discharge to home or other facility with appropriate resources  Description: INTERVENTIONS:  - Identify barriers to discharge w/patient and caregiver  - Arrange for needed discharge resources and transportation as appropriate  - Identify discharge learning needs (meds, wound care, etc )  - Arrange for interpretive services to assist at discharge as needed  - Refer to Case Management Department for coordinating discharge planning if the patient needs post-hospital services based on physician/advanced practitioner order or complex needs related to functional status, cognitive ability, or social support system  Outcome: Progressing     Problem: ALTERATION IN THE BREAST  Goal: Optimize infant feeding at the breast  Description: INTERVENTIONS:  - Latch, breast and nipple assessment  - Assess prior breast feeding history  - Hand expression of breast milk  - For cracked, bleeding and or sore nipples reassess latch, treat damaged nipple  -Educate mother on feeding cues  -Positioning/latch techniques  Outcome: Progressing     Problem: INADEQUATE LATCH, SUCK OR SWALLOW  Goal: Demonstrate ability to latch and sustain latch, audible swallowing and satiety  Description: INTERVENTIONS:  - Assess oral anatomy, notify Physician/AP for abnormal findings  - Establish milk expression  - Maximize feeding opportunity (skin to skin, behavioral state)  - Position/latch techniques  - Discourage use of pacifier-artificial nipple  - Mechanical pumping  - Nipple Shield  - Supplemental formula feeding (Physician/AP order)  - Alternative feeding method  Outcome: Progressing     Problem: POSTPARTUM  Goal: Experiences normal postpartum course  Description: INTERVENTIONS:  - Monitor maternal vital signs  - Assess uterine involution and lochia  Outcome: Progressing  Goal: Appropriate maternal -  bonding  Description: INTERVENTIONS:  - Identify family support  - Assess for appropriate maternal/infant bonding   -Encourage maternal/infant bonding opportunities  - Referral to  or  as needed  Outcome: Progressing  Goal: Establishment of infant feeding pattern  Description: INTERVENTIONS:  - Assess breast/bottle feeding  - Refer to lactation as needed  Outcome: Progressing  Goal: Incision(s), wounds(s) or drain site(s) healing without S/S of infection  Description: INTERVENTIONS  - Assess and document dressing, incision, wound bed, drain sites and surrounding tissue  - Provide patient and family education  - Perform skin care/dressing changes every day  Outcome: Progressing

## 2022-04-08 NOTE — PLAN OF CARE
Problem: PAIN - ADULT  Goal: Verbalizes/displays adequate comfort level or baseline comfort level  Description: Interventions:  - Encourage patient to monitor pain and request assistance  - Assess pain using appropriate pain scale  - Administer analgesics based on type and severity of pain and evaluate response  - Implement non-pharmacological measures as appropriate and evaluate response  - Consider cultural and social influences on pain and pain management  - Notify physician/advanced practitioner if interventions unsuccessful or patient reports new pain  Outcome: Progressing     Problem: INFECTION - ADULT  Goal: Absence or prevention of progression during hospitalization  Description: INTERVENTIONS:  - Assess and monitor for signs and symptoms of infection  - Monitor lab/diagnostic results  - Monitor all insertion sites, i e  indwelling lines, tubes, and drains  - Monitor endotracheal if appropriate and nasal secretions for changes in amount and color  - [place] appropriate cooling/warming therapies per order  - Administer medications as ordered  - Instruct and encourage patient and family to use good hand hygiene technique  - Identify and instruct in appropriate isolation precautions for identified infection/condition  Outcome: Progressing  Goal: Absence of fever/infection during neutropenic period  Description: INTERVENTIONS:  - Monitor WBC    Outcome: Progressing     Problem: SAFETY ADULT  Goal: Patient will remain free of falls  Description: INTERVENTIONS:  - Educate patient/family on patient safety including physical limitations  - Instruct patient to call for assistance with activity   - Consult OT/PT to assist with strengthening/mobility   - Keep Call bell within reach  - Keep bed low and locked with side rails adjusted as appropriate  - Keep care items and personal belongings within reach  - Initiate and maintain comfort rounds  - Make Fall Risk Sign visible to staff  - Offer Toileting in advance of need  - Obtain necessary fall risk management equipment  - Apply yellow socks and bracelet for high fall risk patients  - Consider moving patient to room near nurses station  Outcome: Progressing  Goal: Maintain or return to baseline ADL function  Description: INTERVENTIONS:  -  Assess patient's ability to carry out ADLs; assess patient's baseline for ADL function and identify physical deficits which impact ability to perform ADLs (bathing, care of mouth/teeth, toileting, grooming, dressing, etc )  - Assess/evaluate cause of self-care deficits   - Assess range of motion  - Assess patient's mobility; develop plan if impaired  - Assess patient's need for assistive devices and provide as appropriate  - Encourage maximum independence but intervene and supervise when necessary  - Involve family in performance of ADLs  - Assess for home care needs following discharge   - Consider OT consult to assist with ADL evaluation and planning for discharge  - Provide patient education as appropriate  Outcome: Progressing  Goal: Maintains/Returns to pre admission functional level  Description: INTERVENTIONS:  - Perform BMAT or MOVE assessment daily    - Set and communicate daily mobility goal to care team and patient/family/caregiver  - Collaborate with rehabilitation services on mobility goals if consulted  - Perform Range of Motion    - Reposition patient   - Dangle patient   - Stand patient  - Ambulate patient   - Out of bed to chair   - Out of bed for meals   - Out of bed for toileting  - Record patient progress and toleration of activity level   Outcome: Progressing     Problem: Knowledge Deficit  Goal: Patient/family/caregiver demonstrates understanding of disease process, treatment plan, medications, and discharge instructions  Description: Complete learning assessment and assess knowledge base    Interventions:  - Provide teaching at level of understanding  - Provide teaching via preferred learning methods  Outcome: Completed  Goal: Verbalizes understanding of labor plan  Description: Assess patient/family/caregiver's baseline knowledge level and ability to understand information  Provide education via patient/family/caregiver's preferred learning method at appropriate level of understanding  1  Provide teaching at level of understanding  2  Provide teaching via preferred learning method(s)    Outcome: Completed     Problem: DISCHARGE PLANNING  Goal: Discharge to home or other facility with appropriate resources  Description: INTERVENTIONS:  - Identify barriers to discharge w/patient and caregiver  - Arrange for needed discharge resources and transportation as appropriate  - Identify discharge learning needs (meds, wound care, etc )  - Arrange for interpretive services to assist at discharge as needed  - Refer to Case Management Department for coordinating discharge planning if the patient needs post-hospital services based on physician/advanced practitioner order or complex needs related to functional status, cognitive ability, or social support system  Outcome: Progressing     Problem: ALTERATION IN THE BREAST  Goal: Optimize infant feeding at the breast  Description: INTERVENTIONS:  - Latch, breast and nipple assessment  - Assess prior breast feeding history  - Hand expression of breast milk  - For cracked, bleeding and or sore nipples reassess latch, treat damaged nipple  -Educate mother on feeding cues  -Positioning/latch techniques  Outcome: Progressing     Problem: INADEQUATE LATCH, SUCK OR SWALLOW  Goal: Demonstrate ability to latch and sustain latch, audible swallowing and satiety  Description: INTERVENTIONS:  - Assess oral anatomy, notify Physician/AP for abnormal findings  - Establish milk expression  - Maximize feeding opportunity (skin to skin, behavioral state)  - Position/latch techniques  - Discourage use of pacifier-artificial nipple  - Mechanical pumping  - Nipple Shield  - Supplemental formula feeding (Physician/AP order)  - Alternative feeding method  Outcome: Progressing

## 2022-04-08 NOTE — UTILIZATION REVIEW
Continued Stay Review    Date: 04-08-22                       Current Patient Class: inpatient   Current Level of Care: M/S    HPI:36 y o  female initially admitted on 04-06-22 for NRFHT and IOL @ 37 6/7 weeks     Assessment/Plan: 04-06-22  @ 1300  Contraction Frequency (minutes): none  Contraction Quality: Mild  Tachysystole: No   Cervical Dilation: Fingertip  Cervical Effacement: 30  Fetal Station: -3  Baseline Rate: 125 bpm  Fetal Heart Rate: 123 BPM  FHR Category: Category I  for spontaneous 6-minute decel nadiring to 60s    IV Pitocin started @ 1340    @ 1715  Dose (kori-units/min) Oxytocin: 8 kori-units/min  Contraction Frequency (minutes): irregular/irritability  Contraction Quality: Mild  Tachysystole: No   Cervical Dilation: 1-2  Cervical Effacement: 50  Fetal Station: -3  Baseline Rate: 120 bpm  Fetal Heart Rate: 138 BPM  FHR Category: Category I    @ 2130  Dose (kori-units/min) Oxytocin: 14 kori-units/min  Contraction Frequency (minutes): 3-4  Contraction Quality: Mild  Tachysystole: No   Cervical Dilation: 1-2  Cervical Effacement: 60  Fetal Station: -3  Baseline Rate: 115 bpm  Fetal Heart Rate: 137 BPM    04-07-22  @0200  Dose (kori-units/min) Oxytocin: 22 kori-units/min  Contraction Frequency (minutes): 1 5-6  Contraction Quality: Mild,Moderate  Tachysystole: No   Cervical Dilation: 1-2  Cervical Effacement: 60  Fetal Station: -3  Baseline Rate: 110 bpm  Fetal Heart Rate: 140 BPM  FHR Category: Category I    @ 0915  Dose (kori-units/min) Oxytocin: 0 kori-units/min  Contraction Frequency (minutes): 1 5-2 5  Contraction Quality: Mild  Tachysystole: No   Cervical Dilation: 2  Cervical Effacement: 60  Fetal Station: -3  Baseline Rate: 115 bpm  Fetal Heart Rate: 118 BPM  FHR Category: Category I    @ 1515   No change in cervical dilation   Epidural     @ 1700  Dose (kori-units/min) Oxytocin: 14 kori-units/min  Contraction Frequency (minutes): irreg on toco  Contraction Quality: Mild  Tachysystole: No   Cervical Dilation: 4  Cervical Effacement: 70  Fetal Station: -3  Baseline Rate: 130 bpm  Fetal Heart Rate: 130 BPM  FHR Category: Category I    @1915  Dose (kori-units/min) Oxytocin: 18 kori-units/min  Contraction Frequency (minutes): 3-4  Contraction Quality: Mild  Tachysystole: No   Cervical Dilation: 4  Cervical Effacement: 70  Fetal Station: -3  Baseline Rate: 120 bpm  Fetal Heart Rate: 122 BPM  FHR Category: Category I    @ 2245  Dose (kori-units/min) Oxytocin: 22 kori-units/min  Contraction Frequency (minutes): 3-3 5  Contraction Quality: Mild,Moderate  Tachysystole: No   Cervical Dilation: 4  Cervical Effacement: 70  Fetal Station: -3  Baseline Rate: 120 bpm  Fetal Heart Rate: 125 BPM  FHR Category: Category II    22  @ 0200  Dose (kori-units/min) Oxytocin: 0 kori-units/min  Contraction Frequency (minutes): 1 5-4  Contraction Quality: Moderate  Tachysystole: No   Cervical Dilation: 4  Cervical Effacement: 70  Fetal Station: -3  Baseline Rate: 125 bpm  Fetal Heart Rate: 135 BPM  FHR Category: Category I  FHT noted to have recurrent late decelerations with prolonged deceleration lasting 2 mins with kimberly of 80s immediately after  Pitocin held, IVF bolus started, O2 given, and maternal positioning changed with return to baseline and resolution of decelerations    @ 0519 38 1/7 weeks   Male  Apgar 8/8  Wt  2955 grams  Infant [facility] to [facility] for routine new born care        Pertinent Labs/Diagnostic Results:       Results from last 7 days   Lab Units 22  1100 22  1436   WBC Thousand/uL 18 93* 10 39*   HEMOGLOBIN g/dL 7 0* 11 2*   HEMATOCRIT % 21 9* 35 2   PLATELETS Thousands/uL 173 236   NEUTROS ABS Thousands/µL  --  7 91*         Results from last 7 days   Lab Units 22  1100   SODIUM mmol/L 132*   POTASSIUM mmol/L 4 4   CHLORIDE mmol/L 100   CO2 mmol/L 22   ANION GAP mmol/L 10   BUN mg/dL 10   CREATININE mg/dL 0 91   EGFR ml/min/1 73sq m 81   CALCIUM mg/dL 8 4     Results from last 7 days   Lab Units 04/08/22  1100   GLUCOSE RANDOM mg/dL 154*         Medications:   Scheduled Medications:  acetaminophen, 650 mg, Oral, Q6H [address]  docusate sodium, 100 mg, Oral, BID  ketorolac, 30 mg, Intravenous, Q6H [address]   Followed by  Clau Joseph ON 4/10/2022] ibuprofen, 600 mg, Oral, Q6H  pantoprazole, 20 mg, Oral, Daily      Continuous IV Infusions:  lactated ringers, 125 mL/hr, Intravenous, Continuous      PRN Meds:  albuterol, 2 5 mg, Nebulization, Once PRN  benzocaine-menthol-lanolin-aloe, 1 application, Topical, L6R PRN  calcium carbonate, 1,000 mg, Oral, Daily PRN  dexamethasone, 8 mg, Intravenous, Once PRN  diphenhydrAMINE, 25 mg, Oral, Q6H PRN  fentaNYL, 25 mcg, Intravenous, Q5 Min PRN  hydrALAZINE, 5 mg, Intravenous, Q20 Min PRN  hydrocortisone, 1 application, Topical, Daily PRN  HYDROmorphone, 0 2 mg, Intravenous, Q5 Min PRN  HYDROmorphone, 0 5 mg, Intravenous, Q5 Min PRN  HYDROmorphone, 0 5 mg, Intravenous, Q1H PRN  [START ON 4/9/2022] HYDROmorphone, 1 mg, Intravenous, Q2H PRN  ipratropium, 0 5 mg, Nebulization, Once PRN  Labetalol HCl, 10 mg, Intravenous, Q10 Min PRN  metoclopramide, 10 mg, Intravenous, Once PRN  metoclopramide, 5 mg, Intravenous, Q6H PRN  nalbuphine, 5 mg, Intravenous, Q4H PRN  naloxone, 0 1 mg, Intravenous, Q3 min PRN  ondansetron, 4 mg, Intravenous, Q8H PRN  ondansetron, 4 mg, Intravenous, Once PRN  [START ON 4/9/2022] oxyCODONE, 10 mg, Oral, Q4H PRN  [START ON 4/9/2022] oxyCODONE, 5 mg, Oral, Q4H PRN  promethazine, 12 5 mg, Intravenous, Once PRN  simethicone, 80 mg, Oral, 4x Daily PRN  witch hazel-glycerin, 1 pad, Topical, Q4H PRN        Discharge Plan: home    Network Utilization Review Department  ATTENTION: Please call with any questions or concerns to [phone] and carefully listen to the prompts so that you are directed to the right person   All voicemails are confidential   Mony Duy all requests for admission clinical reviews, approved or denied determinations and any other requests to dedicated fax number below belonging to the campus where the patient is receiving treatment   List of dedicated fax numbers for the Facilities:  [address] [place] [address] DENIALS (Administrative/Medical Necessity) [phone]   [address]  [facility] (Maternity/NICU/Pediatrics) [phone]   [address]  [address] [place] [address] [id number] [place] [address] [address] [place] [address] [phone]

## 2022-04-08 NOTE — QUICK NOTE
Notified by RN around 1100 that patient was look pale, with BP 90/50s and tachycardia 140s  Presented to patient bedside with Dr Josette Dykes  Patient feeling okay, sitting up in bed  Does appear slightly pale, tired  She denies dizziness, chest pain, SOB  She has not yet got OOB since C/S and has kessler catheter in place  Urine appears dark  Vitals in room show tachycardia improving to 110-120s  Heart and lungs sound clear  Stat labs:  Lab Results   Component Value Date    WBC 18 93 (H) 04/08/2022    HGB 7 0 (L) 04/08/2022    HCT 21 9 (L) 04/08/2022    MCV 83 04/08/2022     04/08/2022     Lab Results   Component Value Date    SODIUM 132 (L) 04/08/2022    K 4 4 04/08/2022     04/08/2022    CO2 22 04/08/2022    AGAP 10 04/08/2022    BUN 10 04/08/2022    CREATININE 0 91 04/08/2022    GLUC 154 (H) 04/08/2022    GLUF 89 03/08/2019    CALCIUM 8 4 04/08/2022    AST 35 02/05/2021    ALT 29 02/05/2021    ALKPHOS 53 02/05/2021    TP 7 9 02/05/2021    TBILI 0 39 02/05/2021    EGFR 81 04/08/2022     A/P:  Given Hgb drop from 11 to 7 0, with abnormal vitals, will transfuse 1 U pRBCs  Repeat CBC later today post-transfusion or early tomorrow morning pending improvement in vitals  Blood transfusion consent signed  D/w Dr Josette Dykes

## 2022-04-08 NOTE — PLAN OF CARE
Problem: PAIN - ADULT  Goal: Verbalizes/displays adequate comfort level or baseline comfort level  Description: Interventions:  - Encourage patient to monitor pain and request assistance  - Assess pain using appropriate pain scale  - Administer analgesics based on type and severity of pain and evaluate response  - Implement non-pharmacological measures as appropriate and evaluate response  - Consider cultural and social influences on pain and pain management  - Notify physician/advanced practitioner if interventions unsuccessful or patient reports new pain  Outcome: Progressing     Problem: INFECTION - ADULT  Goal: Absence or prevention of progression during hospitalization  Description: INTERVENTIONS:  - Assess and monitor for signs and symptoms of infection  - Monitor lab/diagnostic results  - Monitor all insertion sites, i e  indwelling lines, tubes, and drains  - Monitor endotracheal if appropriate and nasal secretions for changes in amount and color  - [place] appropriate cooling/warming therapies per order  - Administer medications as ordered  - Instruct and encourage patient and family to use good hand hygiene technique  - Identify and instruct in appropriate isolation precautions for identified infection/condition  Outcome: Progressing  Goal: Absence of fever/infection during neutropenic period  Description: INTERVENTIONS:  - Monitor WBC    Outcome: Progressing     Problem: SAFETY ADULT  Goal: Patient will remain free of falls  Description: INTERVENTIONS:  - Educate patient/family on patient safety including physical limitations  - Instruct patient to call for assistance with activity   - Consult OT/PT to assist with strengthening/mobility   - Keep Call bell within reach  - Keep bed low and locked with side rails adjusted as appropriate  - Keep care items and personal belongings within reach  - Initiate and maintain comfort rounds  - Make Fall Risk Sign visible to staff  - Offer Toileting in advance of need  - Obtain necessary fall risk management equipment  - Apply yellow socks and bracelet for high fall risk patients  - Consider moving patient to room near nurses station  Outcome: Progressing  Goal: Maintain or return to baseline ADL function  Description: INTERVENTIONS:  -  Assess patient's ability to carry out ADLs; assess patient's baseline for ADL function and identify physical deficits which impact ability to perform ADLs (bathing, care of mouth/teeth, toileting, grooming, dressing, etc )  - Assess/evaluate cause of self-care deficits   - Assess range of motion  - Assess patient's mobility; develop plan if impaired  - Assess patient's need for assistive devices and provide as appropriate  - Encourage maximum independence but intervene and supervise when necessary  - Involve family in performance of ADLs  - Assess for home care needs following discharge   - Consider OT consult to assist with ADL evaluation and planning for discharge  - Provide patient education as appropriate  Outcome: Progressing  Goal: Maintains/Returns to pre admission functional level  Description: INTERVENTIONS:  - Perform BMAT or MOVE assessment daily    - Set and communicate daily mobility goal to care team and patient/family/caregiver  - Collaborate with rehabilitation services on mobility goals if consulted  - Perform Range of Motion    - Reposition patient   - Dangle patient   - Stand patient  - Ambulate patient   - Out of bed to chair   - Out of bed for meals   - Out of bed for toileting  - Record patient progress and toleration of activity level   Outcome: Progressing     Problem: Knowledge Deficit  Goal: Patient/family/caregiver demonstrates understanding of disease process, treatment plan, medications, and discharge instructions  Description: Complete learning assessment and assess knowledge base    Interventions:  - Provide teaching at level of understanding  - Provide teaching via preferred learning methods  Outcome: Progressing  Goal: Verbalizes understanding of labor plan  Description: Assess patient/family/caregiver's baseline knowledge level and ability to understand information  Provide education via patient/family/caregiver's preferred learning method at appropriate level of understanding  1  Provide teaching at level of understanding  2  Provide teaching via preferred learning method(s)  Outcome: Progressing     Problem: DISCHARGE PLANNING  Goal: Discharge to home or other facility with appropriate resources  Description: INTERVENTIONS:  - Identify barriers to discharge w/patient and caregiver  - Arrange for needed discharge resources and transportation as appropriate  - Identify discharge learning needs (meds, wound care, etc )  - Arrange for interpretive services to assist at discharge as needed  - Refer to Case Management Department for coordinating discharge planning if the patient needs post-hospital services based on physician/advanced practitioner order or complex needs related to functional status, cognitive ability, or social support system  Outcome: Progressing     Problem: Labor & Delivery  Goal: Manages discomfort  Description: Assess and monitor for signs and symptoms of discomfort  Assess patient's pain level regularly and per hospital policy  Administer medications as ordered  Support use of nonpharmacological methods to help control pain such as distraction, imagery, relaxation, and application of heat and cold  Collaborate with interdisciplinary team and patient to determine appropriate pain management plan  1  Include patient in decisions related to comfort  2  Offer non-pharmacological pain management interventions  3  Report ineffective pain management to physician  Outcome: Progressing  Goal: Patient vital signs are stable  Description: 1  Assess vital signs - vaginal delivery    Outcome: Progressing     Problem: BIRTH - VAGINAL/ SECTION  Goal: Fetal and maternal status remain reassuring during the birth process  Description: INTERVENTIONS:  - Monitor vital signs  - Monitor fetal heart rate  - Monitor uterine activity  - Monitor labor progression (vaginal delivery)  - DVT prophylaxis  - Antibiotic prophylaxis  Outcome: Progressing  Goal: Emotionally satisfying birthing experience for mother/fetus  Description: Interventions:  - Assess, plan, implement and evaluate the nursing care given to the patient in labor  - Advocate the philosophy that each childbirth experience is a unique experience and support the family's chosen level of involvement and control during the labor process   - Actively participate in both the patient's and family's teaching of the birth process  - Consider cultural, Bahai and age-specific factors and plan care for the patient in labor  Outcome: Progressing

## 2022-04-08 NOTE — LACTATION NOTE
This note was copied from a baby's chart  Follow Up Lactation: Gayathri Mcguire is demonstrating early feeding cues  Brought Gaudencio up to the left breast  Education on alignment of nipple to nose, Wide mouth, deep latch with education  Active, coordinated sucking  Reviewed timing of feeds, signs of satiation  Breast compressions demonstrated with teach back  Enc  To meet feeding log goals  Provided education on alignment of nose to breast; bring baby to breast and not breast to baby; support head with opp  Hand in cross cradle; use pillows to lift baby to be belly to belly; ear, shoulder, hip alignment; Support mother's back and place self in comfortable position to support bringing baby to the breast  Shoulders should be down and away from ears  - Start feedings on breast that last feeding ended   - allow no more than 3 hours between breast feeding sessions   - time between feedings is counted from the beginning of the first feed to the beginning of the next feeding session    Reviewed early signs of hunger, including tensing of hands and shoulders - no need to wait for open eyes  Crying is a late hunger sign  If baby is crying, soothe baby first and then attempt to latch  Reviewed normal sucking patterns: transition from stimulation to nutritive to release or non-nutritive  The goal is to see and hear lots of swallowing  Reviewed normal nursing pattern: infant could latch on one breast up to 30 minutes or until releases on own  Signs of satiation is open hand with fingers that do not grab your finger  Discussed difference in sensation of non-nutritive v nutritive sucking    Demonstrated with teach back breast compressions during a feeding to increase milk transfer and stimulate suckling after a breathing/muscle break

## 2022-04-08 NOTE — OB LABOR/OXYTOCIN SAFETY PROGRESS
Oxytocin Safety Progress Check Note - Gopal King 39 y o  female MRN: [id number]    Unit/Bed#: LD  Encounter: [id number]    Dose (kori-units/min) Oxytocin: 22 kori-units/min  Contraction Frequency (minutes): 3-3 5  Contraction Quality: Mild,Moderate  Tachysystole: No   Cervical Dilation: 4        Cervical Effacement: 70  Fetal Station: -3  Baseline Rate: 120 bpm  Fetal Heart Rate: 125 BPM  FHR Category: Category II               Vital Signs:   Vitals:    22 2145   BP: 123/60   Pulse: (!) 106   Resp:    Temp:    SpO2:        Notes/comments:   Artificially ruptured for clear fluid  SVE unchanged  FHT with occasional variable decelerations that resolve with repositioning  Patient states that she is starting to consider a  section but isn't there yet  Discussed with Dr Harrison Mark MD 2022 10:47 PM

## 2022-04-08 NOTE — LACTATION NOTE
This note was copied from a baby's chart  Follow up Lactation: mom called requesting formula  Took in pump parts  Education on missing a feed = pumping  Brought Gaudencio up to the left breast in laid back  With lights on bright, visible scar tissue around areola  When questioned, mom states she has implants  Active, coordinated sucking  Set mom up with pump  Pumped on right breast  Mom wanted to stop as pumping  As mom states pumping Was uncomfortable  Mom seems uninterested in breastfeeding as everytime baby latches or mom pumps, she moves away from the sensation  Yet, when questioned, mom states she wants to breastfeed  Demonstration how to syringe feed formula  Education on alternative feeding methods  Demonstration of how to feed with syringe  Switch Feeds:   Start every feeding at the breast  Offer both breasts or one breast and use breast compressions to achieve active suckling  Once baby is not actively suckling, bring baby in upright position and offer expressed milk and/or artificial supplementation via alternative feeding method (syringe, finger, paced bottle feeding)  Burp frequently between breasts and during paced bottle feeding  Once feed is complete, place baby back on breast for on-nutritive suck  Pump after the feeding session to supplement with expressed milk at next feed  encouraged to pump every 2 hours, meet early feeding cues, and follow up with formula if baby is still demonstrating hunger cues  Instructions given on pumping  Discussed when to start, frequency, different pumps available versus manual expression  Discussed hygiene of hands and supplies as well as assembly, placement of flanges, size of flanged, preparing the breast and cycles and suction settings on pump  Demonstrated use of hand pump  Discussed labeling of milk, storage, and preparation of stored milk      Pumping:   - When pumping, begin in stimulation mode (high cycle, low vacuum) until milk begins to express  Change pump to expression mode (low cycle, high vacuum)  Use hands on pumping techniques to assist with milk transfer  When milk stops expressing, change back to stimulation mode  When milk begins to flow, change to expression mode  You make cycle pump up to three times in a pumping session

## 2022-04-08 NOTE — ANESTHESIA POSTPROCEDURE EVALUATION
Post-Op Assessment Note    CV Status:  Stable  Pain Score: 0    Pain management: adequate     Mental Status:  Alert and awake   Hydration Status:  Euvolemic   PONV Controlled:  Controlled   Airway Patency:  Patent      Post Op Vitals Reviewed: Yes      Staff: Anesthesiologist, CRNA     Post-op block assessment: site cleaned, no complications and catheter intact      No complications documented      BP   142/63   Temp  99 2    Pulse  111   Resp   16   SpO2   97%

## 2022-04-08 NOTE — LACTATION NOTE
This note was copied from a baby's chart  CONSULT - LACTATION  Baby Boy Geoffrey Rogel Bilotti 0 days male MRN: [id number]    [facility] Room / Bed: (N)/(N) Encounter: [id number]    Maternal Information     MOTHER:  Lisset Aguirre  Maternal Age: 39 y o    OB History: # 1 - Date: None, Sex: None, Weight: None, GA: None, Delivery: None, Apgar1: None, Apgar5: None, Living: None, Birth Comments: None   Previouse breast reduction surgery? Yes; mom states she had implants; scar tissue around areola     Lactation history:   Has patient previously breast fed: No   How long had patient previously breast fed:     Previous breast feeding complications:       Past Surgical History:   Procedure Laterality Date    AUGMENTATION BREAST      CARDIAC CATHETERIZATION      Last Assessed: 1/10/2017    LASIK      Corneal  Last Assessed: 1/10/2017        Birth information:  YOB: 2022   Time of birth: 5:19 AM   Sex: male   Delivery type: , Low Transverse   Birth Weight: 2955 g (6 lb 8 2 oz)   Percent of Weight Change: 0%     Gestational Age: 43w4d   [unfilled]    Assessment     Breast and nipple assessment: no clinical assesemtn    [place] Assessment: no clinical assessment    Feeding assessment: no clinical assessment  LATCH:  Latch: Too sleepy or reluctant, no latch achieved   Audible Swallowing: A few with stimulation   Type of Nipple: Everted (After stimulation)   Comfort (Breast/Nipple): Soft/non-tender   Hold (Positioning): Partial assist, teach one side, mother does other, staff holds   [facility] Score: 6          Feeding recommendations:  breast feed on demand  Demonstration of hand expression, Encouraged to bring Gaudencio skin to skin  Reviewed early feeding cues, timing of feeds and signs of satiation  Encouraged to call lactation when baby demonstrates early feeding cues to latch  Mom has pump at home    RSB reviewed  DC at bedside - enc   To review    Information on hand expression given  Discussed benefits of knowing how to manually express breast including stimulating milk supply, softening nipple for latch and evacuating breast in the event of engorgement  Mom is encouraged to place baby skin to skin for feedings  Skin to skin education provided for baby placement on mother's chest, baby only in diaper, blankets below shoulders on baby's back  Skin to skin is encouraged to continue at home for feedings and between feedings  Worked on positioning infant up at chest level and starting to feed infant with nose arriving at the nipple  Then, using areolar compression to achieve a deep latch that is comfortable and exchanges optimum amounts of milk  - Start feedings on breast that last feeding ended   - allow no more than 3 hours between breast feeding sessions   - time between feedings is counted from the beginning of the first feed to the beginning of the next feeding session    Reviewed early signs of hunger, including tensing of hands and shoulders - no need to wait for open eyes  Crying is a late hunger sign  If baby is crying, soothe baby first and then attempt to latch  Reviewed normal sucking patterns: transition from stimulation to nutritive to release or non-nutritive  The goal is to see and hear lots of swallowing  Reviewed normal nursing pattern: infant could latch on one breast up to 30 minutes or until releases on own  Signs of satiation is open hand with fingers that do not grab your finger  Discussed difference in sensation of non-nutritive v nutritive sucking    Met with mother  Provided mother with Ready, Set, Baby booklet  Discussed Skin to Skin contact an benefits to mom and baby  Talked about the delay of the first bath until baby has adjusted  Spoke about the benefits of rooming in  Feeding on cue and what that means for recognizing infant's hunger  Avoidance of pacifiers for the first month discussed   Talked about exclusive breastfeeding for the first 6 months  Positioning and latch reviewed as well as showing images of other feeding positions  Discussed the properties of a good latch in any position  Reviewed hand/manual expression  Discussed s/s that baby is getting enough milk and some s/s that breastfeeding dyad may need further help  Gave information on common concerns, what to expect the first few weeks after delivery, preparing for other caregivers, and how partners can help  Resources for support also provided  Encouraged parents to call for assistance, questions, and concerns about breastfeeding  Extension provided      [place]ola, [address] [place] 4/8/2022 10:49 AM

## 2022-04-08 NOTE — DISCHARGE INSTRUCTIONS
WHAT YOU NEED TO KNOW:   A , or  section, is abdominal surgery to deliver your baby  DISCHARGE INSTRUCTIONS:   Call 911 for any of the following:   · You feel lightheaded, short of breath, and have chest pain  · You cough up blood  Seek care immediately if:   · Blood soaks through your bandage  · Your stitches come apart  · Your arm or leg feels warm, tender, and painful  It may look swollen and red  Contact your OB if:   · You have heavy vaginal bleeding that fills 1 or more sanitary pads in 1 hour  · You have a fever  · Your incision is swollen, red, or draining pus  · You have questions or concerns about yourself or your baby  Medicines: You may  need any of the following:  · Prescription pain medicine  may be given  Ask how to take this medicine safely  · Acetaminophen  decreases pain and fever  It is available without a doctor's order  Ask how much to take and how often to take it  Follow directions  Acetaminophen can cause liver damage if not taken correctly  · NSAIDs , such as ibuprofen, help decrease swelling, pain, and fever  NSAIDs can cause stomach bleeding or kidney problems in certain people  If you take blood thinner medicine, always ask your healthcare provider if NSAIDs are safe for you  Always read the medicine label and follow directions  · Take your medicine as directed  Contact your healthcare provider if you think your medicine is not helping or if you have side effects  Tell him or her if you are allergic to any medicine  Keep a list of the medicines, vitamins, and herbs you take  Include the amounts, and when and why you take them  Bring the list or the pill bottles to follow-up visits  Carry your medicine list with you in case of an emergency  Wound care:  Carefully wash your wound with soap and water every day  Keep your wound clean and dry  Wear loose, comfortable clothes that do not rub against your wound   Ask your OB about bathing and showering  Limit activity as directed:   · Ask when it is safe for you to drive, walk up stairs, lift heavy objects, and have sex  · Ask when it is okay to exercise, and what types of exercise to do  Start slowly and do more as you get stronger  Drink liquids as directed:  Liquids help keep you hydrated after your procedure and decrease your risk for a blood clot  Ask how much liquid to drink each day and which liquids are best for you  Follow up with your OB as directed: You may need to return to have your stitches or staples removed  Write down your questions so you remember to ask them during your visits  © 2017 [employer] Information is for End User's use only and may not be sold, redistributed or otherwise used for commercial purposes  All illustrations and images included in CareNotes® are the copyrighted property of [employer]  or Ismael Huang  The above information is an  only  It is not intended as medical advice for individual conditions or treatments  Talk to your doctor, nurse or pharmacist before following any medical regimen to see if it is safe and effective for you  Postpartum Perineal Care   WHAT YOU NEED TO KNOW:   Postpartum perineal care is care for your perineum after you have a baby  The perineum is your vagina and anus  DISCHARGE INSTRUCTIONS:   Care for your perineum:  Healthcare providers will give you a small squirt bottle and show you how to use it   Do the following after you use the toilet and before you put on a new pad:  · Remove the soiled pad    · Use the squirt bottle to rinse your perineum from front to back while you sit on the toilet     · Pat the area dry from front to back with toilet paper or a cotton cloth     · Put on a fresh pad    · Wash your hands  Decrease pain:  Ask your healthcare provider about these and other ways to decrease perineal pain:  · Sitz baths:  Healthcare providers may give you a portable sitz bath  This is a small tub that fits in the toilet  Fill the sitz bath or bathtub with 4 to 6 inches of warm water  Sit in the warm water for 20 minutes 2 to 3 times a day  · Ice:  Ice helps decrease swelling and pain  Ice may also help prevent tissue damage  Use an ice pack, or put crushed ice in a plastic bag  Cover it with a towel and place it on your perineum for 15 to 20 minutes every hour, or as directed  · Medicine spray, wipes, or pads:  Healthcare providers may give you a medicine spray or wipes soaked with numbing medicine to decrease the pain  Pads that contain an herb called witch hazel may also help reduce pain  Use these after perineal care or a sitz bath  Follow up with your healthcare provider as directed:  Write down your questions so you remember to ask them during your visits  Contact your healthcare provider if:   · You have heavy vaginal bleeding that fills 1 or more sanitary pads in 1 hour  · You have foul-smelling vaginal discharge  · You feel weak or lightheaded  · You have questions or concerns about your condition or care  Seek care immediately or call 911 if:   · You have large blood clots or bright red blood coming from your vagina  · You have abdominal pain, vomiting, and a fever  © 2017 [address] [facility] Information is for End User's use only and may not be sold, redistributed or otherwise used for commercial purposes  All illustrations and images included in CareNotes® are the copyrighted property of [employer]  or Ismael Huang  The above information is an  only  It is not intended as medical advice for individual conditions or treatments  Talk to your doctor, nurse or pharmacist before following any medical regimen to see if it is safe and effective for you  Postpartum Depression   WHAT YOU NEED TO KNOW:   What is postpartum depression?   Postpartum depression is a mood disorder that occurs after giving birth  A mood is an emotion or a feeling  Moods affect your behavior and how you feel about yourself and life in general  Depression is a sad mood that you cannot control  Women often feel sad, afraid, or nervous after their baby is born  These feelings are called postpartum blues or baby blues, and they usually go away in 1 to 2 weeks  With postpartum depression, these symptoms get worse and continue for more than 2 weeks  Postpartum depression is a serious condition that affects your daily activities and relationships  What causes postpartum depression? Healthcare providers do not know exactly what causes postpartum depression  It may be caused by a sudden drop in hormone levels after childbirth  A previous episode of postpartum depression or a family history of depression may increase your risk  Several things may trigger postpartum depression:  · Lack of support from the baby's father or other family members    · Feeling more tired than usual    · Stress, a poor diet, or lack of sleep    · Pain after childbirth or pain during breastfeeding    · Sudden change in lifestyle  How is postpartum depression diagnosed? Postpartum depression affects your daily activities and your relationships with other people  Healthcare providers will ask you questions about your signs and symptoms and how they are affecting your life  The symptoms of postpartum depression usually begin within 1 month after childbirth  You feel depressed or lose interest in activities you enjoy nearly every day for at least 2 weeks  You also have 4 or more of the following symptoms:  · You feel tired or have less energy than usual      · You feel unimportant or guilty most of the time  · You think about hurting or killing yourself  · Your appetite changes  You may lose your appetite and lose weight without trying  Your appetite may also increase and you may gain weight  · You are restless, irritable, or withdrawn      · You have trouble concentrating and remembering things  You have trouble doing daily tasks or making decisions  · You have trouble sleeping, even after the baby is asleep  How is postpartum depression treated? · Psychotherapy:  During therapy, you will talk with healthcare providers about how to cope with your feelings and moods  This can be done alone or in a group  It may also be done with family members or your partner  · Antidepressants: This medicine is given to decrease or stop the symptoms of depression  You usually need to take antidepressants for several weeks before you begin to feel better  Do not stop taking antidepressants unless your healthcare provider tells you to  Healthcare providers may try a different antidepressant if one type does not work  What can I do to feel better? · Rest:  Do not try to do everything all at the same time  Do only what is needed and let other things wait until later  Ask your family or friends for help, especially if you have other children  Ask your partner to help with night feedings or other baby care  Try to sleep when the baby naps  · Get emotional support:  Share your feelings with your partner, a friend, or another mother  · Take care of yourself:  Shower and dress each day  Do not skip meals  Try to get out of the house a little each day  Get regular exercise  Eat a healthy diet  Avoid alcohol because it can make your depression worse  Do not isolate yourself  Go for a walk or meet with a friend  It is also important that you have some time by yourself each day  How do I find support and more information? · [address] [place] [address], Public Information & Communication Branch  [address], [address], [address]  [place] [address]   Phone: [phone]  Phone: [phone]  Web Address: [facility]  When should I contact my healthcare provider?    · You cannot make it to your next visit     · Your depression does not get better with treatment or it gets worse  · You have questions or concerns about your condition or care  When should I seek immediate care or call 911? · You think about hurting or killing yourself, your baby, or someone else  · You feel like other people want to hurt you  · You hear voices telling you to hurt yourself or your baby  CARE AGREEMENT:   You have the right to help plan your care  Learn about your health condition and how it may be treated  Discuss treatment options with your caregivers to decide what care you want to receive  You always have the right to refuse treatment  The above information is an  only  It is not intended as medical advice for individual conditions or treatments  Talk to your doctor, nurse or pharmacist before following any medical regimen to see if it is safe and effective for you  ©  2600 [employer] Information is for End User's use only and may not be sold, redistributed or otherwise used for commercial purposes  All illustrations and images included in CareNotes® are the copyrighted property of [employer]  or Ismael Huang  Postpartum Bleeding   WHAT YOU NEED TO KNOW:   Postpartum bleeding is vaginal bleeding after childbirth  This bleeding is normal, whether your baby was born vaginally or by   It contains blood and the tissue that lined the inside of your uterus when you were pregnant  DISCHARGE INSTRUCTIONS:   What to expect with postpartum bleeding:  Postpartum bleeding usually lasts at least 10 days, and may last longer than 6 weeks  Your bleeding may range from light (barely staining a pad) to heavy (soaking a pad in 1 hour)  Usually, you have heavier bleeding right after childbirth, which slows over the next few weeks until it stops  The bleeding is red or dark brown with clots for the first 1 to 3 days   It then turns pink for several days, and then becomes a white or yellow discharge until it ends  Follow up with your obstetrician as directed:  Do not have sex until your obstetrician says it is okay  Write down your questions so you remember to ask them during your visits  Contact your healthcare provider or obstetrician if:   · Your bleeding increases, or you have heavy bleeding that soaks a pad in 1 hour for 2 hours in a row  · You pass large blood clots  · You are breathing faster than normal, or your heart is beating faster than normal     · You are urinating less than usual, or not at all  · You feel dizzy  · You have questions or concerns about your condition or care  Seek immediate care or call 911 if:   · You are suddenly short of breath and feel lightheaded  · You have sudden chest pain  © 2017 [address] [employer]  Information is for End User's use only and may not be sold, redistributed or otherwise used for commercial purposes  All illustrations and images included in CareNotes® are the copyrighted property of [employer]  or [employer]mael [employer]  The above information is an  only  It is not intended as medical advice for individual conditions or treatments  Talk to your doctor, nurse or pharmacist before following any medical regimen to see if it is safe and effective for you  Breast Care for the Breast Feeding Mother   WHAT YOU SHOULD KNOW:   Your breasts will go through normal changes while you are breastfeeding  Sometimes breast and nipple problems can develop while you are breastfeeding  Learn about changes that are normal and those that may be a problem  Breast care can help you prevent and manage problems so you and your baby can enjoy the benefits of breastfeeding  AFTER YOU LEAVE:   Breast changes while you are breastfeeding:   · For the first few days after your baby is born, your body makes a small amount of breast milk (colostrum)   Within about 2 to 5 days, your body will begin making mature milk  It may take up to 10 days or longer for mature milk to come in  When your mature milk comes in, your breasts will become full and firm  They may feel tender  · Breastfeeding your baby will decrease the full feeling in your breasts  You may feel a tingly sensation during feedings as milk is released from your breasts  This is called the milk let-down reflex  After 7 or more days, the fullness may feel like it has decreased  Your nipples should look the same as they did before you started breastfeeding  Breasts that feel full before and empty after breastfeeding are signs that breastfeeding is going well  Breast problems that can occur while you are breastfeeding:   · Nipple soreness  may occur when you begin to breastfeed your baby  You may also have nipple soreness if your baby is not latched on to your breast correctly  Correct positioning and latch-on may decrease or stop the pain in your nipples  Work with your caregivers to help your baby latch on correctly  It may also be helpful to place warm, wet compresses on your nipples to help decrease pain  · Plugged milk ducts  may cause painful breast lumps  Plugged ducts may be caused by not emptying your breasts completely during feedings  When your baby pauses during breastfeeding, massage and gently squeeze your breast  Gentle massage may unplug a blocked milk duct  Pump out any milk left in your breasts after your baby is done breastfeeding  Avoid wearing tight tops, tight bras, or under-wire bras, because they may put pressure on your breasts  · Engorgement  may occur as your milk comes in soon after you begin breastfeeding  Engorgement may cause your breasts to become swollen and painful  Your breasts may also become engorged if you miss a feeding or you do not breastfeed on demand  The best way to decrease engorgement symptoms is to empty your breasts by feeding your baby often   Engorgement can make it hard for your baby to latch on to your breast  If this happens, express a small amount of milk and then have your baby latch on  Cold compresses, gel packs, or ice packs on your breasts can help decrease pain and swelling  Ask your caregiver how often and how long you should use cold, or ice packs  · A breast infection called mastitis  can develop if you have plugged milk ducts or engorgement  Mastitis causes your breasts to become red, swollen, and painful  You may also have flu-like symptoms, such as chills and a fever  Place heat on your breasts to help decrease the pain  You may want to place a moist, warm cloth on the painful breast or both of your breasts  Ask how often to do this  Your primary healthcare provider [facility]) may suggest that you take an NSAID, such as ibuprofen, to decrease pain and swelling  He may also order antibiotics to treat mastitis  Ask about feeding your baby when you have a breast infection  How to help prevent or manage breast problems while you are breastfeeding:   · Learn how to position your baby and latch him on correctly  To latch your baby correctly to your breast, make sure that his mouth covers most of your areola (dark area around your nipple)  He should not be attached only to the nipple  Your baby is latched on well if you feel comfortable and do not feel pain  A correct latch helps him get enough milk and can help to prevent sore nipples and other breast problems  There are several breastfeeding positions that you can try  Find the position that works best for you and your baby  Ask your caregiver for more information about how to hold and breastfeed your baby  · Prevent biting  Your baby may get teeth at about 1to 3months of age  To help prevent biting, break his suction once he is finished or if he has fallen asleep  To break his suction, slip a finger into the side of his mouth  If your baby bites you, respond with surprise or unhappiness  Offer praise when he does not bite you       · Breastfeed your baby regularly  Feed your baby 8 to 12 times a day  You may need to wake up your baby at night to feed him  It is okay to feed from 1 or both breasts at each feeding  Your baby should breastfeed from both breasts equally over the course of a day  If your baby only feeds from 1 side during a feeding, offer your other breast to him first for the next feeding  · Schedule and keep follow-up visits  Talk to your baby's pediatrician or your PHP during follow-up visits if you have breast problems  Caregivers may suggest that you, or you and your partner, attend classes on breastfeeding  You also may want to join a breastfeeding support group  Caregivers may suggest that you see a lactation consultant  This is a caregiver who can help you with breastfeeding  Contact your PHP if:   · You have a fever and chills  · You have body aches and you feel like you do not have any energy  · One or both of your breasts is red, swollen or hard, painful, and feels warm or hot  · You have breast engorgement that does not get better within 24 hours  · You see or feel a lump in your breast that hurts when you touch it  · You have nipple pain during breastfeeding or between feedings  · Your nipples are red, dry, cracked, or bleeding, or they have scabs on them  · You have questions or concerns about your condition or care  © 2014 [address] is for End User's use only and may not be sold, redistributed or otherwise used for commercial purposes  All illustrations and images included in CareNotes® are the copyrighted property of [employer]  or [employer]el [employer]  The above information is an  only  It is not intended as medical advice for individual conditions or treatments  Talk to your doctor, nurse or pharmacist before following any medical regimen to see if it is safe and effective for you

## 2022-04-09 ENCOUNTER — APPOINTMENT (INPATIENT)
Dept: CT IMAGING | Facility: HOSPITAL | Age: 37
End: 2022-04-09
Payer: COMMERCIAL

## 2022-04-09 LAB
ABO GROUP BLD BPU: NORMAL
ABO GROUP BLD: NORMAL
ANION GAP SERPL CALCULATED.3IONS-SCNC: 6 MMOL/L (ref 4–13)
BASOPHILS # BLD AUTO: 0.04 THOUSANDS/ΜL (ref 0–0.1)
BASOPHILS NFR BLD AUTO: 0 % (ref 0–1)
BLD GP AB SCN SERPL QL: NEGATIVE
BPU ID: NORMAL
BUN SERPL-MCNC: 11 MG/DL (ref 5–25)
CALCIUM SERPL-MCNC: 8.6 MG/DL (ref 8.3–10.1)
CHLORIDE SERPL-SCNC: 102 MMOL/L (ref 100–108)
CO2 SERPL-SCNC: 26 MMOL/L (ref 21–32)
CREAT SERPL-MCNC: 0.89 MG/DL (ref 0.6–1.3)
CROSSMATCH: NORMAL
EOSINOPHIL # BLD AUTO: 0.07 THOUSAND/ΜL (ref 0–0.61)
EOSINOPHIL NFR BLD AUTO: 1 % (ref 0–6)
ERYTHROCYTE [DISTWIDTH] IN BLOOD BY AUTOMATED COUNT: 14 % (ref 11.6–15.1)
ERYTHROCYTE [DISTWIDTH] IN BLOOD BY AUTOMATED COUNT: 14.8 % (ref 11.6–15.1)
GFR SERPL CREATININE-BSD FRML MDRD: 83 ML/MIN/1.73SQ M
GLUCOSE SERPL-MCNC: 118 MG/DL (ref 65–140)
HCT VFR BLD AUTO: 20.4 % (ref 34.8–46.1)
HCT VFR BLD AUTO: 24.6 % (ref 34.8–46.1)
HGB BLD-MCNC: 7.1 G/DL (ref 11.5–15.4)
HGB BLD-MCNC: 8.1 G/DL (ref 11.5–15.4)
HGB UR QL STRIP.AUTO: ABNORMAL
IMM GRANULOCYTES # BLD AUTO: 0.11 THOUSAND/UL (ref 0–0.2)
IMM GRANULOCYTES NFR BLD AUTO: 1 % (ref 0–2)
LYMPHOCYTES # BLD AUTO: 1.81 THOUSANDS/ΜL (ref 0.6–4.47)
LYMPHOCYTES NFR BLD AUTO: 13 % (ref 14–44)
MAGNESIUM SERPL-MCNC: 2 MG/DL (ref 1.6–2.6)
MCH RBC QN AUTO: 27.4 PG (ref 26.8–34.3)
MCH RBC QN AUTO: 28.1 PG (ref 26.8–34.3)
MCHC RBC AUTO-ENTMCNC: 32.9 G/DL (ref 31.4–37.4)
MCHC RBC AUTO-ENTMCNC: 34.8 G/DL (ref 31.4–37.4)
MCV RBC AUTO: 79 FL (ref 82–98)
MCV RBC AUTO: 85 FL (ref 82–98)
MONOCYTES # BLD AUTO: 0.85 THOUSAND/ΜL (ref 0.17–1.22)
MONOCYTES NFR BLD AUTO: 6 % (ref 4–12)
NEUTROPHILS # BLD AUTO: 10.79 THOUSANDS/ΜL (ref 1.85–7.62)
NEUTS SEG NFR BLD AUTO: 79 % (ref 43–75)
NRBC BLD AUTO-RTO: 0 /100 WBCS
PLATELET # BLD AUTO: 181 THOUSANDS/UL (ref 149–390)
PLATELET # BLD AUTO: 183 THOUSANDS/UL (ref 149–390)
PMV BLD AUTO: 10.5 FL (ref 8.9–12.7)
PMV BLD AUTO: 10.5 FL (ref 8.9–12.7)
POTASSIUM SERPL-SCNC: 4.4 MMOL/L (ref 3.5–5.3)
RBC # BLD AUTO: 2.59 MILLION/UL (ref 3.81–5.12)
RBC # BLD AUTO: 2.88 MILLION/UL (ref 3.81–5.12)
RBC, URINE: ABNORMAL
RH BLD: POSITIVE
SODIUM SERPL-SCNC: 134 MMOL/L (ref 136–145)
SPECIMEN EXPIRATION DATE: NORMAL
UNIT DISPENSE STATUS: NORMAL
UNIT PRODUCT CODE: NORMAL
UNIT PRODUCT VOLUME: 350 ML
UNIT RH: NORMAL
WBC # BLD AUTO: 13.67 THOUSAND/UL (ref 4.31–10.16)
WBC # BLD AUTO: 15.25 THOUSAND/UL (ref 4.31–10.16)

## 2022-04-09 PROCEDURE — 86900 BLOOD TYPING SEROLOGIC ABO: CPT

## 2022-04-09 PROCEDURE — 71275 CT ANGIOGRAPHY CHEST: CPT

## 2022-04-09 PROCEDURE — 86850 RBC ANTIBODY SCREEN: CPT | Performed by: OBSTETRICS & GYNECOLOGY

## 2022-04-09 PROCEDURE — 83735 ASSAY OF MAGNESIUM: CPT | Performed by: OBSTETRICS & GYNECOLOGY

## 2022-04-09 PROCEDURE — 99024 POSTOP FOLLOW-UP VISIT: CPT | Performed by: STUDENT IN AN ORGANIZED HEALTH CARE EDUCATION/TRAINING PROGRAM

## 2022-04-09 PROCEDURE — 87040 BLOOD CULTURE FOR BACTERIA: CPT | Performed by: OBSTETRICS & GYNECOLOGY

## 2022-04-09 PROCEDURE — 86901 BLOOD TYPING SEROLOGIC RH(D): CPT

## 2022-04-09 PROCEDURE — 30233N1 TRANSFUSION OF NONAUTOLOGOUS RED BLOOD CELLS INTO PERIPHERAL VEIN, PERCUTANEOUS APPROACH: ICD-10-PCS | Performed by: OBSTETRICS & GYNECOLOGY

## 2022-04-09 PROCEDURE — 85025 COMPLETE CBC W/AUTO DIFF WBC: CPT | Performed by: OBSTETRICS & GYNECOLOGY

## 2022-04-09 PROCEDURE — 86901 BLOOD TYPING SEROLOGIC RH(D): CPT | Performed by: OBSTETRICS & GYNECOLOGY

## 2022-04-09 PROCEDURE — 81015 MICROSCOPIC EXAM OF URINE: CPT

## 2022-04-09 PROCEDURE — 86880 COOMBS TEST DIRECT: CPT

## 2022-04-09 PROCEDURE — 86900 BLOOD TYPING SEROLOGIC ABO: CPT | Performed by: OBSTETRICS & GYNECOLOGY

## 2022-04-09 PROCEDURE — P9016 RBC LEUKOCYTES REDUCED: HCPCS

## 2022-04-09 PROCEDURE — 81003 URINALYSIS AUTO W/O SCOPE: CPT

## 2022-04-09 PROCEDURE — 99232 SBSQ HOSP IP/OBS MODERATE 35: CPT

## 2022-04-09 PROCEDURE — 85027 COMPLETE CBC AUTOMATED: CPT | Performed by: OBSTETRICS & GYNECOLOGY

## 2022-04-09 PROCEDURE — 80048 BASIC METABOLIC PNL TOTAL CA: CPT | Performed by: OBSTETRICS & GYNECOLOGY

## 2022-04-09 PROCEDURE — 86923 COMPATIBILITY TEST ELECTRIC: CPT

## 2022-04-09 PROCEDURE — G1004 CDSM NDSC: HCPCS

## 2022-04-09 PROCEDURE — 74174 CTA ABD&PLVS W/CONTRAST: CPT

## 2022-04-09 RX ORDER — DIPHENHYDRAMINE HCL 25 MG
25 TABLET ORAL ONCE
Status: COMPLETED | OUTPATIENT
Start: 2022-04-09 | End: 2022-04-09

## 2022-04-09 RX ORDER — ACETAMINOPHEN 325 MG/1
650 TABLET ORAL ONCE
Status: COMPLETED | OUTPATIENT
Start: 2022-04-09 | End: 2022-04-09

## 2022-04-09 RX ADMIN — KETOROLAC TROMETHAMINE 30 MG: 30 INJECTION, SOLUTION INTRAMUSCULAR at 16:13

## 2022-04-09 RX ADMIN — IOHEXOL 100 ML: 350 INJECTION, SOLUTION INTRAVENOUS at 16:41

## 2022-04-09 RX ADMIN — OXYCODONE HYDROCHLORIDE 10 MG: 5 TABLET ORAL at 06:51

## 2022-04-09 RX ADMIN — ACETAMINOPHEN 650 MG: 325 TABLET, FILM COATED ORAL at 20:30

## 2022-04-09 RX ADMIN — OXYCODONE HYDROCHLORIDE 10 MG: 5 TABLET ORAL at 23:52

## 2022-04-09 RX ADMIN — OXYCODONE HYDROCHLORIDE 10 MG: 5 TABLET ORAL at 19:44

## 2022-04-09 RX ADMIN — OXYCODONE HYDROCHLORIDE 10 MG: 5 TABLET ORAL at 10:58

## 2022-04-09 RX ADMIN — SODIUM CHLORIDE, SODIUM LACTATE, POTASSIUM CHLORIDE, AND CALCIUM CHLORIDE 999 ML/HR: .6; .31; .03; .02 INJECTION, SOLUTION INTRAVENOUS at 19:45

## 2022-04-09 RX ADMIN — SODIUM CHLORIDE, SODIUM LACTATE, POTASSIUM CHLORIDE, AND CALCIUM CHLORIDE 125 ML/HR: .6; .31; .03; .02 INJECTION, SOLUTION INTRAVENOUS at 20:28

## 2022-04-09 RX ADMIN — ACETAMINOPHEN 650 MG: 325 TABLET, FILM COATED ORAL at 14:22

## 2022-04-09 RX ADMIN — DIPHENHYDRAMINE HCL 25 MG: 25 TABLET, COATED ORAL at 10:43

## 2022-04-09 RX ADMIN — KETOROLAC TROMETHAMINE 30 MG: 30 INJECTION, SOLUTION INTRAMUSCULAR at 01:55

## 2022-04-09 RX ADMIN — ACETAMINOPHEN 650 MG: 325 TABLET, FILM COATED ORAL at 01:55

## 2022-04-09 RX ADMIN — ENOXAPARIN SODIUM 40 MG: 40 INJECTION SUBCUTANEOUS at 10:42

## 2022-04-09 RX ADMIN — DOCUSATE SODIUM 100 MG: 100 CAPSULE, LIQUID FILLED ORAL at 19:47

## 2022-04-09 RX ADMIN — DIPHENHYDRAMINE HCL 25 MG: 25 TABLET, COATED ORAL at 10:44

## 2022-04-09 RX ADMIN — ACETAMINOPHEN 650 MG: 325 TABLET ORAL at 08:52

## 2022-04-09 RX ADMIN — KETOROLAC TROMETHAMINE 30 MG: 30 INJECTION, SOLUTION INTRAMUSCULAR at 08:52

## 2022-04-09 RX ADMIN — DOCUSATE SODIUM 100 MG: 100 CAPSULE, LIQUID FILLED ORAL at 10:45

## 2022-04-09 RX ADMIN — KETOROLAC TROMETHAMINE 30 MG: 30 INJECTION, SOLUTION INTRAMUSCULAR at 21:43

## 2022-04-09 NOTE — RAPID RESPONSE
Rapid Response Note  Raul King 39 y o  female MRN: [id number]  Unit/Bed#: -01 Encounter: [id number]    Rapid Response Notification(s):   Response called date/time:  2022 2:06 PM  Response team arrival date/time:  2022 2:10 PM  Response end date/time:  2022 2:35 PM  Level of care:  [facility]  Rapid response location:  OB/L&D  Primary reason for rapid response call:  Acute change in heart rhythm    Rapid Response Intervention(s):   Airway:  None  Breathing:  None  Circulation:  None  Fluids administered:  Lactated Ringer's  Medications administered: Other (comment) (Tylenol)       Assessment:   · Rapid response called to [facility], room 312 for rapid heart rate, SVT  · VS - temp 100 4,  sinus, /54, 100% Room Air  · Pt recently received 1 units of PRBC's  · Most recent hgb - 7 1 pre transfusion  · Pt recently underwent  with moderate blood loss  · ABD soft slightly tender  · No report of excessive vaginal bleeding or clots    Plan:   · Likely related to possible transfusion reaction  · Tylenol 650mg PO now  · Benadryl 25mg PO now  · 1L LR bolus  · STAT CBC, BMP, Mag  · No transfer of patient, remain on med surg  · Primary team aware  · Updated patient and family of plans     Rapid Response Outcome:   Transfer:  Remain on floor  Code Status: Level 1 (Full Code)      Family notified: Spoke with patients  and other family members present  Updated on current condition and plan  All questions answered to satisfaction  Background/Situation:   Conception Batter is a 39 y o  female with a PMHx: cardiac arrhythmia who is s/p caesarian section with moderate blood loss requiring transfusion 1 unit PRBCs post op  Today she was hypotensive and morning Hgb is 7 1, received another 1 unit PRBC this afternoon which had just recently finished  Post transfusion the pt had an elevated -160's rapid response was called   Upon arrival pt is awake, alert and oriented is in NAD but visibly anxious  VS  temp 100 4,  sinus, /54  1L LR started, tylenol 650mg for fever/possible transfusion reaction and 25mg benadryl for possible transfusion reaction  STAT labs ordered  No need to transfer pt at this time  Primary team and primary RN present  Review of Systems   Constitutional: Positive for fever  Thirst   HENT: Negative  Eyes: Negative  Respiratory: Negative  Cardiovascular: Positive for palpitations  Endocrine: Negative  Genitourinary: Negative  Musculoskeletal: Negative  Skin: Negative  Allergic/Immunologic: Negative  Neurological: Negative  Hematological: Negative  Psychiatric/Behavioral: Negative  All other systems reviewed and are negative  Objective:   Vitals:    04/09/22 1400 04/09/22 1415 04/09/22 1415 04/09/22 1428   BP: 139/69 112/54  129/60   BP Location:       Pulse: (!) 150 (!) 157  (!) 153   Resp: (!) 28   (!) 24   Temp: 99 3 °F (37 4 °C) 100 4 °F (38 °C)  99 5 °F (37 5 °C)   TempSrc: Oral Oral  Oral   SpO2:   100%    Weight:       Height:         Physical Exam  Vitals and nursing note reviewed  Constitutional:       General: She is awake  Appearance: She is obese  HENT:      Mouth/Throat:      Mouth: Mucous membranes are dry  Eyes:      Pupils: Pupils are equal, round, and reactive to light  Cardiovascular:      Rate and Rhythm: Tachycardia present  Pulmonary:      Effort: Pulmonary effort is normal       Breath sounds: Normal breath sounds  Abdominal:      Palpations: Abdomen is soft  Skin:     General: Skin is warm  Capillary Refill: Capillary refill takes less than 2 seconds  Neurological:      General: No focal deficit present  Mental Status: She is alert  Psychiatric:         Behavior: Behavior is cooperative  Portions of the record may have been created with voice recognition software    Occasional wrong word or "sound a like" substitutions may have occurred due to the inherent limitations of voice recognition software  Read the chart carefully and recognize, using context, where substitutions have occurred      [address] [place] [address]

## 2022-04-09 NOTE — PROGRESS NOTES
Progress Note - OB/GYN   Agnes King 39 y o  female MRN: [id number]  Unit/Bed#:  312-01 Encounter: [id number]    Assessment:  POD#1 s/p primary low transverse  section, stable    Plan:  1) Post-op care  - Encourage ambulation  - Encourage breastfeeding  - Continue current meds     2) Stewart catheter  - Stewart out  - s/p void trial     3) Pain control   - Increase in pain this AM  - Otherwise well controlled with PO analgesics     4) Postoperative Anemia   - QBL 364mL  - Hgb 11 2  -> 7 0 -> 1uPRBCs -> 7 3   - Hgb this AM 7 1    5) Rh positive  - Rhogam not indicated    6) DVT ppx   - BMI 37  - Lovenox 40 mg daily     7) Disposition:   - Anticipate discharge POD #2 vs POD #3    Subjective/Objective     Subjective: Doing well this AM  Reports that pain was worse this AM and that she just took PO analgesics  States that she is voiding and tolerating PO well  Pain: yes  Tolerating PO: yes  Voiding: yes  Flatus: yes  BM: no  Ambulating: yes  Breastfeeding: Breastfeeding and Bottle feeding  Chest pain: no  Shortness of breath: no  Leg pain: no  Lochia: wnl    Objective:     Vitals:  Vitals:    22 1645 22 0010 22 0500   BP: 94/50 123/81 103/57 90/51   BP Location: Right arm Left arm Left arm Right arm   Pulse: 97 100 (!) 109 99   Resp: 18 18 18 18   Temp: 97 5 °F (36 4 °C) 98 4 °F (36 9 °C) 98 2 °F (36 8 °C) 98 3 °F (36 8 °C)   TempSrc: Oral Oral Oral Oral   SpO2: 100% 100% 99% 99%   Weight:       Height:           Physical Exam:   GEN: The patient was alert and oriented x3, pleasant well-appearing female in no acute distress     CV: Regular rate  PULM: nonlabored respirations  MSK: Normal gait  Skin: warm, dry  Neuro: no focal deficits  Psych: normal affect and judgement, cooperative  Abd: soft, moderate tenderness throughout abdomen,   Uterine fundus firm and non-tender, at the umbilicus, Incision C/D/I        Lab Results   Component Value Date    WBC 13 67 (H) 2022 HGB 7 1 (L) 04/09/2022    HCT 20 4 (L) 04/09/2022    MCV 79 (L) 04/09/2022     04/09/2022           Larisa Suazo MD, PGY-1  Obstetrics & Gynecology  04/09/22  7:41 AM

## 2022-04-09 NOTE — QUICK NOTE
Reviewed CTA findings, no obvious PE or evidence of on-going bleeding  Patient stating that she feels chills  Temp 100F most recently  Will continue IV fluids and tylenol for management of     CTA no obvious embloism or abdominal bleeding  Feels chills  Last temp 100  IV fluids continued for now for febrile non-hemolytic transfusion reaction  Will recheck labs in AM or sooner as clinically indicated    Dr Mckayla Sun aware      Nader Waters MD, PGY-4  4/9/2022  11:01 PM

## 2022-04-09 NOTE — QUICK NOTE
Due to continued tachycardia, will order CTA to rule out PE  Per ICU team, recommend scanning abdomen as well  Patient notified  CTA chest, abdomen, pelvis ordered

## 2022-04-09 NOTE — PROGRESS NOTES
Notified by nursing that patient with incisional pain and HR 140s   Blood transfusion finished within the last hour    Upon my arrival to the room, patient was sitting in her chair looking flushed  She was verbalizing well and alert    Heart rate noted to be in the 160s to 170s bpm  Patient did report some numbness in her face and arms  Patient reporting a history of SVT in 2001, status post ablation  She reports similar symptoms during episodes of SVT, however the symptoms are less severe  Given history and elevated heart rate, rapid response was called  Please see documentation from rapid response for full details    Given development of temperature and elevated heart rate, symptoms likely secondary to blood transfusion reaction  Benadryl, Tylenol, IV fluids ordered  EKG: sinus tachycardia  Continue to monitor closely at this time    Dr Gualberto Gómez present at bedside  Dr Zara Barrios notified      Kevin Sanchez MD, PGY-4  4/9/2022  2:36 PM

## 2022-04-10 ENCOUNTER — APPOINTMENT (INPATIENT)
Dept: RADIOLOGY | Facility: HOSPITAL | Age: 37
End: 2022-04-10
Payer: COMMERCIAL

## 2022-04-10 LAB
ABO GROUP BLD BPU: NORMAL
ALBUMIN SERPL BCP-MCNC: 1.9 G/DL (ref 3.5–5)
ALBUMIN SERPL BCP-MCNC: 2 G/DL (ref 3.5–5)
ALP SERPL-CCNC: 81 U/L (ref 46–116)
ALP SERPL-CCNC: 86 U/L (ref 46–116)
ALT SERPL W P-5'-P-CCNC: 25 U/L (ref 12–78)
ALT SERPL W P-5'-P-CCNC: 29 U/L (ref 12–78)
ANION GAP SERPL CALCULATED.3IONS-SCNC: 7 MMOL/L (ref 4–13)
ANION GAP SERPL CALCULATED.3IONS-SCNC: 9 MMOL/L (ref 4–13)
AST SERPL W P-5'-P-CCNC: 29 U/L (ref 5–45)
AST SERPL W P-5'-P-CCNC: 31 U/L (ref 5–45)
BACTERIA UR QL AUTO: ABNORMAL /HPF
BASOPHILS # BLD AUTO: 0.02 THOUSANDS/ΜL (ref 0–0.1)
BASOPHILS # BLD AUTO: 0.03 THOUSANDS/ΜL (ref 0–0.1)
BASOPHILS NFR BLD AUTO: 0 % (ref 0–1)
BASOPHILS NFR BLD AUTO: 0 % (ref 0–1)
BILIRUB SERPL-MCNC: 0.26 MG/DL (ref 0.2–1)
BILIRUB SERPL-MCNC: 0.28 MG/DL (ref 0.2–1)
BILIRUB UR QL STRIP: NEGATIVE
BPU ID: NORMAL
BUN SERPL-MCNC: 5 MG/DL (ref 5–25)
BUN SERPL-MCNC: 6 MG/DL (ref 5–25)
CALCIUM ALBUM COR SERPL-MCNC: 10 MG/DL (ref 8.3–10.1)
CALCIUM ALBUM COR SERPL-MCNC: 9.7 MG/DL (ref 8.3–10.1)
CALCIUM SERPL-MCNC: 8 MG/DL (ref 8.3–10.1)
CALCIUM SERPL-MCNC: 8.4 MG/DL (ref 8.3–10.1)
CHLORIDE SERPL-SCNC: 100 MMOL/L (ref 100–108)
CHLORIDE SERPL-SCNC: 105 MMOL/L (ref 100–108)
CLARITY UR: ABNORMAL
CO2 SERPL-SCNC: 26 MMOL/L (ref 21–32)
CO2 SERPL-SCNC: 26 MMOL/L (ref 21–32)
COLOR UR: YELLOW
CREAT SERPL-MCNC: 0.68 MG/DL (ref 0.6–1.3)
CREAT SERPL-MCNC: 0.71 MG/DL (ref 0.6–1.3)
CROSSMATCH: NORMAL
EOSINOPHIL # BLD AUTO: 0 THOUSAND/ΜL (ref 0–0.61)
EOSINOPHIL # BLD AUTO: 0.01 THOUSAND/ΜL (ref 0–0.61)
EOSINOPHIL NFR BLD AUTO: 0 % (ref 0–6)
EOSINOPHIL NFR BLD AUTO: 0 % (ref 0–6)
ERYTHROCYTE [DISTWIDTH] IN BLOOD BY AUTOMATED COUNT: 14.6 % (ref 11.6–15.1)
ERYTHROCYTE [DISTWIDTH] IN BLOOD BY AUTOMATED COUNT: 14.9 % (ref 11.6–15.1)
ERYTHROCYTE [DISTWIDTH] IN BLOOD BY AUTOMATED COUNT: 15 % (ref 11.6–15.1)
FLUAV RNA RESP QL NAA+PROBE: NEGATIVE
FLUBV RNA RESP QL NAA+PROBE: NEGATIVE
GFR SERPL CREATININE-BSD FRML MDRD: 109 ML/MIN/1.73SQ M
GFR SERPL CREATININE-BSD FRML MDRD: 112 ML/MIN/1.73SQ M
GLUCOSE SERPL-MCNC: 117 MG/DL (ref 65–140)
GLUCOSE SERPL-MCNC: 99 MG/DL (ref 65–140)
GLUCOSE UR STRIP-MCNC: NEGATIVE MG/DL
HCT VFR BLD AUTO: 21 % (ref 34.8–46.1)
HCT VFR BLD AUTO: 21.8 % (ref 34.8–46.1)
HCT VFR BLD AUTO: 22.6 % (ref 34.8–46.1)
HGB BLD-MCNC: 7.1 G/DL (ref 11.5–15.4)
HGB BLD-MCNC: 7.2 G/DL (ref 11.5–15.4)
HGB BLD-MCNC: 7.4 G/DL (ref 11.5–15.4)
HGB UR QL STRIP.AUTO: ABNORMAL
IMM GRANULOCYTES # BLD AUTO: 0.1 THOUSAND/UL (ref 0–0.2)
IMM GRANULOCYTES # BLD AUTO: 0.11 THOUSAND/UL (ref 0–0.2)
IMM GRANULOCYTES NFR BLD AUTO: 1 % (ref 0–2)
IMM GRANULOCYTES NFR BLD AUTO: 1 % (ref 0–2)
KETONES UR STRIP-MCNC: NEGATIVE MG/DL
LACTATE SERPL-SCNC: 0.7 MMOL/L (ref 0.5–2)
LEUKOCYTE ESTERASE UR QL STRIP: ABNORMAL
LYMPHOCYTES # BLD AUTO: 0.49 THOUSANDS/ΜL (ref 0.6–4.47)
LYMPHOCYTES # BLD AUTO: 0.58 THOUSANDS/ΜL (ref 0.6–4.47)
LYMPHOCYTES NFR BLD AUTO: 6 % (ref 14–44)
LYMPHOCYTES NFR BLD AUTO: 6 % (ref 14–44)
MCH RBC QN AUTO: 28 PG (ref 26.8–34.3)
MCH RBC QN AUTO: 28.1 PG (ref 26.8–34.3)
MCH RBC QN AUTO: 28.5 PG (ref 26.8–34.3)
MCHC RBC AUTO-ENTMCNC: 32.7 G/DL (ref 31.4–37.4)
MCHC RBC AUTO-ENTMCNC: 33 G/DL (ref 31.4–37.4)
MCHC RBC AUTO-ENTMCNC: 33.8 G/DL (ref 31.4–37.4)
MCV RBC AUTO: 84 FL (ref 82–98)
MCV RBC AUTO: 85 FL (ref 82–98)
MCV RBC AUTO: 86 FL (ref 82–98)
MONOCYTES # BLD AUTO: 0.33 THOUSAND/ΜL (ref 0.17–1.22)
MONOCYTES # BLD AUTO: 0.36 THOUSAND/ΜL (ref 0.17–1.22)
MONOCYTES NFR BLD AUTO: 4 % (ref 4–12)
MONOCYTES NFR BLD AUTO: 4 % (ref 4–12)
NEUTROPHILS # BLD AUTO: 7.46 THOUSANDS/ΜL (ref 1.85–7.62)
NEUTROPHILS # BLD AUTO: 8.38 THOUSANDS/ΜL (ref 1.85–7.62)
NEUTS SEG NFR BLD AUTO: 89 % (ref 43–75)
NEUTS SEG NFR BLD AUTO: 89 % (ref 43–75)
NITRITE UR QL STRIP: NEGATIVE
NON-SQ EPI CELLS URNS QL MICRO: ABNORMAL /HPF
NRBC BLD AUTO-RTO: 0 /100 WBCS
NRBC BLD AUTO-RTO: 0 /100 WBCS
PH UR STRIP.AUTO: 6 [PH]
PLATELET # BLD AUTO: 152 THOUSANDS/UL (ref 149–390)
PLATELET # BLD AUTO: 163 THOUSANDS/UL (ref 149–390)
PLATELET # BLD AUTO: 168 THOUSANDS/UL (ref 149–390)
PMV BLD AUTO: 10 FL (ref 8.9–12.7)
PMV BLD AUTO: 10.1 FL (ref 8.9–12.7)
PMV BLD AUTO: 10.1 FL (ref 8.9–12.7)
POTASSIUM SERPL-SCNC: 3.4 MMOL/L (ref 3.5–5.3)
POTASSIUM SERPL-SCNC: 3.5 MMOL/L (ref 3.5–5.3)
PROT SERPL-MCNC: 5.4 G/DL (ref 6.4–8.2)
PROT SERPL-MCNC: 5.6 G/DL (ref 6.4–8.2)
PROT UR STRIP-MCNC: NEGATIVE MG/DL
RBC # BLD AUTO: 2.49 MILLION/UL (ref 3.81–5.12)
RBC # BLD AUTO: 2.57 MILLION/UL (ref 3.81–5.12)
RBC # BLD AUTO: 2.63 MILLION/UL (ref 3.81–5.12)
RBC #/AREA URNS AUTO: ABNORMAL /HPF
RSV RNA RESP QL NAA+PROBE: NEGATIVE
SARS-COV-2 RNA RESP QL NAA+PROBE: NEGATIVE
SODIUM SERPL-SCNC: 133 MMOL/L (ref 136–145)
SODIUM SERPL-SCNC: 140 MMOL/L (ref 136–145)
SP GR UR STRIP.AUTO: <=1.005 (ref 1–1.03)
UNIT DISPENSE STATUS: NORMAL
UNIT PRODUCT CODE: NORMAL
UNIT PRODUCT VOLUME: 350 ML
UNIT RH: NORMAL
UROBILINOGEN UR QL STRIP.AUTO: 0.2 E.U./DL
WBC # BLD AUTO: 10.2 THOUSAND/UL (ref 4.31–10.16)
WBC # BLD AUTO: 8.42 THOUSAND/UL (ref 4.31–10.16)
WBC # BLD AUTO: 9.45 THOUSAND/UL (ref 4.31–10.16)
WBC #/AREA URNS AUTO: ABNORMAL /HPF

## 2022-04-10 PROCEDURE — 87040 BLOOD CULTURE FOR BACTERIA: CPT

## 2022-04-10 PROCEDURE — 99222 1ST HOSP IP/OBS MODERATE 55: CPT | Performed by: INTERNAL MEDICINE

## 2022-04-10 PROCEDURE — 81001 URINALYSIS AUTO W/SCOPE: CPT

## 2022-04-10 PROCEDURE — 80053 COMPREHEN METABOLIC PANEL: CPT

## 2022-04-10 PROCEDURE — 85027 COMPLETE CBC AUTOMATED: CPT | Performed by: OBSTETRICS & GYNECOLOGY

## 2022-04-10 PROCEDURE — 80053 COMPREHEN METABOLIC PANEL: CPT | Performed by: OBSTETRICS & GYNECOLOGY

## 2022-04-10 PROCEDURE — 83605 ASSAY OF LACTIC ACID: CPT

## 2022-04-10 PROCEDURE — 71045 X-RAY EXAM CHEST 1 VIEW: CPT

## 2022-04-10 PROCEDURE — 87154 CUL TYP ID BLD PTHGN 6+ TRGT: CPT

## 2022-04-10 PROCEDURE — 0241U HB NFCT DS VIR RESP RNA 4 TRGT: CPT

## 2022-04-10 PROCEDURE — 99024 POSTOP FOLLOW-UP VISIT: CPT | Performed by: OBSTETRICS & GYNECOLOGY

## 2022-04-10 PROCEDURE — 87186 SC STD MICRODIL/AGAR DIL: CPT

## 2022-04-10 PROCEDURE — 93005 ELECTROCARDIOGRAM TRACING: CPT

## 2022-04-10 PROCEDURE — 85025 COMPLETE CBC W/AUTO DIFF WBC: CPT

## 2022-04-10 PROCEDURE — 85025 COMPLETE CBC W/AUTO DIFF WBC: CPT | Performed by: OBSTETRICS & GYNECOLOGY

## 2022-04-10 RX ORDER — SODIUM CHLORIDE, SODIUM LACTATE, POTASSIUM CHLORIDE, CALCIUM CHLORIDE 600; 310; 30; 20 MG/100ML; MG/100ML; MG/100ML; MG/100ML
125 INJECTION, SOLUTION INTRAVENOUS CONTINUOUS
Status: DISCONTINUED | OUTPATIENT
Start: 2022-04-10 | End: 2022-04-11

## 2022-04-10 RX ORDER — CLINDAMYCIN PHOSPHATE 900 MG/50ML
900 INJECTION INTRAVENOUS EVERY 8 HOURS
Status: DISCONTINUED | OUTPATIENT
Start: 2022-04-10 | End: 2022-04-11

## 2022-04-10 RX ADMIN — OXYCODONE HYDROCHLORIDE 10 MG: 5 TABLET ORAL at 04:26

## 2022-04-10 RX ADMIN — IBUPROFEN 600 MG: 600 TABLET ORAL at 16:24

## 2022-04-10 RX ADMIN — DOCUSATE SODIUM 100 MG: 100 CAPSULE, LIQUID FILLED ORAL at 19:02

## 2022-04-10 RX ADMIN — SODIUM CHLORIDE, SODIUM LACTATE, POTASSIUM CHLORIDE, AND CALCIUM CHLORIDE 125 ML/HR: .6; .31; .03; .02 INJECTION, SOLUTION INTRAVENOUS at 04:28

## 2022-04-10 RX ADMIN — ACETAMINOPHEN 650 MG: 325 TABLET, FILM COATED ORAL at 08:16

## 2022-04-10 RX ADMIN — OXYCODONE HYDROCHLORIDE 10 MG: 5 TABLET ORAL at 12:47

## 2022-04-10 RX ADMIN — ACETAMINOPHEN 650 MG: 325 TABLET, FILM COATED ORAL at 02:19

## 2022-04-10 RX ADMIN — SODIUM CHLORIDE, SODIUM LACTATE, POTASSIUM CHLORIDE, AND CALCIUM CHLORIDE 125 ML/HR: .6; .31; .03; .02 INJECTION, SOLUTION INTRAVENOUS at 18:40

## 2022-04-10 RX ADMIN — DOCUSATE SODIUM 100 MG: 100 CAPSULE, LIQUID FILLED ORAL at 08:16

## 2022-04-10 RX ADMIN — OXYCODONE HYDROCHLORIDE 10 MG: 5 TABLET ORAL at 21:32

## 2022-04-10 RX ADMIN — IBUPROFEN 600 MG: 600 TABLET ORAL at 10:20

## 2022-04-10 RX ADMIN — ENOXAPARIN SODIUM 40 MG: 40 INJECTION SUBCUTANEOUS at 08:22

## 2022-04-10 RX ADMIN — OXYCODONE HYDROCHLORIDE 10 MG: 5 TABLET ORAL at 08:28

## 2022-04-10 RX ADMIN — IBUPROFEN 600 MG: 600 TABLET ORAL at 04:06

## 2022-04-10 RX ADMIN — CLINDAMYCIN PHOSPHATE 900 MG: 900 INJECTION, SOLUTION INTRAVENOUS at 20:13

## 2022-04-10 RX ADMIN — ACETAMINOPHEN 650 MG: 325 TABLET, FILM COATED ORAL at 12:47

## 2022-04-10 RX ADMIN — ONDANSETRON 4 MG: 2 INJECTION INTRAMUSCULAR; INTRAVENOUS at 08:38

## 2022-04-10 RX ADMIN — ACETAMINOPHEN 650 MG: 325 TABLET, FILM COATED ORAL at 23:00

## 2022-04-10 RX ADMIN — ONDANSETRON 4 MG: 2 INJECTION INTRAMUSCULAR; INTRAVENOUS at 08:37

## 2022-04-10 RX ADMIN — OXYCODONE HYDROCHLORIDE 5 MG: 5 TABLET ORAL at 17:29

## 2022-04-10 RX ADMIN — ACETAMINOPHEN 650 MG: 325 TABLET, FILM COATED ORAL at 17:09

## 2022-04-10 RX ADMIN — GENTAMICIN SULFATE 250 MG: 40 INJECTION, SOLUTION INTRAMUSCULAR; INTRAVENOUS at 18:44

## 2022-04-10 NOTE — PROGRESS NOTES
Progress Note - OB/GYN   Cristopher King 39 y o  female MRN: [id number]  Unit/Bed#:  312-01 Encounter: [id number]    Assessment:  POD#2 s/p primary low transverse  section, stable    Plan:  Post-op care  - Encourage ambulation  - Encourage breastfeeding  - Continue current meds   - Voiding     Pain control   - Well controlled on PO regimen    Tachycardia  - Initially thought to be secondary to anemia however patient with tachycardia into 160-170sbpm, s/p rapid response  - Tachycardia persists but patient feeling better  - CTA negative for obvious PE or abdominal bleeding  - Consider cardiology consult today due to continued tachycardia    Postoperative Anemia   - QBL 364mL  - Hgb 11 2  -> 7 0 -> 1uPRBCs -> 7 1 --> 1u pRBC --> 8 1 --> 7 2 (likely secondary to IV hydration)    Rh positive  - Rhogam not indicated    DVT ppx   - BMI 37  - Lovenox 40 mg daily      Disposition:   - Anticipate discharge POD #2 vs POD #3    Subjective/Objective     Subjective: Doing well this AM  Reports that she is overall feeling well but still concerned about her elevated HR  Pain: yes  Tolerating PO: yes  Voiding: yes  Flatus: yes  BM: no  Ambulating: yes  Breastfeeding: Breastfeeding and Bottle feeding  Chest pain: no  Shortness of breath: no  Leg pain: no  Lochia: wnl    Objective:     Vitals:  Vitals:    22 2148 22 2358 04/10/22 0406   BP:   107/60 107/54   BP Location:   Left arm Left arm   Pulse: (!) 140  (!) 120 (!) 135   Resp:  18 18 18   Temp:  99 9 °F (37 7 °C) 98 7 °F (37 1 °C) 99 8 °F (37 7 °C)   TempSrc:  Oral Oral Oral   SpO2:   99% 100%   Weight:       Height:           Physical Exam:   GEN: The patient was alert and oriented x3, pleasant well-appearing female in no acute distress     CV: tachycardia  PULM: nonlabored respirations  MSK: Normal gait  Skin: warm, dry  Neuro: no focal deficits  Psych: normal affect and judgement, cooperative  Abd: soft, moderate tenderness throughout abdomen,   Uterine fundus firm and non-tender, at the umbilicus, Incision C/D/I        Lab Results   Component Value Date    WBC 10 20 (H) 04/10/2022    HGB 7 2 (L) 04/10/2022    HCT 21 8 (L) 04/10/2022    MCV 85 04/10/2022     04/10/2022           Maynor Navarro MD, PGY-4  Obstetrics & Gynecology  04/10/22  6:08 AM

## 2022-04-10 NOTE — CONSULTS
Consultation - Cardiology Team One  Yasmine King 39 y o  female MRN: [id number]  Unit/Bed#: -01 Encounter: [id number]    Inpatient consult to Cardiology  Consult performed by: Rolena Baumgarten, CRNP  Consult ordered by: Donnie Ayoub MD      Physician Requesting Consult: Micki Spring MD  Reason for Consult / Principal Problem: tachycardia    Assessment/ Plan    1  Sinus tachycardia  Suspected to be from transfusion reaction as tachycardia with rates into the 150s-160s occurred towards end of 2nd unit of PRBCs  Also mildly febrile yesterday which can contribute to her tachycardia  Currently 108 bpm on my exam and feeling much better  ECG from rapid response: Sinus tachycardia, - see Media for images    2  Hx of SVT s/p ablation - no evidence of recurrence by ECGs    3  S/p  section- POD #2, complicated by post operative anemia and low grade fever  Management by ob-gyn    4  ABLA - hgb 7 2 (s/p 2 units PRBCs)    History of Present Illness   HPI: Jax Hernandez is a 39y o  year old female with remote history of SVT s/p ablation who recently delivered a healthy boy via  section on 22 who developed tachycardia during a blood transfusion yesterday necessitating a rapid response  She felt palpitations and tingling up into her face and arms  ECG from 22 shows sinus tachycardia  She was also mildly febrile to T max of 100 4 around the time of her rapid  Cardiology consulted for further evaluation of her tachycardia  EKG reviewed personally: Sinus tachycardia    Telemetry reviewed personally: n/a    Review of Systems   Constitutional: Negative for chills, malaise/fatigue and weight gain  Cardiovascular: Positive for leg swelling  Negative for chest pain, dyspnea on exertion, orthopnea, palpitations and syncope  Respiratory: Negative for cough, shortness of breath, sleep disturbances due to breathing and sputum production      Gastrointestinal: Positive for abdominal pain (incision)  Negative for bloating, nausea and vomiting  Neurological: Negative for dizziness, light-headedness and weakness  Psychiatric/Behavioral: Negative for altered mental status  All other systems reviewed and are negative      Historical Information   Past Medical History:   Diagnosis Date    Abnormal Pap smear of cervix     Asthma     only with allergies, utilizes rescue inhaler     Cardiac arrhythmia     Last Assessed: 1/10/2017    Female infertility     Seasonal allergies      Past Surgical History:   Procedure Laterality Date    AUGMENTATION BREAST      CARDIAC CATHETERIZATION      Last Assessed: 1/10/2017    LASIK      Corneal  Last Assessed: 1/10/2017     Social History     Substance and Sexual Activity   Alcohol Use Yes     Social History     Substance and Sexual Activity   Drug Use Never     Social History     Tobacco Use   Smoking Status Never Smoker   Smokeless Tobacco Never Used     Family History:   Family History   Problem Relation Age of Onset    Lung cancer Mother     Cancer Mother     Heart disease Maternal Grandmother     Cancer Sister        Meds/Allergies   all current active meds have been reviewed and current meds:   Current Facility-Administered Medications   Medication Dose Route Frequency    acetaminophen (TYLENOL) tablet 650 mg  650 mg Oral Q6H [address]    albuterol inhalation solution 2 5 mg  2 5 mg Nebulization Once PRN    benzocaine-menthol-lanolin-aloe (DERMOPLAST) 20-0 5 % topical spray 1 application  1 application Topical Z6K PRN    calcium carbonate (TUMS) chewable tablet 1,000 mg  1,000 mg Oral Daily PRN    diphenhydrAMINE (BENADRYL) tablet 25 mg  25 mg Oral Q6H PRN    docusate sodium (COLACE) capsule 100 mg  100 mg Oral BID    enoxaparin (LOVENOX) subcutaneous injection 40 mg  40 mg Subcutaneous Q24H CRYSTAL    fentaNYL (SUBLIMAZE) injection 25 mcg  25 mcg Intravenous Q5 Min PRN    hydrALAZINE (APRESOLINE) injection 5 mg  5 mg Intravenous Q20 Min PRN    hydrocortisone 1 % cream 1 application  1 application Topical Daily PRN    HYDROmorphone (DILAUDID) injection 0 2 mg  0 2 mg Intravenous Q5 Min PRN    HYDROmorphone (DILAUDID) injection 0 5 mg  0 5 mg Intravenous Q5 Min PRN    HYDROmorphone (DILAUDID) injection 1 mg  1 mg Intravenous Q2H PRN    ibuprofen (MOTRIN) tablet 600 mg  600 mg Oral Q6H    ipratropium (ATROVENT) 0 02 % inhalation solution 0 5 mg  0 5 mg Nebulization Once PRN    Labetalol HCl (NORMODYNE) injection 10 mg  10 mg Intravenous Q10 Min PRN    lactated ringers infusion  125 mL/hr Intravenous Continuous    metoclopramide (REGLAN) injection 10 mg  10 mg Intravenous Once PRN    nalbuphine (NUBAIN) injection 5 mg  5 mg Intravenous Q4H PRN    ondansetron (ZOFRAN) injection 4 mg  4 mg Intravenous Q8H PRN    oxyCODONE (ROXICODONE) IR tablet 10 mg  10 mg Oral Q4H PRN    oxyCODONE (ROXICODONE) IR tablet 5 mg  5 mg Oral Q4H PRN    pantoprazole (PROTONIX) EC tablet 20 mg  20 mg Oral Daily    promethazine (PHENERGAN) injection 12 5 mg  12 5 mg Intravenous Once PRN    simethicone (MYLICON) chewable tablet 80 mg  80 mg Oral 4x Daily PRN    witch hazel-glycerin (TUCKS) topical pad 1 pad  1 pad Topical Q4H PRN     lactated ringers, 125 mL/hr, Last Rate: Stopped (04/10/22 1017)        Allergies   Allergen Reactions    Penicillins Other (See Comments)     "poly cillin" lip swelling  Other reaction(s): Other (See Comments)  "poly cillin" lip swelling       Objective   Vitals: Blood pressure 110/73, pulse (!) 118, temperature 98 6 °F (37 °C), temperature source Oral, resp  rate 20, height 5' 2" (1 575 m), weight 92 5 kg (204 lb), last menstrual period 07/15/2021, SpO2 95 %, currently breastfeeding , Body mass index is 37 31 kg/m²  ,     Systolic (82CSI), LKZ:976 , Min:107 , WOP:375     Diastolic (46YJJ), QYO:60, Min:54, Max:73    Intake/Output Summary (Last 24 hours) at 4/10/2022 1256  Last data filed at 4/10/2022 0428  Gross per 24 hour Intake 2000 ml   Output --   Net 2000 ml     Wt Readings from Last 3 Encounters:   04/06/22 92 5 kg (204 lb)   04/05/22 92 9 kg (204 lb 12 8 oz)   03/31/22 91 2 kg (201 lb)     Invasive Devices  Report    None               Physical Exam  Vitals reviewed  Constitutional:       General: She is not in acute distress  Appearance: Normal appearance  Neck:      Vascular: No hepatojugular reflux or JVD  Cardiovascular:      Rate and Rhythm: Regular rhythm  Tachycardia present  Pulses: Normal pulses  Heart sounds: Normal heart sounds  No murmur heard  No friction rub  No gallop  Pulmonary:      Effort: Pulmonary effort is normal  No respiratory distress  Breath sounds: No rales  Comments: Room air  Abdominal:      General: Bowel sounds are normal  There is no distension  Palpations: Abdomen is soft  Tenderness: There is no abdominal tenderness  Musculoskeletal:         General: No tenderness  Normal range of motion  Cervical back: Neck supple  Right lower leg: Edema present  Left lower leg: Edema present  Skin:     General: Skin is warm and dry  Capillary Refill: Capillary refill takes less than 2 seconds  Findings: No erythema  Neurological:      Mental Status: She is alert and oriented to person, place, and time     Psychiatric:         Mood and Affect: Mood normal      LABORATORY RESULTS:      CBC with diff:   Results from last 7 days   Lab Units 04/10/22  0553 04/09/22  1421 04/09/22  0715 04/08/22  2111 04/08/22  1100 04/05/22  1436   WBC Thousand/uL 10 20* 15 25* 13 67* 18 87* 18 93* 10 39*   HEMOGLOBIN g/dL 7 2* 8 1* 7 1* 7 3* 7 0* 11 2*   HEMATOCRIT % 21 8* 24 6* 20 4* 21 8* 21 9* 35 2   MCV fL 85 85 79* 83 83 82   PLATELETS Thousands/uL 168 181 183 199 173 236   MCH pg 28 0 28 1 27 4 27 8 26 5* 26 0*   MCHC g/dL 33 0 32 9 34 8 33 5 32 0 31 8   RDW % 14 6 14 8 14 0 14 1 13 3 13 6   MPV fL 10 1 10 5 10 5 11 0 11 4 11 7   NRBC AUTO /100 WBCs --   --  0  --   --  0     CMP:  Results from last 7 days   Lab Units 04/10/22  0553 04/09/22  1421 04/08/22  1100   POTASSIUM mmol/L 3 5 4 4 4 4   CHLORIDE mmol/L 105 102 100   CO2 mmol/L 26 26 22   BUN mg/dL 5 11 10   CREATININE mg/dL 0 68 0 89 0 91   CALCIUM mg/dL 8 4 8 6 8 4   AST U/L 29  --   --    ALT U/L 25  --   --    ALK PHOS U/L 86  --   --    EGFR ml/min/1 73sq m 112 83 81     BMP:  Results from last 7 days   Lab Units 04/10/22  0553 04/09/22  1421 04/08/22  1100   POTASSIUM mmol/L 3 5 4 4 4 4   CHLORIDE mmol/L 105 102 100   CO2 mmol/L 26 26 22   BUN mg/dL 5 11 10   CREATININE mg/dL 0 68 0 89 0 91   CALCIUM mg/dL 8 4 8 6 8 4     Lab Results   Component Value Date    CREATININE 0 68 04/10/2022    CREATININE 0 89 04/09/2022    CREATININE 0 91 04/08/2022     Results from last 7 days   Lab Units 04/09/22  1421   MAGNESIUM mg/dL 2 0     Lipid Profile:   No results found for: CHOL  Lab Results   Component Value Date    HDL 61 (H) 03/08/2019    HDL 59 01/13/2017     Lab Results   Component Value Date    LDLCALC 147 (H) 03/08/2019    LDLCALC 154 (H) 01/13/2017     Lab Results   Component Value Date    TRIG 61 03/08/2019    TRIG 74 01/13/2017     Imaging: I have personally reviewed pertinent reports  and I have personally reviewed pertinent films in PACS  CTA chest abdomen pelvis w wo contrast    Result Date: 4/9/2022  Narrative: CTA - CHEST, ABDOMEN AND PELVIS - WITHOUT AND WITH IV CONTRAST INDICATION:   persistent tachycardia, post-op  COMPARISON:  None  TECHNIQUE: CT examination of the chest, abdomen and pelvis was performed both prior to and after the administration of intravenous contrast   The noncontrast portion of this examination was performed utilizing low radiation dose technique  Thin section  angiographic arterial phase post contrast technique was used in order to evaluate for aortic dissection  3D reformatted images and volume rendering were performed on an independent workstation  Additionally, axial, sagittal, and coronal 2D reformatted  images were created from the source data and submitted for interpretation  Radiation dose length product (DLP) for this visit:  1853 mGy-cm   This examination, like all CT scans performed in the [facility], was performed utilizing techniques to minimize radiation dose exposure, including the use of iterative reconstruction and automated exposure control  IV Contrast:  100 mL of iohexol (OMNIPAQUE) Enteric Contrast:  Enteric contrast was not administered  FINDINGS: AORTA:  There is no aortic dissection or intramural hematoma  There is no aortic aneurysm  CHEST LUNGS:  Lungs are clear  There is no tracheal or endobronchial lesion  PLEURA:  Unremarkable  HEART/PULMONARY ARTERIAL TREE:  While the study was not tailored to evaluate for pulmonary embolism  No gross embolus is identified centrally and in the more proximal portions of the segmental vasculature  MEDIASTINUM AND MELANI:  Unremarkable  CHEST WALL AND LOWER NECK:   Unremarkable  ABDOMEN LIVER/BILIARY TREE:  Unremarkable  GALLBLADDER:  No calcified gallstones  No pericholecystic inflammatory change  SPLEEN:  Unremarkable  PANCREAS:  Unremarkable  ADRENAL GLANDS:  Unremarkable  KIDNEYS/URETERS:  Unremarkable  No hydronephrosis  STOMACH AND BOWEL:  Unremarkable  APPENDIX:  No findings to suggest appendicitis  ABDOMINOPELVIC CAVITY:  There is fluid and stranding around the uterus with mild hyperdensity anteriorly underlying the rectus musculature consistent with postoperative hematoma  A large measurable hematoma or fluid collection is not identified in the peritoneum or retroperitoneum  No lymphadenopathy  PELVIS REPRODUCTIVE ORGANS:  There are postoperative changes status post  section including uterine enlargement and endometrial gas  There are multiple serpiginous foci of arterial enhancement within the myometrium, most likely normal postoperative vasculature    A cluster of vessels in the right anterior myometrium on series 3 images 146 through 152 appear more prominent than vasculature elsewhere though it is difficult to exclude active extravasation without delayed images through the pelvis  URINARY BLADDER:  Unremarkable  ABDOMINAL WALL/INGUINAL REGIONS:  There is postoperative gas and stranding in the anterior abdominal wall and pelvic rectus muscles  OSSEOUS STRUCTURES:  No acute fracture or destructive osseous lesion  Impression: 1  No gross evidence of pulmonary embolism proximally though the study was not tailored for this evaluation  2   Normal aorta  3   Mild high density fluid in the pelvis adjacent to the uterus, most prominent anteriorly underlying the lower rectus musculature with gas and stranding in the overlying subcutaneous soft tissues  This is consistent with postoperative hematoma  No large measurable collection is identified  4   Multiple arterially enhancing structures in the myometrium including a prominent cluster in the right anterior myometrium  However, evaluation for contrast extravasation is limited due to lack of delayed imaging and these may represent normal enhancing arteries  I personally discussed this study with Kristi Holguin on 4/9/2022 at 5:43 PM  Workstation performed: [id number]     Counseling / Coordination of Care  Total floor / unit time spent today 45 minutes  Greater than 50% of total time was spent with the patient and / or family counseling and / or coordination of care  A description of the counseling / coordination of care: Review of history, current assessment, development of a plan  Code Status: Level 1 - Full Code    ** Please Note: Dragon 360 Dictation voice to text software may have been used in the creation of this document   **

## 2022-04-10 NOTE — QUICK NOTE
A bedside to assess patient after notified of temp of 101 3° and heart rate in 150s  Patient is alert sitting in chair  Patient denies any other symptoms at this time reports that she feels well overall  States that her heart rate tends to get high when she is anxious  Discussed with patient need for further testing to rule out infection  Patient expressed understanding  Discussed case with Dr Andrea López and will order CBC/CMP, lactate, chest x-ray, blood cultures, UA at this time  Will restart fluids and start gentamicin/clindamycin  Patient expressed understanding  Vitals:    04/10/22 1724   BP:    Pulse: (!) 153   Resp:    Temp: (!) 101 2 °F (38 4 °C)   SpO2: 98%       Physical Exam  Constitutional:       Appearance: Normal appearance  She is diaphoretic  HENT:      Head: Normocephalic and atraumatic  Cardiovascular:      Rate and Rhythm: Regular rhythm  Tachycardia present  Pulmonary:      Effort: Pulmonary effort is normal       Breath sounds: Normal breath sounds  Abdominal:      General: There is no distension  Tenderness: There is no abdominal tenderness  Comments: Slight bruising noted just superior to incision    Neurological:      General: No focal deficit present  Mental Status: She is alert  Review of Systems   Constitutional: Positive for chills and fever  HENT: Negative  Eyes: Negative  Cardiovascular: Positive for palpitations  Negative for chest pain  Respiratory: Negative for shortness of breath  Endocrine: Negative  Hematologic/Lymphatic: Negative  Skin: Negative  Musculoskeletal: Negative  Gastrointestinal: Negative  Genitourinary: Negative  Neurological: Negative  Psychiatric/Behavioral: Negative          Taiwo Gill MD  Obstetrics & Gynecology PGY-1  4/10/2022  5:53 PM

## 2022-04-11 PROBLEM — T80.92XA TRANSFUSION REACTION: Status: ACTIVE | Noted: 2022-04-11

## 2022-04-11 PROBLEM — A41.9 SEPSIS (HCC): Status: ACTIVE | Noted: 2022-04-11

## 2022-04-11 PROBLEM — R50.9 FEVER, UNKNOWN ORIGIN: Status: ACTIVE | Noted: 2022-04-11

## 2022-04-11 LAB
ANION GAP SERPL CALCULATED.3IONS-SCNC: 8 MMOL/L (ref 4–13)
BASOPHILS # BLD MANUAL: 0 THOUSAND/UL (ref 0–0.1)
BASOPHILS NFR MAR MANUAL: 0 % (ref 0–1)
BUN SERPL-MCNC: 3 MG/DL (ref 5–25)
CALCIUM SERPL-MCNC: 7.9 MG/DL (ref 8.3–10.1)
CHLORIDE SERPL-SCNC: 100 MMOL/L (ref 100–108)
CO2 SERPL-SCNC: 26 MMOL/L (ref 21–32)
CREAT SERPL-MCNC: 0.84 MG/DL (ref 0.6–1.3)
EOSINOPHIL # BLD MANUAL: 0 THOUSAND/UL (ref 0–0.4)
EOSINOPHIL NFR BLD MANUAL: 0 % (ref 0–6)
ERYTHROCYTE [DISTWIDTH] IN BLOOD BY AUTOMATED COUNT: 15.2 % (ref 11.6–15.1)
GFR SERPL CREATININE-BSD FRML MDRD: 89 ML/MIN/1.73SQ M
GLUCOSE SERPL-MCNC: 113 MG/DL (ref 65–140)
HCT VFR BLD AUTO: 24.2 % (ref 34.8–46.1)
HGB BLD-MCNC: 7.8 G/DL (ref 11.5–15.4)
LACTATE SERPL-SCNC: 0.9 MMOL/L (ref 0.5–2)
LYMPHOCYTES # BLD AUTO: 0.61 THOUSAND/UL (ref 0.6–4.47)
LYMPHOCYTES # BLD AUTO: 7 % (ref 14–44)
MCH RBC QN AUTO: 27.7 PG (ref 26.8–34.3)
MCHC RBC AUTO-ENTMCNC: 32.2 G/DL (ref 31.4–37.4)
MCV RBC AUTO: 86 FL (ref 82–98)
MONOCYTES # BLD AUTO: 0.17 THOUSAND/UL (ref 0–1.22)
MONOCYTES NFR BLD: 2 % (ref 4–12)
MYELOCYTES NFR BLD MANUAL: 2 % (ref 0–1)
NEUTROPHILS # BLD MANUAL: 7.76 THOUSAND/UL (ref 1.85–7.62)
NEUTS BAND NFR BLD MANUAL: 20 % (ref 0–8)
NEUTS SEG NFR BLD AUTO: 69 % (ref 43–75)
PLATELET # BLD AUTO: 195 THOUSANDS/UL (ref 149–390)
PLATELET BLD QL SMEAR: ADEQUATE
PMV BLD AUTO: 10 FL (ref 8.9–12.7)
POLYCHROMASIA BLD QL SMEAR: PRESENT
POTASSIUM SERPL-SCNC: 3.2 MMOL/L (ref 3.5–5.3)
PROCALCITONIN SERPL-MCNC: 0.3 NG/ML
RBC # BLD AUTO: 2.82 MILLION/UL (ref 3.81–5.12)
RBC MORPH BLD: PRESENT
SODIUM SERPL-SCNC: 134 MMOL/L (ref 136–145)
TRANSFUSION STATUS PATIENT QL: NORMAL
VANCOMYCIN TROUGH SERPL-MCNC: 5.6 UG/ML (ref 10–20)
WBC # BLD AUTO: 8.72 THOUSAND/UL (ref 4.31–10.16)

## 2022-04-11 PROCEDURE — 85007 BL SMEAR W/DIFF WBC COUNT: CPT | Performed by: OBSTETRICS & GYNECOLOGY

## 2022-04-11 PROCEDURE — 80048 BASIC METABOLIC PNL TOTAL CA: CPT | Performed by: OBSTETRICS & GYNECOLOGY

## 2022-04-11 PROCEDURE — 84145 PROCALCITONIN (PCT): CPT | Performed by: OBSTETRICS & GYNECOLOGY

## 2022-04-11 PROCEDURE — 87077 CULTURE AEROBIC IDENTIFY: CPT

## 2022-04-11 PROCEDURE — 87086 URINE CULTURE/COLONY COUNT: CPT

## 2022-04-11 PROCEDURE — 83605 ASSAY OF LACTIC ACID: CPT | Performed by: OBSTETRICS & GYNECOLOGY

## 2022-04-11 PROCEDURE — 99223 1ST HOSP IP/OBS HIGH 75: CPT | Performed by: INTERNAL MEDICINE

## 2022-04-11 PROCEDURE — 87186 SC STD MICRODIL/AGAR DIL: CPT

## 2022-04-11 PROCEDURE — 94760 N-INVAS EAR/PLS OXIMETRY 1: CPT

## 2022-04-11 PROCEDURE — 85027 COMPLETE CBC AUTOMATED: CPT | Performed by: OBSTETRICS & GYNECOLOGY

## 2022-04-11 PROCEDURE — 80202 ASSAY OF VANCOMYCIN: CPT | Performed by: OBSTETRICS & GYNECOLOGY

## 2022-04-11 PROCEDURE — 94762 N-INVAS EAR/PLS OXIMTRY CONT: CPT

## 2022-04-11 PROCEDURE — 99024 POSTOP FOLLOW-UP VISIT: CPT | Performed by: OBSTETRICS & GYNECOLOGY

## 2022-04-11 RX ORDER — SODIUM CHLORIDE, SODIUM LACTATE, POTASSIUM CHLORIDE, CALCIUM CHLORIDE 600; 310; 30; 20 MG/100ML; MG/100ML; MG/100ML; MG/100ML
100 INJECTION, SOLUTION INTRAVENOUS CONTINUOUS
Status: DISCONTINUED | OUTPATIENT
Start: 2022-04-11 | End: 2022-04-12

## 2022-04-11 RX ORDER — VANCOMYCIN HYDROCHLORIDE 1 G/200ML
15 INJECTION, SOLUTION INTRAVENOUS EVERY 8 HOURS
Status: DISCONTINUED | OUTPATIENT
Start: 2022-04-11 | End: 2022-04-12

## 2022-04-11 RX ORDER — POTASSIUM CHLORIDE 20 MEQ/1
20 TABLET, EXTENDED RELEASE ORAL ONCE
Status: COMPLETED | OUTPATIENT
Start: 2022-04-11 | End: 2022-04-11

## 2022-04-11 RX ADMIN — ACETAMINOPHEN 650 MG: 325 TABLET, FILM COATED ORAL at 05:05

## 2022-04-11 RX ADMIN — VANCOMYCIN HYDROCHLORIDE 1000 MG: 1 INJECTION, SOLUTION INTRAVENOUS at 02:37

## 2022-04-11 RX ADMIN — SODIUM CHLORIDE, SODIUM LACTATE, POTASSIUM CHLORIDE, AND CALCIUM CHLORIDE 125 ML/HR: .6; .31; .03; .02 INJECTION, SOLUTION INTRAVENOUS at 14:13

## 2022-04-11 RX ADMIN — IBUPROFEN 600 MG: 600 TABLET ORAL at 22:24

## 2022-04-11 RX ADMIN — IBUPROFEN 600 MG: 600 TABLET ORAL at 10:23

## 2022-04-11 RX ADMIN — ACETAMINOPHEN 650 MG: 325 TABLET, FILM COATED ORAL at 17:04

## 2022-04-11 RX ADMIN — OXYCODONE HYDROCHLORIDE 5 MG: 5 TABLET ORAL at 21:48

## 2022-04-11 RX ADMIN — SODIUM CHLORIDE, SODIUM LACTATE, POTASSIUM CHLORIDE, AND CALCIUM CHLORIDE 125 ML/HR: .6; .31; .03; .02 INJECTION, SOLUTION INTRAVENOUS at 22:40

## 2022-04-11 RX ADMIN — VANCOMYCIN HYDROCHLORIDE 1000 MG: 1 INJECTION, SOLUTION INTRAVENOUS at 18:33

## 2022-04-11 RX ADMIN — DOCUSATE SODIUM 100 MG: 100 CAPSULE, LIQUID FILLED ORAL at 17:04

## 2022-04-11 RX ADMIN — SODIUM CHLORIDE, SODIUM LACTATE, POTASSIUM CHLORIDE, AND CALCIUM CHLORIDE 125 ML/HR: .6; .31; .03; .02 INJECTION, SOLUTION INTRAVENOUS at 04:11

## 2022-04-11 RX ADMIN — IBUPROFEN 600 MG: 600 TABLET ORAL at 02:40

## 2022-04-11 RX ADMIN — DOCUSATE SODIUM 100 MG: 100 CAPSULE, LIQUID FILLED ORAL at 10:23

## 2022-04-11 RX ADMIN — VANCOMYCIN HYDROCHLORIDE 1000 MG: 1 INJECTION, SOLUTION INTRAVENOUS at 11:02

## 2022-04-11 RX ADMIN — ENOXAPARIN SODIUM 40 MG: 40 INJECTION SUBCUTANEOUS at 10:23

## 2022-04-11 RX ADMIN — SODIUM CHLORIDE 1000 ML: 0.9 INJECTION, SOLUTION INTRAVENOUS at 12:33

## 2022-04-11 RX ADMIN — METRONIDAZOLE 500 MG: 500 INJECTION, SOLUTION INTRAVENOUS at 04:09

## 2022-04-11 RX ADMIN — ACETAMINOPHEN 650 MG: 325 TABLET, FILM COATED ORAL at 23:19

## 2022-04-11 RX ADMIN — POTASSIUM CHLORIDE 20 MEQ: 1500 TABLET, EXTENDED RELEASE ORAL at 16:02

## 2022-04-11 RX ADMIN — OXYCODONE HYDROCHLORIDE 10 MG: 5 TABLET ORAL at 06:24

## 2022-04-11 RX ADMIN — IBUPROFEN 600 MG: 600 TABLET ORAL at 16:02

## 2022-04-11 RX ADMIN — CEFEPIME HYDROCHLORIDE 2000 MG: 2 INJECTION, POWDER, FOR SOLUTION INTRAVENOUS at 02:00

## 2022-04-11 RX ADMIN — CEFEPIME HYDROCHLORIDE 2000 MG: 2 INJECTION, POWDER, FOR SOLUTION INTRAVENOUS at 10:15

## 2022-04-11 RX ADMIN — METRONIDAZOLE 500 MG: 500 INJECTION, SOLUTION INTRAVENOUS at 12:58

## 2022-04-11 RX ADMIN — OXYCODONE HYDROCHLORIDE 10 MG: 5 TABLET ORAL at 16:13

## 2022-04-11 RX ADMIN — ACETAMINOPHEN 650 MG: 325 TABLET, FILM COATED ORAL at 11:06

## 2022-04-11 NOTE — PROGRESS NOTES
Vancomycin Assessment    Jax Hernandez is a 39 y o  female who is currently receiving vancomycin 1000mg (15mg/kg) Q 8hrs for other febrile tachycardia w/unknown source of infx   Relevant clinical data and objective history reviewed:  Creatinine   Date Value Ref Range Status   04/10/2022 0 71 0 60 - 1 30 mg/dL Final     Comment:     Standardized to IDMS reference method   04/10/2022 0 68 0 60 - 1 30 mg/dL Final     Comment:     Standardized to IDMS reference method   04/09/2022 0 89 0 60 - 1 30 mg/dL Final     Comment:     Standardized to IDMS reference method     /60 (BP Location: Right arm)   Pulse (!) 132   Temp (!) 102 °F (38 9 °C) (Oral)   Resp 20   Ht 5' 2" (1 575 m)   Wt 92 5 kg (204 lb)   LMP 07/15/2021   SpO2 98%   Breastfeeding Unknown   BMI 37 31 kg/m²   I/O last 3 completed shifts: In: 2000 [I V :1500; Blood:500]  Out: -   Lab Results   Component Value Date/Time    BUN 6 04/10/2022 06:00 PM    WBC 8 42 04/10/2022 06:00 PM    HGB 7 1 (L) 04/10/2022 06:00 PM    HCT 21 0 (L) 04/10/2022 06:00 PM    MCV 84 04/10/2022 06:00 PM     04/10/2022 06:00 PM     Temp Readings from Last 3 Encounters:   04/11/22 (!) 102 °F (38 9 °C) (Oral)   02/16/22 97 7 °F (36 5 °C)   01/03/22 97 8 °F (36 6 °C) (Temporal)     Vancomycin Days of Therapy: 1    Assessment/Plan  The patient is currently on vancomycin utilizing scheduled dosing based on adjusted body weight (due to obesity)  Baseline risks associated with therapy include: concomitant nephrotoxic medications  The patient is currently receiving 1000mg (15mg/kg) Q 8hrs and is clinically appropriate and dose will be continued  Pharmacy will also follow closely for s/sx of nephrotoxicity, infusion reactions, and appropriateness of therapy  BMP and CBC will be ordered per protocol  Plan for trough as patient approaches steady state, prior to the 5th  dose at approximately 1000 on 4/12/22    Due to infection severity, will target a trough of 15-20 (appropriate for most indications)   Pharmacy will continue to follow the patients culture results and clinical progress daily      Johana Wynne, Pharmacist

## 2022-04-11 NOTE — ASSESSMENT & PLAN NOTE
Continue ambulating  Continue breastfeeding  Voiding   Contraception: declines for now, will discuss at postpartum visit   Continue other routine post op care  Plan for post op visit in 1 week with her Michael Bajwa

## 2022-04-11 NOTE — PLAN OF CARE
Problem: PAIN - ADULT  Goal: Verbalizes/displays adequate comfort level or baseline comfort level  Description: Interventions:  - Encourage patient to monitor pain and request assistance  - Assess pain using appropriate pain scale  - Administer analgesics based on type and severity of pain and evaluate response  - Implement non-pharmacological measures as appropriate and evaluate response  - Consider cultural and social influences on pain and pain management  - Notify physician/advanced practitioner if interventions unsuccessful or patient reports new pain  Outcome: Progressing     Problem: INFECTION - ADULT  Goal: Absence or prevention of progression during hospitalization  Description: INTERVENTIONS:  - Assess and monitor for signs and symptoms of infection  - Monitor lab/diagnostic results  - Monitor all insertion sites, i e  indwelling lines, tubes, and drains  - Monitor endotracheal if appropriate and nasal secretions for changes in amount and color  - [place] appropriate cooling/warming therapies per order  - Administer medications as ordered  - Instruct and encourage patient and family to use good hand hygiene technique  - Identify and instruct in appropriate isolation precautions for identified infection/condition  Outcome: Progressing  Goal: Absence of fever/infection during neutropenic period  Description: INTERVENTIONS:  - Monitor WBC    Outcome: Progressing     Problem: SAFETY ADULT  Goal: Patient will remain free of falls  Description: INTERVENTIONS:  - Educate patient/family on patient safety including physical limitations  - Instruct patient to call for assistance with activity   - Consult OT/PT to assist with strengthening/mobility   - Keep Call bell within reach  - Keep bed low and locked with side rails adjusted as appropriate  - Keep care items and personal belongings within reach  - Initiate and maintain comfort rounds  - Make Fall Risk Sign visible to staff  - Offer Toileting in advance of need  - Obtain necessary fall risk management equipment  - Apply yellow socks and bracelet for high fall risk patients  - Consider moving patient to room near nurses station  Outcome: Progressing  Goal: Maintain or return to baseline ADL function  Description: INTERVENTIONS:  -  Assess patient's ability to carry out ADLs; assess patient's baseline for ADL function and identify physical deficits which impact ability to perform ADLs (bathing, care of mouth/teeth, toileting, grooming, dressing, etc )  - Assess/evaluate cause of self-care deficits   - Assess range of motion  - Assess patient's mobility; develop plan if impaired  - Assess patient's need for assistive devices and provide as appropriate  - Encourage maximum independence but intervene and supervise when necessary  - Involve family in performance of ADLs  - Assess for home care needs following discharge   - Consider OT consult to assist with ADL evaluation and planning for discharge  - Provide patient education as appropriate  Outcome: Progressing  Goal: Maintains/Returns to pre admission functional level  Description: INTERVENTIONS:  - Perform BMAT or MOVE assessment daily    - Set and communicate daily mobility goal to care team and patient/family/caregiver     - Collaborate with rehabilitation services on mobility goals if consulted  - Perform Range of Motion    - Reposition patient   - Dangle patient   - Stand patient  - Ambulate patient   - Out of bed to chair   - Out of bed for meals   - Out of bed for toileting  - Record patient progress and toleration of activity level   Outcome: Progressing     Problem: DISCHARGE PLANNING  Goal: Discharge to home or other facility with appropriate resources  Description: INTERVENTIONS:  - Identify barriers to discharge w/patient and caregiver  - Arrange for needed discharge resources and transportation as appropriate  - Identify discharge learning needs (meds, wound care, etc )  - Arrange for interpretive services to assist at discharge as needed  - Refer to Case Management Department for coordinating discharge planning if the patient needs post-hospital services based on physician/advanced practitioner order or complex needs related to functional status, cognitive ability, or social support system  Outcome: Progressing     Problem: ALTERATION IN THE BREAST  Goal: Optimize infant feeding at the breast  Description: INTERVENTIONS:  - Latch, breast and nipple assessment  - Assess prior breast feeding history  - Hand expression of breast milk  - For cracked, bleeding and or sore nipples reassess latch, treat damaged nipple  -Educate mother on feeding cues  -Positioning/latch techniques  Outcome: Progressing     Problem: INADEQUATE LATCH, SUCK OR SWALLOW  Goal: Demonstrate ability to latch and sustain latch, audible swallowing and satiety  Description: INTERVENTIONS:  - Assess oral anatomy, notify Physician/AP for abnormal findings  - Establish milk expression  - Maximize feeding opportunity (skin to skin, behavioral state)  - Position/latch techniques  - Discourage use of pacifier-artificial nipple  - Mechanical pumping  - Nipple Shield  - Supplemental formula feeding (Physician/AP order)  - Alternative feeding method  Outcome: Progressing     Problem: POSTPARTUM  Goal: Experiences normal postpartum course  Description: INTERVENTIONS:  - Monitor maternal vital signs  - Assess uterine involution and lochia  Outcome: Progressing  Goal: Appropriate maternal -  bonding  Description: INTERVENTIONS:  - Identify family support  - Assess for appropriate maternal/infant bonding   -Encourage maternal/infant bonding opportunities  - Referral to  or  as needed  Outcome: Progressing  Goal: Establishment of infant feeding pattern  Description: INTERVENTIONS:  - Assess breast/bottle feeding  - Refer to lactation as needed  Outcome: Progressing  Goal: Incision(s), wounds(s) or drain site(s) healing without S/S of infection  Description: INTERVENTIONS  - Assess and document dressing, incision, wound bed, drain sites and surrounding tissue  - Provide patient and family education  Outcome: Progressing     Problem: Potential for Falls  Goal: Patient will remain free of falls  Description: INTERVENTIONS:  - Educate patient/family on patient safety including physical limitations  - Instruct patient to call for assistance with activity   - Consult OT/PT to assist with strengthening/mobility   - Keep Call bell within reach  - Keep bed low and locked with side rails adjusted as appropriate  - Keep care items and personal belongings within reach  - Initiate and maintain comfort rounds  - Make Fall Risk Sign visible to staff  - Offer Toileting in advance of need  - Obtain necessary fall risk management equipment  - Apply yellow socks and bracelet for high fall risk patients  - Consider moving patient to room near nurses station  Outcome: Progressing

## 2022-04-11 NOTE — PLAN OF CARE
Problem: PAIN - ADULT  Goal: Verbalizes/displays adequate comfort level or baseline comfort level  Description: Interventions:  - Encourage patient to monitor pain and request assistance  - Assess pain using appropriate pain scale  - Administer analgesics based on type and severity of pain and evaluate response  - Implement non-pharmacological measures as appropriate and evaluate response  - Consider cultural and social influences on pain and pain management  - Notify physician/advanced practitioner if interventions unsuccessful or patient reports new pain  Outcome: Progressing     Problem: INFECTION - ADULT  Goal: Absence or prevention of progression during hospitalization  Description: INTERVENTIONS:  - Assess and monitor for signs and symptoms of infection  - Monitor lab/diagnostic results  - Monitor all insertion sites, i e  indwelling lines, tubes, and drains  - Monitor endotracheal if appropriate and nasal secretions for changes in amount and color  - [place] appropriate cooling/warming therapies per order  - Administer medications as ordered  - Instruct and encourage patient and family to use good hand hygiene technique  - Identify and instruct in appropriate isolation precautions for identified infection/condition  Outcome: Progressing  Goal: Absence of fever/infection during neutropenic period  Description: INTERVENTIONS:  - Monitor WBC    Outcome: Progressing     Problem: SAFETY ADULT  Goal: Patient will remain free of falls  Description: INTERVENTIONS:  - Educate patient/family on patient safety including physical limitations  - Instruct patient to call for assistance with activity   - Consult OT/PT to assist with strengthening/mobility   - Keep Call bell within reach  - Keep bed low and locked with side rails adjusted as appropriate  - Keep care items and personal belongings within reach  - Initiate and maintain comfort rounds  - Make Fall Risk Sign visible to staff  - Offer Toileting in advance of need  - Obtain necessary fall risk management equipment  - Apply yellow socks and bracelet for high fall risk patients  - Consider moving patient to room near nurses station  Outcome: Progressing  Goal: Maintain or return to baseline ADL function  Description: INTERVENTIONS:  -  Assess patient's ability to carry out ADLs; assess patient's baseline for ADL function and identify physical deficits which impact ability to perform ADLs (bathing, care of mouth/teeth, toileting, grooming, dressing, etc )  - Assess/evaluate cause of self-care deficits   - Assess range of motion  - Assess patient's mobility; develop plan if impaired  - Assess patient's need for assistive devices and provide as appropriate  - Encourage maximum independence but intervene and supervise when necessary  - Involve family in performance of ADLs  - Assess for home care needs following discharge   - Consider OT consult to assist with ADL evaluation and planning for discharge  - Provide patient education as appropriate  Outcome: Progressing  Goal: Maintains/Returns to pre admission functional level  Description: INTERVENTIONS:  - Perform BMAT or MOVE assessment daily    - Set and communicate daily mobility goal to care team and patient/family/caregiver     - Collaborate with rehabilitation services on mobility goals if consulted  - Perform Range of Motion    - Reposition patient   - Dangle patient   - Stand patient  - Ambulate patient   - Out of bed to chair   - Out of bed for meals   - Out of bed for toileting  - Record patient progress and toleration of activity level   Outcome: Progressing     Problem: DISCHARGE PLANNING  Goal: Discharge to home or other facility with appropriate resources  Description: INTERVENTIONS:  - Identify barriers to discharge w/patient and caregiver  - Arrange for needed discharge resources and transportation as appropriate  - Identify discharge learning needs (meds, wound care, etc )  - Arrange for interpretive services to assist at discharge as needed  - Refer to Case Management Department for coordinating discharge planning if the patient needs post-hospital services based on physician/advanced practitioner order or complex needs related to functional status, cognitive ability, or social support system  Outcome: Progressing     Problem: ALTERATION IN THE BREAST  Goal: Optimize infant feeding at the breast  Description: INTERVENTIONS:  - Latch, breast and nipple assessment  - Assess prior breast feeding history  - Hand expression of breast milk  - For cracked, bleeding and or sore nipples reassess latch, treat damaged nipple  -Educate mother on feeding cues  -Positioning/latch techniques  Outcome: Progressing     Problem: INADEQUATE LATCH, SUCK OR SWALLOW  Goal: Demonstrate ability to latch and sustain latch, audible swallowing and satiety  Description: INTERVENTIONS:  - Assess oral anatomy, notify Physician/AP for abnormal findings  - Establish milk expression  - Maximize feeding opportunity (skin to skin, behavioral state)  - Position/latch techniques  - Discourage use of pacifier-artificial nipple  - Mechanical pumping  - Nipple Shield  - Supplemental formula feeding (Physician/AP order)  - Alternative feeding method  Outcome: Progressing     Problem: POSTPARTUM  Goal: Experiences normal postpartum course  Description: INTERVENTIONS:  - Monitor maternal vital signs  - Assess uterine involution and lochia  Outcome: Progressing  Goal: Appropriate maternal -  bonding  Description: INTERVENTIONS:  - Identify family support  - Assess for appropriate maternal/infant bonding   -Encourage maternal/infant bonding opportunities  - Referral to  or  as needed  Outcome: Progressing  Goal: Establishment of infant feeding pattern  Description: INTERVENTIONS:  - Assess breast/bottle feeding  - Refer to lactation as needed  Outcome: Progressing  Goal: Incision(s), wounds(s) or drain site(s) healing without S/S of infection  Description: INTERVENTIONS  - Assess and document dressing, incision, wound bed, drain sites and surrounding tissue  - Provide patient and family education  - Perform skin care/dressing changes prn  Outcome: Progressing     Problem: Potential for Falls  Goal: Patient will remain free of falls  Description: INTERVENTIONS:  - Educate patient/family on patient safety including physical limitations  - Instruct patient to call for assistance with activity   - Consult OT/PT to assist with strengthening/mobility   - Keep Call bell within reach  - Keep bed low and locked with side rails adjusted as appropriate  - Keep care items and personal belongings within reach  - Initiate and maintain comfort rounds  - Make Fall Risk Sign visible to staff  - Offer Toileting in advance of need  - Obtain necessary fall risk management equipment  - Apply yellow socks and bracelet for high fall risk patients  - Consider moving patient to room near nurses station  Outcome: Progressing

## 2022-04-11 NOTE — CONSULTS
Consultation - Infectious Disease   Salvador King 39 y o  female MRN: [id number]  Unit/Bed#: -01 Encounter: [id number]    Extensive review of the medical records in Epic including review of the notes, radiographs, and laboratory results  IMPRESSION/RECOMMENDATIONS:   1  Sepsis, evolving since admission:  With fever, tachycardia, tachypnea, leukocytosis in the setting of Enterococcus bacteremia  Lactate is within normal limits  Procalcitonin is elevated 0 30  Temperature curve improving since yesterday  Doubt pt would have persistent fevers related to blood transfusion  o Follow blood cultures  o Antibiotic therapy as stated below  o Repeat CBC in am   o Supportive care per primary service  o Monitor clinical response, temperature curve  2  Enterococcus bacteremia:  Which GPC in pairs and chains in 1 of 2 sets  Unclear focus of bacteremia at present  Consider intra-abdominal or pelvic source  Recent CT abd results noted with hematoma  Surgical incision appears clean and dry on exam  I do not suspect a wound infection or skin source of bacteremia at this time  o Follow blood cultures for identification and susceptibility report  o Repeat blood cultures in the morning to assess for clearance of bacteremia  o Consider pelvic US to assess for fluid collection  Alternately, could consider repeat CT abd/pelvis if fevers persist   o Check TT echo to assess for valve vegetations  o Discontinue cefepime and metronidazole  o Continue vancomycin iv dosing per pharmacy protocol for target trough of 15-20  3  Status post  section on : no evidence of wound infection at this time  o Serial exams of surgical wound  4  Morbid obesity with BMI of 37: this is a risk factor for infection  5  Hx of PCN allergy: pt is uncertain about the nature of the reaction, but thinks she may have had a rash  o Pt will inquire from her father and PCP regarding the nature of her PCN allergy      My recommendations were discussed with the patient in detail who verbalized understanding  My recommendations were discussed with Dr Estella Del Real, from the primary service via secure text  Thank you for allowing me to participate in the care of this patient  The ID service will follow  HISTORY OF PRESENT ILLNESS:  Reason for Consult: sepsis  CC: fever  HPI: Dwight De La Torre is a 39y o  year old female who was admitted on  for observation after fetus noted to have spontaneous deceleration  Patient underwent  section on   Following  section, patient was noted to appear pale, was tachycardic with heart rate 140s and had BP of 90/50s  She was found to be anemic with hemoglobin that had dropped to 7 0 and was transfused 1 unit of pRBCs  RRT was called on  as patient was noted to be febrile with temp of 100 4 and tachycardic (157s)  Initially, she was felt to have a reaction to the blood transfusion  However, since then patient has had persistent fever ranging from 101-102 F  Since yesterday she has been treated with broad-spectrum antibiotic regimen of vancomycin, cefepime and metronidazole  Infectious Disease service was consulted for further evaluation  She reports improving soreness from her surgical incision, which is gradually improving  No drainage reported  REVIEW OF SYSTEMS:  Constitutional: +fever, chills, fatigue  HENT: Negative runny nose, sore throat, congestion  Eyes: +blurry vision  Respiratory: Negative cough, shortness of breath, no pleuritic pain  Cardiovascular: Negative for chest pain, palpitations  Gastrointestinal: Negative for abdominal pain, nausea, vomiting, diarrhea  Genitourinary:  Occasional hematuria with clots, urine frequency  Negative for dysuria, flank/CVA pain  Musculoskeletal: +leg swelling  Negative for arthritis, myalgias  Skin: +healing surgical wound following   Negative for rash     Neurological: Negative for syncope, focal weakness  Hematological: Negative for excessive bruising, bleeding  Psychiatric/Behavioral: Negative for confusion, hallucination  PAST MEDICAL HISTORY:  Past Medical History:   Diagnosis Date    Abnormal Pap smear of cervix     Asthma     only with allergies, utilizes rescue inhaler     Cardiac arrhythmia     Last Assessed: 1/10/2017    Female infertility     Seasonal allergies      Past Surgical History:   Procedure Laterality Date    AUGMENTATION BREAST      CARDIAC CATHETERIZATION      Last Assessed: 1/10/2017    LASIK      Corneal  Last Assessed: 1/10/2017     FAMILY HISTORY:  Family History   Problem Relation Age of Onset    Lung cancer Mother     Cancer Mother     Heart disease Maternal Grandmother     Cancer Sister    Sister: breast cancer    SOCIAL HISTORY:  Social History   Social History     Substance and Sexual Activity   Alcohol Use Yes     Social History     Substance and Sexual Activity   Drug Use Never     Social History     Tobacco Use   Smoking Status Never Smoker   Smokeless Tobacco Never Used   Lives at home with   Pets: 1 dog  ALLERGIES:  Allergies   Allergen Reactions    Penicillins Other (See Comments)     "poly cillin" lip swelling  Other reaction(s): Other (See Comments)  "poly cillin" lip swelling     MEDICATIONS:  All current active medications have been reviewed    Antibiotics: cefepime since 4/10  Scheduled Meds:  Current Facility-Administered Medications   Medication Dose Route Frequency Provider Last Rate    acetaminophen  650 mg Oral Q6H [facility] Shirlene Weiss MD      albuterol  2 5 mg Nebulization Once PRN Charna Baumgarten, MD      benzocaine-menthol-lanolin-aloe  1 application Topical Y6H PRN Wenceslao Jasso MD      calcium carbonate  1,000 mg Oral Daily PRN Wenceslao Jasso MD      cefepime  2,000 mg Intravenous Q8H Marquise Lucio MD 2,000 mg (04/11/22 1015)    diphenhydrAMINE  25 mg Oral Q6H PRN Wenceslao Jasso MD      docusate sodium  100 mg Oral BID Minus MD Ned      enoxaparin  40 mg Subcutaneous Q24H [facility] Penelope Sotelo MD      fentaNYL  25 mcg Intravenous Q5 Min PRN Ashu Etienne MD      hydrALAZINE  5 mg Intravenous Q20 Min PRN Ashu Etienne MD      hydrocortisone  1 application Topical Daily PRN Minus MD Ned      HYDROmorphone  0 2 mg Intravenous Q5 Min PRN Ashu Etienne MD      HYDROmorphone  0 5 mg Intravenous Q5 Min PRN Ashu Etienne MD      HYDROmorphone  1 mg Intravenous Q2H PRN Minus MD Ned      ibuprofen  600 mg Oral Q6H Renay Epstein MD      ipratropium  0 5 mg Nebulization Once PRN Ashu Etienne MD      Labetalol HCl  10 mg Intravenous Q10 Min PRN Ashu Etienne MD      lactated ringers  125 mL/hr Intravenous Continuous Casimiro Yates  mL/hr (04/11/22 1413)    metoclopramide  10 mg Intravenous Once PRN Ashu Etienne MD      metroNIDAZOLE  500 mg Intravenous Q8H Gerry Romero  mg (04/11/22 1258)    nalbuphine  5 mg Intravenous Q4H PRN Ashu Etienne MD      ondansetron  4 mg Intravenous Q8H PRN Minus MD Ned      oxyCODONE  10 mg Oral Q4H PRN Minus MD Ned      oxyCODONE  5 mg Oral Q4H PRN Minus MD Ned      pantoprazole  20 mg Oral Daily Minus MD Ned      potassium chloride  20 mEq Oral Once Octavia Martinez MD      promethazine  12 5 mg Intravenous Once PRN Ashu Etienne MD      simethicone  80 mg Oral 4x Daily PRN Minus MD Ned      sodium chloride  1,000 mL Intravenous Once Casimiro Yates MD      Followed by   Juan Amador sodium chloride  1,000 mL Intravenous Once Casimiro Yates MD      vancomycin  15 mg/kg (Adjusted) Intravenous Q8H Gerry Romero MD 1,000 mg (04/11/22 1102)    witch hazel-glycerin  1 pad Topical Q4H PRN Minus MD Ned       Continuous Infusions:lactated ringers, 125 mL/hr, Last Rate: 125 mL/hr (04/11/22 1413)      PRN Meds: albuterol   benzocaine-menthol-lanolin-aloe    calcium carbonate    diphenhydrAMINE    fentaNYL    hydrALAZINE    hydrocortisone    HYDROmorphone    HYDROmorphone    HYDROmorphone    ipratropium    Labetalol HCl    metoclopramide    nalbuphine    ondansetron    oxyCODONE    oxyCODONE    promethazine    simethicone    witch hazel-glycerin     PHYSICAL EXAM:  Temp:  [97 5 °F (36 4 °C)-102 °F (38 9 °C)] 98 7 °F (37 1 °C)  HR:  [112-156] 120  Resp:  [18-20] 18  BP: (109-152)/(53-77) 110/58  SpO2:  [95 %-100 %] 96 %  Temp (24hrs), Av °F (37 8 °C), Min:97 5 °F (36 4 °C), Max:102 °F (38 9 °C)  Current: Temperature: 98 7 °F (37 1 °C)  No intake or output data in the 24 hours ending 22 1424  General Appearance:  Appearing well, nontoxic, sitting in chair, pale appearance, no acute distress   Head:  Normocephalic, without obvious abnormality, atraumatic   Eyes:  EOMI, Conjunctiva pink and sclera anicteric, both eyes   Nose: Nares normal, mucosa normal, no drainage   Throat: Oropharynx moist    Neck: Supple   Lungs:  Clear to auscultation bilaterally, respirations unlabored   Heart:   S1, S2, tachycardic, fast rhythm, no murmur   Abdomen: Soft, non-tender, non-distended   :  Healing  scar, no drainage, erythema or tenderness  No Stewart catheter present   Extremities:   1+ distal leg edema b/l   Skin: No rash or draining wounds noted  Neurologic: Alert and oriented to person, surroundings, conversant, fluent speech   Psychiatric: Calm, cooperative with exam and interview     LABS, IMAGING, & OTHER STUDIES:  Lab Results:  I have personally reviewed pertinent labs and cultures    Results from last 7 days   Lab Units 22  1013 04/10/22  1800 04/10/22  1351   WBC Thousand/uL 8 72 8 42 9 45   HEMOGLOBIN g/dL 7 8* 7 1* 7 4*   PLATELETS Thousands/uL 195 152 163     Results from last 7 days   Lab Units 22  0620 04/10/22  1800 04/10/22  1800 04/10/22  0553 04/10/22  0553   SODIUM mmol/L 134*  -- 133*  --  140   POTASSIUM mmol/L 3 2*   < > 3 4*   < > 3 5   CHLORIDE mmol/L 100   < > 100   < > 105   CO2 mmol/L 26   < > 26   < > 26   BUN mg/dL 3*   < > 6   < > 5   CREATININE mg/dL 0 84   < > 0 71   < > 0 68   EGFR ml/min/1 73sq m 89   < > 109   < > 112   CALCIUM mg/dL 7 9*   < > 8 0*   < > 8 4   AST U/L  --   --  31  --  29   ALT U/L  --   --  29   < > 25   ALK PHOS U/L  --   --  81   < > 86    < > = values in this interval not displayed  Results from last 7 days   Lab Units 04/10/22  1847 04/10/22  1801 04/09/22  1856   BLOOD CULTURE   --  Received in Microbiology Lab  Culture in Progress  No Growth at 24 hrs  GRAM STAIN RESULT  Gram positive cocci in pairs and chains*  --  No organisms seen     Imaging Studies:   4/10 chest x-ray:  No acute cardiopulmonary disease  4/9 CTA chest, abd/pelv w&wo contr:  No evidence of pulmonary embolism  Mild high density fluid in pelvis adjacent to uterus with gas and stranding and overlying subcutaneous soft tissues  This is consistent with postoperative hematoma  No large mesh both collection identified  I have personally reviewed pertinent imaging study reports and images in PACS

## 2022-04-11 NOTE — SEPSIS NOTE
Sepsis Note   Suma King 39 y o  female MRN: [id number]  Unit/Bed#: -01 Encounter: [id number]       qSOFA     Row Name 04/11/22 1004 04/11/22 0806 04/11/22 0615 04/11/22 0416 04/11/22 0200    Altered mental status GCS < 15 -- -- -- -- --    Respiratory Rate > / =22 0 0 0 0 0    Systolic BP < / =035 0 0 0 0 0    Q Sofa Score 0 0 0 0 0    Row Name 04/11/22 0000 04/10/22 2140 04/10/22 2021 04/10/22 1710 04/10/22 1600    Altered mental status GCS < 15 -- -- -- -- --    Respiratory Rate > / =22 0 0 0 0 0    Systolic BP < / =739 0 0 0 0 0    Q Sofa Score 0 0 0 0 0    Row Name 04/10/22 0900 04/10/22 0406 04/09/22 2358 04/09/22 2148 04/09/22 1700    Altered mental status GCS < 15 -- -- -- -- --    Respiratory Rate > / =22 0 0 0 0 0    Systolic BP < / =086 0 0 0 -- 0    Q Sofa Score 0 0 0 -- 0    Row Name 04/09/22 1600 04/09/22 1500 04/09/22 1428 04/09/22 14:15:34 04/09/22 1400    Altered mental status GCS < 15 -- -- -- -- --    Respiratory Rate > / =22 0 0 1 -- 1    Systolic BP < / =648 0 0 0 0 0    Q Sofa Score 0 0 1 1 1    Row Name 04/09/22 1245 04/09/22 1220 04/09/22 1200          Altered mental status GCS < 15 -- -- --      Respiratory Rate > / =22 1 0 0      Systolic BP < / =280 0 0 1      Q Sofa Score 1 0 1                 Initial Sepsis Screening     Row Name 04/11/22 1155                Is the patient's history suggestive of a new or worsening infection? Yes (Proceed)  -YC        Suspected source of infection acute abdominal infection  -YC        Are two or more of the following signs & symptoms of infection both present and new to the patient? Yes (Proceed)  -YC        Indicate SIRS criteria Hyperthemia > 38 3C (100 9F); Tachycardia > 90 bpm  -YC        If the answer is yes to both questions, suspicion of sepsis is present --        If severe sepsis is present AND tissue hypoperfusion perists in the hour after fluid resuscitation or lactate > 4, the patient meets criteria for SEPTIC SHOCK -- Are any of the following organ dysfunction criteria present within 6 hours of suspected infection and SIRS criteria that are NOT considered to be chronic conditions? No  -YC        Organ dysfunction --        Date of presentation of severe sepsis --        Time of presentation of severe sepsis --        Tissue hypoperfusion persists in the hour after crystalloid fluid administration, evidenced, by either: --        Was hypotension present within one hour of the conclusion of crystalloid fluid administration? No  -YC        Date of presentation of septic shock --        Time of presentation of septic shock --              User Key  (r) = Recorded By, (t) = Taken By, (c) = Cosigned By    [address] Name Provider Lorri Garcia MD Resident                  39years old status post primary low transverse  section due to maternal request day 3  Patient have turned tachycardic and febrile yesterday night and was started on initial clindamycin gentamicin, subsequently patient was transition to vancomycin cefepime and Flagyl by Infectious Disease team   Initial lactate yesterday within normal limits blood cultures were ordered with positive results today for gram-positive cocci in pairs and chains  Urinalysis with evidence of leukocyturia, occasional bacteria epithelial cells, chest x-ray without evidence of consolidations, bilateral costophrenic angles clear  CBC with white blood count 8 72  Patient currently febrile 101  with heart rate in the 130s, no hypotension present, adequate saturations on room air   will start sepsis protocol  Will continue same antibiotics  Diagnosis discussed with patient and her family  Clear lung files, low extremity edema present  Patient denies SOB or lightheadedness        Patient discussed with Dr Elvis Merchant MD

## 2022-04-11 NOTE — ASSESSMENT & PLAN NOTE
Currently on -->  --> Linezolid 600 mg BID (4/13)  Blood cultures 4/10: Enterococcus bacteremia   Blood cultures 4/12: No growth at 24h  S/p ID consult 4/11/22; appreciate recommendations   TT Echocardiogram 4/12 negative for vegetations   Urine culture 70-79K Enterococcos faecalis   Afebrile >24h   F/u AM labs    Temp Readings from Last 3 Encounters:   04/14/22 99 1 °F (37 3 °C) (Oral)   02/16/22 97 7 °F (36 5 °C)   01/03/22 97 8 °F (36 6 °C) (Temporal)     Clinically stable - patient feels well   Plan for PO linezolid on discharge until 4/20 for total of 10 days of antibiotic treatment per ID   Anticipate discharge today

## 2022-04-11 NOTE — PROGRESS NOTES
Progress Note - OB/GYN   Queen Karen King 39 y o  female MRN: [id number]  Unit/Bed#: -01 Encounter: [id number]    ASSESSMENT:  Sukhjinder Peng is a 39 y o  Danejosselyn Bergeron female, POD#3 s/p Primary low transverse  section at 38w1d for maternal request in the setting of prolonged induction of labor  Postpartum course complicated by acute blood loss anemia requiring 1U pRBCs, as well as tachycardia/fever, thought to be in the setting of transfusion reaction  PLAN:  Fever, unknown origin  Assessment & Plan  Currently on cefepime/vanc/flagyl - continue for now  Touch base with Cardiology/ID today as indicated  F/u AM labs    Temp Readings from Last 3 Encounters:   22 99 5 °F (37 5 °C) (Oral)   22 97 7 °F (36 5 °C)   22 97 8 °F (36 6 °C) (Temporal)     Clinically stable    Transfusion reaction  Assessment & Plan  Continue monitoring vitals    * S/P  section  Assessment & Plan  Continue ambulating  Continue breastfeeding  Voiding   Continue other routine post op care      SUBJECTIVE:  Pt feels well  She has no major complaints  Events over the weekend included tachycardia, fever, after receiving a second unit of blood; suspected transfusion reaction, however with persistent tachycardia, fever afterward, now suspicious for infectious process, source unidentified at this time  Leading suspicion is endometritis, currently receiving ceffepime/vanc/flagyl  Patient currently denies fever/chills  Denies chest pain, SOB, nausea, vomiting  She is ambulating well  Voiding without difficulty  Passing flatus  Patient desires to go home         OBJECTIVE:  Vitals:    22 0416 22 0505 22 0615 22 0806   BP: 110/53  109/54 112/56   BP Location: Right arm  Right arm Right arm   Pulse: (!) 117 (!) 112 (!) 112 (!) 114   Resp: 20  20 18   Temp: 98 8 °F (37 1 °C)  98 4 °F (36 9 °C) 99 5 °F (37 5 °C)   TempSrc: Oral  Axillary Oral   SpO2: 95% 96% 95% 96%   Weight:       Height: BP range last 24:  Systolic (97BPP), OKU:447 , Min:109 , SHERYL:778   Diastolic (29EHC), SLZ:50, Min:53, Max:77      Physical Exam:   Body mass index is 37 31 kg/m²  Physical Exam  Constitutional:       Appearance: She is obese  She is not ill-appearing or diaphoretic  HENT:      Head: Normocephalic and atraumatic  Eyes:      Conjunctiva/sclera: Conjunctivae normal       Pupils: Pupils are equal, round, and reactive to light  Cardiovascular:      Rate and Rhythm: Tachycardia present  Pulmonary:      Effort: Pulmonary effort is normal  No respiratory distress  Abdominal:      General: There is distension (post op)  Palpations: Abdomen is soft  Tenderness: There is no abdominal tenderness  There is no guarding  Comments: Pfannenstiel incision c/d/i, healing well, covered in surgical glue  Genitourinary:     Comments: Deferred   Musculoskeletal:         General: Normal range of motion  Cervical back: Normal range of motion  Right lower leg: Edema present  Left lower leg: Edema present  Comments: Lower extremities with 1+ pitting edema  Skin:     General: Skin is warm and dry  Neurological:      General: No focal deficit present  Mental Status: She is alert and oriented to person, place, and time  Mental status is at baseline  Psychiatric:         Mood and Affect: Mood normal          Behavior: Behavior normal          Thought Content: Thought content normal            I/O       04/09 0701  04/10 0700 04/10 0701  04/11 0700 04/11 0701 04/12 0700    P  O        I V  (mL/kg) 1500 (16 2)      Blood 500      Total Intake(mL/kg) 2000 (21 6)      Urine (mL/kg/hr)       Total Output       Net +2000                   Results:  Recent Labs     04/10/22  1351 04/10/22  1800   WBC 9 45 8 42   HGB 7 4* 7 1*   HCT 22 6* 21 0*    152       Recent Labs     04/10/22  0553 04/10/22  0553 04/10/22  1800 04/11/22  0620   K 3 5   < > 3 4* 3 2*   CREATININE 0 68 < > 0 71 0 84   AST 29  --  31  --    ALT 25  --  29  --     < > = values in this interval not displayed         Recent Results (from the past 24 hour(s))   CBC and differential    Collection Time: 04/10/22  1:51 PM   Result Value Ref Range    WBC 9 45 4 31 - 10 16 Thousand/uL    RBC 2 63 (L) 3 81 - 5 12 Million/uL    Hemoglobin 7 4 (L) 11 5 - 15 4 g/dL    Hematocrit 22 6 (L) 34 8 - 46 1 %    MCV 86 82 - 98 fL    MCH 28 1 26 8 - 34 3 pg    MCHC 32 7 31 4 - 37 4 g/dL    RDW 15 0 11 6 - 15 1 %    MPV 10 1 8 9 - 12 7 fL    Platelets 009 991 - 676 Thousands/uL    nRBC 0 /100 WBCs    Neutrophils Relative 89 (H) 43 - 75 %    Immat GRANS % 1 0 - 2 %    Lymphocytes Relative 6 (L) 14 - 44 %    Monocytes Relative 4 4 - 12 %    Eosinophils Relative 0 0 - 6 %    Basophils Relative 0 0 - 1 %    Neutrophils Absolute 8 38 (H) 1 85 - 7 62 Thousands/µL    Immature Grans Absolute 0 10 0 00 - 0 20 Thousand/uL    Lymphocytes Absolute 0 58 (L) 0 60 - 4 47 Thousands/µL    Monocytes Absolute 0 36 0 17 - 1 22 Thousand/µL    Eosinophils Absolute 0 00 0 00 - 0 61 Thousand/µL    Basophils Absolute 0 03 0 00 - 0 10 Thousands/µL   CBC and differential    Collection Time: 04/10/22  6:00 PM   Result Value Ref Range    WBC 8 42 4 31 - 10 16 Thousand/uL    RBC 2 49 (L) 3 81 - 5 12 Million/uL    Hemoglobin 7 1 (L) 11 5 - 15 4 g/dL    Hematocrit 21 0 (L) 34 8 - 46 1 %    MCV 84 82 - 98 fL    MCH 28 5 26 8 - 34 3 pg    MCHC 33 8 31 4 - 37 4 g/dL    RDW 14 9 11 6 - 15 1 %    MPV 10 0 8 9 - 12 7 fL    Platelets 337 300 - 136 Thousands/uL    nRBC 0 /100 WBCs    Neutrophils Relative 89 (H) 43 - 75 %    Immat GRANS % 1 0 - 2 %    Lymphocytes Relative 6 (L) 14 - 44 %    Monocytes Relative 4 4 - 12 %    Eosinophils Relative 0 0 - 6 %    Basophils Relative 0 0 - 1 %    Neutrophils Absolute 7 46 1 85 - 7 62 Thousands/µL    Immature Grans Absolute 0 11 0 00 - 0 20 Thousand/uL    Lymphocytes Absolute 0 49 (L) 0 60 - 4 47 Thousands/µL    Monocytes Absolute 0 33 0 17 - 1 22 Thousand/µL    Eosinophils Absolute 0 01 0 00 - 0 61 Thousand/µL    Basophils Absolute 0 02 0 00 - 0 10 Thousands/µL   Comprehensive metabolic panel    Collection Time: 04/10/22  6:00 PM   Result Value Ref Range    Sodium 133 (L) 136 - 145 mmol/L    Potassium 3 4 (L) 3 5 - 5 3 mmol/L    Chloride 100 100 - 108 mmol/L    CO2 26 21 - 32 mmol/L    ANION GAP 7 4 - 13 mmol/L    BUN 6 5 - 25 mg/dL    Creatinine 0 71 0 60 - 1 30 mg/dL    Glucose 117 65 - 140 mg/dL    Calcium 8 0 (L) 8 3 - 10 1 mg/dL    Corrected Calcium 9 7 8 3 - 10 1 mg/dL    AST 31 5 - 45 U/L    ALT 29 12 - 78 U/L    Alkaline Phosphatase 81 46 - 116 U/L    Total Protein 5 4 (L) 6 4 - 8 2 g/dL    Albumin 1 9 (L) 3 5 - 5 0 g/dL    Total Bilirubin 0 26 0 20 - 1 00 mg/dL    eGFR 109 ml/min/1 73sq m   Lactic acid, plasma    Collection Time: 04/10/22  6:00 PM   Result Value Ref Range    LACTIC ACID 0 7 0 5 - 2 0 mmol/L   Blood culture    Collection Time: 04/10/22  6:01 PM    Specimen: Arm, Left; Blood   Result Value Ref Range    Blood Culture Received in Microbiology Lab  Culture in Progress      Urinalysis with microscopic    Collection Time: 04/10/22  6:47 PM   Result Value Ref Range    Clarity, UA Slightly Cloudy     Color, UA Yellow     Specific Gravity, UA <=1 005 1 003 - 1 030    pH, UA 6 0 4 5, 5 0, 5 5, 6 0, 6 5, 7 0, 7 5, 8 0    Glucose, UA Negative Negative mg/dl    Ketones, UA Negative Negative mg/dl    Blood, UA Large (A) Negative    Protein, UA Negative Negative mg/dl    Nitrite, UA Negative Negative    Bilirubin, UA Negative Negative    Urobilinogen, UA 0 2 0 2, 1 0 E U /dl E U /dl    Leukocytes, UA Moderate (A) Negative    WBC, UA 30-50 (A) None Seen, 2-4, 5-60 /hpf    RBC, UA 30-50 (A) None Seen, 2-4 /hpf    Bacteria, UA Occasional None Seen, Occasional /hpf    Epithelial Cells Occasional None Seen, Occasional /hpf   Blood culture    Collection Time: 04/10/22  6:47 PM    Specimen: Arm, Right; Blood   Result Value Ref Range    Blood Culture Received in Microbiology Lab  Culture in Progress      COVID/FLU/RSV    Collection Time: 04/10/22  9:34 PM    Specimen: Nasopharyngeal Swab; Nares   Result Value Ref Range    SARS-CoV-2 Negative Negative    INFLUENZA A PCR Negative Negative    INFLUENZA B PCR Negative Negative    RSV PCR Negative Negative   Basic metabolic panel    Collection Time: 04/11/22  6:20 AM   Result Value Ref Range    Sodium 134 (L) 136 - 145 mmol/L    Potassium 3 2 (L) 3 5 - 5 3 mmol/L    Chloride 100 100 - 108 mmol/L    CO2 26 21 - 32 mmol/L    ANION GAP 8 4 - 13 mmol/L    BUN 3 (L) 5 - 25 mg/dL    Creatinine 0 84 0 60 - 1 30 mg/dL    Glucose 113 65 - 140 mg/dL    Calcium 7 9 (L) 8 3 - 10 1 mg/dL    eGFR 89 ml/min/1 73sq m       Nicolasa Ocampo MD  PGY-3, OB/GYN  4/11/2022 8:56 AM

## 2022-04-11 NOTE — ASSESSMENT & PLAN NOTE
S/p second 1U pRBC transfusion on 4/9, notable for tachycardia into the 170s  S/p rapid response   Tachycardia present but normalizing   Continue monitoring vitals

## 2022-04-12 ENCOUNTER — APPOINTMENT (INPATIENT)
Dept: NON INVASIVE DIAGNOSTICS | Facility: HOSPITAL | Age: 37
End: 2022-04-12
Payer: COMMERCIAL

## 2022-04-12 LAB
ANION GAP SERPL CALCULATED.3IONS-SCNC: 11 MMOL/L (ref 4–13)
AORTIC ROOT: 2.7 CM
APICAL FOUR CHAMBER EJECTION FRACTION: 64 %
ASCENDING AORTA: 2.8 CM (ref 2.06–3.08)
AV LVOT PEAK GRADIENT: 4 MMHG
AV PEAK GRADIENT: 8 MMHG
BUN SERPL-MCNC: 6 MG/DL (ref 5–25)
CALCIUM SERPL-MCNC: 8.2 MG/DL (ref 8.3–10.1)
CHLORIDE SERPL-SCNC: 104 MMOL/L (ref 100–108)
CO2 SERPL-SCNC: 25 MMOL/L (ref 21–32)
CREAT SERPL-MCNC: 0.69 MG/DL (ref 0.6–1.3)
DOP CALC RVOT PEAK VEL: 0.48 M/S
E WAVE DECELERATION TIME: 119 MS
ERYTHROCYTE [DISTWIDTH] IN BLOOD BY AUTOMATED COUNT: 15.7 % (ref 11.6–15.1)
FRACTIONAL SHORTENING: 33 % (ref 28–44)
GFR SERPL CREATININE-BSD FRML MDRD: 112 ML/MIN/1.73SQ M
GLUCOSE SERPL-MCNC: 90 MG/DL (ref 65–140)
HCT VFR BLD AUTO: 22.1 % (ref 34.8–46.1)
HGB BLD-MCNC: 7.2 G/DL (ref 11.5–15.4)
INTERVENTRICULAR SEPTUM IN DIASTOLE (PARASTERNAL SHORT AXIS VIEW): 0.7 CM
INTERVENTRICULAR SEPTUM: 0.7 CM (ref 0.54–1.01)
LEFT ATRIUM AREA SYSTOLE SINGLE PLANE A4C: 22.3 CM2
LEFT ATRIUM SIZE: 4.1 CM
LEFT INTERNAL DIMENSION IN SYSTOLE: 3.2 CM (ref 3.1–4.69)
LEFT VENTRICULAR INTERNAL DIMENSION IN DIASTOLE: 4.8 CM (ref 5.1–7.59)
LEFT VENTRICULAR POSTERIOR WALL IN END DIASTOLE: 0.7 CM (ref 0.53–1)
LEFT VENTRICULAR STROKE VOLUME: 67 ML
LVSV (TEICH): 67 ML
MCH RBC QN AUTO: 27.9 PG (ref 26.8–34.3)
MCHC RBC AUTO-ENTMCNC: 32.6 G/DL (ref 31.4–37.4)
MCV RBC AUTO: 86 FL (ref 82–98)
MV E'TISSUE VEL-SEP: 14 CM/S
MV PEAK A VEL: 0.66 M/S
MV PEAK E VEL: 92 CM/S
MV STENOSIS PRESSURE HALF TIME: 34 MS
MV VALVE AREA P 1/2 METHOD: 6.47 CM2
PLATELET # BLD AUTO: 187 THOUSANDS/UL (ref 149–390)
PMV BLD AUTO: 9.8 FL (ref 8.9–12.7)
POTASSIUM SERPL-SCNC: 3.2 MMOL/L (ref 3.5–5.3)
PROCALCITONIN SERPL-MCNC: 0.31 NG/ML
PV PEAK GRADIENT: 3 MMHG
RBC # BLD AUTO: 2.58 MILLION/UL (ref 3.81–5.12)
RIGHT ATRIUM AREA SYSTOLE A4C: 16 CM2
RIGHT VENTRICLE ID DIMENSION: 4 CM
SL CV LV EF: 60
SL CV PED ECHO LEFT VENTRICLE DIASTOLIC VOLUME (MOD BIPLANE) 2D: 107 ML
SL CV PED ECHO LEFT VENTRICLE SYSTOLIC VOLUME (MOD BIPLANE) 2D: 40 ML
SL CV RVOT MAX GRADIENT: 1 MMHG
SODIUM SERPL-SCNC: 140 MMOL/L (ref 136–145)
TR MAX PG: 33 MMHG
TR PEAK VELOCITY: 2.9 M/S
TRICUSPID VALVE PEAK REGURGITATION VELOCITY: 2.88 M/S
VANCOMYCIN TROUGH SERPL-MCNC: 10.4 UG/ML (ref 10–20)
WBC # BLD AUTO: 7.21 THOUSAND/UL (ref 4.31–10.16)
Z-SCORE OF ASCENDING AORTA: 0.89
Z-SCORE OF INTERVENTRICULAR SEPTUM IN END DIASTOLE: -0.62
Z-SCORE OF LEFT VENTRICULAR DIMENSION IN END DIASTOLE: -2.59
Z-SCORE OF LEFT VENTRICULAR DIMENSION IN END SYSTOLE: -1.38
Z-SCORE OF LEFT VENTRICULAR POSTERIOR WALL IN END DIASTOLE: -0.51

## 2022-04-12 PROCEDURE — 93306 TTE W/DOPPLER COMPLETE: CPT | Performed by: INTERNAL MEDICINE

## 2022-04-12 PROCEDURE — 87040 BLOOD CULTURE FOR BACTERIA: CPT | Performed by: INTERNAL MEDICINE

## 2022-04-12 PROCEDURE — 80048 BASIC METABOLIC PNL TOTAL CA: CPT | Performed by: OBSTETRICS & GYNECOLOGY

## 2022-04-12 PROCEDURE — 99232 SBSQ HOSP IP/OBS MODERATE 35: CPT | Performed by: INTERNAL MEDICINE

## 2022-04-12 PROCEDURE — 93306 TTE W/DOPPLER COMPLETE: CPT

## 2022-04-12 PROCEDURE — 99024 POSTOP FOLLOW-UP VISIT: CPT | Performed by: STUDENT IN AN ORGANIZED HEALTH CARE EDUCATION/TRAINING PROGRAM

## 2022-04-12 PROCEDURE — 84145 PROCALCITONIN (PCT): CPT | Performed by: OBSTETRICS & GYNECOLOGY

## 2022-04-12 PROCEDURE — 85027 COMPLETE CBC AUTOMATED: CPT | Performed by: OBSTETRICS & GYNECOLOGY

## 2022-04-12 PROCEDURE — 80202 ASSAY OF VANCOMYCIN: CPT | Performed by: OBSTETRICS & GYNECOLOGY

## 2022-04-12 RX ADMIN — IBUPROFEN 600 MG: 600 TABLET ORAL at 22:57

## 2022-04-12 RX ADMIN — ACETAMINOPHEN 650 MG: 325 TABLET, FILM COATED ORAL at 22:56

## 2022-04-12 RX ADMIN — IBUPROFEN 600 MG: 600 TABLET ORAL at 10:47

## 2022-04-12 RX ADMIN — OXYCODONE HYDROCHLORIDE 5 MG: 5 TABLET ORAL at 17:31

## 2022-04-12 RX ADMIN — VANCOMYCIN HYDROCHLORIDE 1000 MG: 1 INJECTION, SOLUTION INTRAVENOUS at 02:38

## 2022-04-12 RX ADMIN — DOCUSATE SODIUM 100 MG: 100 CAPSULE, LIQUID FILLED ORAL at 17:03

## 2022-04-12 RX ADMIN — ACETAMINOPHEN 650 MG: 325 TABLET, FILM COATED ORAL at 17:03

## 2022-04-12 RX ADMIN — ACETAMINOPHEN 650 MG: 325 TABLET, FILM COATED ORAL at 05:45

## 2022-04-12 RX ADMIN — ACETAMINOPHEN 650 MG: 325 TABLET, FILM COATED ORAL at 10:47

## 2022-04-12 RX ADMIN — IBUPROFEN 600 MG: 600 TABLET ORAL at 04:05

## 2022-04-12 RX ADMIN — VANCOMYCIN HYDROCHLORIDE 1500 MG: 5 INJECTION, POWDER, LYOPHILIZED, FOR SOLUTION INTRAVENOUS at 16:55

## 2022-04-12 RX ADMIN — ENOXAPARIN SODIUM 40 MG: 40 INJECTION SUBCUTANEOUS at 08:45

## 2022-04-12 RX ADMIN — DOCUSATE SODIUM 100 MG: 100 CAPSULE, LIQUID FILLED ORAL at 08:45

## 2022-04-12 RX ADMIN — VANCOMYCIN HYDROCHLORIDE 1000 MG: 1 INJECTION, SOLUTION INTRAVENOUS at 10:47

## 2022-04-12 RX ADMIN — PANTOPRAZOLE SODIUM 20 MG: 20 TABLET, DELAYED RELEASE ORAL at 08:45

## 2022-04-12 RX ADMIN — IBUPROFEN 600 MG: 600 TABLET ORAL at 17:03

## 2022-04-12 NOTE — PROGRESS NOTES
Progress Note - OB/GYN   Tyler King 39 y o  female MRN: [id number]  Unit/Bed#: -01 Encounter: [id number]    ASSESSMENT:  Santana Bernard is a 39 y o   female, POD#4 s/p Primary low transverse  section at 38w1d for maternal request in the setting of prolonged induction of labor  Postpartum course complicated by blood transfusion reaction (received 2U pRBCs today this admission), in addition to sepsis  Sepsis resolving with antibiotic treatment  PLAN:  * Sepsis ([address] )  Assessment & Plan  Currently on --> vancomycin ()  Blood cultures 4/10: Enterococcus bacteremia   S/p ID consult 22; appreciate recommendations   F/u echocardiogram today  to r/o vegetations in the setting of Enterococcus   F/u repeat blood cultures drawn   F/u AM labs    Temp Readings from Last 3 Encounters:   22 98 7 °F (37 1 °C) (Oral)   22 97 7 °F (36 5 °C)   22 97 8 °F (36 6 °C) (Temporal)     Clinically stable - patient feels well     Transfusion reaction  Assessment & Plan  S/p second 1U pRBC transfusion on , notable for tachycardia into the 170s  S/p rapid response   Tachycardia present but normalizing   Continue monitoring vitals    S/P  section  Assessment & Plan  Continue ambulating  Continue breastfeeding  Voiding   Contraception: declines for now, will discuss at postpartum visit   Continue other routine post op care      SUBJECTIVE:  Pt feels well  She has no major complaints  Last night had no issues  Continues to tolerate PO, ambulate, void without difficulty  Passing flatus  Denies chest pain, fever, chills, nausea/vomiting  Her incision is healing well  Her lochia is WNL  Patient still with persistent tachycardia, now ranging 110s, however patient denies palpitations  She is eager to go home whenever she safely can  Continues to have persistent bilateral lower extremity pitting edema         OBJECTIVE:  Vitals:    22 0000 22 0200 04/12/22 0400 04/12/22 0553   BP: 121/61 119/60 125/58 112/61   BP Location: Right arm Right arm Right arm Right arm   Pulse: (!) 124 (!) 116 (!) 124 (!) 113   Resp: 22 19 20 22   Temp: 100 3 °F (37 9 °C) 98 7 °F (37 1 °C) 98 4 °F (36 9 °C) 98 7 °F (37 1 °C)   TempSrc: Oral Oral Oral Oral   SpO2: 97% 96% 94% 97%   Weight:       Height:           BP range last 24:  Systolic (42HJW), TOZ:937 , Min:110 , ZWA:648   Diastolic (58HMS), LDB:70, Min:56, Max:65      Physical Exam:   Body mass index is 37 31 kg/m²  Physical Exam  Constitutional:       Appearance: She is obese  She is not ill-appearing or diaphoretic  HENT:      Head: Normocephalic and atraumatic  Eyes:      Conjunctiva/sclera: Conjunctivae normal       Pupils: Pupils are equal, round, and reactive to light  Cardiovascular:      Rate and Rhythm: Tachycardia present  Pulmonary:      Effort: Pulmonary effort is normal  No respiratory distress  Abdominal:      General: There is distension (post op)  Palpations: Abdomen is soft  Tenderness: There is no abdominal tenderness  There is no guarding  Comments: Pfannenstiel incision c/d/i, healing well, covered in surgical glue  Genitourinary:     Comments: Deferred   Musculoskeletal:         General: Normal range of motion  Cervical back: Normal range of motion  Right lower leg: Edema present  Left lower leg: Edema present  Comments: Lower extremities with 1+ pitting edema  Skin:     General: Skin is warm and dry  Neurological:      General: No focal deficit present  Mental Status: She is alert and oriented to person, place, and time  Mental status is at baseline  Psychiatric:         Mood and Affect: Mood normal          Behavior: Behavior normal          Thought Content:  Thought content normal            I/O       04/10 0701 04/11 0700 04/11 0701 04/12 0700 04/12 0701 04/13 0700    I V  (mL/kg)       Blood       Total Intake(mL/kg)       Net Results:  Recent Labs     04/11/22  1013 04/12/22  0537   WBC 8 72 7 21   HGB 7 8* 7 2*   HCT 24 2* 22 1*    187       Recent Labs     04/10/22  0553 04/10/22  0553 04/10/22  1800 04/11/22  0620   K 3 5   < > 3 4* 3 2*   CREATININE 0 68   < > 0 71 0 84   AST 29  --  31  --    ALT 25  --  29  --     < > = values in this interval not displayed  Recent Results (from the past 24 hour(s))   CBC and differential    Collection Time: 04/11/22 10:13 AM   Result Value Ref Range    WBC 8 72 4 31 - 10 16 Thousand/uL    RBC 2 82 (L) 3 81 - 5 12 Million/uL    Hemoglobin 7 8 (L) 11 5 - 15 4 g/dL    Hematocrit 24 2 (L) 34 8 - 46 1 %    MCV 86 82 - 98 fL    MCH 27 7 26 8 - 34 3 pg    MCHC 32 2 31 4 - 37 4 g/dL    RDW 15 2 (H) 11 6 - 15 1 %    MPV 10 0 8 9 - 12 7 fL    Platelets 327 178 - 812 Thousands/uL   Vancomycin, trough Draw level PERIPHERALLY immediately prior to scheduled dose  Do NOT hold dose pending results  Please contact Pharmacy with results and any questions      Collection Time: 04/11/22 10:13 AM   Result Value Ref Range    Vancomycin Tr 5 6 (L) 10 0 - 20 0 ug/mL   Manual Differential(PHLEBS Do Not Order)    Collection Time: 04/11/22 10:13 AM   Result Value Ref Range    Segmented % 69 43 - 75 %    Bands % 20 (H) 0 - 8 %    Lymphocytes % 7 (L) 14 - 44 %    Monocytes % 2 (L) 4 - 12 %    Eosinophils, % 0 0 - 6 %    Basophils % 0 0 - 1 %    Myelocytes % 2 (H) 0 - 1 %    Absolute Neutrophils 7 76 (H) 1 85 - 7 62 Thousand/uL    Lymphocytes Absolute 0 61 0 60 - 4 47 Thousand/uL    Monocytes Absolute 0 17 0 00 - 1 22 Thousand/uL    Eosinophils Absolute 0 00 0 00 - 0 40 Thousand/uL    Basophils Absolute 0 00 0 00 - 0 10 Thousand/uL    Total Counted      RBC Morphology Present     Polychromasia Present     Platelet Estimate Adequate Adequate   Lactic Acid with Reflex STAT    Collection Time: 04/11/22  1:16 PM   Result Value Ref Range    LACTIC ACID 0 9 0 5 - 2 0 mmol/L   Procalcitonin    Collection Time: 04/11/22  1:16 PM   Result Value Ref Range    Procalcitonin 0 30 (H) <=0 25 ng/ml   CBC    Collection Time: 04/12/22  5:37 AM   Result Value Ref Range    WBC 7 21 4 31 - 10 16 Thousand/uL    RBC 2 58 (L) 3 81 - 5 12 Million/uL    Hemoglobin 7 2 (L) 11 5 - 15 4 g/dL    Hematocrit 22 1 (L) 34 8 - 46 1 %    MCV 86 82 - 98 fL    MCH 27 9 26 8 - 34 3 pg    MCHC 32 6 31 4 - 37 4 g/dL    RDW 15 7 (H) 11 6 - 15 1 %    Platelets 405 035 - 170 Thousands/uL    MPV 9 8 8 9 - 12 7 fL   Procalcitonin, Next Day AM Collection    Collection Time: 04/12/22  5:38 AM   Result Value Ref Range    Procalcitonin 0 31 (H) <=0 25 ng/ml       Reddy Schwarz MD  PGY-3, OB/GYN  4/12/2022 7:42 AM

## 2022-04-12 NOTE — UTILIZATION REVIEW
Continued Stay Review    Date: 22                          Current Patient Class: inpatient   Current Level of Care: M/S    HPI:36 y o  female initially admitted on *22 for the need for prolong external fetal heart monitoring   Assessment/Plan: POD # 4  Primary low transverse  section at 38w1d for maternal request in the setting of prolonged induction of labor  Postpartum course complicated by blood transfusion reaction (received 2U pRBCs today this admission), in addition to sepsis  Sepsis resolving with antibiotic treatment    (_) 1 pitting edema b/l lower legs   Blood cultures 4/10: Enterococcus bacteremia   S/p ID consult 22; appreciate recommendations   F/u echocardiogram today  to r/o vegetations in the setting of Enterococcus   F/u repeat blood cultures drawn         Vital Signs:   Date/Time Temp Pulse Resp BP SpO2 O2 Device Cardiac (WDL) Patient Position - Orthostatic VS   22 1207 98 2 °F (36 8 °C) 102 18 112/61 98 % None (Room air) -- Sitting   22 1025 98 2 °F (36 8 °C) 106 Abnormal  20 115/69 97 % None (Room air) -- Sitting   22 0845 -- -- -- -- -- None (Room air) X --   22 0824 98 °F (36 7 °C) 113 Abnormal  20 117/76 96 % None (Room air) -- Sitting   22 0553 98 7 °F (37 1 °C) 113 Abnormal  22 112/61 97 % -- -- Sitting   22 0400 98 4 °F (36 9 °C) 124 Abnormal  20 125/58 94 % -- -- Sitting   22 0200 98 7 °F (37 1 °C) 116 Abnormal  19 119/60 96 % None (Room air) -- Sitting   22 0000 100 3 °F (37 9 °C)  124 Abnormal  22 121/61 97 % -- -- Sitting   22 2224 -- -- -- -- -- None (Room air) X --   22 2211 99 2 °F (37 3 °C) 118 Abnormal  20 110/64 97 % None (Room air) -- Sitting   22 2004 98 9 °F (37 2 °C) 122 Abnormal  20 123/65 96 % None (Room air) -- Sitting   22 1802 98 4 °F (36 9 °C) 123 Abnormal  20 115/56 95 % None (Room air) -- Sitting   22 1601 98 7 °F (37 1 °C) 127 Abnormal  20 121/56 100 % None (Room air) -- Sitting   04/11/22 1540 99 2 °F (37 3 °C) -- -- -- -- -- -- --   04/11/22 1400 98 7 °F (37 1 °C) 120 Abnormal  18 110/58 -- -- -- Sitting   04/11/22 1214 -- -- -- -- 96 % None (Room air) -- --   04/11/22 1205 98 7 °F (37 1 °C) 127 Abnormal  20 122/56 96 % None (Room air) -- Sitting   04/11/22 1023 -- -- -- -- -- None (Room air) WDL --   04/11/22 1004 101 °F (38 3 °C) Abnormal   135 Abnormal   20 138/61 96 % None (Room air) -- Sitting       Pertinent Labs/Diagnostic Results:   Results from last 7 days   Lab Units 04/10/22  2134   SARS-COV-2  Negative     Results from last 7 days   Lab Units 04/12/22  0537 04/11/22  1013 04/10/22  1800 04/10/22  1351 04/10/22  1351 04/10/22  0553 04/10/22  0553 04/09/22  1421 04/09/22  0715   WBC Thousand/uL 7 21 8 72 8 42   < > 9 45   < > 10 20*   < > 13 67*   HEMOGLOBIN g/dL 7 2* 7 8* 7 1*  --  7 4*  --  7 2*   < > 7 1*   HEMATOCRIT % 22 1* 24 2* 21 0*  --  22 6*  --  21 8*   < > 20 4*   PLATELETS Thousands/uL 187 195 152   < > 163   < > 168   < > 183   NEUTROS ABS Thousands/µL  --   --  7 46  --  8 38*  --   --   --  10 79*   BANDS PCT %  --  20*  --   --   --   --   --   --   --     < > = values in this interval not displayed           Results from last 7 days   Lab Units 04/12/22  0538 04/11/22  0620 04/10/22  1800 04/10/22  0553 04/09/22  1421   SODIUM mmol/L 140 134* 133* 140 134*   POTASSIUM mmol/L 3 2* 3 2* 3 4* 3 5 4 4   CHLORIDE mmol/L 104 100 100 105 102   CO2 mmol/L 25 26 26 26 26   ANION GAP mmol/L 11 8 7 9 6   BUN mg/dL 6 3* 6 5 11   CREATININE mg/dL 0 69 0 84 0 71 0 68 0 89   EGFR ml/min/1 73sq m 112 89 109 112 83   CALCIUM mg/dL 8 2* 7 9* 8 0* 8 4 8 6   MAGNESIUM mg/dL  --   --   --   --  2 0     Results from last 7 days   Lab Units 04/10/22  1800 04/10/22  0553   AST U/L 31 29   ALT U/L 29 25   ALK PHOS U/L 81 86   TOTAL PROTEIN g/dL 5 4* 5 6*   ALBUMIN g/dL 1 9* 2 0*   TOTAL BILIRUBIN mg/dL 0 26 0 28         Results from last 7 days   Lab Units 04/12/22  0538 04/11/22  0620 04/10/22  1800 04/10/22  0553 04/09/22  1421 04/08/22  1100   GLUCOSE RANDOM mg/dL 90 113 117 99 118 154*     Results from last 7 days   Lab Units 04/12/22  0538 04/11/22  1316   PROCALCITONIN ng/ml 0 31* 0 30*     Results from last 7 days   Lab Units 04/11/22  1316 04/10/22  1800   LACTIC ACID mmol/L 0 9 0 7     Results from last 7 days   Lab Units 04/10/22  1847 04/09/22  1629   CLARITY UA  Slightly Cloudy  --    COLOR UA  Yellow  --    SPEC GRAV UA  <=1 005  --    PH UA  6 0  --    GLUCOSE UA mg/dl Negative  --    KETONES UA mg/dl Negative  --    BLOOD UA  Large* Large*   PROTEIN UA mg/dl Negative  --    NITRITE UA  Negative  --    BILIRUBIN UA  Negative  --    UROBILINOGEN UA E U /dl 0 2  --    LEUKOCYTES UA  Moderate*  --    WBC UA /hpf 30-50*  --    RBC UA /hpf 30-50*  --    BACTERIA UA /hpf Occasional  --    EPITHELIAL CELLS WET PREP /hpf Occasional  --      Results from last 7 days   Lab Units 04/10/22  2134   INFLUENZA A PCR  Negative   INFLUENZA B PCR  Negative   RSV PCR  Negative         Results from last 7 days   Lab Units 04/12/22  0542 04/11/22  0249 04/10/22  1847 04/10/22  1801 04/09/22  1856   BLOOD CULTURE  Received in Microbiology Lab  Culture in Progress  Received in Microbiology Lab  Culture in Progress  --  Enterococcus faecalis* No Growth at 24 hrs  No Growth at 24 hrs     GRAM STAIN RESULT   --   --  Gram positive cocci in pairs and chains*  --  No organisms seen   URINE CULTURE   --  70,000-79,000 cfu/ml Gamma Hemolytic Streptococcus  --   --   --        Medications:   Scheduled Medications:  acetaminophen, 650 mg, Oral, Q6H CRYSTAL  docusate sodium, 100 mg, Oral, BID  enoxaparin, 40 mg, Subcutaneous, Q24H CRYSTAL  ibuprofen, 600 mg, Oral, Q6H  pantoprazole, 20 mg, Oral, Daily  sodium chloride, 1,000 mL, Intravenous, Once   Followed by  sodium chloride, 1,000 mL, Intravenous, Once  vancomycin, 15 mg/kg (Adjusted), Intravenous, Q8H      Continuous IV Infusions:     PRN Meds:  albuterol, 2 5 mg, Nebulization, Once PRN  benzocaine-menthol-lanolin-aloe, 1 application, Topical, V6T PRN  calcium carbonate, 1,000 mg, Oral, Daily PRN  diphenhydrAMINE, 25 mg, Oral, Q6H PRN  fentaNYL, 25 mcg, Intravenous, Q5 Min PRN  hydrALAZINE, 5 mg, Intravenous, Q20 Min PRN  hydrocortisone, 1 application, Topical, Daily PRN  HYDROmorphone, 0 2 mg, Intravenous, Q5 Min PRN  HYDROmorphone, 0 5 mg, Intravenous, Q5 Min PRN  HYDROmorphone, 1 mg, Intravenous, Q2H PRN  ipratropium, 0 5 mg, Nebulization, Once PRN  Labetalol HCl, 10 mg, Intravenous, Q10 Min PRN  metoclopramide, 10 mg, Intravenous, Once PRN  nalbuphine, 5 mg, Intravenous, Q4H PRN  ondansetron, 4 mg, Intravenous, Q8H PRN  oxyCODONE, 10 mg, Oral, Q4H PRN  oxyCODONE, 5 mg, Oral, Q4H PRN  promethazine, 12 5 mg, Intravenous, Once PRN  simethicone, 80 mg, Oral, 4x Daily PRN  witch hazel-glycerin, 1 pad, Topical, Q4H PRN        Discharge Plan: home when medically stable    Network Utilization Review Department  ATTENTION: Please call with any questions or concerns to [phone] and carefully listen to the prompts so that you are directed to the right person  All voicemails are confidential   Elias Augustin all requests for admission clinical reviews, approved or denied determinations and any other requests to dedicated fax number below belonging to the campus where the patient is receiving treatment   List of dedicated fax numbers for the Facilities:  [address] DENIALS (Administrative/Medical Necessity) [phone]   [address] (Maternity/NICU/Pediatrics) [phone] [address] [id number] [facility] Tien Jaquez [address] [phone]

## 2022-04-12 NOTE — PROGRESS NOTES
Progress Note - Infectious Disease   Tyler King 39 y o  female MRN: [id number]  Unit/Bed#: -01 Encounter: [id number]    Impression/Recommendations:  1  Sepsis, evolving since admission:  With fever, tachycardia, tachypnea, leukocytosis in the setting of Enterococcus bacteremia  Lactate is within normal limits  Procalcitonin is mildly elevated, 0 31  Temperature curve improving since yesterday  Doubt pt would have persistent fevers related to blood transfusion  ? Antibiotic therapy as stated below  ? Repeat CBC in am   ? Supportive care per primary service  ? Monitor clinical response, temperature curve    2  Enterococcus fecalis bacteremia:  Identified in 1 of 2 sets  Unclear focus of bacteremia at present  Urine cx also has growth of Enterococcus but < 100K and pt has no symptoms of UTI  Post-partum endometritis initially considered following , although pt has no abdominal/pelvic tenderness on exam  Recent CT abd results noted with hematoma  Surgical incision appears clean and dry on exam  I do not suspect a wound infection or skin source of bacteremia at this time   TT echo is negative for valve vegetations  ? Follow repeat blood cultures to assess for clearance of bacteremia  ? Continue vancomycin iv dosing per pharmacy protocol for target trough of 15-20  Vanco trough of 10 4 is slightly subtheraputic  ? With continued improvement, anticipate transition to oral linezolid until   ? Total antibiotic duration will be 10 days    3  Status post  section on : no evidence of wound infection at this time  ? Serial exams of surgical wound    4  Morbid obesity with BMI of 37: this is a risk factor for infection    5  Hx of PCN allergy: pt is uncertain about the nature of the reaction, but thinks she may have had a rash  My recommendations were discussed with the patient and her  who verbalized understanding    Their questions and concerns were addressed by me   My recommendations were discussed with resident Dr Kelsi Hernandez from the primary service  Thank you for allowing me to participate in the care of this patient  The ID service will follow      Antibiotics: vancomycin since 4/11  Scheduled Meds:  Current Facility-Administered Medications   Medication Dose Route Frequency Provider Last Rate    acetaminophen  650 mg Oral Q6H [address] Shirlene Weiss MD      albuterol  2 5 mg Nebulization Once PRN Kristin Ann MD      benzocaine-menthol-lanolin-aloe  1 application Topical K2B PRN Rufino Domingo MD      calcium carbonate  1,000 mg Oral Daily PRN Rufino Domingo MD      diphenhydrAMINE  25 mg Oral Q6H PRN Rufino Domingo MD      docusate sodium  100 mg Oral BID Rufino Domingo MD      enoxaparin  40 mg Subcutaneous Q24H [address] Gualberto Kearney MD      fentaNYL  25 mcg Intravenous Q5 Min PRN Kristin Ann MD      hydrALAZINE  5 mg Intravenous Q20 Min PRN Kristin Ann MD      hydrocortisone  1 application Topical Daily PRN Rufino Domingo MD      HYDROmorphone  0 2 mg Intravenous Q5 Min PRN Kristin Ann MD      HYDROmorphone  0 5 mg Intravenous Q5 Min PRN Kristin Ann MD      HYDROmorphone  1 mg Intravenous Q2H PRN Rufino Domingo MD      ibuprofen  600 mg Oral Q6H Marcie Jones MD      ipratropium  0 5 mg Nebulization Once PRN Kristin Ann MD      Labetalol HCl  10 mg Intravenous Q10 Min PRN Kristin Ann MD      metoclopramide  10 mg Intravenous Once PRN Kristin Ann MD      nalbuphine  5 mg Intravenous Q4H PRN Kristin Ann MD      ondansetron  4 mg Intravenous Q8H PRN Rufino Domingo MD      oxyCODONE  10 mg Oral Q4H ULISES Domingo MD      oxyCODONE  5 mg Oral Q4H PRJUANY Domingo MD      pantoprazole  20 mg Oral Daily Rufino Domingo MD      promethazine  12 5 mg Intravenous Once PRN Kristin Ann MD      simethicone  80 mg Oral 4x Daily PRN Rufino Domingo MD      sodium chloride  1,000 mL Intravenous Once Andria Costa MD      Followed by   [address] [place] [address] sodium chloride  1,000 mL Intravenous Once Andria Costa MD      vancomycin  1,500 mg Intravenous Q8H Yadira Shen MD     [address] [place] [address] witch hazel-glycerin  1 pad Topical Q4H PRN Morena Quijano MD       Continuous Infusions:   PRN Meds: albuterol    benzocaine-menthol-lanolin-aloe    calcium carbonate    diphenhydrAMINE    fentaNYL    hydrALAZINE    hydrocortisone    HYDROmorphone    HYDROmorphone    HYDROmorphone    ipratropium    Labetalol HCl    metoclopramide    nalbuphine    ondansetron    oxyCODONE    oxyCODONE    promethazine    simethicone    witch hazel-glycerin    Subjective:  Patient feels better today  She denies fever, chills overnight  She denies abdominal pain  She reports continued leg swelling  She denies vomiting, diarrhea, rash  No new symptoms  She is tolerating vanco iv  Objective:  Vitals:  Temp:  [98 °F (36 7 °C)-100 3 °F (37 9 °C)] 98 2 °F (36 8 °C)  HR:  [102-127] 102  Resp:  [18-22] 18  BP: (110-125)/(56-76) 112/61  SpO2:  [94 %-100 %] 98 %  Temp (24hrs), Av 7 °F (37 1 °C), Min:98 °F (36 7 °C), Max:100 3 °F (37 9 °C)  Current: Temperature: 98 2 °F (36 8 °C)  Physical Exam:   General Appearance:  Alert, awake, nontoxic, no acute distress   ENT: Oropharynx moist without lesions   Lungs:   Clear to auscultation bilaterally; respirations unlabored   Heart:  S1, S2, tachycardic, no murmur   Abdomen:   Soft, non-tender, non-distended   : Healing  scar, well approximated, no drainage, erythema or tenderness   No Stewart catheter present   Extremities: 1+ distal leg edema b/l   Neurologic: AAO to person, surroundings, conversant, fluent speech   Skin: No rash     Labs, Cultures, Imaging, & Other studies:   All pertinent labs, cultures and imaging studies were personally reviewed  Results from last 7 days   Lab Units 22  0537 22  1013 04/10/22  1800   WBC Thousand/uL 7 21 8  72 8 42   HEMOGLOBIN g/dL 7 2* 7 8* 7 1*   PLATELETS Thousands/uL 187 195 152     Results from last 7 days   Lab Units 04/12/22  0538 04/11/22  0620 04/11/22  0620 04/10/22  1800 04/10/22  1800 04/10/22  0553 04/10/22  0553   SODIUM mmol/L 140  --  134*  --  133*   < > 140   POTASSIUM mmol/L 3 2*   < > 3 2*   < > 3 4*   < > 3 5   CHLORIDE mmol/L 104   < > 100   < > 100   < > 105   CO2 mmol/L 25   < > 26   < > 26   < > 26   BUN mg/dL 6   < > 3*   < > 6   < > 5   CREATININE mg/dL 0 69   < > 0 84   < > 0 71   < > 0 68   EGFR ml/min/1 73sq m 112   < > 89   < > 109   < > 112   CALCIUM mg/dL 8 2*   < > 7 9*   < > 8 0*   < > 8 4   AST U/L  --   --   --   --  31  --  29   ALT U/L  --   --   --   --  29   < > 25   ALK PHOS U/L  --   --   --   --  81   < > 86    < > = values in this interval not displayed  Results from last 7 days   Lab Units 04/12/22  0542 04/11/22  0249 04/10/22  1847 04/10/22  1801 04/09/22  1856   BLOOD CULTURE  Received in Microbiology Lab  Culture in Progress  Received in Microbiology Lab  Culture in Progress  --  Enterococcus faecalis* No Growth at 24 hrs  No Growth at 24 hrs  GRAM STAIN RESULT   --   --  Gram positive cocci in pairs and chains*  --  No organisms seen   URINE CULTURE   --  70,000-79,000 cfu/ml Enterococcus faecalis*  --   --   --      Results from last 7 days   Lab Units 04/12/22  0538 04/11/22  1316   PROCALCITONIN ng/ml 0 31* 0 30*     Imaging Studies:   4/10 chest x-ray:  No acute cardiopulmonary disease      4/9 CTA chest, abd/pelv w&wo contr:  No evidence of pulmonary embolism  Mild high density fluid in pelvis adjacent to uterus with gas and stranding and overlying subcutaneous soft tissues  This is consistent with postoperative hematoma  No large mesh both collection identified

## 2022-04-12 NOTE — PROGRESS NOTES
Vancomycin IV Pharmacy-to-Dose Consultation    Asif Current is a 39 y o  female who is currently receiving Vancomycin IV with management by the Pharmacy Consult service  Assessment/Plan:  The patient was reviewed  Renal function is stable and no signs or symptoms of nephrotoxicity and/or infusion reactions were documented in the chart  Vancomycin trough prior to 1030 dose today came back as 10 4 with extrapolated trough being 10 3 and is sub therapeutic  Will change the order to 1500 mg IV q8h with trough due at 1600 on 04/13  We will continue to follow the patients culture results and clinical progress daily      Nehal Bowden, Pharmacist

## 2022-04-12 NOTE — PLAN OF CARE
Problem: PAIN - ADULT  Goal: Verbalizes/displays adequate comfort level or baseline comfort level  Description: Interventions:  - Encourage patient to monitor pain and request assistance  - Assess pain using appropriate pain scale  - Administer analgesics based on type and severity of pain and evaluate response  - Implement non-pharmacological measures as appropriate and evaluate response  - Consider cultural and social influences on pain and pain management  - Notify physician/advanced practitioner if interventions unsuccessful or patient reports new pain  Outcome: Progressing     Problem: INFECTION - ADULT  Goal: Absence or prevention of progression during hospitalization  Description: INTERVENTIONS:  - Assess and monitor for signs and symptoms of infection  - Monitor lab/diagnostic results  - Monitor all insertion sites, i e  indwelling lines, tubes, and drains  - Monitor endotracheal if appropriate and nasal secretions for changes in amount and color  - [place] appropriate cooling/warming therapies per order  - Administer medications as ordered  - Instruct and encourage patient and family to use good hand hygiene technique  - Identify and instruct in appropriate isolation precautions for identified infection/condition  Outcome: Progressing  Goal: Absence of fever/infection during neutropenic period  Description: INTERVENTIONS:  - Monitor WBC    Outcome: Progressing     Problem: SAFETY ADULT  Goal: Patient will remain free of falls  Description: INTERVENTIONS:  - Educate patient/family on patient safety including physical limitations  - Instruct patient to call for assistance with activity   - Consult OT/PT to assist with strengthening/mobility   - Keep Call bell within reach  - Keep bed low and locked with side rails adjusted as appropriate  - Keep care items and personal belongings within reach  - Initiate and maintain comfort rounds  - Make Fall Risk Sign visible to staff  - Offer Toileting in advance of need  - Obtain necessary fall risk management equipment  - Apply yellow socks and bracelet for high fall risk patients  - Consider moving patient to room near nurses station  Outcome: Progressing  Goal: Maintain or return to baseline ADL function  Description: INTERVENTIONS:  -  Assess patient's ability to carry out ADLs; assess patient's baseline for ADL function and identify physical deficits which impact ability to perform ADLs (bathing, care of mouth/teeth, toileting, grooming, dressing, etc )  - Assess/evaluate cause of self-care deficits   - Assess range of motion  - Assess patient's mobility; develop plan if impaired  - Assess patient's need for assistive devices and provide as appropriate  - Encourage maximum independence but intervene and supervise when necessary  - Involve family in performance of ADLs  - Assess for home care needs following discharge   - Consider OT consult to assist with ADL evaluation and planning for discharge  - Provide patient education as appropriate  Outcome: Progressing  Goal: Maintains/Returns to pre admission functional level  Description: INTERVENTIONS:  - Perform BMAT or MOVE assessment daily    - Set and communicate daily mobility goal to care team and patient/family/caregiver     - Collaborate with rehabilitation services on mobility goals if consulted  - Perform Range of Motion    - Reposition patient   - Dangle patient   - Stand patient  - Ambulate patient   - Out of bed to chair   - Out of bed for meals   - Out of bed for toileting  - Record patient progress and toleration of activity level   Outcome: Progressing     Problem: DISCHARGE PLANNING  Goal: Discharge to home or other facility with appropriate resources  Description: INTERVENTIONS:  - Identify barriers to discharge w/patient and caregiver  - Arrange for needed discharge resources and transportation as appropriate  - Identify discharge learning needs (meds, wound care, etc )  - Arrange for interpretive services to assist at discharge as needed  - Refer to Case Management Department for coordinating discharge planning if the patient needs post-hospital services based on physician/advanced practitioner order or complex needs related to functional status, cognitive ability, or social support system  Outcome: Progressing     Problem: ALTERATION IN THE BREAST  Goal: Optimize infant feeding at the breast  Description: INTERVENTIONS:  - Latch, breast and nipple assessment  - Assess prior breast feeding history  - Hand expression of breast milk  - For cracked, bleeding and or sore nipples reassess latch, treat damaged nipple  -Educate mother on feeding cues  -Positioning/latch techniques  Outcome: Progressing     Problem: INADEQUATE LATCH, SUCK OR SWALLOW  Goal: Demonstrate ability to latch and sustain latch, audible swallowing and satiety  Description: INTERVENTIONS:  - Assess oral anatomy, notify Physician/AP for abnormal findings  - Establish milk expression  - Maximize feeding opportunity (skin to skin, behavioral state)  - Position/latch techniques  - Discourage use of pacifier-artificial nipple  - Mechanical pumping  - Nipple Shield  - Supplemental formula feeding (Physician/AP order)  - Alternative feeding method  Outcome: Progressing     Problem: POSTPARTUM  Goal: Experiences normal postpartum course  Description: INTERVENTIONS:  - Monitor maternal vital signs  - Assess uterine involution and lochia  Outcome: Progressing  Goal: Appropriate maternal -  bonding  Description: INTERVENTIONS:  - Identify family support  - Assess for appropriate maternal/infant bonding   -Encourage maternal/infant bonding opportunities  - Referral to  or  as needed  Outcome: Progressing  Goal: Establishment of infant feeding pattern  Description: INTERVENTIONS:  - Assess breast/bottle feeding  - Refer to lactation as needed  Outcome: Progressing  Goal: Incision(s), wounds(s) or drain site(s) healing without S/S of infection  Description: INTERVENTIONS  - Assess and document dressing, incision, wound bed, drain sites and surrounding tissue  - Provide patient and family education  - Perform skin care/dressing changes every     Outcome: Progressing     Problem: Potential for Falls  Goal: Patient will remain free of falls  Description: INTERVENTIONS:  - Educate patient/family on patient safety including physical limitations  - Instruct patient to call for assistance with activity   - Consult OT/PT to assist with strengthening/mobility   - Keep Call bell within reach  - Keep bed low and locked with side rails adjusted as appropriate  - Keep care items and personal belongings within reach  - Initiate and maintain comfort rounds  - Make Fall Risk Sign visible to staff  - Offer Toileting in advance of need  - Obtain necessary fall risk management equipment  - Apply yellow socks and bracelet for high fall risk patients  - Consider moving patient to room near nurses station  Outcome: Progressing

## 2022-04-12 NOTE — PLAN OF CARE
Problem: PAIN - ADULT  Goal: Verbalizes/displays adequate comfort level or baseline comfort level  Description: Interventions:  - Encourage patient to monitor pain and request assistance  - Assess pain using appropriate pain scale  - Administer analgesics based on type and severity of pain and evaluate response  - Implement non-pharmacological measures as appropriate and evaluate response  - Consider cultural and social influences on pain and pain management  - Notify physician/advanced practitioner if interventions unsuccessful or patient reports new pain  Outcome: Progressing     Problem: INFECTION - ADULT  Goal: Absence or prevention of progression during hospitalization  Description: INTERVENTIONS:  - Assess and monitor for signs and symptoms of infection  - Monitor lab/diagnostic results  - Monitor all insertion sites, i e  indwelling lines, tubes, and drains  - Monitor endotracheal if appropriate and nasal secretions for changes in amount and color  - [place] appropriate cooling/warming therapies per order  - Administer medications as ordered  - Instruct and encourage patient and family to use good hand hygiene technique  - Identify and instruct in appropriate isolation precautions for identified infection/condition  Outcome: Progressing  Goal: Absence of fever/infection during neutropenic period  Description: INTERVENTIONS:  - Monitor WBC    Outcome: Progressing     Problem: SAFETY ADULT  Goal: Patient will remain free of falls  Description: INTERVENTIONS:  - Educate patient/family on patient safety including physical limitations  - Instruct patient to call for assistance with activity   - Consult OT/PT to assist with strengthening/mobility   - Keep Call bell within reach  - Keep bed low and locked with side rails adjusted as appropriate  - Keep care items and personal belongings within reach  - Initiate and maintain comfort rounds  - Make Fall Risk Sign visible to staff  - Offer Toileting in advance of need  - Obtain necessary fall risk management equipment  - Apply yellow socks and bracelet for high fall risk patients  - Consider moving patient to room near nurses station  Outcome: Progressing  Goal: Maintain or return to baseline ADL function  Description: INTERVENTIONS:  -  Assess patient's ability to carry out ADLs; assess patient's baseline for ADL function and identify physical deficits which impact ability to perform ADLs (bathing, care of mouth/teeth, toileting, grooming, dressing, etc )  - Assess/evaluate cause of self-care deficits   - Assess range of motion  - Assess patient's mobility; develop plan if impaired  - Assess patient's need for assistive devices and provide as appropriate  - Encourage maximum independence but intervene and supervise when necessary  - Involve family in performance of ADLs  - Assess for home care needs following discharge   - Consider OT consult to assist with ADL evaluation and planning for discharge  - Provide patient education as appropriate  Outcome: Progressing  Goal: Maintains/Returns to pre admission functional level  Description: INTERVENTIONS:  - Perform BMAT or MOVE assessment daily    - Set and communicate daily mobility goal to care team and patient/family/caregiver     - Collaborate with rehabilitation services on mobility goals if consulted  - Perform Range of Motion    - Reposition patient   - Dangle patient   - Stand patient  - Ambulate patient   - Out of bed to chair   - Out of bed for meals   - Out of bed for toileting  - Record patient progress and toleration of activity level   Outcome: Progressing     Problem: DISCHARGE PLANNING  Goal: Discharge to home or other facility with appropriate resources  Description: INTERVENTIONS:  - Identify barriers to discharge w/patient and caregiver  - Arrange for needed discharge resources and transportation as appropriate  - Identify discharge learning needs (meds, wound care, etc )  - Arrange for interpretive services to assist at discharge as needed  - Refer to Case Management Department for coordinating discharge planning if the patient needs post-hospital services based on physician/advanced practitioner order or complex needs related to functional status, cognitive ability, or social support system  Outcome: Progressing     Problem: ALTERATION IN THE BREAST  Goal: Optimize infant feeding at the breast  Description: INTERVENTIONS:  - Latch, breast and nipple assessment  - Assess prior breast feeding history  - Hand expression of breast milk  - For cracked, bleeding and or sore nipples reassess latch, treat damaged nipple  -Educate mother on feeding cues  -Positioning/latch techniques  Outcome: Progressing     Problem: INADEQUATE LATCH, SUCK OR SWALLOW  Goal: Demonstrate ability to latch and sustain latch, audible swallowing and satiety  Description: INTERVENTIONS:  - Assess oral anatomy, notify Physician/AP for abnormal findings  - Establish milk expression  - Maximize feeding opportunity (skin to skin, behavioral state)  - Position/latch techniques  - Discourage use of pacifier-artificial nipple  - Mechanical pumping  - Nipple Shield  - Supplemental formula feeding (Physician/AP order)  - Alternative feeding method  Outcome: Progressing     Problem: POSTPARTUM  Goal: Experiences normal postpartum course  Description: INTERVENTIONS:  - Monitor maternal vital signs  - Assess uterine involution and lochia  Outcome: Progressing  Goal: Appropriate maternal -  bonding  Description: INTERVENTIONS:  - Identify family support  - Assess for appropriate maternal/infant bonding   -Encourage maternal/infant bonding opportunities  - Referral to  or  as needed  Outcome: Progressing  Goal: Establishment of infant feeding pattern  Description: INTERVENTIONS:  - Assess breast/bottle feeding  - Refer to lactation as needed  Outcome: Progressing  Goal: Incision(s), wounds(s) or drain site(s) healing without S/S of infection  Description: INTERVENTIONS  - Assess and document dressing, incision, wound bed, drain sites and surrounding tissue  - Provide patient and family education  - Perform skin care/dressing changes every   Outcome: Progressing     Problem: Potential for Falls  Goal: Patient will remain free of falls  Description: INTERVENTIONS:  - Educate patient/family on patient safety including physical limitations  - Instruct patient to call for assistance with activity   - Consult OT/PT to assist with strengthening/mobility   - Keep Call bell within reach  - Keep bed low and locked with side rails adjusted as appropriate  - Keep care items and personal belongings within reach  - Initiate and maintain comfort rounds  - Make Fall Risk Sign visible to staff  - Offer Toileting in advance of need  - Obtain necessary fall risk management equipment  - Apply yellow socks and bracelet for high fall risk patients  - Consider moving patient to room near nurses station  Outcome: Progressing

## 2022-04-13 PROBLEM — R60.0 LOWER EXTREMITY EDEMA: Status: ACTIVE | Noted: 2022-04-13

## 2022-04-13 PROBLEM — D64.9 ANEMIA: Status: ACTIVE | Noted: 2022-04-13

## 2022-04-13 LAB
BACTERIA BLD CULT: ABNORMAL
BACTERIA UR CULT: ABNORMAL
BASOPHILS # BLD AUTO: 0.01 THOUSANDS/ΜL (ref 0–0.1)
BASOPHILS NFR BLD AUTO: 0 % (ref 0–1)
E FAECALIS DNA BLD POS QL NAA+NON-PROBE: DETECTED
EOSINOPHIL # BLD AUTO: 0.12 THOUSAND/ΜL (ref 0–0.61)
EOSINOPHIL NFR BLD AUTO: 2 % (ref 0–6)
ERYTHROCYTE [DISTWIDTH] IN BLOOD BY AUTOMATED COUNT: 15.2 % (ref 11.6–15.1)
GRAM STN SPEC: ABNORMAL
HCT VFR BLD AUTO: 21.2 % (ref 34.8–46.1)
HGB BLD-MCNC: 7 G/DL (ref 11.5–15.4)
IMM GRANULOCYTES # BLD AUTO: 0.07 THOUSAND/UL (ref 0–0.2)
IMM GRANULOCYTES NFR BLD AUTO: 1 % (ref 0–2)
LYMPHOCYTES # BLD AUTO: 0.96 THOUSANDS/ΜL (ref 0.6–4.47)
LYMPHOCYTES NFR BLD AUTO: 14 % (ref 14–44)
MCH RBC QN AUTO: 27.4 PG (ref 26.8–34.3)
MCHC RBC AUTO-ENTMCNC: 32.1 G/DL (ref 31.4–37.4)
MCV RBC AUTO: 86 FL (ref 82–98)
MONOCYTES # BLD AUTO: 0.36 THOUSAND/ΜL (ref 0.17–1.22)
MONOCYTES NFR BLD AUTO: 5 % (ref 4–12)
NEUTROPHILS # BLD AUTO: 5.17 THOUSANDS/ΜL (ref 1.85–7.62)
NEUTS SEG NFR BLD AUTO: 78 % (ref 43–75)
NRBC BLD AUTO-RTO: 0 /100 WBCS
PLATELET # BLD AUTO: 210 THOUSANDS/UL (ref 149–390)
PMV BLD AUTO: 9.4 FL (ref 8.9–12.7)
RBC # BLD AUTO: 2.48 MILLION/UL (ref 3.81–5.12)
WBC # BLD AUTO: 6.69 THOUSAND/UL (ref 4.31–10.16)

## 2022-04-13 PROCEDURE — 99231 SBSQ HOSP IP/OBS SF/LOW 25: CPT | Performed by: INTERNAL MEDICINE

## 2022-04-13 PROCEDURE — 99024 POSTOP FOLLOW-UP VISIT: CPT | Performed by: STUDENT IN AN ORGANIZED HEALTH CARE EDUCATION/TRAINING PROGRAM

## 2022-04-13 PROCEDURE — 85025 COMPLETE CBC W/AUTO DIFF WBC: CPT | Performed by: OBSTETRICS & GYNECOLOGY

## 2022-04-13 RX ORDER — LINEZOLID 600 MG/1
600 TABLET, FILM COATED ORAL EVERY 12 HOURS SCHEDULED
Qty: 14 TABLET | Refills: 0 | Status: SHIPPED | OUTPATIENT
Start: 2022-04-13 | End: 2022-04-20

## 2022-04-13 RX ORDER — LINEZOLID 600 MG/1
600 TABLET, FILM COATED ORAL EVERY 12 HOURS SCHEDULED
Status: DISCONTINUED | OUTPATIENT
Start: 2022-04-13 | End: 2022-04-14 | Stop reason: HOSPADM

## 2022-04-13 RX ORDER — ALBUTEROL SULFATE 90 UG/1
2 AEROSOL, METERED RESPIRATORY (INHALATION) EVERY 6 HOURS PRN
Status: DISCONTINUED | OUTPATIENT
Start: 2022-04-13 | End: 2022-04-14 | Stop reason: HOSPADM

## 2022-04-13 RX ADMIN — IBUPROFEN 600 MG: 600 TABLET ORAL at 11:03

## 2022-04-13 RX ADMIN — IBUPROFEN 600 MG: 600 TABLET ORAL at 22:35

## 2022-04-13 RX ADMIN — DOCUSATE SODIUM 100 MG: 100 CAPSULE, LIQUID FILLED ORAL at 08:13

## 2022-04-13 RX ADMIN — LINEZOLID 600 MG: 600 TABLET, FILM COATED ORAL at 22:35

## 2022-04-13 RX ADMIN — OXYCODONE HYDROCHLORIDE 5 MG: 5 TABLET ORAL at 00:32

## 2022-04-13 RX ADMIN — LINEZOLID 600 MG: 600 TABLET, FILM COATED ORAL at 12:21

## 2022-04-13 RX ADMIN — DOCUSATE SODIUM 100 MG: 100 CAPSULE, LIQUID FILLED ORAL at 16:50

## 2022-04-13 RX ADMIN — ACETAMINOPHEN 650 MG: 325 TABLET, FILM COATED ORAL at 16:51

## 2022-04-13 RX ADMIN — OXYCODONE HYDROCHLORIDE 5 MG: 5 TABLET ORAL at 20:31

## 2022-04-13 RX ADMIN — ACETAMINOPHEN 650 MG: 325 TABLET, FILM COATED ORAL at 04:32

## 2022-04-13 RX ADMIN — IBUPROFEN 600 MG: 600 TABLET ORAL at 16:50

## 2022-04-13 RX ADMIN — VANCOMYCIN HYDROCHLORIDE 1500 MG: 5 INJECTION, POWDER, LYOPHILIZED, FOR SOLUTION INTRAVENOUS at 00:32

## 2022-04-13 RX ADMIN — PANTOPRAZOLE SODIUM 20 MG: 20 TABLET, DELAYED RELEASE ORAL at 08:13

## 2022-04-13 RX ADMIN — ACETAMINOPHEN 650 MG: 325 TABLET, FILM COATED ORAL at 11:03

## 2022-04-13 RX ADMIN — IBUPROFEN 600 MG: 600 TABLET ORAL at 04:32

## 2022-04-13 RX ADMIN — ENOXAPARIN SODIUM 40 MG: 40 INJECTION SUBCUTANEOUS at 08:14

## 2022-04-13 RX ADMIN — VANCOMYCIN HYDROCHLORIDE 1500 MG: 5 INJECTION, POWDER, LYOPHILIZED, FOR SOLUTION INTRAVENOUS at 08:20

## 2022-04-13 NOTE — ASSESSMENT & PLAN NOTE
Recent Labs     04/10/22  1800 04/11/22  1013 04/12/22  0537 04/13/22  0437   HGB 7 1* 7 8* 7 2* 7 0*     S/p 2U pRBCs, transfusion of second unit complicated by transfusion reaction notable for tachycardia into the 170s  S/p rapid response  Avoid further parenteral repletion in the setting of Enterococcus bacteremia     PO iron on discharge

## 2022-04-13 NOTE — CASE MANAGEMENT
Case Management Progress Note    Patient name Vic [facility]  Location  312/-71 MRN [id number]  : 1985 Date 2022       LOS (days): 7  Geometric Mean LOS (GMLOS) (days):   Days to GMLOS:        OBJECTIVE:        Current admission status: Inpatient  Preferred Pharmacy:   [facility]/pharmacy #[id number]- [place] - [address] [address]  Phone: [phone] Fax: [phone]    Primary Care Provider: Juan Johnson DO    Primary Insurance: BLUE CROSS  Secondary Insurance:     PROGRESS NOTE:    Consult: Discharge medications    Discussed consult with AMY Valdez and Dr Andrew Javed  Pt discharging with antbx price may be unaffordable  Requested Dr Andrew Javed to send script to pharmacy to check pt's copay cost  SW CM following

## 2022-04-13 NOTE — CASE MANAGEMENT
Case Management Progress Note    Patient name Balwinder Isaac  Location /-05 MRN [id number]  : 1985 Date 2022       LOS (days): 7  Geometric Mean LOS (GMLOS) (days):   Days to GMLOS:        OBJECTIVE:        Current admission status: Inpatient  Preferred Pharmacy:   [facility]/pharmacy #[id number] [place] - [address] [place] [address]  Phone: [phone] Fax: [address] ([place]) [place] [address]  Phone: [phone] Fax: [phone]    Primary Care Provider: Deric Navarro DO    Primary Insurance: BLUE CROSS  Secondary Insurance:     PROGRESS NOTE:    Received update pre authorization approved:     ID/case # [id number], effective 3/14 2022-22  Pharmacy updated re: same  Medication will be available for pickup by noon tomorrow  Information provided to MD re: same that patient will need to return to pharmacy on Thursday to  medication  SW following

## 2022-04-13 NOTE — CASE MANAGEMENT
Case Management Progress Note    Patient name Santana Joana  Location /-19 MRN [id number]  : 1985 Date 2022       LOS (days): 7  Geometric Mean LOS (GMLOS) (days):   Days to GMLOS:        OBJECTIVE:        Current admission status: Inpatient  Preferred Pharmacy:   [facility]/pharmacy #[id number]- [place] - [address] [facility]  [address] [facility] [address]  Phone: [phone] Fax: [address] ([place]) [facility] [address] [employer]  [address] [facility] [address]  Phone: [phone] Fax: [phone]    Primary Care Provider: Kimani John DO    Primary Insurance: BLUE CROSS  Secondary Insurance:     PROGRESS NOTE:    Kirsten Dwyer Updated Dr Tesha Keys re: information for presciption linezolid needed for discharge following update from [facility]  Dr Tesha Keys made aware that prior auth required from pt's insurance co [employer]#[id number] ; by calling Cover My Meds @ [phone]  If approved for prior auth, medication would need to be ordered by pharmacy and should be available tomorrow  Out of pocket cost would be approx  $2000 if insurance not used  SW following

## 2022-04-13 NOTE — PLAN OF CARE
Problem: PAIN - ADULT  Goal: Verbalizes/displays adequate comfort level or baseline comfort level  Description: Interventions:  - Encourage patient to monitor pain and request assistance  - Assess pain using appropriate pain scale  - Administer analgesics based on type and severity of pain and evaluate response  - Implement non-pharmacological measures as appropriate and evaluate response  - Consider cultural and social influences on pain and pain management  - Notify physician/advanced practitioner if interventions unsuccessful or patient reports new pain  Outcome: Progressing     Problem: INFECTION - ADULT  Goal: Absence or prevention of progression during hospitalization  Description: INTERVENTIONS:  - Assess and monitor for signs and symptoms of infection  - Monitor lab/diagnostic results  - Monitor all insertion sites, i e  indwelling lines, tubes, and drains  - Monitor endotracheal if appropriate and nasal secretions for changes in amount and color  - [place] appropriate cooling/warming therapies per order  - Administer medications as ordered  - Instruct and encourage patient and family to use good hand hygiene technique  - Identify and instruct in appropriate isolation precautions for identified infection/condition  Outcome: Progressing  Goal: Absence of fever/infection during neutropenic period  Description: INTERVENTIONS:  - Monitor WBC    Outcome: Progressing     Problem: SAFETY ADULT  Goal: Patient will remain free of falls  Description: INTERVENTIONS:  - Educate patient/family on patient safety including physical limitations  - Instruct patient to call for assistance with activity   - Consult OT/PT to assist with strengthening/mobility   - Keep Call bell within reach  - Keep bed low and locked with side rails adjusted as appropriate  - Keep care items and personal belongings within reach  - Initiate and maintain comfort rounds  - Make Fall Risk Sign visible to staff  - Offer Toileting in advance of need  - Obtain necessary fall risk management equipment  - Apply yellow socks and bracelet for high fall risk patients  - Consider moving patient to room near nurses station  Outcome: Progressing  Goal: Maintain or return to baseline ADL function  Description: INTERVENTIONS:  -  Assess patient's ability to carry out ADLs; assess patient's baseline for ADL function and identify physical deficits which impact ability to perform ADLs (bathing, care of mouth/teeth, toileting, grooming, dressing, etc )  - Assess/evaluate cause of self-care deficits   - Assess range of motion  - Assess patient's mobility; develop plan if impaired  - Assess patient's need for assistive devices and provide as appropriate  - Encourage maximum independence but intervene and supervise when necessary  - Involve family in performance of ADLs  - Assess for home care needs following discharge   - Consider OT consult to assist with ADL evaluation and planning for discharge  - Provide patient education as appropriate  Outcome: Progressing  Goal: Maintains/Returns to pre admission functional level  Description: INTERVENTIONS:  - Perform BMAT or MOVE assessment daily    - Set and communicate daily mobility goal to care team and patient/family/caregiver     - Collaborate with rehabilitation services on mobility goals if consulted  - Perform Range of Motion    - Reposition patient   - Dangle patient   - Stand patient  - Ambulate patient   - Out of bed to chair   - Out of bed for meals   - Out of bed for toileting  - Record patient progress and toleration of activity level   Outcome: Progressing     Problem: DISCHARGE PLANNING  Goal: Discharge to home or other facility with appropriate resources  Description: INTERVENTIONS:  - Identify barriers to discharge w/patient and caregiver  - Arrange for needed discharge resources and transportation as appropriate  - Identify discharge learning needs (meds, wound care, etc )  - Arrange for interpretive services to assist at discharge as needed  - Refer to Case Management Department for coordinating discharge planning if the patient needs post-hospital services based on physician/advanced practitioner order or complex needs related to functional status, cognitive ability, or social support system  Outcome: Progressing     Problem: ALTERATION IN THE BREAST  Goal: Optimize infant feeding at the breast  Description: INTERVENTIONS:  - Latch, breast and nipple assessment  - Assess prior breast feeding history  - Hand expression of breast milk  - For cracked, bleeding and or sore nipples reassess latch, treat damaged nipple  -Educate mother on feeding cues  -Positioning/latch techniques  Outcome: Progressing     Problem: INADEQUATE LATCH, SUCK OR SWALLOW  Goal: Demonstrate ability to latch and sustain latch, audible swallowing and satiety  Description: INTERVENTIONS:  - Assess oral anatomy, notify Physician/AP for abnormal findings  - Establish milk expression  - Maximize feeding opportunity (skin to skin, behavioral state)  - Position/latch techniques  - Discourage use of pacifier-artificial nipple  - Mechanical pumping  - Nipple Shield  - Supplemental formula feeding (Physician/AP order)  - Alternative feeding method  Outcome: Progressing     Problem: POSTPARTUM  Goal: Experiences normal postpartum course  Description: INTERVENTIONS:  - Monitor maternal vital signs  - Assess uterine involution and lochia  Outcome: Progressing  Goal: Appropriate maternal -  bonding  Description: INTERVENTIONS:  - Identify family support  - Assess for appropriate maternal/infant bonding   -Encourage maternal/infant bonding opportunities  - Referral to  or  as needed  Outcome: Progressing  Goal: Establishment of infant feeding pattern  Description: INTERVENTIONS:  - Assess breast/bottle feeding  - Refer to lactation as needed  Outcome: Progressing  Goal: Incision(s), wounds(s) or drain site(s) healing without S/S of infection  Description: INTERVENTIONS  - Assess and document dressing, incision, wound bed, drain sites and surrounding tissue  - Provide patient and family education  - Perform skin care/dressing changes every   Outcome: Progressing     Problem: Potential for Falls  Goal: Patient will remain free of falls  Description: INTERVENTIONS:  - Educate patient/family on patient safety including physical limitations  - Instruct patient to call for assistance with activity   - Consult OT/PT to assist with strengthening/mobility   - Keep Call bell within reach  - Keep bed low and locked with side rails adjusted as appropriate  - Keep care items and personal belongings within reach  - Initiate and maintain comfort rounds  - Make Fall Risk Sign visible to staff  - Offer Toileting in advance of need  - Obtain necessary fall risk management equipment  - Apply yellow socks and bracelet for high fall risk patients  - Consider moving patient to room near nurses station  Outcome: Progressing

## 2022-04-13 NOTE — PROGRESS NOTES
Progress Note - Infectious Disease   Tomas King 39 y o  female MRN: [id number]  Unit/Bed#: -01 Encounter: [id number]    Impression/Recommendations:  1  Sepsis, evolving since admission:  With fever, tachycardia, tachypnea, leukocytosis in the setting of Enterococcus bacteremia  Lactate is within normal limits  Procalcitonin is mildly elevated, 0 31  Temperature curve improving since yesterday  Doubt pt would have persistent fevers related to blood transfusion  ? Antibiotic therapy as stated below  ? Repeat CBC in am   ? Supportive care per primary service  ? Monitor clinical response, temperature curve    2  Enterococcus fecalis bacteremia:  Identified in 1 of 2 sets  Unclear focus of bacteremia at present  Urine cx also has growth of Enterococcus but < 100K and pt had no symptoms of UTI post-partum  Post-partum endometritis considered unlikely as pt has no abdominal/pelvic tenderness on exam  Recent CT abd results noted with hematoma  Surgical incision appears clean and dry on exam  I do not suspect a wound infection or skin source of bacteremia at this time   TT echo is negative for valve vegetations  ? Follow repeat blood cultures to assess for clearance of bacteremia  ? Discontinue vancomycin iv   ? Transition to oral linezolid 600mg q12 hours until   ? Total antibiotic duration will be 10 days  ? Discontinue fentanyl prn due to potential risk of serotonin syndrome while on linezolid    3  Status post  section on : no evidence of wound infection at this time  ? Serial exams of surgical wound    4  Morbid obesity with BMI of 37: this is a risk factor for infection    5  Hx of PCN allergy: pt is uncertain about the nature of the reaction, but thinks she may have had a rash  My recommendations were discussed with the patient and her  who verbalized understanding  Their questions and concerns were addressed by me    My recommendations were discussed with resident Dr Tesha Keys from the primary service  Thank you for allowing me to participate in the care of this patient  The ID service will follow      Antibiotics: vancomycin since 4/11  Scheduled Meds:  Current Facility-Administered Medications   Medication Dose Route Frequency Provider Last Rate    acetaminophen  650 mg Oral Q6H William Kirkpatrick MD      albuterol  2 puff Inhalation Q6H PRN Allison Santos MD      albuterol  2 5 mg Nebulization Once PRN Andreea Del Cid MD      benzocaine-menthol-lanolin-aloe  1 application Topical N9I PRN Morena Qujiano MD      calcium carbonate  1,000 mg Oral Daily PRN Mornea Quijano MD      diphenhydrAMINE  25 mg Oral Q6H PRN Morena Quijano MD      docusate sodium  100 mg Oral BID Morena Quijano MD      enoxaparin  40 mg Subcutaneous Q24H [facility] Germaine Sabillon MD      fentaNYL  25 mcg Intravenous Q5 Min PRN Andreea Del Cid MD      hydrALAZINE  5 mg Intravenous Q20 Min PRN Andreea Del Cid MD      hydrocortisone  1 application Topical Daily PRN Morena Quijano MD      HYDROmorphone  0 2 mg Intravenous Q5 Min PRN Andreea Del Cid MD      HYDROmorphone  0 5 mg Intravenous Q5 Min PRN Andreea Del Cid MD      HYDROmorphone  1 mg Intravenous Q2H PRN Morena Quijano MD      ibuprofen  600 mg Oral Q6H Yadira Shen MD      ipratropium  0 5 mg Nebulization Once PRN Andreea Del Cid MD      Labetalol HCl  10 mg Intravenous Q10 Min PRN Andreea Del Cid MD      metoclopramide  10 mg Intravenous Once PRN Andreea Del Cid MD      nalbuphine  5 mg Intravenous Q4H PRN Andreea Del Cid MD      ondansetron  4 mg Intravenous Q8H PRN Morena Quijano MD      oxyCODONE  10 mg Oral Q4H PRN Morena Quijano MD      oxyCODONE  5 mg Oral Q4H PRN Morena Quijano MD      pantoprazole  20 mg Oral Daily Morena Quijano MD      promethazine  12 5 mg Intravenous Once PRN Andreea Del Cid MD      simethicone  80 mg Oral 4x Daily PRN Morena Quijano MD     [address] [place] [address] sodium chloride  1,000 mL Intravenous Once Clarke Hill MD      Followed by   Patricia Martin sodium chloride  1,000 mL Intravenous Once Clarke Hill MD      vancomycin  1,500 mg Intravenous Q8H Marcie Jones MD 0 mg (22 0220)   Patricia Martin witch hazel-glycerin  1 pad Topical Q4H PRN Rufino Domingo MD       Continuous Infusions:   PRN Meds: albuterol    albuterol    benzocaine-menthol-lanolin-aloe    calcium carbonate    diphenhydrAMINE    fentaNYL    hydrALAZINE    hydrocortisone    HYDROmorphone    HYDROmorphone    HYDROmorphone    ipratropium    Labetalol HCl    metoclopramide    nalbuphine    ondansetron    oxyCODONE    oxyCODONE    promethazine    simethicone    witch hazel-glycerin    Subjective:  Patient feels well today  She denies fever, chills abdominal pain, dysuria, flank pain  She reports occasional hematuria  She reports continued leg swelling  She denies vomiting, diarrhea, rash  No new symptoms  She is tolerating vanco iv  Objective:  Vitals:  Temp:  [97 8 °F (36 6 °C)-98 7 °F (37 1 °C)] 97 9 °F (36 6 °C)  HR:  [] 101  Resp:  [18-20] 18  BP: (112-131)/(56-77) 115/77  SpO2:  [97 %-100 %] 97 %  Temp (24hrs), Av 1 °F (36 7 °C), Min:97 8 °F (36 6 °C), Max:98 7 °F (37 1 °C)  Current: Temperature: 97 9 °F (36 6 °C)  Physical Exam:   General Appearance:  Alert, awake, nontoxic, no acute distress   ENT: Oropharynx moist without lesions   Lungs:   Clear to auscultation bilaterally; respirations unlabored   Heart:  S1, S2, tachycardic, no murmur   Abdomen:   Soft, non-tender, non-distended   : Healing  scar, well approximated, no drainage, erythema or tenderness   No Stewart catheter present   Extremities: 1+ distal leg edema b/l   Neurologic: AAO to person, surroundings, conversant, fluent speech   Skin: No rash     Labs, Cultures, Imaging, & Other studies:   All pertinent labs, cultures and imaging studies were personally reviewed  Results from last 7 days   Lab Units 04/13/22  0437 04/12/22  0537 04/11/22  1013   WBC Thousand/uL 6 69 7 21 8 72   HEMOGLOBIN g/dL 7 0* 7 2* 7 8*   PLATELETS Thousands/uL 210 187 195     Results from last 7 days   Lab Units 04/12/22  0538 04/11/22  0620 04/11/22  0620 04/10/22  1800 04/10/22  1800 04/10/22  0553 04/10/22  0553   SODIUM mmol/L 140  --  134*  --  133*   < > 140   POTASSIUM mmol/L 3 2*   < > 3 2*   < > 3 4*   < > 3 5   CHLORIDE mmol/L 104   < > 100   < > 100   < > 105   CO2 mmol/L 25   < > 26   < > 26   < > 26   BUN mg/dL 6   < > 3*   < > 6   < > 5   CREATININE mg/dL 0 69   < > 0 84   < > 0 71   < > 0 68   EGFR ml/min/1 73sq m 112   < > 89   < > 109   < > 112   CALCIUM mg/dL 8 2*   < > 7 9*   < > 8 0*   < > 8 4   AST U/L  --   --   --   --  31  --  29   ALT U/L  --   --   --   --  29   < > 25   ALK PHOS U/L  --   --   --   --  81   < > 86    < > = values in this interval not displayed  Results from last 7 days   Lab Units 04/12/22  0542 04/11/22  0249 04/10/22  1847 04/10/22  1801 04/09/22  1856   BLOOD CULTURE  Received in Microbiology Lab  Culture in Progress  Received in Microbiology Lab  Culture in Progress  --  Enterococcus faecalis* No Growth at 48 hrs  No Growth at 48 hrs  GRAM STAIN RESULT   --   --  Gram positive cocci in pairs and chains*  --  No organisms seen   URINE CULTURE   --  70,000-79,000 cfu/ml Enterococcus faecalis*  --   --   --      Results from last 7 days   Lab Units 04/12/22  0538 04/11/22  1316   PROCALCITONIN ng/ml 0 31* 0 30*     Imaging Studies:   4/10 chest x-ray:  No acute cardiopulmonary disease      4/9 CTA chest, abd/pelv w&wo contr:  No evidence of pulmonary embolism  Mild high density fluid in pelvis adjacent to uterus with gas and stranding and overlying subcutaneous soft tissues  This is consistent with postoperative hematoma  No large mesh both collection identified

## 2022-04-13 NOTE — ASSESSMENT & PLAN NOTE
Symmetric, 2+ pitting edema up to the level of the shins  Continue to monitor   DVT ppx: SCDs, Lovenox   Consider dose of Lasix to relieve swelling

## 2022-04-13 NOTE — PLAN OF CARE
Problem: PAIN - ADULT  Goal: Verbalizes/displays adequate comfort level or baseline comfort level  Description: Interventions:  - Encourage patient to monitor pain and request assistance  - Assess pain using appropriate pain scale  - Administer analgesics based on type and severity of pain and evaluate response  - Implement non-pharmacological measures as appropriate and evaluate response  - Consider cultural and social influences on pain and pain management  - Notify physician/advanced practitioner if interventions unsuccessful or patient reports new pain  Outcome: Progressing     Problem: INFECTION - ADULT  Goal: Absence or prevention of progression during hospitalization  Description: INTERVENTIONS:  - Assess and monitor for signs and symptoms of infection  - Monitor lab/diagnostic results  - Monitor all insertion sites, i e  indwelling lines, tubes, and drains  - Monitor endotracheal if appropriate and nasal secretions for changes in amount and color  - [place] appropriate cooling/warming therapies per order  - Administer medications as ordered  - Instruct and encourage patient and family to use good hand hygiene technique  - Identify and instruct in appropriate isolation precautions for identified infection/condition  Outcome: Progressing  Goal: Absence of fever/infection during neutropenic period  Description: INTERVENTIONS:  - Monitor WBC    Outcome: Progressing     Problem: SAFETY ADULT  Goal: Patient will remain free of falls  Description: INTERVENTIONS:  - Educate patient/family on patient safety including physical limitations  - Instruct patient to call for assistance with activity   - Consult OT/PT to assist with strengthening/mobility   - Keep Call bell within reach  - Keep bed low and locked with side rails adjusted as appropriate  - Keep care items and personal belongings within reach  - Initiate and maintain comfort rounds  - Make Fall Risk Sign visible to staff  - Offer Toileting in advance of need  - Obtain necessary fall risk management equipment  - Apply yellow socks and bracelet for high fall risk patients  - Consider moving patient to room near nurses station  Outcome: Progressing  Goal: Maintain or return to baseline ADL function  Description: INTERVENTIONS:  -  Assess patient's ability to carry out ADLs; assess patient's baseline for ADL function and identify physical deficits which impact ability to perform ADLs (bathing, care of mouth/teeth, toileting, grooming, dressing, etc )  - Assess/evaluate cause of self-care deficits   - Assess range of motion  - Assess patient's mobility; develop plan if impaired  - Assess patient's need for assistive devices and provide as appropriate  - Encourage maximum independence but intervene and supervise when necessary  - Involve family in performance of ADLs  - Assess for home care needs following discharge   - Consider OT consult to assist with ADL evaluation and planning for discharge  - Provide patient education as appropriate  Outcome: Progressing  Goal: Maintains/Returns to pre admission functional level  Description: INTERVENTIONS:  - Perform BMAT or MOVE assessment daily    - Set and communicate daily mobility goal to care team and patient/family/caregiver     - Collaborate with rehabilitation services on mobility goals if consulted  - Perform Range of Motion    - Reposition patient   - Dangle patient   - Stand patient  - Ambulate patient   - Out of bed to chair   - Out of bed for meals   - Out of bed for toileting  - Record patient progress and toleration of activity level   Outcome: Progressing     Problem: DISCHARGE PLANNING  Goal: Discharge to home or other facility with appropriate resources  Description: INTERVENTIONS:  - Identify barriers to discharge w/patient and caregiver  - Arrange for needed discharge resources and transportation as appropriate  - Identify discharge learning needs (meds, wound care, etc )  - Arrange for interpretive services to assist at discharge as needed  - Refer to Case Management Department for coordinating discharge planning if the patient needs post-hospital services based on physician/advanced practitioner order or complex needs related to functional status, cognitive ability, or social support system  Outcome: Progressing     Problem: ALTERATION IN THE BREAST  Goal: Optimize infant feeding at the breast  Description: INTERVENTIONS:  - Latch, breast and nipple assessment  - Assess prior breast feeding history  - Hand expression of breast milk  - For cracked, bleeding and or sore nipples reassess latch, treat damaged nipple  -Educate mother on feeding cues  -Positioning/latch techniques  Outcome: Progressing     Problem: INADEQUATE LATCH, SUCK OR SWALLOW  Goal: Demonstrate ability to latch and sustain latch, audible swallowing and satiety  Description: INTERVENTIONS:  - Assess oral anatomy, notify Physician/AP for abnormal findings  - Establish milk expression  - Maximize feeding opportunity (skin to skin, behavioral state)  - Position/latch techniques  - Discourage use of pacifier-artificial nipple  - Mechanical pumping  - Nipple Shield  - Supplemental formula feeding (Physician/AP order)  - Alternative feeding method  Outcome: Progressing     Problem: POSTPARTUM  Goal: Experiences normal postpartum course  Description: INTERVENTIONS:  - Monitor maternal vital signs  - Assess uterine involution and lochia  Outcome: Progressing  Goal: Appropriate maternal -  bonding  Description: INTERVENTIONS:  - Identify family support  - Assess for appropriate maternal/infant bonding   -Encourage maternal/infant bonding opportunities  - Referral to  or  as needed  Outcome: Progressing  Goal: Establishment of infant feeding pattern  Description: INTERVENTIONS:  - Assess breast/bottle feeding  - Refer to lactation as needed  Outcome: Progressing  Goal: Incision(s), wounds(s) or drain site(s) healing without S/S of infection  Description: INTERVENTIONS  - Assess and document dressing, incision, wound bed, drain sites and surrounding tissue  - Provide patient and family education  - Perform skin care/dressing changes every     Outcome: Progressing     Problem: Potential for Falls  Goal: Patient will remain free of falls  Description: INTERVENTIONS:  - Educate patient/family on patient safety including physical limitations  - Instruct patient to call for assistance with activity   - Consult OT/PT to assist with strengthening/mobility   - Keep Call bell within reach  - Keep bed low and locked with side rails adjusted as appropriate  - Keep care items and personal belongings within reach  - Initiate and maintain comfort rounds  - Make Fall Risk Sign visible to staff  - Offer Toileting in advance of need  - Obtain necessary fall risk management equipment  - Apply yellow socks and bracelet for high fall risk patients  - Consider moving patient to room near nurses station  Outcome: Progressing

## 2022-04-13 NOTE — PROGRESS NOTES
Progress Note - OB/GYN   Vic King 39 y o  female MRN: [id number]  Unit/Bed#: -01 Encounter: [id number]    ASSESSMENT:  Anthony Hood is a 39 y o   female, POD#5 s/p Primary low transverse  section at 38w1d for maternal request in the setting of prolonged induction of labor  Postpartum course complicated by blood transfusion reaction (received 2U pRBCs today this admission), in addition to sepsis  Sepsis resolving with antibiotic treatment  PLAN:  * Sepsis ([address] )  Assessment & Plan  Currently on --> vancomycin ()  Blood cultures 4/10: Enterococcus bacteremia   S/p ID consult 22; appreciate recommendations   TT Echocardiogram  negative for vegetations   Urine culture 70-79K Enterococcos faecalis   F/u repeat blood cultures drawn   F/u AM labs    Temp Readings from Last 3 Encounters:   22 98 7 °F (37 1 °C) (Oral)   22 97 7 °F (36 5 °C)   22 97 8 °F (36 6 °C) (Temporal)     Clinically stable - patient feels well   Plan for PO linezolid on discharge until  for total of 10 days of antibiotic treatment per ID     Bilateral lower extremity edema  Assessment & Plan  Symmetric, 2+ pitting edema   Continue to monitor   DVT ppx: SCDs, Lovenox   Consider dose of Lasix to relieve swelling     Anemia  Assessment & Plan  Recent Labs     04/10/22  1800 22  1013 22  0537 22  0437   HGB 7 1* 7 8* 7 2* 7 0*     S/p 2U pRBCs, transfusion of second unit complicated by transfusion reaction notable for tachycardia into the 170s  S/p rapid response  Avoid further parenteral repletion in the setting of Enterococcus bacteremia     PO iron     Transfusion reaction  Assessment & Plan  S/p second 1U pRBC transfusion on , notable for tachycardia into the 170s  S/p rapid response   Tachycardia present but normalizing   Continue monitoring vitals    S/P  section  Assessment & Plan  Continue ambulating  Continue breastfeeding  Voiding Contraception: declines for now, will discuss at postpartum visit   Continue other routine post op care    Asthma  Assessment & Plan  PTA Albuterol inhaler PRN      SUBJECTIVE:  Pt feels well  She has no major complaints  Last night had no issues  Continues to tolerate PO, ambulate, void without difficulty  Passing flatus  Had BM yesterday  Denies chest pain, fever, chills, nausea/vomiting  Her incision is healing well  Her lochia is WNL  Patient with normalizing tachycardia, now ranging 100s  Patient denies palpitations  She is eager to go home whenever she safely can  Continues to have persistent bilateral lower extremity pitting edema, does have concerns about how swollen her legs are  OBJECTIVE:  Vitals:    22 2205 22 0012 22 0400   BP: 131/61 122/74 112/56 119/68   BP Location:  Right arm  Right arm   Pulse: 95 (!) 107 (!) 108 105   Resp: 18 20 20 18   Temp: 97 8 °F (36 6 °C) 98 3 °F (36 8 °C) 97 8 °F (36 6 °C) 97 9 °F (36 6 °C)   TempSrc: Oral Oral Oral Oral   SpO2: 97% 100% 98% 97%   Weight:       Height:           BP range last 24:  Systolic (10FFL), FP , Min:112 , VSA:326   Diastolic (34AUN), EZQ:58, Min:56, Max:76      Physical Exam:   Body mass index is 37 31 kg/m²  Physical Exam  Constitutional:       Appearance: She is obese  She is not ill-appearing or diaphoretic  HENT:      Head: Normocephalic and atraumatic  Eyes:      Conjunctiva/sclera: Conjunctivae normal       Pupils: Pupils are equal, round, and reactive to light  Cardiovascular:      Rate and Rhythm: Tachycardia present  Pulmonary:      Effort: Pulmonary effort is normal  No respiratory distress  Abdominal:      General: There is distension (post op)  Palpations: Abdomen is soft  Tenderness: There is no abdominal tenderness  There is no guarding  Comments: Pfannenstiel incision c/d/i, healing well, covered in surgical glue      Genitourinary:     Comments: Deferred   Musculoskeletal:         General: Normal range of motion  Cervical back: Normal range of motion  Right lower leg: Edema present  Left lower leg: Edema present  Comments: Lower extremities with 2+ pitting edema  Skin:     General: Skin is warm and dry  Neurological:      General: No focal deficit present  Mental Status: She is alert and oriented to person, place, and time  Mental status is at baseline  Psychiatric:         Mood and Affect: Mood normal          Behavior: Behavior normal          Thought Content: Thought content normal          I/O     None          Results:  Recent Labs     04/12/22  0537 04/13/22  0437   WBC 7 21 6 69   HGB 7 2* 7 0*   HCT 22 1* 21 2*    210       Recent Labs     04/10/22  1800 04/10/22  1800 04/11/22  0620 04/12/22  0538   K 3 4*   < > 3 2* 3 2*   CREATININE 0 71   < > 0 84 0 69   AST 31  --   --   --    ALT 29  --   --   --     < > = values in this interval not displayed  Recent Results (from the past 24 hour(s))   Vancomycin, trough Please draw prior to 1030 dose on 04/12  Please draw level PERIPHERALLY and not through the PICC line  Call pharmacy with results    Collection Time: 04/12/22 10:33 AM   Result Value Ref Range    Vancomycin Tr 10 4 10 0 - 20 0 ug/mL   Echo complete w/ contrast if indicated    Collection Time: 04/12/22  1:45 PM   Result Value Ref Range    LA size 4 1 cm    Triscuspid Valve Regurgitation Peak Gradient 33 0 mmHg    Tricuspid valve peak regurgitation velocity 2 88 m/s    LVPWd 0 70 0 53 - 1 00 cm    MV E' Tissue Velocity Septal 14 cm/s    TR Peak Howard 2 9 m/s    IVSd 4 88 cm    LV DIASTOLIC VOLUME (MOD BIPLANE) 2D 107 mL    LEFT VENTRICLE SYSTOLIC VOLUME (MOD BIPLANE) 2D 40 mL    Left ventricular stroke volume (2D) 67 00 mL    RVOT PMAX 1 mmHg    A4C EF 64 %    LVIDd 4 80 5  10 - 7 59 cm    IVS 0 7 0 54 - 1 01 cm    LVIDS 3 20 3  10 - 4 69 cm    FS 33 28 - 44 %    Asc Ao 2 8 2 06 - 3 08 cm    Ao root 2 70 cm    RVID d 4 0 cm    AV LVOT peak gradient 4 mmHg    MV valve area p 1/2 method 6 47 cm2    PV peak gradient antegrade 3 mmHg    E wave deceleration time 119 ms    RVOT peak howard 0 48 m/s    AV peak gradient 8 mmHg    MV Peak E Howard 92 cm/s    MV Peak A Howard 0 66 m/s    HAIDER A4C 22 3 cm2    RAA A4C 16 cm2    MV stenosis pressure 1/2 time 34 ms    LVSV, 2D 67 mL    Ao asc z-score 0 89     ZLVPWD -0 51     ZLVIDS -1 38     ZLVIDD -2 59     ZIVSD -0 62     LV EF 60    CBC and differential    Collection Time: 04/13/22  4:37 AM   Result Value Ref Range    WBC 6 69 4 31 - 10 16 Thousand/uL    RBC 2 48 (L) 3 81 - 5 12 Million/uL    Hemoglobin 7 0 (L) 11 5 - 15 4 g/dL    Hematocrit 21 2 (L) 34 8 - 46 1 %    MCV 86 82 - 98 fL    MCH 27 4 26 8 - 34 3 pg    MCHC 32 1 31 4 - 37 4 g/dL    RDW 15 2 (H) 11 6 - 15 1 %    MPV 9 4 8 9 - 12 7 fL    Platelets 854 543 - 455 Thousands/uL    nRBC 0 /100 WBCs    Neutrophils Relative 78 (H) 43 - 75 %    Immat GRANS % 1 0 - 2 %    Lymphocytes Relative 14 14 - 44 %    Monocytes Relative 5 4 - 12 %    Eosinophils Relative 2 0 - 6 %    Basophils Relative 0 0 - 1 %    Neutrophils Absolute 5 17 1 85 - 7 62 Thousands/µL    Immature Grans Absolute 0 07 0 00 - 0 20 Thousand/uL    Lymphocytes Absolute 0 96 0 60 - 4 47 Thousands/µL    Monocytes Absolute 0 36 0 17 - 1 22 Thousand/µL    Eosinophils Absolute 0 12 0 00 - 0 61 Thousand/µL    Basophils Absolute 0 01 0 00 - 0 10 Thousands/µL       Kailee Grant MD  PGY-3, OB/GYN  4/13/2022 6:48 AM

## 2022-04-14 VITALS
OXYGEN SATURATION: 98 % | BODY MASS INDEX: 37.54 KG/M2 | HEART RATE: 97 BPM | RESPIRATION RATE: 18 BRPM | DIASTOLIC BLOOD PRESSURE: 55 MMHG | WEIGHT: 204 LBS | SYSTOLIC BLOOD PRESSURE: 127 MMHG | HEIGHT: 62 IN | TEMPERATURE: 98.5 F

## 2022-04-14 LAB
ANION GAP SERPL CALCULATED.3IONS-SCNC: 10 MMOL/L (ref 4–13)
ATRIAL RATE: 123 BPM
BUN SERPL-MCNC: 5 MG/DL (ref 5–25)
CALCIUM SERPL-MCNC: 8.1 MG/DL (ref 8.3–10.1)
CHLORIDE SERPL-SCNC: 107 MMOL/L (ref 100–108)
CO2 SERPL-SCNC: 25 MMOL/L (ref 21–32)
CREAT SERPL-MCNC: 0.57 MG/DL (ref 0.6–1.3)
ERYTHROCYTE [DISTWIDTH] IN BLOOD BY AUTOMATED COUNT: 15.4 % (ref 11.6–15.1)
GFR SERPL CREATININE-BSD FRML MDRD: 119 ML/MIN/1.73SQ M
GLUCOSE SERPL-MCNC: 104 MG/DL (ref 65–140)
HCT VFR BLD AUTO: 20 % (ref 34.8–46.1)
HGB BLD-MCNC: 6.7 G/DL (ref 11.5–15.4)
MCH RBC QN AUTO: 27.9 PG (ref 26.8–34.3)
MCHC RBC AUTO-ENTMCNC: 33.5 G/DL (ref 31.4–37.4)
MCV RBC AUTO: 83 FL (ref 82–98)
P AXIS: 57 DEGREES
PLATELET # BLD AUTO: 248 THOUSANDS/UL (ref 149–390)
PMV BLD AUTO: 9.2 FL (ref 8.9–12.7)
POTASSIUM SERPL-SCNC: 2.9 MMOL/L (ref 3.5–5.3)
PR INTERVAL: 132 MS
QRS AXIS: 17 DEGREES
QRSD INTERVAL: 80 MS
QT INTERVAL: 316 MS
QTC INTERVAL: 452 MS
RBC # BLD AUTO: 2.4 MILLION/UL (ref 3.81–5.12)
SODIUM SERPL-SCNC: 142 MMOL/L (ref 136–145)
T WAVE AXIS: 25 DEGREES
VENTRICULAR RATE: 123 BPM
WBC # BLD AUTO: 5.9 THOUSAND/UL (ref 4.31–10.16)

## 2022-04-14 PROCEDURE — 80048 BASIC METABOLIC PNL TOTAL CA: CPT | Performed by: OBSTETRICS & GYNECOLOGY

## 2022-04-14 PROCEDURE — 99024 POSTOP FOLLOW-UP VISIT: CPT | Performed by: OBSTETRICS & GYNECOLOGY

## 2022-04-14 PROCEDURE — 93010 ELECTROCARDIOGRAM REPORT: CPT | Performed by: INTERNAL MEDICINE

## 2022-04-14 PROCEDURE — 85027 COMPLETE CBC AUTOMATED: CPT | Performed by: OBSTETRICS & GYNECOLOGY

## 2022-04-14 RX ORDER — OXYCODONE HYDROCHLORIDE 5 MG/1
5 TABLET ORAL EVERY 6 HOURS PRN
Qty: 6 TABLET | Refills: 0 | Status: SHIPPED | OUTPATIENT
Start: 2022-04-14 | End: 2022-04-18

## 2022-04-14 RX ORDER — FERROUS SULFATE TAB EC 324 MG (65 MG FE EQUIVALENT) 324 (65 FE) MG
324 TABLET DELAYED RESPONSE ORAL DAILY
Qty: 60 TABLET | Refills: 0 | Status: SHIPPED | OUTPATIENT
Start: 2022-04-14 | End: 2022-05-16 | Stop reason: ALTCHOICE

## 2022-04-14 RX ORDER — ACETAMINOPHEN 325 MG/1
650 TABLET ORAL EVERY 6 HOURS PRN
Qty: 30 TABLET | Refills: 0 | Status: SHIPPED | OUTPATIENT
Start: 2022-04-14 | End: 2022-05-16 | Stop reason: ALTCHOICE

## 2022-04-14 RX ORDER — CALCIUM CARBONATE 200(500)MG
1000 TABLET,CHEWABLE ORAL 2 TIMES DAILY PRN
Refills: 0
Start: 2022-04-14 | End: 2022-05-16 | Stop reason: ALTCHOICE

## 2022-04-14 RX ORDER — POTASSIUM CHLORIDE 750 MG/1
10 TABLET, EXTENDED RELEASE ORAL 2 TIMES DAILY
Qty: 6 TABLET | Refills: 0 | Status: SHIPPED | OUTPATIENT
Start: 2022-04-14 | End: 2022-04-17 | Stop reason: SDUPTHER

## 2022-04-14 RX ORDER — DOCUSATE SODIUM 100 MG/1
100 CAPSULE, LIQUID FILLED ORAL 2 TIMES DAILY
Qty: 10 CAPSULE | Refills: 0 | Status: SHIPPED | OUTPATIENT
Start: 2022-04-14

## 2022-04-14 RX ORDER — FUROSEMIDE 20 MG/1
10 TABLET ORAL DAILY
Qty: 3 TABLET | Refills: 0 | Status: SHIPPED | OUTPATIENT
Start: 2022-04-14 | End: 2022-04-17 | Stop reason: SDUPTHER

## 2022-04-14 RX ORDER — FERROUS SULFATE TAB EC 324 MG (65 MG FE EQUIVALENT) 324 (65 FE) MG
324 TABLET DELAYED RESPONSE ORAL
Qty: 30 TABLET | Refills: 0 | Status: CANCELLED | OUTPATIENT
Start: 2022-04-14

## 2022-04-14 RX ORDER — IBUPROFEN 600 MG/1
600 TABLET ORAL EVERY 6 HOURS PRN
Qty: 30 TABLET | Refills: 0 | Status: SHIPPED | OUTPATIENT
Start: 2022-04-14 | End: 2022-05-16 | Stop reason: ALTCHOICE

## 2022-04-14 RX ADMIN — PANTOPRAZOLE SODIUM 20 MG: 20 TABLET, DELAYED RELEASE ORAL at 09:53

## 2022-04-14 RX ADMIN — ENOXAPARIN SODIUM 40 MG: 40 INJECTION SUBCUTANEOUS at 09:53

## 2022-04-14 RX ADMIN — IBUPROFEN 600 MG: 600 TABLET ORAL at 04:25

## 2022-04-14 RX ADMIN — IBUPROFEN 600 MG: 600 TABLET ORAL at 11:44

## 2022-04-14 RX ADMIN — LINEZOLID 600 MG: 600 TABLET, FILM COATED ORAL at 10:34

## 2022-04-14 RX ADMIN — DOCUSATE SODIUM 100 MG: 100 CAPSULE, LIQUID FILLED ORAL at 09:53

## 2022-04-14 NOTE — PLAN OF CARE
Problem: PAIN - ADULT  Goal: Verbalizes/displays adequate comfort level or baseline comfort level  Description: Interventions:  - Encourage patient to monitor pain and request assistance  - Assess pain using appropriate pain scale  - Administer analgesics based on type and severity of pain and evaluate response  - Implement non-pharmacological measures as appropriate and evaluate response  - Consider cultural and social influences on pain and pain management  - Notify physician/advanced practitioner if interventions unsuccessful or patient reports new pain  Outcome: Progressing     Problem: INFECTION - ADULT  Goal: Absence or prevention of progression during hospitalization  Description: INTERVENTIONS:  - Assess and monitor for signs and symptoms of infection  - Monitor lab/diagnostic results  - Monitor all insertion sites, i e  indwelling lines, tubes, and drains  - Monitor endotracheal if appropriate and nasal secretions for changes in amount and color  - [place] appropriate cooling/warming therapies per order  - Administer medications as ordered  - Instruct and encourage patient and family to use good hand hygiene technique  - Identify and instruct in appropriate isolation precautions for identified infection/condition  Outcome: Progressing  Goal: Absence of fever/infection during neutropenic period  Description: INTERVENTIONS:  - Monitor WBC    Outcome: Progressing     Problem: SAFETY ADULT  Goal: Patient will remain free of falls  Description: INTERVENTIONS:  - Educate patient/family on patient safety including physical limitations  - Instruct patient to call for assistance with activity   - Consult OT/PT to assist with strengthening/mobility   - Keep Call bell within reach  - Keep bed low and locked with side rails adjusted as appropriate  - Keep care items and personal belongings within reach  - Initiate and maintain comfort rounds  - Make Fall Risk Sign visible to staff  - Offer Toileting in advance of need  - Obtain necessary fall risk management equipment  - Apply yellow socks and bracelet for high fall risk patients  - Consider moving patient to room near nurses station  Outcome: Progressing  Goal: Maintain or return to baseline ADL function  Description: INTERVENTIONS:  -  Assess patient's ability to carry out ADLs; assess patient's baseline for ADL function and identify physical deficits which impact ability to perform ADLs (bathing, care of mouth/teeth, toileting, grooming, dressing, etc )  - Assess/evaluate cause of self-care deficits   - Assess range of motion  - Assess patient's mobility; develop plan if impaired  - Assess patient's need for assistive devices and provide as appropriate  - Encourage maximum independence but intervene and supervise when necessary  - Involve family in performance of ADLs  - Assess for home care needs following discharge   - Consider OT consult to assist with ADL evaluation and planning for discharge  - Provide patient education as appropriate  Outcome: Progressing  Goal: Maintains/Returns to pre admission functional level  Description: INTERVENTIONS:  - Perform BMAT or MOVE assessment daily    - Set and communicate daily mobility goal to care team and patient/family/caregiver     - Collaborate with rehabilitation services on mobility goals if consulted  - Perform Range of Motion    - Reposition patient   - Dangle patient   - Stand patient  - Ambulate patient   - Out of bed to chair   - Out of bed for meals   - Out of bed for toileting  - Record patient progress and toleration of activity level   Outcome: Progressing     Problem: DISCHARGE PLANNING  Goal: Discharge to home or other facility with appropriate resources  Description: INTERVENTIONS:  - Identify barriers to discharge w/patient and caregiver  - Arrange for needed discharge resources and transportation as appropriate  - Identify discharge learning needs (meds, wound care, etc )  - Arrange for interpretive services to assist at discharge as needed  - Refer to Case Management Department for coordinating discharge planning if the patient needs post-hospital services based on physician/advanced practitioner order or complex needs related to functional status, cognitive ability, or social support system  Outcome: Progressing     Problem: ALTERATION IN THE BREAST  Goal: Optimize infant feeding at the breast  Description: INTERVENTIONS:  - Latch, breast and nipple assessment  - Assess prior breast feeding history  - Hand expression of breast milk  - For cracked, bleeding and or sore nipples reassess latch, treat damaged nipple  -Educate mother on feeding cues  -Positioning/latch techniques  Outcome: Progressing     Problem: INADEQUATE LATCH, SUCK OR SWALLOW  Goal: Demonstrate ability to latch and sustain latch, audible swallowing and satiety  Description: INTERVENTIONS:  - Assess oral anatomy, notify Physician/AP for abnormal findings  - Establish milk expression  - Maximize feeding opportunity (skin to skin, behavioral state)  - Position/latch techniques  - Discourage use of pacifier-artificial nipple  - Mechanical pumping  - Nipple Shield  - Supplemental formula feeding (Physician/AP order)  - Alternative feeding method  Outcome: Progressing     Problem: POSTPARTUM  Goal: Experiences normal postpartum course  Description: INTERVENTIONS:  - Monitor maternal vital signs  - Assess uterine involution and lochia  Outcome: Progressing  Goal: Appropriate maternal -  bonding  Description: INTERVENTIONS:  - Identify family support  - Assess for appropriate maternal/infant bonding   -Encourage maternal/infant bonding opportunities  - Referral to  or  as needed  Outcome: Progressing  Goal: Establishment of infant feeding pattern  Description: INTERVENTIONS:  - Assess breast/bottle feeding  - Refer to lactation as needed  Outcome: Progressing  Goal: Incision(s), wounds(s) or drain site(s) healing without S/S of infection  Description: INTERVENTIONS  - Assess and document dressing, incision, wound bed, drain sites and surrounding tissue  - Provide patient and family education  - Perform skin care/dressing changes every   Outcome: Progressing     Problem: Potential for Falls  Goal: Patient will remain free of falls  Description: INTERVENTIONS:  - Educate patient/family on patient safety including physical limitations  - Instruct patient to call for assistance with activity   - Consult OT/PT to assist with strengthening/mobility   - Keep Call bell within reach  - Keep bed low and locked with side rails adjusted as appropriate  - Keep care items and personal belongings within reach  - Initiate and maintain comfort rounds  - Make Fall Risk Sign visible to staff  - Offer Toileting in advance of need  - Obtain necessary fall risk management equipment  - Apply yellow socks and bracelet for high fall risk patients  - Consider moving patient to room near nurses station  Outcome: Progressing

## 2022-04-14 NOTE — UTILIZATION REVIEW
Discharge Zachælandy 89 y o  female MRN: [id number]     Unit/Bed#:   Encounter: [id number]     Admission Date: 2022 as observation  INPATIENT 22     Discharge Date: 22     Admitting Attending: Dr Chloe Longo  Delivering Attending: Dr Yuan Holliday  Discharge Attending: Jama     Diagnosis:  1  Pregnancy conceived by IVF  2  Obesity, BMI 37     Procedures: primary low transverse  section      Complications: none apparent      Anton Gabriela King is a 43 yo  who was admitted for an induction of labor for non-reassuring fetal heart tones at 37w5d  Her induction was started with a pitocin titration  Multiple attempt at kessler balloon placement were made, which were eventually successful  She received an epidural for analgesia, and was artificially ruptured for clear fluid  After failing to make change for several hours, the patient elected to undergo a primary  delivery  She underwent an uncomplicated  delivery   She delivered a viable male  at 0519 on 2022  Weight was 6lbs 8oz (2955g) with APGARs of 8 and 8 at 1 and 5 minutes  Her preoperative Hb was 11 2  Her postoperative Hb was 6 7   Her postoperative course was complicated by possible transfusion reaction and enterococcus in urine and blood, more recent blood cultures negative   Patient had iv antibiotics and was seen by cardiology for increased heart rate and ID for infection       Condition at discharge: good      On day of discharge pain was well controlled, patient was tolerating PO, passing flatus, had a bowel movement  She was discharged with standard post partum/ post operative instructions to follow up with her physician in 1 week for an incision check and in 3-6 weeks for a postpartum appointment       Discharge instructions/Information to patient and family:   -Do not place anything (no partner, tampons or douche) in your vagina for 6 weeks    -You may walk for exercise for the first 6 weeks then gradually return to your usual activities    -Please do not drive for 1 week if you have no stitches and for 2 weeks if you have stitches or underwent a  delivery     -You may take baths or shower per your preference    -Please look at your bust (breasts) in the mirror daily and call for redness or tenderness or increased warmth    -Please call us for temperature > 100 4*F or 38* C, worsening pain or a foul discharge        Discharge Medications:   Prenatal vitamin daily for 6 months or the duration of nursing whichever is longer  Motrin 600 mg orally every 6 hours as needed for pain  Tylenol (over the counter) per bottle directions as needed for pain: do NOT use with percocet  Hydrocortisone cream 1% (over the counter) applied 1-2x daily to hemorrhoids as needed  Percocet as needed  Zyvox  Lasix  Potassium  Iron     Provisions for Follow-Up Care:   Follow up with your doctor in 1 week for incision site check       Planned Readmission: no

## 2022-04-14 NOTE — UTILIZATION REVIEW
Continued Stay Review    Date: 22                      Current Patient Class: inpatient  Current Level of Care: M/S    HPI:36 y o  female initially admitted on 22 for PPROM    Assessment/Plan: 22  POD # 5   For prolong labor    PLAN:  * Sepsis ([address] )  Assessment & Plan  § Currently on  vancomycin () D/C by ID and placed on oral linezolid 600mg q12 hours until   Blood cultures 4/10: Enterococcus bacteremia   S/p ID consult 22; appreciate recommendations   TT Echocardiogram  negative for vegetations   Urine culture 70-79K Enterococcos faecalis   F/u repeat blood cultures drawn  awaiting 48 hr results    Vital Signs:     Date/Time Temp Pulse Resp BP MAP (mmHg) SpO2 O2 Device Cardiac (WDL) Patient Position - Orthostatic VS   22 0837 98 5 °F (36 9 °C) 97 18 127/55 -- -- -- -- --   22 0044 99 1 °F (37 3 °C) 101 18 109/66 -- 98 % -- -- --   22 2031 98 9 °F (37 2 °C) 101 18 114/57 -- 97 % None (Room air) -- Sitting   22 1945 -- -- -- -- -- -- None (Room air) WDL --   22 1652 98 9 °F (37 2 °C) 104 18 122/58 -- 100 % None (Room air) -- Sitting   22 1237 97 9 °F (36 6 °C) 95 18 117/62 -- -- -- -- --   22 0855 97 9 °F (36 6 °C) 101 18 115/77 -- -- -- -- --   22 0813 -- -- -- -- -- -- None (Room air) WDL --   22 0400 97 9 °F (36 6 °C) 105 18 119/68 -- 97 % -- -- Sitting   22 0012 97 8 °F (36 6 °C) 108 Abnormal  20 112/56 -- 98 % -- -- --         Pertinent Labs/Diagnostic Results:   Results from last 7 days   Lab Units 04/10/22  2134   SARS-COV-2  Negative     Results from last 7 days   Lab Units 22  0846 22  0437 22  0537 22  1013 22  1013 04/10/22  1800 04/10/22  1800 04/10/22  1351 04/10/22  1351   WBC Thousand/uL 5 90 6 69 7 21   < > 8 72   < > 8 42   < > 9 45   HEMOGLOBIN g/dL 6 7* 7 0* 7 2*  --  7 8*  --  7 1*   < > 7 4*   HEMATOCRIT % 20 0* 21 2* 22 1*  --  24 2*  --  21 0*   < > 22 6* PLATELETS Thousands/uL 248 210 187   < > 195   < > 152   < > 163   NEUTROS ABS Thousands/µL  --  5 17  --   --   --   --  7 46  --  8 38*   BANDS PCT %  --   --   --   --  20*  --   --   --   --     < > = values in this interval not displayed           Results from last 7 days   Lab Units 04/12/22  0538 04/11/22  0620 04/10/22  1800 04/10/22  0553 04/09/22  1421   SODIUM mmol/L 140 134* 133* 140 134*   POTASSIUM mmol/L 3 2* 3 2* 3 4* 3 5 4 4   CHLORIDE mmol/L 104 100 100 105 102   CO2 mmol/L 25 26 26 26 26   ANION GAP mmol/L 11 8 7 9 6   BUN mg/dL 6 3* 6 5 11   CREATININE mg/dL 0 69 0 84 0 71 0 68 0 89   EGFR ml/min/1 73sq m 112 89 109 112 83   CALCIUM mg/dL 8 2* 7 9* 8 0* 8 4 8 6   MAGNESIUM mg/dL  --   --   --   --  2 0     Results from last 7 days   Lab Units 04/10/22  1800 04/10/22  0553   AST U/L 31 29   ALT U/L 29 25   ALK PHOS U/L 81 86   TOTAL PROTEIN g/dL 5 4* 5 6*   ALBUMIN g/dL 1 9* 2 0*   TOTAL BILIRUBIN mg/dL 0 26 0 28         Results from last 7 days   Lab Units 04/12/22  0538 04/11/22  0620 04/10/22  1800 04/10/22  0553 04/09/22  1421 04/08/22  1100   GLUCOSE RANDOM mg/dL 90 113 117 99 118 154*     Results from last 7 days   Lab Units 04/12/22  0538 04/11/22  1316   PROCALCITONIN ng/ml 0 31* 0 30*     Results from last 7 days   Lab Units 04/11/22  1316 04/10/22  1800   LACTIC ACID mmol/L 0 9 0 7       Results from last 7 days   Lab Units 04/10/22  1847 04/09/22  1629   CLARITY UA  Slightly Cloudy  --    COLOR UA  Yellow  --    SPEC GRAV UA  <=1 005  --    PH UA  6 0  --    GLUCOSE UA mg/dl Negative  --    KETONES UA mg/dl Negative  --    BLOOD UA  Large* Large*   PROTEIN UA mg/dl Negative  --    NITRITE UA  Negative  --    BILIRUBIN UA  Negative  --    UROBILINOGEN UA E U /dl 0 2  --    LEUKOCYTES UA  Moderate*  --    WBC UA /hpf 30-50*  --    RBC UA /hpf 30-50*  --    BACTERIA UA /hpf Occasional  --    EPITHELIAL CELLS WET PREP /hpf Occasional  --      Results from last 7 days   Lab Units 04/10/22  2134   INFLUENZA A PCR  Negative   INFLUENZA B PCR  Negative   RSV PCR  Negative                             Results from last 7 days   Lab Units 04/12/22  0542 04/11/22  0249 04/10/22  1847 04/10/22  1801 04/09/22  1856   BLOOD CULTURE  No Growth at 24 hrs  No Growth at 24 hrs   --  Enterococcus faecalis* No Growth at 72 hrs  No Growth at 48 hrs     GRAM STAIN RESULT   --   --  Gram positive cocci in pairs and chains*  --  No organisms seen   URINE CULTURE   --  70,000-79,000 cfu/ml Enterococcus faecalis*  --   --   --        Medications:   Scheduled Medications:  docusate sodium, 100 mg, Oral, BID  enoxaparin, 40 mg, Subcutaneous, Q24H CRYSTAL  ibuprofen, 600 mg, Oral, Q6H  linezolid, 600 mg, Oral, Q12H CRYSTAL  pantoprazole, 20 mg, Oral, Daily  sodium chloride, 1,000 mL, Intravenous, Once   Followed by  sodium chloride, 1,000 mL, Intravenous, Once      Continuous IV Infusions:     PRN Meds:  albuterol, 2 puff, Inhalation, Q6H PRN  albuterol, 2 5 mg, Nebulization, Once PRN  benzocaine-menthol-lanolin-aloe, 1 application, Topical, O0M PRN  calcium carbonate, 1,000 mg, Oral, Daily PRN  diphenhydrAMINE, 25 mg, Oral, Q6H PRN  hydrALAZINE, 5 mg, Intravenous, Q20 Min PRN  hydrocortisone, 1 application, Topical, Daily PRN  HYDROmorphone, 0 2 mg, Intravenous, Q5 Min PRN  HYDROmorphone, 0 5 mg, Intravenous, Q5 Min PRN  HYDROmorphone, 1 mg, Intravenous, Q2H PRN  ipratropium, 0 5 mg, Nebulization, Once PRN  Labetalol HCl, 10 mg, Intravenous, Q10 Min PRN  metoclopramide, 10 mg, Intravenous, Once PRN  nalbuphine, 5 mg, Intravenous, Q4H PRN  ondansetron, 4 mg, Intravenous, Q8H PRN  oxyCODONE, 10 mg, Oral, Q4H PRN  oxyCODONE, 5 mg, Oral, Q4H PRN  promethazine, 12 5 mg, Intravenous, Once PRN  simethicone, 80 mg, Oral, 4x Daily PRN  witch hazel-glycerin, 1 pad, Topical, Q4H PRN        Discharge Plan:  Home when medically stable   Network Utilization Review Department  ATTENTION: Please call with any questions or concerns to [phone] and carefully listen to the prompts so that you are directed to the right person  All voicemails are confidential   Finis Feeling all requests for admission clinical reviews, approved or denied determinations and any other requests to dedicated fax number below belonging to the campus where the patient is receiving treatment   List of dedicated fax numbers for the Facilities:  [address] DENIALS (Administrative/Medical Necessity) [phone]   [address] (Maternity/NICU/Pediatrics) [phone]   [address] [place] [address]  [address] [place] [address] [address] [place] [address] [address] Zahira Chavis 14[address] [facility] [place] [address] [phone]

## 2022-04-14 NOTE — UTILIZATION REVIEW
Clinical for 04/06/2022 included with this review      Continued Stay Review    Date: 04-08-22                       Current Patient Class: inpatient   Current Level of Care: M/S  I:36 y o  female initially admitted on 04-06-22 for NRFHT and IOL @ 37 6/7 weeks     Assessment/Plan: 04-06-22  @ 1300  Contraction Frequency (minutes): none  Contraction Quality: Mild  Tachysystole: No   Cervical Dilation: Fingertip  Cervical Effacement: 30  Fetal Station: -3  Baseline Rate: 125 bpm  Fetal Heart Rate: 123 BPM  FHR Category: Category I  for spontaneous 6-minute decel nadiring to 60s    IV Pitocin started @ 1340    @ 1715  Dose (kori-units/min) Oxytocin: 8 kori-units/min  Contraction Frequency (minutes): irregular/irritability  Contraction Quality: Mild  Tachysystole: No   Cervical Dilation: 1-2  Cervical Effacement: 50  Fetal Station: -3  Baseline Rate: 120 bpm  Fetal Heart Rate: 138 BPM  FHR Category: Category I    @ 2130  Dose (kori-units/min) Oxytocin: 14 kori-units/min  Contraction Frequency (minutes): 3-4  Contraction Quality: Mild  Tachysystole: No   Cervical Dilation: 1-2  Cervical Effacement: 60  Fetal Station: -3  Baseline Rate: 115 bpm  Fetal Heart Rate: 137 BPM    04-07-22  @0200  Dose (kori-units/min) Oxytocin: 22 kori-units/min  Contraction Frequency (minutes): 1 5-6  Contraction Quality: Mild,Moderate  Tachysystole: No   Cervical Dilation: 1-2  Cervical Effacement: 60  Fetal Station: -3  Baseline Rate: 110 bpm  Fetal Heart Rate: 140 BPM  FHR Category: Category I    @ 0915  Dose (kori-units/min) Oxytocin: 0 kori-units/min  Contraction Frequency (minutes): 1 5-2 5  Contraction Quality: Mild  Tachysystole: No   Cervical Dilation: 2  Cervical Effacement: 60  Fetal Station: -3  Baseline Rate: 115 bpm  Fetal Heart Rate: 118 BPM  FHR Category: Category I    @ 1515   No change in cervical dilation   Epidural     @ 1700  Dose (kori-units/min) Oxytocin: 14 kori-units/min  Contraction Frequency (minutes): irreg on toco  Contraction Quality: Mild  Tachysystole: No   Cervical Dilation: 4  Cervical Effacement: 70  Fetal Station: -3  Baseline Rate: 130 bpm  Fetal Heart Rate: 130 BPM  FHR Category: Category I    @1915  Dose (kori-units/min) Oxytocin: 18 kori-units/min  Contraction Frequency (minutes): 3-4  Contraction Quality: Mild  Tachysystole: No   Cervical Dilation: 4  Cervical Effacement: 70  Fetal Station: -3  Baseline Rate: 120 bpm  Fetal Heart Rate: 122 BPM  FHR Category: Category I    @ 2245  Dose (kori-units/min) Oxytocin: 22 kori-units/min  Contraction Frequency (minutes): 3-3 5  Contraction Quality: Mild,Moderate  Tachysystole: No   Cervical Dilation: 4  Cervical Effacement: 70  Fetal Station: -3  Baseline Rate: 120 bpm  Fetal Heart Rate: 125 BPM  FHR Category: Category II    22  @ 0200  Dose (kori-units/min) Oxytocin: 0 kori-units/min  Contraction Frequency (minutes): 1 5-4  Contraction Quality: Moderate  Tachysystole: No   Cervical Dilation: 4  Cervical Effacement: 70  Fetal Station: -3  Baseline Rate: 125 bpm  Fetal Heart Rate: 135 BPM  FHR Category: Category I  FHT noted to have recurrent late decelerations with prolonged deceleration lasting 2 mins with kimberly of 80s immediately after  Pitocin held, IVF bolus started, O2 given, and maternal positioning changed with return to baseline and resolution of decelerations    @ 0519 38 1/7 weeks   Male  Apgar 8/8  Wt  2955 grams  Infant [facility] to [facility] for routine new born care        Pertinent Labs/Diagnostic Results:   Results from last 7 days   Lab Units 04/10/22  2134   SARS-COV-2  Negative     Results from last 7 days   Lab Units 22  0846 22  0437 22  0537 22  1013 22  1013 04/10/22  1800 04/10/22  1800 04/10/22  1351 04/10/22  1351   WBC Thousand/uL 5 90 6 69 7 21   < > 8 72   < > 8 42   < > 9 45   HEMOGLOBIN g/dL 6 7* 7 0* 7 2*  --  7 8*  --  7 1*   < > 7 4*   HEMATOCRIT % 20 0* 21 2* 22 1*  --  24 2*  --  21 0*   < > 22 6*   PLATELETS Thousands/uL 248 210 187   < > 195   < > 152   < > 163   NEUTROS ABS Thousands/µL  --  5 17  --   --   --   --  7 46  --  8 38*   BANDS PCT %  --   --   --   --  20*  --   --   --   --     < > = values in this interval not displayed  Results from last 7 days   Lab Units 04/14/22  0846 04/12/22  0538 04/11/22  0620 04/10/22  1800 04/10/22  0553 04/09/22  1421 04/09/22  1421   SODIUM mmol/L 142 140 134* 133* 140   < > 134*   POTASSIUM mmol/L 2 9* 3 2* 3 2* 3 4* 3 5   < > 4 4   CHLORIDE mmol/L 107 104 100 100 105   < > 102   CO2 mmol/L 25 25 26 26 26   < > 26   ANION GAP mmol/L 10 11 8 7 9   < > 6   BUN mg/dL 5 6 3* 6 5   < > 11   CREATININE mg/dL 0 57* 0 69 0 84 0 71 0 68   < > 0 89   EGFR ml/min/1 73sq m 119 112 89 109 112   < > 83   CALCIUM mg/dL 8 1* 8 2* 7 9* 8 0* 8 4   < > 8 6   MAGNESIUM mg/dL  --   --   --   --   --   --  2 0    < > = values in this interval not displayed  Results from last 7 days   Lab Units 04/14/22  0846 04/12/22  0538 04/11/22  0620 04/10/22  1800 04/10/22  0553 04/09/22  1421 04/08/22  1100   GLUCOSE RANDOM mg/dL 104 90 113 117 99 118 154*         Medications:   Scheduled Medications:  No current facility-administered medications for this encounter  Continuous IV Infusions:  No current facility-administered medications for this encounter  PRN Meds:  No current facility-administered medications for this encounter  Discharge Plan: home    Network Utilization Review Department  ATTENTION: Please call with any questions or concerns to [phone] and carefully listen to the prompts so that you are directed to the right person  All voicemails are confidential   Ulises Pronto all requests for admission clinical reviews, approved or denied determinations and any other requests to dedicated fax number below belonging to the campus where the patient is receiving treatment   List of dedicated fax numbers for the Facilities:  [address] [place] [address] DENIALS (Administrative/Medical Necessity) [phone]   [address] (Maternity/NICU/Pediatrics) [address] [place] [address],[address]  [phone]   Jeffrey Batres 50 [address] [place] [address] [address] [address] Zahira Chavis 14[address] [facility] [place] [address] [phone]

## 2022-04-14 NOTE — PLAN OF CARE
Problem: PAIN - ADULT  Goal: Verbalizes/displays adequate comfort level or baseline comfort level  Description: Interventions:  - Encourage patient to monitor pain and request assistance  - Assess pain using appropriate pain scale  - Administer analgesics based on type and severity of pain and evaluate response  - Implement non-pharmacological measures as appropriate and evaluate response  - Consider cultural and social influences on pain and pain management  - Notify physician/advanced practitioner if interventions unsuccessful or patient reports new pain  Outcome: Progressing     Problem: INFECTION - ADULT  Goal: Absence or prevention of progression during hospitalization  Description: INTERVENTIONS:  - Assess and monitor for signs and symptoms of infection  - Monitor lab/diagnostic results  - Monitor all insertion sites, i e  indwelling lines, tubes, and drains  - Monitor endotracheal if appropriate and nasal secretions for changes in amount and color  - [place] appropriate cooling/warming therapies per order  - Administer medications as ordered  - Instruct and encourage patient and family to use good hand hygiene technique  - Identify and instruct in appropriate isolation precautions for identified infection/condition  Outcome: Progressing  Goal: Absence of fever/infection during neutropenic period  Description: INTERVENTIONS:  - Monitor WBC    Outcome: Progressing     Problem: SAFETY ADULT  Goal: Patient will remain free of falls  Description: INTERVENTIONS:  - Educate patient/family on patient safety including physical limitations  - Instruct patient to call for assistance with activity   - Consult OT/PT to assist with strengthening/mobility   - Keep Call bell within reach  - Keep bed low and locked with side rails adjusted as appropriate  - Keep care items and personal belongings within reach  - Initiate and maintain comfort rounds  - Make Fall Risk Sign visible to staff  - Offer Toileting in advance of need  - Obtain necessary fall risk management equipment  - Apply yellow socks and bracelet for high fall risk patients  - Consider moving patient to room near nurses station  Outcome: Progressing  Goal: Maintain or return to baseline ADL function  Description: INTERVENTIONS:  -  Assess patient's ability to carry out ADLs; assess patient's baseline for ADL function and identify physical deficits which impact ability to perform ADLs (bathing, care of mouth/teeth, toileting, grooming, dressing, etc )  - Assess/evaluate cause of self-care deficits   - Assess range of motion  - Assess patient's mobility; develop plan if impaired  - Assess patient's need for assistive devices and provide as appropriate  - Encourage maximum independence but intervene and supervise when necessary  - Involve family in performance of ADLs  - Assess for home care needs following discharge   - Consider OT consult to assist with ADL evaluation and planning for discharge  - Provide patient education as appropriate  Outcome: Progressing  Goal: Maintains/Returns to pre admission functional level  Description: INTERVENTIONS:  - Perform BMAT or MOVE assessment daily    - Set and communicate daily mobility goal to care team and patient/family/caregiver     - Collaborate with rehabilitation services on mobility goals if consulted  - Perform Range of Motion    - Reposition patient   - Dangle patient   - Stand patient  - Ambulate patient   - Out of bed to chair   - Out of bed for meals   - Out of bed for toileting  - Record patient progress and toleration of activity level   Outcome: Progressing     Problem: DISCHARGE PLANNING  Goal: Discharge to home or other facility with appropriate resources  Description: INTERVENTIONS:  - Identify barriers to discharge w/patient and caregiver  - Arrange for needed discharge resources and transportation as appropriate  - Identify discharge learning needs (meds, wound care, etc )  - Arrange for interpretive services to assist at discharge as needed  - Refer to Case Management Department for coordinating discharge planning if the patient needs post-hospital services based on physician/advanced practitioner order or complex needs related to functional status, cognitive ability, or social support system  Outcome: Progressing     Problem: ALTERATION IN THE BREAST  Goal: Optimize infant feeding at the breast  Description: INTERVENTIONS:  - Latch, breast and nipple assessment  - Assess prior breast feeding history  - Hand expression of breast milk  - For cracked, bleeding and or sore nipples reassess latch, treat damaged nipple  -Educate mother on feeding cues  -Positioning/latch techniques  Outcome: Progressing     Problem: INADEQUATE LATCH, SUCK OR SWALLOW  Goal: Demonstrate ability to latch and sustain latch, audible swallowing and satiety  Description: INTERVENTIONS:  - Assess oral anatomy, notify Physician/AP for abnormal findings  - Establish milk expression  - Maximize feeding opportunity (skin to skin, behavioral state)  - Position/latch techniques  - Discourage use of pacifier-artificial nipple  - Mechanical pumping  - Nipple Shield  - Supplemental formula feeding (Physician/AP order)  - Alternative feeding method  Outcome: Progressing     Problem: POSTPARTUM  Goal: Experiences normal postpartum course  Description: INTERVENTIONS:  - Monitor maternal vital signs  - Assess uterine involution and lochia  Outcome: Progressing  Goal: Appropriate maternal -  bonding  Description: INTERVENTIONS:  - Identify family support  - Assess for appropriate maternal/infant bonding   -Encourage maternal/infant bonding opportunities  - Referral to  or  as needed  Outcome: Progressing  Goal: Establishment of infant feeding pattern  Description: INTERVENTIONS:  - Assess breast/bottle feeding  - Refer to lactation as needed  Outcome: Progressing  Goal: Incision(s), wounds(s) or drain site(s) healing without S/S of infection  Description: INTERVENTIONS  - Assess and document dressing, incision, wound bed, drain sites and surrounding tissue  - Provide patient and family education  - Perform skin care/dressing changes every     Outcome: Progressing     Problem: Potential for Falls  Goal: Patient will remain free of falls  Description: INTERVENTIONS:  - Educate patient/family on patient safety including physical limitations  - Instruct patient to call for assistance with activity   - Consult OT/PT to assist with strengthening/mobility   - Keep Call bell within reach  - Keep bed low and locked with side rails adjusted as appropriate  - Keep care items and personal belongings within reach  - Initiate and maintain comfort rounds  - Make Fall Risk Sign visible to staff  - Offer Toileting in advance of need  - Obtain necessary fall risk management equipment  - Apply yellow socks and bracelet for high fall risk patients  - Consider moving patient to room near nurses station  Outcome: Progressing

## 2022-04-14 NOTE — PLAN OF CARE
Problem: PAIN - ADULT  Goal: Verbalizes/displays adequate comfort level or baseline comfort level  Description: Interventions:  - Encourage patient to monitor pain and request assistance  - Assess pain using appropriate pain scale  - Administer analgesics based on type and severity of pain and evaluate response  - Implement non-pharmacological measures as appropriate and evaluate response  - Consider cultural and social influences on pain and pain management  - Notify physician/advanced practitioner if interventions unsuccessful or patient reports new pain  4/14/2022 1136 by Betzy Roe RN  Outcome: Completed  4/14/2022 0734 by Betzy Roe RN  Outcome: Progressing     Problem: INFECTION - ADULT  Goal: Absence or prevention of progression during hospitalization  Description: INTERVENTIONS:  - Assess and monitor for signs and symptoms of infection  - Monitor lab/diagnostic results  - Monitor all insertion sites, i e  indwelling lines, tubes, and drains  - Monitor endotracheal if appropriate and nasal secretions for changes in amount and color  - [place] appropriate cooling/warming therapies per order  - Administer medications as ordered  - Instruct and encourage patient and family to use good hand hygiene technique  - Identify and instruct in appropriate isolation precautions for identified infection/condition  4/14/2022 1136 by Betzy Roe RN  Outcome: Completed  4/14/2022 0734 by Betzy Roe RN  Outcome: Progressing  Goal: Absence of fever/infection during neutropenic period  Description: INTERVENTIONS:  - Monitor WBC    4/14/2022 1136 by Betzy Roe RN  Outcome: Completed  4/14/2022 0734 by Betzy Roe RN  Outcome: Progressing     Problem: SAFETY ADULT  Goal: Patient will remain free of falls  Description: INTERVENTIONS:  - Educate patient/family on patient safety including physical limitations  - Instruct patient to call for assistance with activity   - Consult OT/PT to assist with strengthening/mobility   - Keep Call bell within reach  - Keep bed low and locked with side rails adjusted as appropriate  - Keep care items and personal belongings within reach  - Initiate and maintain comfort rounds  - Make Fall Risk Sign visible to staff  - Offer Toileting in advance of need  - Obtain necessary fall risk management equipment  - Apply yellow socks and bracelet for high fall risk patients  - Consider moving patient to room near nurses station  4/14/2022 1136 by Talita Mcnamara RN  Outcome: Completed  4/14/2022 0734 by Talita Mcnamara RN  Outcome: Progressing  Goal: Maintain or return to baseline ADL function  Description: INTERVENTIONS:  -  Assess patient's ability to carry out ADLs; assess patient's baseline for ADL function and identify physical deficits which impact ability to perform ADLs (bathing, care of mouth/teeth, toileting, grooming, dressing, etc )  - Assess/evaluate cause of self-care deficits   - Assess range of motion  - Assess patient's mobility; develop plan if impaired  - Assess patient's need for assistive devices and provide as appropriate  - Encourage maximum independence but intervene and supervise when necessary  - Involve family in performance of ADLs  - Assess for home care needs following discharge   - Consider OT consult to assist with ADL evaluation and planning for discharge  - Provide patient education as appropriate  4/14/2022 1136 by Talita Mcnamara RN  Outcome: Completed  4/14/2022 0734 by Talita Mcnamara RN  Outcome: Progressing  Goal: Maintains/Returns to pre admission functional level  Description: INTERVENTIONS:  - Perform BMAT or MOVE assessment daily    - Set and communicate daily mobility goal to care team and patient/family/caregiver     - Collaborate with rehabilitation services on mobility goals if consulted  - Perform Range of Motion    - Reposition patient   - Dangle patient   - Stand patient  - Ambulate patient   - Out of bed to chair   - Out of bed for meals   - Out of bed for toileting  - Record patient progress and toleration of activity level   4/14/2022 1136 by Genaro Gunn RN  Outcome: Completed  4/14/2022 0734 by Genaro Gunn RN  Outcome: Progressing     Problem: DISCHARGE PLANNING  Goal: Discharge to home or other facility with appropriate resources  Description: INTERVENTIONS:  - Identify barriers to discharge w/patient and caregiver  - Arrange for needed discharge resources and transportation as appropriate  - Identify discharge learning needs (meds, wound care, etc )  - Arrange for interpretive services to assist at discharge as needed  - Refer to Case Management Department for coordinating discharge planning if the patient needs post-hospital services based on physician/advanced practitioner order or complex needs related to functional status, cognitive ability, or social support system  4/14/2022 1136 by Genaro Gunn RN  Outcome: Completed  4/14/2022 0734 by Genaro Gunn RN  Outcome: Progressing     Problem: ALTERATION IN THE BREAST  Goal: Optimize infant feeding at the breast  Description: INTERVENTIONS:  - Latch, breast and nipple assessment  - Assess prior breast feeding history  - Hand expression of breast milk  - For cracked, bleeding and or sore nipples reassess latch, treat damaged nipple  -Educate mother on feeding cues  -Positioning/latch techniques  4/14/2022 1136 by Genaro Gunn RN  Outcome: Completed  4/14/2022 0734 by Genaro Gunn RN  Outcome: Progressing     Problem: INADEQUATE LATCH, SUCK OR SWALLOW  Goal: Demonstrate ability to latch and sustain latch, audible swallowing and satiety  Description: INTERVENTIONS:  - Assess oral anatomy, notify Physician/AP for abnormal findings  - Establish milk expression  - Maximize feeding opportunity (skin to skin, behavioral state)  - Position/latch techniques  - Discourage use of pacifier-artificial nipple  - Mechanical pumping  - Nipple Shield  - Supplemental formula feeding (Physician/AP order)  - Alternative feeding method  2022 by Cirilo Buitrago RN  Outcome: Completed  2022 by Cirilo Buitrago RN  Outcome: Progressing     Problem: POSTPARTUM  Goal: Experiences normal postpartum course  Description: INTERVENTIONS:  - Monitor maternal vital signs  - Assess uterine involution and lochia  2022 by Cirilo Buitrago RN  Outcome: Completed  2022 by Cirilo Buitrago RN  Outcome: Progressing  Goal: Appropriate maternal -  bonding  Description: INTERVENTIONS:  - Identify family support  - Assess for appropriate maternal/infant bonding   -Encourage maternal/infant bonding opportunities  - Referral to  or  as needed  2022 by Cirilo Buitrago RN  Outcome: Completed  2022 by Cirilo Buitrago RN  Outcome: Progressing  Goal: Establishment of infant feeding pattern  Description: INTERVENTIONS:  - Assess breast/bottle feeding  - Refer to lactation as needed  2022 by Cirilo Buitrago RN  Outcome: Completed  2022 by Cirilo Buitrago RN  Outcome: Progressing  Goal: Incision(s), wounds(s) or drain site(s) healing without S/S of infection  Description: INTERVENTIONS  - Assess and document dressing, incision, wound bed, drain sites and surrounding tissue  - Provide patient and family education  - Perform skin care/dressing changes every   2022 by Cirilo Buitrago RN  Outcome: Completed  2022 by Cirilo Buitrago RN  Outcome: Progressing     Problem: Potential for Falls  Goal: Patient will remain free of falls  Description: INTERVENTIONS:  - Educate patient/family on patient safety including physical limitations  - Instruct patient to call for assistance with activity   - Consult OT/PT to assist with strengthening/mobility   - Keep Call bell within reach  - Keep bed low and locked with side rails adjusted as appropriate  - Keep care items and personal belongings within reach  - Initiate and maintain comfort rounds  - Make Fall Risk Sign visible to staff  - Offer Toileting in advance of need  - Obtain necessary fall risk management equipment  - Apply yellow socks and bracelet for high fall risk patients  - Consider moving patient to room near nurses station  4/14/2022 1136 by Juancarlos Gomez RN  Outcome: Completed  4/14/2022 0734 by Juancarlos Gomez RN  Outcome: Progressing

## 2022-04-14 NOTE — UTILIZATION REVIEW
Rosa Maria Albarran MD   Physician   OB/GYN   Discharge Summary      Signed   Date of Service:  2022  4:45 AM                 Signed                Show:Clear all  [x]Manual[x]Template[]Copied    Added by:  Norma Amador MD[x]Shirlene Stein MD      []Mary for details       Discharge Summary - Tomas King 39 y o  female MRN: [id number]     Unit/Bed#:   Encounter: [id number]     Admission Date: 2022      Discharge Date: 22     Admitting Attending: Dr Buck Ahmadi  Delivering Attending: Dr Carolann Curiel  Discharge Attending: Carolann Curiel     Diagnosis:  1  Pregnancy conceived by IVF  2  Obesity, BMI 37     Procedures: primary low transverse  section      Complications: none apparent      Aurea Sousa is a 40 yo  who was admitted for an induction of labor for non-reassuring fetal heart tones at 37w5d  Her induction was started with a pitocin titration  Multiple attempt at kessler balloon placement were made, which were eventually successful  She received an epidural for analgesia, and was artificially ruptured for clear fluid  After failing to make change for several hours, the patient elected to undergo a primary  delivery  She underwent an uncomplicated  delivery  She delivered a viable male  at 0 on 2022  Weight was 6lbs 8oz (2955g) with APGARs of 8 and 8 at 1 and 5 minutes  Her preoperative Hb was 11 2  Her postoperative Hb was 6 7  Her postoperative course was complicated by possible transfusion reaction and enterococcus in urine and blood, more recent blood cultures negative  Patient had iv antibiotics and was seen by cardiology for increased heart rate and ID for infection       Condition at discharge: good      On day of discharge pain was well controlled, patient was tolerating PO, passing flatus, had a bowel movement   She was discharged with standard post partum/ post operative instructions to follow up with her physician in 1 week for an incision check and in 3-6 weeks for a postpartum appointment       Discharge instructions/Information to patient and family:   -Do not place anything (no partner, tampons or douche) in your vagina for 6 weeks  -You may walk for exercise for the first 6 weeks then gradually return to your usual activities    -Please do not drive for 1 week if you have no stitches and for 2 weeks if you have stitches or underwent a  delivery     -You may take baths or shower per your preference    -Please look at your bust (breasts) in the mirror daily and call for redness or tenderness or increased warmth    -Please call us for temperature > 100 4*F or 38* C, worsening pain or a foul discharge        Discharge Medications:   Prenatal vitamin daily for 6 months or the duration of nursing whichever is longer  Motrin 600 mg orally every 6 hours as needed for pain  Tylenol (over the counter) per bottle directions as needed for pain: do NOT use with percocet  Hydrocortisone cream 1% (over the counter) applied 1-2x daily to hemorrhoids as needed  Percocet as needed  Zyvox  Lasix  Potassium  Iron     Provisions for Follow-Up Care:   Follow up with your doctor in 1 week for incision site check       Planned Readmission: no                                  Revision History

## 2022-04-14 NOTE — PROGRESS NOTES
Progress Note - OB/GYN   Jeet King 39 y o  female MRN: [id number]  Unit/Bed#: -01 Encounter: [id number]    ASSESSMENT:  Chiara Thomason is a 39 y o   female, POD#6 s/p Primary low transverse  section at 38w1d for maternal request in the setting of prolonged induction of labor  Postpartum course complicated by blood transfusion reaction (received 2U pRBCs today this admission), in addition to sepsis  Sepsis resolving with antibiotic treatment  Anticipate discharge home today  PLAN:  * Sepsis ([address] )  Assessment & Plan  Currently on -->  --> Linezolid 600 mg BID ()  Blood cultures 4/10: Enterococcus bacteremia   Blood cultures : No growth at 24h  S/p ID consult 22; appreciate recommendations   TT Echocardiogram  negative for vegetations   Urine culture 70-79K Enterococcos faecalis   Afebrile >24h   F/u AM labs    Temp Readings from Last 3 Encounters:   22 99 1 °F (37 3 °C) (Oral)   22 97 7 °F (36 5 °C)   22 97 8 °F (36 6 °C) (Temporal)     Clinically stable - patient feels well   Plan for PO linezolid on discharge until  for total of 10 days of antibiotic treatment per ID   Anticipate discharge today    Bilateral lower extremity edema  Assessment & Plan  Symmetric, 2+ pitting edema up to the level of the shins  Continue to monitor   DVT ppx: SCDs, Lovenox   Consider dose of Lasix to relieve swelling     Anemia  Assessment & Plan  Recent Labs     04/10/22  1800 22  1013 22  0537 22  0437   HGB 7 1* 7 8* 7 2* 7 0*     S/p 2U pRBCs, transfusion of second unit complicated by transfusion reaction notable for tachycardia into the 170s  S/p rapid response  Avoid further parenteral repletion in the setting of Enterococcus bacteremia     PO iron on discharge    Transfusion reaction  Assessment & Plan  S/p second 1U pRBC transfusion on , notable for tachycardia into the 170s  S/p rapid response   Tachycardia present but normalizing Continue monitoring vitals    S/P  section  Assessment & Plan  Continue ambulating  Continue breastfeeding  Voiding   Contraception: declines for now, will discuss at postpartum visit   Continue other routine post op care  Plan for post op visit in 1 week with her ObGyn    Asthma  Assessment & Plan  PTA Albuterol inhaler PRN      SUBJECTIVE:  Pt feels well  She has no major complaints  Last night had no issues  Continues to tolerate PO, ambulate, void without difficulty  Passing flatus, has had BM  Denies chest pain, fever, chills, nausea/vomiting  Her incision is healing well  Her lochia is WNL  Her tachycardia is continuing to normalize, now ranging low 100s  Patient denies palpitations  Anticipate discharge today, patient is excited  Continues to have persistent bilateral lower extremity pitting edema, does have concerns about how swollen her legs are  Notices swelling in hands is finally going down  OBJECTIVE:  Vitals:    22 1237 22 1652 22 2031 22 0044   BP: 117/62 122/58 114/57 109/66   BP Location:  Right arm Right arm    Pulse: 95 104 101 101   Resp: 18 18 18 18   Temp: 97 9 °F (36 6 °C) 98 9 °F (37 2 °C) 98 9 °F (37 2 °C) 99 1 °F (37 3 °C)   TempSrc: Oral Oral Oral Oral   SpO2:  100% 97% 98%   Weight:       Height:           BP range last 24:  Systolic (65ETU), MCN:533 , Min:109 , DSD:976   Diastolic (20FWN), DIALLO:12, Min:57, Max:77      Physical Exam:   Body mass index is 37 31 kg/m²  Physical Exam  Constitutional:       Appearance: She is obese  She is not ill-appearing or diaphoretic  HENT:      Head: Normocephalic and atraumatic  Eyes:      Conjunctiva/sclera: Conjunctivae normal       Pupils: Pupils are equal, round, and reactive to light  Cardiovascular:      Rate and Rhythm: Tachycardia present  Pulmonary:      Effort: Pulmonary effort is normal  No respiratory distress  Abdominal:      General: There is distension (post op)  Palpations: Abdomen is soft  Tenderness: There is no abdominal tenderness  There is no guarding  Comments: Pfannenstiel incision c/d/i, healing well, covered in surgical glue  Genitourinary:     Comments: Deferred   Musculoskeletal:         General: Normal range of motion  Cervical back: Normal range of motion  Right lower leg: Edema present  Left lower leg: Edema present  Comments: Bilateral lower extremities with 2+ pitting edema up to the level of the shins   Skin:     General: Skin is warm and dry  Neurological:      General: No focal deficit present  Mental Status: She is alert and oriented to person, place, and time  Mental status is at baseline  Psychiatric:         Mood and Affect: Mood normal          Behavior: Behavior normal          Thought Content: Thought content normal          I/O     None        Results:  Recent Labs     04/12/22  0537 04/13/22  0437   WBC 7 21 6 69   HGB 7 2* 7 0*   HCT 22 1* 21 2*    210       Recent Labs     04/11/22  0620 04/12/22  0538   K 3 2* 3 2*   CREATININE 0 84 0 69       No results found for this or any previous visit (from the past 24 hour(s))      Brenden Smith MD  PGY-3, OB/GYN  4/14/2022 6:07 AM

## 2022-04-14 NOTE — TELEPHONE ENCOUNTER
[address] [place] [address] Patient reached and made aware of CT results and MRI ordered per Dr. Thien Vicente. Patient requests it be faxed to MRI and CT diagnostics. She will call and schedule her own appt.

## 2022-04-15 LAB
BACTERIA BLD CULT: NORMAL
GRAM STN SPEC: NORMAL
PLACENTA IN STORAGE: NORMAL

## 2022-04-16 DIAGNOSIS — Z98.891 S/P C-SECTION: Primary | ICD-10-CM

## 2022-04-16 LAB — BACTERIA BLD CULT: NORMAL

## 2022-04-16 RX ORDER — OXYCODONE HYDROCHLORIDE AND ACETAMINOPHEN 5; 325 MG/1; MG/1
1 TABLET ORAL EVERY 6 HOURS PRN
Qty: 400 TABLET | Refills: 0 | Status: SHIPPED | OUTPATIENT
Start: 2022-04-16 | End: 2022-04-17 | Stop reason: SDUPTHER

## 2022-04-17 DIAGNOSIS — R60.0 BILATERAL LOWER EXTREMITY EDEMA: ICD-10-CM

## 2022-04-17 DIAGNOSIS — Z98.891 S/P C-SECTION: ICD-10-CM

## 2022-04-17 LAB
BACTERIA BLD CULT: NORMAL
BACTERIA BLD CULT: NORMAL

## 2022-04-17 RX ORDER — FUROSEMIDE 20 MG/1
20 TABLET ORAL DAILY PRN
Qty: 30 TABLET | Refills: 2 | Status: SHIPPED | OUTPATIENT
Start: 2022-04-17 | End: 2022-04-20

## 2022-04-17 RX ORDER — POTASSIUM CHLORIDE 20 MEQ/1
20 TABLET, EXTENDED RELEASE ORAL DAILY PRN
Qty: 30 TABLET | Refills: 3 | Status: SHIPPED | OUTPATIENT
Start: 2022-04-17 | End: 2022-05-16 | Stop reason: ALTCHOICE

## 2022-04-17 RX ORDER — OXYCODONE HYDROCHLORIDE AND ACETAMINOPHEN 5; 325 MG/1; MG/1
1 TABLET ORAL EVERY 6 HOURS PRN
Qty: 40 TABLET | Refills: 0 | Status: SHIPPED | OUTPATIENT
Start: 2022-04-17 | End: 2022-04-24 | Stop reason: SDUPTHER

## 2022-04-18 ENCOUNTER — TELEPHONE (OUTPATIENT)
Dept: FAMILY MEDICINE CLINIC | Facility: CLINIC | Age: 37
End: 2022-04-18

## 2022-04-18 ENCOUNTER — TRANSITIONAL CARE MANAGEMENT (OUTPATIENT)
Dept: FAMILY MEDICINE CLINIC | Facility: CLINIC | Age: 37
End: 2022-04-18

## 2022-04-18 DIAGNOSIS — D64.9 ANEMIA, UNSPECIFIED TYPE: Primary | ICD-10-CM

## 2022-04-18 DIAGNOSIS — R60.9 EDEMA, UNSPECIFIED TYPE: ICD-10-CM

## 2022-04-19 ENCOUNTER — APPOINTMENT (OUTPATIENT)
Dept: LAB | Facility: MEDICAL CENTER | Age: 37
End: 2022-04-19
Payer: COMMERCIAL

## 2022-04-19 DIAGNOSIS — R60.9 EDEMA, UNSPECIFIED TYPE: ICD-10-CM

## 2022-04-19 DIAGNOSIS — D64.9 ANEMIA, UNSPECIFIED TYPE: ICD-10-CM

## 2022-04-19 LAB
ANION GAP SERPL CALCULATED.3IONS-SCNC: 7 MMOL/L (ref 4–13)
BASOPHILS # BLD AUTO: 0.03 THOUSANDS/ΜL (ref 0–0.1)
BASOPHILS NFR BLD AUTO: 0 % (ref 0–1)
BILIRUB UR QL STRIP: NEGATIVE
BUN SERPL-MCNC: 7 MG/DL (ref 5–25)
CALCIUM SERPL-MCNC: 9.4 MG/DL (ref 8.3–10.1)
CHLORIDE SERPL-SCNC: 102 MMOL/L (ref 100–108)
CLARITY UR: CLEAR
CO2 SERPL-SCNC: 28 MMOL/L (ref 21–32)
COLOR UR: COLORLESS
CREAT SERPL-MCNC: 0.64 MG/DL (ref 0.6–1.3)
EOSINOPHIL # BLD AUTO: 0.11 THOUSAND/ΜL (ref 0–0.61)
EOSINOPHIL NFR BLD AUTO: 1 % (ref 0–6)
ERYTHROCYTE [DISTWIDTH] IN BLOOD BY AUTOMATED COUNT: 16.2 % (ref 11.6–15.1)
GFR SERPL CREATININE-BSD FRML MDRD: 115 ML/MIN/1.73SQ M
GLUCOSE SERPL-MCNC: 111 MG/DL (ref 65–140)
GLUCOSE UR STRIP-MCNC: NEGATIVE MG/DL
HCT VFR BLD AUTO: 26.4 % (ref 34.8–46.1)
HGB BLD-MCNC: 8 G/DL (ref 11.5–15.4)
HGB UR QL STRIP.AUTO: NEGATIVE
IMM GRANULOCYTES # BLD AUTO: 0.19 THOUSAND/UL (ref 0–0.2)
IMM GRANULOCYTES NFR BLD AUTO: 2 % (ref 0–2)
KETONES UR STRIP-MCNC: NEGATIVE MG/DL
LEUKOCYTE ESTERASE UR QL STRIP: NEGATIVE
LYMPHOCYTES # BLD AUTO: 1.34 THOUSANDS/ΜL (ref 0.6–4.47)
LYMPHOCYTES NFR BLD AUTO: 13 % (ref 14–44)
MCH RBC QN AUTO: 26.7 PG (ref 26.8–34.3)
MCHC RBC AUTO-ENTMCNC: 30.3 G/DL (ref 31.4–37.4)
MCV RBC AUTO: 88 FL (ref 82–98)
MONOCYTES # BLD AUTO: 0.62 THOUSAND/ΜL (ref 0.17–1.22)
MONOCYTES NFR BLD AUTO: 6 % (ref 4–12)
NEUTROPHILS # BLD AUTO: 8.42 THOUSANDS/ΜL (ref 1.85–7.62)
NEUTS SEG NFR BLD AUTO: 78 % (ref 43–75)
NITRITE UR QL STRIP: NEGATIVE
NRBC BLD AUTO-RTO: 0 /100 WBCS
NT-PROBNP SERPL-MCNC: 198 PG/ML
PH UR STRIP.AUTO: 7 [PH]
PLATELET # BLD AUTO: 624 THOUSANDS/UL (ref 149–390)
PMV BLD AUTO: 8.9 FL (ref 8.9–12.7)
POTASSIUM SERPL-SCNC: 3.7 MMOL/L (ref 3.5–5.3)
PROT UR STRIP-MCNC: NEGATIVE MG/DL
RBC # BLD AUTO: 3 MILLION/UL (ref 3.81–5.12)
SODIUM SERPL-SCNC: 137 MMOL/L (ref 136–145)
SP GR UR STRIP.AUTO: 1.01 (ref 1–1.03)
UROBILINOGEN UR STRIP-ACNC: <2 MG/DL
WBC # BLD AUTO: 10.71 THOUSAND/UL (ref 4.31–10.16)

## 2022-04-19 PROCEDURE — 83880 ASSAY OF NATRIURETIC PEPTIDE: CPT

## 2022-04-19 PROCEDURE — 81003 URINALYSIS AUTO W/O SCOPE: CPT

## 2022-04-19 PROCEDURE — 80048 BASIC METABOLIC PNL TOTAL CA: CPT

## 2022-04-19 PROCEDURE — 36415 COLL VENOUS BLD VENIPUNCTURE: CPT

## 2022-04-19 PROCEDURE — 85025 COMPLETE CBC W/AUTO DIFF WBC: CPT

## 2022-04-20 ENCOUNTER — OFFICE VISIT (OUTPATIENT)
Dept: FAMILY MEDICINE CLINIC | Facility: CLINIC | Age: 37
End: 2022-04-20
Payer: COMMERCIAL

## 2022-04-20 VITALS
TEMPERATURE: 98.2 F | SYSTOLIC BLOOD PRESSURE: 122 MMHG | WEIGHT: 197 LBS | DIASTOLIC BLOOD PRESSURE: 78 MMHG | HEIGHT: 62 IN | HEART RATE: 84 BPM | BODY MASS INDEX: 36.25 KG/M2

## 2022-04-20 DIAGNOSIS — R78.81 BACTEREMIA DUE TO ENTEROCOCCUS: ICD-10-CM

## 2022-04-20 DIAGNOSIS — E87.6 HYPOKALEMIA: ICD-10-CM

## 2022-04-20 DIAGNOSIS — R60.9 EDEMA, UNSPECIFIED TYPE: Primary | ICD-10-CM

## 2022-04-20 DIAGNOSIS — R60.0 BILATERAL LOWER EXTREMITY EDEMA: ICD-10-CM

## 2022-04-20 DIAGNOSIS — B95.2 BACTEREMIA DUE TO ENTEROCOCCUS: ICD-10-CM

## 2022-04-20 PROCEDURE — 99496 TRANSJ CARE MGMT HIGH F2F 7D: CPT | Performed by: FAMILY MEDICINE

## 2022-04-20 RX ORDER — FUROSEMIDE 40 MG/1
20 TABLET ORAL DAILY PRN
Qty: 30 TABLET | Refills: 3 | Status: SHIPPED | OUTPATIENT
Start: 2022-04-20 | End: 2022-05-16 | Stop reason: ALTCHOICE

## 2022-04-20 RX ORDER — POTASSIUM CHLORIDE 20 MEQ/1
40 TABLET, EXTENDED RELEASE ORAL DAILY
Qty: 60 TABLET | Refills: 5 | Status: SHIPPED | OUTPATIENT
Start: 2022-04-20 | End: 2022-05-03 | Stop reason: ALTCHOICE

## 2022-04-21 ENCOUNTER — APPOINTMENT (OUTPATIENT)
Dept: LAB | Facility: CLINIC | Age: 37
End: 2022-04-21
Payer: COMMERCIAL

## 2022-04-21 DIAGNOSIS — R60.9 EDEMA, UNSPECIFIED TYPE: ICD-10-CM

## 2022-04-21 DIAGNOSIS — B95.2 BACTEREMIA DUE TO ENTEROCOCCUS: ICD-10-CM

## 2022-04-21 DIAGNOSIS — R78.81 BACTEREMIA DUE TO ENTEROCOCCUS: ICD-10-CM

## 2022-04-21 LAB
BUN SERPL-MCNC: 9 MG/DL (ref 5–25)
CREAT SERPL-MCNC: 0.75 MG/DL (ref 0.6–1.3)
GFR SERPL CREATININE-BSD FRML MDRD: 102 ML/MIN/1.73SQ M
NT-PROBNP SERPL-MCNC: 116 PG/ML
POTASSIUM SERPL-SCNC: 4.6 MMOL/L (ref 3.5–5.3)

## 2022-04-21 PROCEDURE — 83880 ASSAY OF NATRIURETIC PEPTIDE: CPT

## 2022-04-21 PROCEDURE — 84132 ASSAY OF SERUM POTASSIUM: CPT

## 2022-04-21 PROCEDURE — 82565 ASSAY OF CREATININE: CPT

## 2022-04-21 PROCEDURE — 84520 ASSAY OF UREA NITROGEN: CPT

## 2022-04-21 PROCEDURE — 36415 COLL VENOUS BLD VENIPUNCTURE: CPT

## 2022-04-21 PROCEDURE — 87040 BLOOD CULTURE FOR BACTERIA: CPT

## 2022-04-24 DIAGNOSIS — Z98.891 S/P C-SECTION: ICD-10-CM

## 2022-04-24 RX ORDER — OXYCODONE HYDROCHLORIDE AND ACETAMINOPHEN 5; 325 MG/1; MG/1
1 TABLET ORAL EVERY 6 HOURS PRN
Qty: 60 TABLET | Refills: 0 | Status: SHIPPED | OUTPATIENT
Start: 2022-04-24 | End: 2022-04-28 | Stop reason: HOSPADM

## 2022-04-25 ENCOUNTER — POSTPARTUM VISIT (OUTPATIENT)
Dept: OBGYN CLINIC | Facility: CLINIC | Age: 37
End: 2022-04-25

## 2022-04-25 ENCOUNTER — HOSPITAL ENCOUNTER (OUTPATIENT)
Dept: CT IMAGING | Facility: HOSPITAL | Age: 37
Discharge: HOME | End: 2022-04-25
Payer: COMMERCIAL

## 2022-04-25 ENCOUNTER — TELEPHONE (OUTPATIENT)
Dept: OBGYN CLINIC | Facility: CLINIC | Age: 37
End: 2022-04-25

## 2022-04-25 ENCOUNTER — HOSPITAL ENCOUNTER (INPATIENT)
Facility: HOSPITAL | Age: 37
LOS: 3 days | Discharge: HOME | DRG: 776 | End: 2022-04-28
Attending: OBSTETRICS & GYNECOLOGY | Admitting: OBSTETRICS & GYNECOLOGY
Payer: COMMERCIAL

## 2022-04-25 VITALS
SYSTOLIC BLOOD PRESSURE: 148 MMHG | BODY MASS INDEX: 32.57 KG/M2 | HEIGHT: 62 IN | DIASTOLIC BLOOD PRESSURE: 82 MMHG | TEMPERATURE: 98.4 F | WEIGHT: 177 LBS

## 2022-04-25 DIAGNOSIS — R50.82 POST-PROCEDURAL FEVER: Primary | ICD-10-CM

## 2022-04-25 DIAGNOSIS — T80.92XA TRANSFUSION REACTION: ICD-10-CM

## 2022-04-25 DIAGNOSIS — R50.9 FEVER, UNSPECIFIED FEVER CAUSE: ICD-10-CM

## 2022-04-25 DIAGNOSIS — A41.9 SEPSIS (HCC): Primary | ICD-10-CM

## 2022-04-25 DIAGNOSIS — Z98.891 S/P CESAREAN SECTION: ICD-10-CM

## 2022-04-25 DIAGNOSIS — R23.8 SKIN IRRITATION: ICD-10-CM

## 2022-04-25 PROCEDURE — 99024 POSTOP FOLLOW-UP VISIT: CPT | Performed by: STUDENT IN AN ORGANIZED HEALTH CARE EDUCATION/TRAINING PROGRAM

## 2022-04-25 PROCEDURE — NC001 PR NO CHARGE: Performed by: OBSTETRICS & GYNECOLOGY

## 2022-04-25 PROCEDURE — 71260 CT THORAX DX C+: CPT

## 2022-04-25 PROCEDURE — 87070 CULTURE OTHR SPECIMN AEROBIC: CPT | Performed by: STUDENT IN AN ORGANIZED HEALTH CARE EDUCATION/TRAINING PROGRAM

## 2022-04-25 PROCEDURE — 3008F BODY MASS INDEX DOCD: CPT | Performed by: OBSTETRICS & GYNECOLOGY

## 2022-04-25 PROCEDURE — 74177 CT ABD & PELVIS W/CONTRAST: CPT

## 2022-04-25 PROCEDURE — G1004 CDSM NDSC: HCPCS

## 2022-04-25 RX ORDER — CLOTRIMAZOLE AND BETAMETHASONE DIPROPIONATE 10; .64 MG/G; MG/G
CREAM TOPICAL 2 TIMES DAILY
Qty: 30 G | Refills: 0 | Status: SHIPPED | OUTPATIENT
Start: 2022-04-25 | End: 2022-05-16 | Stop reason: ALTCHOICE

## 2022-04-25 RX ORDER — ONDANSETRON 2 MG/ML
4 INJECTION INTRAMUSCULAR; INTRAVENOUS EVERY 6 HOURS PRN
Status: DISCONTINUED | OUTPATIENT
Start: 2022-04-25 | End: 2022-04-28 | Stop reason: HOSPADM

## 2022-04-25 RX ORDER — SIMETHICONE 80 MG
80 TABLET,CHEWABLE ORAL 4 TIMES DAILY PRN
Status: DISCONTINUED | OUTPATIENT
Start: 2022-04-25 | End: 2022-04-28 | Stop reason: HOSPADM

## 2022-04-25 RX ORDER — SODIUM CHLORIDE, SODIUM LACTATE, POTASSIUM CHLORIDE, CALCIUM CHLORIDE 600; 310; 30; 20 MG/100ML; MG/100ML; MG/100ML; MG/100ML
125 INJECTION, SOLUTION INTRAVENOUS CONTINUOUS
Status: DISCONTINUED | OUTPATIENT
Start: 2022-04-26 | End: 2022-04-26

## 2022-04-25 RX ORDER — CALCIUM CARBONATE 200(500)MG
1000 TABLET,CHEWABLE ORAL DAILY PRN
Status: DISCONTINUED | OUTPATIENT
Start: 2022-04-25 | End: 2022-04-28 | Stop reason: HOSPADM

## 2022-04-25 RX ORDER — DOCUSATE SODIUM 100 MG/1
100 CAPSULE, LIQUID FILLED ORAL 2 TIMES DAILY
Status: DISCONTINUED | OUTPATIENT
Start: 2022-04-26 | End: 2022-04-28 | Stop reason: HOSPADM

## 2022-04-25 RX ADMIN — IOHEXOL 100 ML: 350 INJECTION, SOLUTION INTRAVENOUS at 17:06

## 2022-04-25 NOTE — PROGRESS NOTES
[facility]  Post-op problem visit  22    Assessment/Plan:  Porter Gonzalez is a 38 yo  who is POD #17 from 1LTCS with post-op course complicated by possible transfusion reaction and sepsis with bacteremia of enterococcus with possible urinary source however not clear source identified as there were not many CFU grown out from urine  There is erythema around incision that is not particularly tender possible reaction from glue as known allergy to adhesive vs overlying cellulitis  Given current exam, prior CT findings, back pain, tachycardia and O2 sat at 96% will plan for CT chest/abdomen/ pelvis to rule out abdominal infectious process, PE, pneumonia  If no findings consideration for treatment of septic thrombophlebitis with antibiotics and anticoagulation therapy  Patient is afebrile in office, however, she does have tachycardia- did recommend presenting to ED but patient declined  Is agreeable to complete imaging ASAP and office to set patient up for imaging stat  After imaging returns plan to follow up with ID for any further recommendations  Patient agreeable with plan and understand she may need to return to hospital if anything found on CT  Subjective:      Patient ID: Kaylie Mcclain is a 39 y o  female  JANETT  Raul Torres is a 38 yo  who is POD #17 s/p 1LTCS presenting for problem visit after calling office with recurring fevers  Patient had a post-operative course that was complicated by transfusion with reaction in addition to post-op sepsis and bacteremia of enterococcus also isolated from urine- she had ID consultation in hospital and was treated with vancomycin and was transitioned to linezolid treatment and was discharged post op day 6 after >48 hours afebrile  Repeat blood cultures and urine cultures have been negative   Patient reports she completed her course of Linezolid and began having fevers with chills for the past few days in the evening that resolve on their own  She had seen her PCP who reached out to office today to discuss Layla's case and she was scheduled for office visit today  Yasmine Johnson states that her incision has been burning and notes there is red around her incision- she believes she had a reaction from the glue that was on her incision  She denies discharge from the site  She has minimal vaginal spotting- denies any foul smelling discharge  She reports she has upper back pain as well particularly with taking a deep breath with mild occasional cough  She denies any chest pain, shortness of breath or palpitations  She denies any symptoms of dysuria, abdominal pain, diarrhea, nausea or vomiting  She is not breast feeding and denies any breast tenderness or erythema  She reports great improvement in her LE edema and denies any calf pain, calf erythema or difference in size  Review of Systems   Constitutional: Positive for chills and fever  Respiratory: Positive for cough  Negative for shortness of breath, wheezing and stridor  Cardiovascular: Negative for chest pain, palpitations and leg swelling  Gastrointestinal: Negative for abdominal pain, constipation, diarrhea, nausea and vomiting  Genitourinary: Positive for vaginal bleeding  Negative for difficulty urinating, dysuria, flank pain, frequency, pelvic pain, urgency and vaginal discharge  Musculoskeletal: Positive for back pain  Negative for arthralgias, joint swelling, myalgias, neck pain and neck stiffness  Skin: Positive for rash  Neurological: Negative for dizziness, weakness, light-headedness and headaches  Objective:    /82 (BP Location: Left arm)   Temp 98 4 °F (36 9 °C) (Tympanic)   Ht 5' 2" (1 575 m)   Wt 80 3 kg (177 lb)   LMP 07/15/2021   BMI 32 37 kg/m²   Heart rate: 115-116  SPO2: 96%     Physical Exam  Vitals reviewed  Constitutional:       General: She is not in acute distress  Appearance: She is not diaphoretic     HENT:      Head: Normocephalic and atraumatic  Cardiovascular:      Rate and Rhythm: Regular rhythm  Tachycardia present  Heart sounds: No murmur heard  No gallop  Pulmonary:      Effort: Pulmonary effort is normal  No respiratory distress  Breath sounds: Normal breath sounds  No wheezing  Chest:      Comments: Breasts are nontender, non erythematous, no engorgement noted, no masses  Abdominal:      Palpations: Abdomen is soft  Tenderness: There is no abdominal tenderness  There is no right CVA tenderness, left CVA tenderness, guarding or rebound  Comments: Uterus non tender at -2U  Surrounding erythema on well healed pfannesteil- non tender to palpation, appears mildy desquamative- rash extends to the intertriginous folds and labia   Genitourinary:     Comments: There is an erythematous rash, nontender to touch on intertriginous folds and labia  On speculum exam there is a moderate bloody/ milky discharge that was cultured, cervix appears normal otherwise  Musculoskeletal:      Comments: Mild LE edema bilaterally, negative Homans, no erythema, palpable cords or tenderness to palpation   Skin:     General: Skin is warm and dry  Findings: Erythema present  Neurological:      Mental Status: She is alert and oriented to person, place, and time     Psychiatric:         Mood and Affect: Mood normal          Behavior: Behavior normal

## 2022-04-25 NOTE — TELEPHONE ENCOUNTER
Apt scheduled in office today at 130  Reached out to ID per DR Prince to follow up  Spoke with their office, will review with MD and call patient for follow up

## 2022-04-25 NOTE — TELEPHONE ENCOUNTER
Per pt advice request to PCP, still running fevers  Since being discharged has been running a fever every day  Not consistent, sometimes low grade and then spikes to a fever  Has had blood cultures run by PCP through ID, and all have been normal  States was allergic to glue from c/s, but no signs of infection  Not breastfeeding and no breast issues  Aware will review with MD on call and call her back      Tigertext sent to Dr Merlin Stai

## 2022-04-26 ENCOUNTER — APPOINTMENT (INPATIENT)
Dept: RADIOLOGY | Facility: HOSPITAL | Age: 37
DRG: 776 | End: 2022-04-26
Attending: RADIOLOGY
Payer: COMMERCIAL

## 2022-04-26 PROBLEM — R18.8 INTRA-ABDOMINAL FLUID COLLECTION: Status: ACTIVE | Noted: 2022-04-11

## 2022-04-26 PROBLEM — A41.81 SEPSIS DUE TO ENTEROCOCCUS (HCC): Status: ACTIVE | Noted: 2022-04-26

## 2022-04-26 LAB
ALBUMIN SERPL BCP-MCNC: 2.7 G/DL (ref 3.5–5)
ALBUMIN SERPL BCP-MCNC: 2.8 G/DL (ref 3.5–5)
ALP SERPL-CCNC: 124 U/L (ref 46–116)
ALP SERPL-CCNC: 132 U/L (ref 46–116)
ALT SERPL W P-5'-P-CCNC: 22 U/L (ref 12–78)
ALT SERPL W P-5'-P-CCNC: 26 U/L (ref 12–78)
ANION GAP SERPL CALCULATED.3IONS-SCNC: 7 MMOL/L (ref 4–13)
ANION GAP SERPL CALCULATED.3IONS-SCNC: 8 MMOL/L (ref 4–13)
AST SERPL W P-5'-P-CCNC: 15 U/L (ref 5–45)
AST SERPL W P-5'-P-CCNC: 19 U/L (ref 5–45)
BACTERIA BLD CULT: NORMAL
BASOPHILS # BLD AUTO: 0.07 THOUSANDS/ΜL (ref 0–0.1)
BASOPHILS # BLD AUTO: 0.13 THOUSANDS/ΜL (ref 0–0.1)
BASOPHILS NFR BLD AUTO: 1 % (ref 0–1)
BASOPHILS NFR BLD AUTO: 1 % (ref 0–1)
BILIRUB SERPL-MCNC: 0.24 MG/DL (ref 0.2–1)
BILIRUB SERPL-MCNC: 0.25 MG/DL (ref 0.2–1)
BUN SERPL-MCNC: 12 MG/DL (ref 5–25)
BUN SERPL-MCNC: 19 MG/DL (ref 5–25)
CALCIUM ALBUM COR SERPL-MCNC: 10.5 MG/DL (ref 8.3–10.1)
CALCIUM ALBUM COR SERPL-MCNC: 10.6 MG/DL (ref 8.3–10.1)
CALCIUM SERPL-MCNC: 9.5 MG/DL (ref 8.3–10.1)
CALCIUM SERPL-MCNC: 9.6 MG/DL (ref 8.3–10.1)
CHLORIDE SERPL-SCNC: 95 MMOL/L (ref 100–108)
CHLORIDE SERPL-SCNC: 99 MMOL/L (ref 100–108)
CO2 SERPL-SCNC: 29 MMOL/L (ref 21–32)
CO2 SERPL-SCNC: 30 MMOL/L (ref 21–32)
CREAT SERPL-MCNC: 0.75 MG/DL (ref 0.6–1.3)
CREAT SERPL-MCNC: 0.99 MG/DL (ref 0.6–1.3)
CRP SERPL QL: 149.3 MG/L
EOSINOPHIL # BLD AUTO: 0.25 THOUSAND/ΜL (ref 0–0.61)
EOSINOPHIL # BLD AUTO: 0.36 THOUSAND/ΜL (ref 0–0.61)
EOSINOPHIL NFR BLD AUTO: 3 % (ref 0–6)
EOSINOPHIL NFR BLD AUTO: 3 % (ref 0–6)
ERYTHROCYTE [DISTWIDTH] IN BLOOD BY AUTOMATED COUNT: 15.7 % (ref 11.6–15.1)
ERYTHROCYTE [DISTWIDTH] IN BLOOD BY AUTOMATED COUNT: 15.9 % (ref 11.6–15.1)
GFR SERPL CREATININE-BSD FRML MDRD: 102 ML/MIN/1.73SQ M
GFR SERPL CREATININE-BSD FRML MDRD: 73 ML/MIN/1.73SQ M
GLUCOSE SERPL-MCNC: 105 MG/DL (ref 65–140)
GLUCOSE SERPL-MCNC: 98 MG/DL (ref 65–140)
HCT VFR BLD AUTO: 28.4 % (ref 34.8–46.1)
HCT VFR BLD AUTO: 28.6 % (ref 34.8–46.1)
HGB BLD-MCNC: 8.8 G/DL (ref 11.5–15.4)
HGB BLD-MCNC: 8.8 G/DL (ref 11.5–15.4)
IMM GRANULOCYTES # BLD AUTO: 0.1 THOUSAND/UL (ref 0–0.2)
IMM GRANULOCYTES # BLD AUTO: 0.11 THOUSAND/UL (ref 0–0.2)
IMM GRANULOCYTES NFR BLD AUTO: 1 % (ref 0–2)
IMM GRANULOCYTES NFR BLD AUTO: 1 % (ref 0–2)
LACTATE SERPL-SCNC: 0.6 MMOL/L (ref 0.5–2)
LYMPHOCYTES # BLD AUTO: 1.5 THOUSANDS/ΜL (ref 0.6–4.47)
LYMPHOCYTES # BLD AUTO: 2.4 THOUSANDS/ΜL (ref 0.6–4.47)
LYMPHOCYTES NFR BLD AUTO: 17 % (ref 14–44)
LYMPHOCYTES NFR BLD AUTO: 20 % (ref 14–44)
MCH RBC QN AUTO: 26.3 PG (ref 26.8–34.3)
MCH RBC QN AUTO: 26.3 PG (ref 26.8–34.3)
MCHC RBC AUTO-ENTMCNC: 30.8 G/DL (ref 31.4–37.4)
MCHC RBC AUTO-ENTMCNC: 31 G/DL (ref 31.4–37.4)
MCV RBC AUTO: 85 FL (ref 82–98)
MCV RBC AUTO: 86 FL (ref 82–98)
MONOCYTES # BLD AUTO: 0.59 THOUSAND/ΜL (ref 0.17–1.22)
MONOCYTES # BLD AUTO: 1.01 THOUSAND/ΜL (ref 0.17–1.22)
MONOCYTES NFR BLD AUTO: 7 % (ref 4–12)
MONOCYTES NFR BLD AUTO: 8 % (ref 4–12)
NEUTROPHILS # BLD AUTO: 6.43 THOUSANDS/ΜL (ref 1.85–7.62)
NEUTROPHILS # BLD AUTO: 7.97 THOUSANDS/ΜL (ref 1.85–7.62)
NEUTS SEG NFR BLD AUTO: 67 % (ref 43–75)
NEUTS SEG NFR BLD AUTO: 71 % (ref 43–75)
NRBC BLD AUTO-RTO: 0 /100 WBCS
NRBC BLD AUTO-RTO: 0 /100 WBCS
PLATELET # BLD AUTO: 535 THOUSANDS/UL (ref 149–390)
PLATELET # BLD AUTO: 588 THOUSANDS/UL (ref 149–390)
PMV BLD AUTO: 8.4 FL (ref 8.9–12.7)
PMV BLD AUTO: 8.5 FL (ref 8.9–12.7)
POTASSIUM SERPL-SCNC: 3.9 MMOL/L (ref 3.5–5.3)
POTASSIUM SERPL-SCNC: 4.2 MMOL/L (ref 3.5–5.3)
PROT SERPL-MCNC: 8 G/DL (ref 6.4–8.2)
PROT SERPL-MCNC: 8.3 G/DL (ref 6.4–8.2)
RBC # BLD AUTO: 3.34 MILLION/UL (ref 3.81–5.12)
RBC # BLD AUTO: 3.34 MILLION/UL (ref 3.81–5.12)
SODIUM SERPL-SCNC: 133 MMOL/L (ref 136–145)
SODIUM SERPL-SCNC: 135 MMOL/L (ref 136–145)
WBC # BLD AUTO: 11.98 THOUSAND/UL (ref 4.31–10.16)
WBC # BLD AUTO: 8.94 THOUSAND/UL (ref 4.31–10.16)

## 2022-04-26 PROCEDURE — 99152 MOD SED SAME PHYS/QHP 5/>YRS: CPT

## 2022-04-26 PROCEDURE — 87205 SMEAR GRAM STAIN: CPT | Performed by: OBSTETRICS & GYNECOLOGY

## 2022-04-26 PROCEDURE — 87075 CULTR BACTERIA EXCEPT BLOOD: CPT | Performed by: OBSTETRICS & GYNECOLOGY

## 2022-04-26 PROCEDURE — C1729 CATH, DRAINAGE: HCPCS

## 2022-04-26 PROCEDURE — 99152 MOD SED SAME PHYS/QHP 5/>YRS: CPT | Performed by: RADIOLOGY

## 2022-04-26 PROCEDURE — 83605 ASSAY OF LACTIC ACID: CPT | Performed by: OBSTETRICS & GYNECOLOGY

## 2022-04-26 PROCEDURE — 99232 SBSQ HOSP IP/OBS MODERATE 35: CPT | Performed by: STUDENT IN AN ORGANIZED HEALTH CARE EDUCATION/TRAINING PROGRAM

## 2022-04-26 PROCEDURE — 85025 COMPLETE CBC W/AUTO DIFF WBC: CPT | Performed by: OBSTETRICS & GYNECOLOGY

## 2022-04-26 PROCEDURE — 80053 COMPREHEN METABOLIC PANEL: CPT | Performed by: OBSTETRICS & GYNECOLOGY

## 2022-04-26 PROCEDURE — C1769 GUIDE WIRE: HCPCS

## 2022-04-26 PROCEDURE — 86140 C-REACTIVE PROTEIN: CPT | Performed by: OBSTETRICS & GYNECOLOGY

## 2022-04-26 PROCEDURE — 99153 MOD SED SAME PHYS/QHP EA: CPT

## 2022-04-26 PROCEDURE — 87040 BLOOD CULTURE FOR BACTERIA: CPT | Performed by: OBSTETRICS & GYNECOLOGY

## 2022-04-26 PROCEDURE — 49406 IMAGE CATH FLUID PERI/RETRO: CPT | Performed by: RADIOLOGY

## 2022-04-26 PROCEDURE — NC001 PR NO CHARGE: Performed by: RADIOLOGY

## 2022-04-26 PROCEDURE — 0W9G30Z DRAINAGE OF PERITONEAL CAVITY WITH DRAINAGE DEVICE, PERCUTANEOUS APPROACH: ICD-10-PCS | Performed by: RADIOLOGY

## 2022-04-26 PROCEDURE — 99223 1ST HOSP IP/OBS HIGH 75: CPT | Performed by: INTERNAL MEDICINE

## 2022-04-26 PROCEDURE — 10030 IMG GID FLU COLL DRG SFT TIS: CPT

## 2022-04-26 PROCEDURE — 87070 CULTURE OTHR SPECIMN AEROBIC: CPT | Performed by: OBSTETRICS & GYNECOLOGY

## 2022-04-26 RX ORDER — FENTANYL CITRATE 50 UG/ML
INJECTION, SOLUTION INTRAMUSCULAR; INTRAVENOUS CODE/TRAUMA/SEDATION MEDICATION
Status: COMPLETED | OUTPATIENT
Start: 2022-04-26 | End: 2022-04-26

## 2022-04-26 RX ORDER — ACETAMINOPHEN 325 MG/1
975 TABLET ORAL EVERY 8 HOURS SCHEDULED
Status: DISCONTINUED | OUTPATIENT
Start: 2022-04-27 | End: 2022-04-27

## 2022-04-26 RX ORDER — ACETAMINOPHEN 325 MG/1
325 TABLET ORAL ONCE
Status: COMPLETED | OUTPATIENT
Start: 2022-04-26 | End: 2022-04-26

## 2022-04-26 RX ORDER — IBUPROFEN 600 MG/1
600 TABLET ORAL EVERY 6 HOURS PRN
Status: DISCONTINUED | OUTPATIENT
Start: 2022-04-26 | End: 2022-04-28 | Stop reason: HOSPADM

## 2022-04-26 RX ORDER — MIDAZOLAM HYDROCHLORIDE 2 MG/2ML
INJECTION, SOLUTION INTRAMUSCULAR; INTRAVENOUS CODE/TRAUMA/SEDATION MEDICATION
Status: COMPLETED | OUTPATIENT
Start: 2022-04-26 | End: 2022-04-26

## 2022-04-26 RX ORDER — ACETAMINOPHEN 325 MG/1
650 TABLET ORAL EVERY 6 HOURS PRN
Status: DISCONTINUED | OUTPATIENT
Start: 2022-04-26 | End: 2022-04-26

## 2022-04-26 RX ORDER — CLOTRIMAZOLE AND BETAMETHASONE DIPROPIONATE 10; .64 MG/G; MG/G
CREAM TOPICAL 2 TIMES DAILY
Status: DISCONTINUED | OUTPATIENT
Start: 2022-04-26 | End: 2022-04-28 | Stop reason: HOSPADM

## 2022-04-26 RX ORDER — OXYCODONE HYDROCHLORIDE 10 MG/1
10 TABLET ORAL EVERY 4 HOURS PRN
Status: DISCONTINUED | OUTPATIENT
Start: 2022-04-26 | End: 2022-04-28 | Stop reason: HOSPADM

## 2022-04-26 RX ORDER — SODIUM CHLORIDE, SODIUM LACTATE, POTASSIUM CHLORIDE, CALCIUM CHLORIDE 600; 310; 30; 20 MG/100ML; MG/100ML; MG/100ML; MG/100ML
75 INJECTION, SOLUTION INTRAVENOUS CONTINUOUS
Status: DISCONTINUED | OUTPATIENT
Start: 2022-04-26 | End: 2022-04-26

## 2022-04-26 RX ORDER — OXYCODONE HYDROCHLORIDE 5 MG/1
5 TABLET ORAL EVERY 4 HOURS PRN
Status: DISCONTINUED | OUTPATIENT
Start: 2022-04-26 | End: 2022-04-28 | Stop reason: HOSPADM

## 2022-04-26 RX ADMIN — CLOTRIMAZOLE AND BETAMETHASONE DIPROPIONATE 1 APPLICATION: 10; .64 CREAM TOPICAL at 18:50

## 2022-04-26 RX ADMIN — IBUPROFEN 600 MG: 600 TABLET ORAL at 22:10

## 2022-04-26 RX ADMIN — DOCUSATE SODIUM 100 MG: 100 CAPSULE, LIQUID FILLED ORAL at 18:48

## 2022-04-26 RX ADMIN — ACETAMINOPHEN 650 MG: 325 TABLET ORAL at 18:48

## 2022-04-26 RX ADMIN — METRONIDAZOLE 500 MG: 500 INJECTION, SOLUTION INTRAVENOUS at 22:00

## 2022-04-26 RX ADMIN — VANCOMYCIN HYDROCHLORIDE 1250 MG: 5 INJECTION, POWDER, LYOPHILIZED, FOR SOLUTION INTRAVENOUS at 16:42

## 2022-04-26 RX ADMIN — OXYCODONE HYDROCHLORIDE 5 MG: 5 TABLET ORAL at 20:44

## 2022-04-26 RX ADMIN — MIDAZOLAM HYDROCHLORIDE 1 MG: 1 INJECTION, SOLUTION INTRAMUSCULAR; INTRAVENOUS at 15:38

## 2022-04-26 RX ADMIN — MIDAZOLAM HYDROCHLORIDE 1 MG: 1 INJECTION, SOLUTION INTRAMUSCULAR; INTRAVENOUS at 15:43

## 2022-04-26 RX ADMIN — ACETAMINOPHEN 325 MG: 325 TABLET ORAL at 04:49

## 2022-04-26 RX ADMIN — CLOTRIMAZOLE AND BETAMETHASONE DIPROPIONATE 1 APPLICATION: 10; .64 CREAM TOPICAL at 09:28

## 2022-04-26 RX ADMIN — FENTANYL CITRATE 50 MCG: 50 INJECTION, SOLUTION INTRAMUSCULAR; INTRAVENOUS at 15:52

## 2022-04-26 RX ADMIN — CEFTRIAXONE 1000 MG: 1 INJECTION, POWDER, FOR SOLUTION INTRAMUSCULAR; INTRAVENOUS at 03:07

## 2022-04-26 RX ADMIN — METRONIDAZOLE 500 MG: 500 INJECTION, SOLUTION INTRAVENOUS at 13:25

## 2022-04-26 RX ADMIN — SODIUM CHLORIDE, SODIUM LACTATE, POTASSIUM CHLORIDE, AND CALCIUM CHLORIDE 75 ML/HR: .6; .31; .03; .02 INJECTION, SOLUTION INTRAVENOUS at 03:02

## 2022-04-26 RX ADMIN — DOCUSATE SODIUM 100 MG: 100 CAPSULE, LIQUID FILLED ORAL at 09:27

## 2022-04-26 RX ADMIN — METRONIDAZOLE 500 MG: 500 INJECTION, SOLUTION INTRAVENOUS at 05:38

## 2022-04-26 RX ADMIN — OXYCODONE HYDROCHLORIDE 5 MG: 5 TABLET ORAL at 16:28

## 2022-04-26 RX ADMIN — VANCOMYCIN HYDROCHLORIDE 1250 MG: 5 INJECTION, POWDER, LYOPHILIZED, FOR SOLUTION INTRAVENOUS at 03:50

## 2022-04-26 RX ADMIN — ACETAMINOPHEN 650 MG: 325 TABLET ORAL at 02:34

## 2022-04-26 RX ADMIN — FENTANYL CITRATE 50 MCG: 50 INJECTION, SOLUTION INTRAMUSCULAR; INTRAVENOUS at 15:38

## 2022-04-26 NOTE — PLAN OF CARE
Problem: PAIN - ADULT  Goal: Verbalizes/displays adequate comfort level or baseline comfort level  Description: Interventions:  - Encourage patient to monitor pain and request assistance  - Assess pain using appropriate pain scale  - Administer analgesics based on type and severity of pain and evaluate response  - Implement non-pharmacological measures as appropriate and evaluate response  - Consider cultural and social influences on pain and pain management  - Notify physician/advanced practitioner if interventions unsuccessful or patient reports new pain  Outcome: Progressing     Problem: INFECTION - ADULT  Goal: Absence or prevention of progression during hospitalization  Description: INTERVENTIONS:  - Assess and monitor for signs and symptoms of infection  - Monitor lab/diagnostic results  - Monitor all insertion sites, i e  indwelling lines, tubes, and drains  - Monitor endotracheal if appropriate and nasal secretions for changes in amount and color  - Eagle appropriate cooling/warming therapies per order  - Administer medications as ordered  - Instruct and encourage patient and family to use good hand hygiene technique  - Identify and instruct in appropriate isolation precautions for identified infection/condition  Outcome: Progressing  Goal: Absence of fever/infection during neutropenic period  Description: INTERVENTIONS:  - Monitor WBC    Outcome: Progressing     Problem: SAFETY ADULT  Goal: Patient will remain free of falls  Description: INTERVENTIONS:  - Educate patient/family on patient safety including physical limitations  - Instruct patient to call for assistance with activity   - Consult OT/PT to assist with strengthening/mobility   - Keep Call bell within reach  - Keep bed low and locked with side rails adjusted as appropriate  - Keep care items and personal belongings within reach  - Initiate and maintain comfort rounds  - Make Fall Risk Sign visible to staff  - Obtain necessary fall risk management equipment  - Apply yellow socks and bracelet for high fall risk patients  - Consider moving patient to room near nurses station  Outcome: Progressing  Goal: Maintain or return to baseline ADL function  Description: INTERVENTIONS:  -  Assess patient's ability to carry out ADLs; assess patient's baseline for ADL function and identify physical deficits which impact ability to perform ADLs (bathing, care of mouth/teeth, toileting, grooming, dressing, etc )  - Assess/evaluate cause of self-care deficits   - Assess range of motion  - Assess patient's mobility; develop plan if impaired  - Assess patient's need for assistive devices and provide as appropriate  - Encourage maximum independence but intervene and supervise when necessary  - Involve family in performance of ADLs  - Assess for home care needs following discharge   - Consider OT consult to assist with ADL evaluation and planning for discharge  - Provide patient education as appropriate  Outcome: Progressing  Goal: Maintains/Returns to pre admission functional level  Description: INTERVENTIONS:  - Perform BMAT or MOVE assessment daily    - Set and communicate daily mobility goal to care team and patient/family/caregiver     - Collaborate with rehabilitation services on mobility goals if consulted  - Out of bed for toileting  - Record patient progress and toleration of activity level   Outcome: Progressing     Problem: DISCHARGE PLANNING  Goal: Discharge to home or other facility with appropriate resources  Description: INTERVENTIONS:  - Identify barriers to discharge w/patient and caregiver  - Arrange for needed discharge resources and transportation as appropriate  - Identify discharge learning needs (meds, wound care, etc )  - Arrange for interpretive services to assist at discharge as needed  - Refer to Case Management Department for coordinating discharge planning if the patient needs post-hospital services based on physician/advanced practitioner order or complex needs related to functional status, cognitive ability, or social support system  Outcome: Progressing     Problem: Knowledge Deficit  Goal: Patient/family/caregiver demonstrates understanding of disease process, treatment plan, medications, and discharge instructions  Description: Complete learning assessment and assess knowledge base    Interventions:  - Provide teaching at level of understanding  - Provide teaching via preferred learning methods  Outcome: Progressing

## 2022-04-26 NOTE — ASSESSMENT & PLAN NOTE
Initial postpartum course complicate by significant lower extremity edema   Seen by PCP outpatient after discharged, prescribed 20 mg lasix PO QD PRN   Swelling much improved since last admission   Hold PO lasix for now   IV fluids now off, patient tolerating PO   Monitor I/O

## 2022-04-26 NOTE — PROGRESS NOTES
Vancomycin Assessment    Jac Donnelly is a 39 y o  female who is currently receiving vancomycin vancomycin 1250mg q12h for intra-abdominal infection     Relevant clinical data and objective history reviewed:  Creatinine   Date Value Ref Range Status   04/26/2022 0 99 0 60 - 1 30 mg/dL Final     Comment:     Standardized to IDMS reference method   04/21/2022 0 75 0 60 - 1 30 mg/dL Final     Comment:     Standardized to IDMS reference method   04/19/2022 0 64 0 60 - 1 30 mg/dL Final     Comment:     Standardized to IDMS reference method     /58 (BP Location: Left arm)   Pulse 104   Temp 98 6 °F (37 °C) (Oral)   Resp 18   Wt 80 3 kg (177 lb)   LMP 07/15/2021   SpO2 100%   BMI 32 37 kg/m²   No intake/output data recorded  Lab Results   Component Value Date/Time    BUN 19 04/26/2022 01:41 AM    WBC 11 98 (H) 04/26/2022 01:41 AM    HGB 8 8 (L) 04/26/2022 01:41 AM    HCT 28 6 (L) 04/26/2022 01:41 AM    MCV 86 04/26/2022 01:41 AM     (H) 04/26/2022 01:41 AM     Temp Readings from Last 3 Encounters:   04/26/22 98 6 °F (37 °C) (Oral)   04/25/22 98 4 °F (36 9 °C) (Tympanic)   04/20/22 98 2 °F (36 8 °C)     Vancomycin Days of Therapy: 1    Assessment/Plan  The patient is currently on vancomycin utilizing scheduled dosing based on adjusted body weight (due to obesity)  The patient is currently receiving vancomycin 1250mg q12h and is clinically appropriate and dose will be continued  Pharmacy will also follow closely for s/sx of nephrotoxicity, infusion reactions, and appropriateness of therapy  BMP and CBC will be ordered per protocol  Plan for trough as patient approaches steady state, prior to the 4th  dose at approximately 1530 4/27   Due to infection severity, will target a trough of 15-20 (appropriate for most indications)   Pharmacy will continue to follow the patients culture results and clinical progress daily      Primitivo Mckeon, Pharmacist

## 2022-04-26 NOTE — DISCHARGE INSTRUCTIONS
TUBE CARE INSTRUCTIONS    Care after your procedure:    Resume your normal diet  Small sips of flat soda will help with nausea  1  The properly functioning catheter should be forward flushed once (1x) daily with 10ml of normal saline using clean technique  You will be given a prescription for flushes  To flush the tube, clean both connections with alcohol swab  Twist off the drainage bag/ bulb  tubing and twist the saline syringe into the drainage tube and flush  Remove the syringe and twist the drainage bag / bulb tubing tubing back on     2  The drainage bag/bulb may be emptied as necessary  Keep a record of the amount of fluid you drain from your tube  This should be done with clean technique as well  3  A fresh dressing should be applied daily over the tube insertion site  4  As the tube is secured to the skin with only a suture,try not to pull on your tube  Tub baths are not permitted  Showers are permitted if the patient's skin entry site is prevented from getting wet  Similarly, washcloth "baths" are acceptable  Contact Interventional Radiology at [phone] [place] PATIENTS: Contact Interventional Radiology at [phone]) Subhash Limon PATIENTS: Contact Interventional Radiology at [phone]) if:    1  Leakage or large amounts of liquid around the catheter  2  Fever of 101 degrees lasting several hours without other obvious cause (such as sore throat, flu, etc)  3  Persistent nausea or vomiting  4  Diminished drainage, which may be associated with pressure or pain  Or when the     drainage from your tube is less than 10mls for 48 hours  5  Catheter pulled back or falls out  The following pharmacies carry the flush syringes         [facility]                     [facility]  [address] [facility]                         [address] [facility]  Phone [phone]            Phone [phone]   [place] [address]                                [phone]  [address] [facility] [address]                      [address] [place]  Phone [phone]            Phone [phone]                      Sofie Beebe                                                                                                          [phone]  [facility]  [facility]    [address]  Phone [phone]

## 2022-04-26 NOTE — PROGRESS NOTES
Progress Note - OB/GYN   Yasmine King 39 y o  female MRN: [id number]  Unit/Bed#: -01 Encounter: [id number]    ASSESSMENT:  Jax Hernandez is a 39 y o  Alessandro Ballard female, s/p 1LTCS at 38w1d on 22 for maternal request in the setting of prolonged induction of labor  Her initial postpartum course was complicated by blood transfusion reaction (received 2U pRBCs today this admission), in addition to sepsis secondary to enterococcus bacteremia/UTI  Now re-admitted with reported fevers at home, CT abd/pelvis showing multiple fluid collections throughout the abdomen  Clinically appears stable  PLAN:  * Intra-abdominal fluid collection  Assessment & Plan  S/p 1LTCS 22 for maternal request for prolonged induction of labor   Initial postpartum course complicated by blood transfusion reaction and enterococcus bacteremia/UTI,   Was discharged with PO course of Linezolid (last dose 22)    Re-presented with reported fevers at home  CT abd/pelvis :  "Complex fluid collection with irregular margins and intralesional gas in the anterior wall of the lower uterine segment of uterus, at the expected location of a  section scar  This may be a liquefying hematoma however infection is not excluded  This appears to communicate with a multilocular intermediate attenuation rim-enhancing collection in the left pelvis with numerous additional collections in the right upper abdomen, anterior to the liver, and in the anterior abdominal wall musculature as detailed above       Recent Labs     22  0141   WBC 11 98*   HGB 8 8*   *   LACTICACID 0 6     Low suspicion for sepsis at this time   Blood cultures  negative x 4 days     [ ] Infectious disease consultation - per communication last night, started on ceftriaxone, vancomycin, flagyl   [ ] IR consultation for consideration of aspiration/drain placement for anterior abdominal wall or pelvic collection         HIstory of Sepsis due to Enterococcus ([facility] [address] )  Assessment & Plan  Blood cultures  negative x 4 days  Echo 22 performed inpatient during initial delivery admission negative for vegetations  [ ] Repeat echo this admission to rule out development of vegetations    Bilateral lower extremity edema  Assessment & Plan  Initial postpartum course complicate by significant lower extremity edema   Seen by PCP outpatient after discharged, prescribed 20 mg lasix PO QD PRN   Swelling much improved since last admission   Hold PO lasix for now   Light IV fluids, monitor I/O    Anemia  Assessment & Plan  S/p 2u pRBC during initial admission, complicated by transfusion reaction/rapid response     Lab Results   Component Value Date/Time    HGB 8 8 (L) 2022 01:41 AM    HGB 8 0 (L) 2022 01:29 PM    HGB 6 7 (LL) 2022 08:46 AM     Continue to trend     S/P  section  Assessment & Plan  S/p 1LTCS on 22 for maternal request in the setting of prolonged induction of labor   Initial postpartum course complicated by anemia, transfusion reaction, and enterococcus sepsis   Pfannenstiel incision c/d/i, epidermis healed well; erythema present around incision, but without drainage, induration, fluctuance  Possibly secondary to adhesive reaction as patient has history of this    Continue ice packs   Continue to monitor       SUBJECTIVE:  Pt feels well  She has no major complaints  Pain currently controlled  Denies headaches, chest pain, fever/chills, SOB, palpitations  Her postpartum vaginal bleeding is minimal  She is bottle feeding  She is tolerating PO, denies nausea/vomiting  Denies leg pain  States her swelling is much better from prior admission since being on PO lasix outpatient  She is worried about getting IV fluids here       OBJECTIVE:  Vitals:    22 2320 22 0020 22 0514   BP: 127/80 109/58 117/70   BP Location: Right arm Left arm    Pulse: (!) 113 104 101   Resp: 16 18 18   Temp: 98 1 °F (36 7 °C) 98 6 °F (37 °C) 98 7 °F (37 1 °C)   TempSrc: Oral Oral Oral   SpO2: 100% 100%    Weight: 80 3 kg (177 lb)         BP range last 24:  Systolic (98YSQ), HCZ:772 , Min:109 , DFY:743   Diastolic (78QSA), KAK:53, Min:58, Max:82      Physical Exam:   Body mass index is 32 37 kg/m²  Physical Exam  Constitutional:       General: She is not in acute distress  Appearance: She is normal weight  She is not ill-appearing or diaphoretic  HENT:      Head: Normocephalic and atraumatic  Eyes:      Conjunctiva/sclera: Conjunctivae normal       Pupils: Pupils are equal, round, and reactive to light  Abdominal:      General: There is no distension  Palpations: Abdomen is soft  Tenderness: There is no abdominal tenderness  Comments: Pfannenstiel incision c/d/i, epidermis well healed with no evidence of separation, drainage, bleeding, induration, or fluctuance  Mild erythema present around healing incision   Genitourinary:     Comments: Deferred   Musculoskeletal:         General: Normal range of motion  Right lower leg: No edema  Left lower leg: No edema  Skin:     General: Skin is warm and dry  Neurological:      General: No focal deficit present  Mental Status: She is alert and oriented to person, place, and time  Mental status is at baseline  Psychiatric:         Mood and Affect: Mood normal          Behavior: Behavior normal          Thought Content:  Thought content normal          I/O       04/24 0701  04/25 0700 04/25 0701  04/26 0700 04/26 0701 04/27 0700    IV Piggyback  450     Total Intake(mL/kg)  450 (5 6)     Urine (mL/kg/hr)  1200     Total Output  1200     Net  -750                  Results:  Recent Labs     04/26/22  0141   WBC 11 98*   HGB 8 8*   HCT 28 6*   *       Recent Labs     04/26/22  0141   K 3 9   CREATININE 0 99   AST 19   ALT 26       Recent Results (from the past 24 hour(s))   CBC and differential    Collection Time: 04/26/22  1:41 AM   Result Value Ref Range WBC 11 98 (H) 4 31 - 10 16 Thousand/uL    RBC 3 34 (L) 3 81 - 5 12 Million/uL    Hemoglobin 8 8 (L) 11 5 - 15 4 g/dL    Hematocrit 28 6 (L) 34 8 - 46 1 %    MCV 86 82 - 98 fL    MCH 26 3 (L) 26 8 - 34 3 pg    MCHC 30 8 (L) 31 4 - 37 4 g/dL    RDW 15 9 (H) 11 6 - 15 1 %    MPV 8 5 (L) 8 9 - 12 7 fL    Platelets 305 (H) 773 - 390 Thousands/uL    nRBC 0 /100 WBCs    Neutrophils Relative 67 43 - 75 %    Immat GRANS % 1 0 - 2 %    Lymphocytes Relative 20 14 - 44 %    Monocytes Relative 8 4 - 12 %    Eosinophils Relative 3 0 - 6 %    Basophils Relative 1 0 - 1 %    Neutrophils Absolute 7 97 (H) 1 85 - 7 62 Thousands/µL    Immature Grans Absolute 0 11 0 00 - 0 20 Thousand/uL    Lymphocytes Absolute 2 40 0 60 - 4 47 Thousands/µL    Monocytes Absolute 1 01 0 17 - 1 22 Thousand/µL    Eosinophils Absolute 0 36 0 00 - 0 61 Thousand/µL    Basophils Absolute 0 13 (H) 0 00 - 0 10 Thousands/µL   Comprehensive metabolic panel    Collection Time: 04/26/22  1:41 AM   Result Value Ref Range    Sodium 133 (L) 136 - 145 mmol/L    Potassium 3 9 3 5 - 5 3 mmol/L    Chloride 95 (L) 100 - 108 mmol/L    CO2 30 21 - 32 mmol/L    ANION GAP 8 4 - 13 mmol/L    BUN 19 5 - 25 mg/dL    Creatinine 0 99 0 60 - 1 30 mg/dL    Glucose 105 65 - 140 mg/dL    Calcium 9 6 8 3 - 10 1 mg/dL    Corrected Calcium 10 6 (H) 8 3 - 10 1 mg/dL    AST 19 5 - 45 U/L    ALT 26 12 - 78 U/L    Alkaline Phosphatase 132 (H) 46 - 116 U/L    Total Protein 8 3 (H) 6 4 - 8 2 g/dL    Albumin 2 8 (L) 3 5 - 5 0 g/dL    Total Bilirubin 0 24 0 20 - 1 00 mg/dL    eGFR 73 ml/min/1 73sq m   Lactic acid, plasma    Collection Time: 04/26/22  1:41 AM   Result Value Ref Range    LACTIC ACID 0 6 0 5 - 2 0 mmol/L   Blood culture    Collection Time: 04/26/22  1:43 AM    Specimen: Arm, Right; Blood   Result Value Ref Range    Blood Culture Received in Microbiology Lab  Culture in Progress      Blood culture    Collection Time: 04/26/22  1:52 AM    Specimen: Arm, Left; Blood   Result Value Ref Range    Blood Culture Received in Microbiology Lab  Culture in Progress          Hebert Prieto MD  PGY-3, OB/GYN  4/26/2022 10:00 AM

## 2022-04-26 NOTE — ASSESSMENT & PLAN NOTE
S/p 1LTCS on 4/8/22 for maternal request in the setting of prolonged induction of labor   Initial postpartum course complicated by anemia, transfusion reaction, and enterococcus sepsis   Pfannenstiel incision c/d, some moisture noted in the area  Epidermis healed well; erythema present around incision, but without drainage, induration, fluctuance   Erythema secondary to adhesive reaction     Continue Lotrisone cream   Place telfa pad over top incision to keep dry   Continue ice packs   Continue to monitor

## 2022-04-26 NOTE — H&P
H&P: OB GYN   Kashmir King 39 y o  female MRN: [id number]  Unit/Bed#: -01 Encounter: [id number]  Admit date: 2022  Today's date: 22    Assessment/Plan   Ms Real Bunn is a 39y o  year-old  POD#17 s/p 1LTCS, Hospital Day: 2, admitted with a concern for intra abdominal abscesses  By issue:    Bilateral lower extremity edema  Assessment & Plan  - Prescribed 20 mg lasix daily as needed by PCP after delivery  - Held on admission    Anemia  Assessment & Plan  - Received 1 unit PRBCs after  section due to symptomatic anemia  - Experienced transfusion reaction, including tachycardia to 150 bpm  - Received Venofer  - f/u admission CBC    S/P  section  Assessment & Plan  - s/p 1LTCS on 22 after a long labor course  - Postpartum course complicated by anemia, transfusion reaction, and enterococcus sepsis with now persistent fevers after completion of antibiotic therapy  - Patient complaining of burning sensation and irritation along incision site, which she believes is due to the Exofin adhesive used on top of the incision    * Sepsis [facility])  Assessment & Plan  - Diagnosed with enterococcus bacteremia during immediate postpartum period  - Treated initially with IV cefepime/vancomycin/flagyl, was transitioned to PO linezolid outpatient; stopped 22  - f/u CBC, blood cultures, lactate  - f/u Infectious disease consult         Chief Complaint   Patient presents with    OB Problem Re-Evaluation     pt reports giving birth about 2 weeks ago and now how abscess in uterus  pt states she is scheduled for surgery tomorrow and was told to come tonight for admission     Mignon Fortune is a 40 yo  s/p 1LTCS on 22, who presents today after having persistent fevers at home, and an outpatient CT scan concerning for intra-abdominal abscesses  Her immediate postpartum course was complicated by anemia, a subsequent transfusion reaction, and enterococcus bacteremia   She was initially treated with IV cefepime, vancomycin, and flagyl, and completed PO Linezolid treatment outpatient on 22  However, she continues to have persistent fevers at home, although none today  She also notes irritation and a burning sensation around her incision  She reports that her edema has improved with 40 mg of Lasix daily, as prescribed by her PCP  Review of Systems   Constitutional: Negative for chills and fever  HENT: Negative for ear pain and sore throat  Eyes: Negative for pain and visual disturbance  Respiratory: Negative for cough and shortness of breath  Cardiovascular: Negative for chest pain and palpitations  Gastrointestinal: Negative for abdominal pain and vomiting  Genitourinary: Negative for dysuria and hematuria  Musculoskeletal: Negative for arthralgias and back pain  Skin: Negative for color change and rash  Neurological: Negative for seizures and syncope  All other systems reviewed and are negative  Other history is as follows:    Historical Information   OB History    Para Term  AB Living   1 1 1 0 0 1   SAB IAB Ectopic Multiple Live Births   0 0 0 0 1      # Outcome Date GA Lbr Santhosh/2nd Weight Sex Delivery Anes PTL Lv   1 Term 22 38w1d  2955 g (6 lb 8 2 oz) M CS-LTranv Spinal, EPI N NIKHIL     Gynecologic history: Patient's last menstrual period was 07/15/2021  Past Medical History:   Diagnosis Date    Abnormal Pap smear of cervix     Asthma     only with allergies, utilizes rescue inhaler     Cardiac arrhythmia     Last Assessed: 1/10/2017    Female infertility     Seasonal allergies      Past Surgical History:   Procedure Laterality Date    AUGMENTATION BREAST      CARDIAC CATHETERIZATION      Last Assessed: 1/10/2017    LASIK      Corneal  Last Assessed: 1/10/2017    [place]  DELIVERY ONLY N/A 2022    Procedure:  SECTION ();   Surgeon: Veto Yanes MD;  Location: [facility] ;  Service: Obstetrics Social History   Social History     Substance and Sexual Activity   Alcohol Use Yes     Social History     Substance and Sexual Activity   Drug Use Never     Social History     Tobacco Use   Smoking Status Never Smoker   Smokeless Tobacco Never Used     Family History: non-contributory    Meds/Allergies   Prior to Admission Medications   Prescriptions Last Dose Informant Patient Reported? Taking?    Esomeprazole Magnesium (NEXIUM PO) Not Taking at Unknown time Self Yes No   Sig: Take by mouth   Patient not taking: Reported on 4/25/2022    Prenatal Vit-Fe Fumarate-FA (PRENATAL PO) Not Taking at Unknown time Self Yes No   Sig: Take by mouth   Patient not taking: Reported on 4/25/2022    acetaminophen (TYLENOL) 325 mg tablet 4/25/2022 at Unknown time Self No Yes   Sig: Take 2 tablets (650 mg total) by mouth every 6 (six) hours as needed for mild pain, headaches or fever   albuterol (ProAir HFA) 90 mcg/act inhaler More than a month at Unknown time Self No No   Sig: Inhale 2 puffs every 6 (six) hours as needed for wheezing   benzocaine-menthol-lanolin-aloe (DERMOPLAST) 20-0 5 % topical spray Not Taking at Unknown time Self No No   Sig: Apply 1 application topically every 6 (six) hours as needed for mild pain or irritation   Patient not taking: Reported on 4/25/2022    calcium carbonate (TUMS) 500 mg chewable tablet Not Taking at Unknown time Self No No   Sig: Chew 2 tablets (1,000 mg total) 2 (two) times a day as needed for indigestion or heartburn   Patient not taking: Reported on 4/25/2022    clotrimazole-betamethasone (LOTRISONE) 1-0 05 % cream   No No   Sig: Apply topically 2 (two) times a day   docusate sodium (COLACE) 100 mg capsule 4/25/2022 at Unknown time Self No Yes   Sig: Take 1 capsule (100 mg total) by mouth 2 (two) times a day   ferrous sulfate 324 (65 Fe) mg Past Week at Unknown time Self No Yes   Sig: Take 1 tablet (324 mg total) by mouth in the morning   furosemide (LASIX) 40 mg tablet 4/25/2022 at Unknown time  No Yes   Sig: Take 0 5 tablets (20 mg total) by mouth daily as needed (swelling)   ibuprofen (MOTRIN) 600 mg tablet Past Week at Unknown time Self No Yes   Sig: Take 1 tablet (600 mg total) by mouth every 6 (six) hours as needed for moderate pain or headaches (cramping)   oxyCODONE-acetaminophen (Percocet) 5-325 mg per tablet 4/25/2022 at Unknown time  No Yes   Sig: Take 1 tablet by mouth every 6 (six) hours as needed for moderate pain Max Daily Amount: 4 tablets   potassium chloride (K-DUR,KLOR-CON) 20 mEq tablet 4/25/2022 at Unknown time Self No Yes   Sig: Take 1 tablet (20 mEq total) by mouth daily as needed (swelling)   potassium chloride (K-DUR,KLOR-CON) 20 mEq tablet   No No   Sig: Take 2 tablets (40 mEq total) by mouth daily      Facility-Administered Medications: None       Current Facility-Administered Medications   Medication Dose Route Frequency    acetaminophen (TYLENOL) tablet 650 mg  650 mg Oral Q6H PRN    calcium carbonate (TUMS) chewable tablet 1,000 mg  1,000 mg Oral Daily PRN    clotrimazole-betamethasone (LOTRISONE) 1-0 05 % cream   Topical BID    docusate sodium (COLACE) capsule 100 mg  100 mg Oral BID    lactated ringers infusion  125 mL/hr Intravenous Continuous    ondansetron (ZOFRAN) injection 4 mg  4 mg Intravenous Q6H PRN    oxyCODONE (ROXICODONE) IR tablet 5 mg  5 mg Oral Q4H PRN    simethicone (MYLICON) chewable tablet 80 mg  80 mg Oral 4x Daily PRN     Allergies   Allergen Reactions    Penicillins Rash     "poly cillin" lip swelling, rash as adult       Objective    Patient Vitals for the past 24 hrs:   BP Temp Temp src Pulse Resp SpO2 Weight   04/26/22 0020 109/58 98 6 °F (37 °C) Oral 104 18 100 % --   04/25/22 2320 127/80 98 1 °F (36 7 °C) Oral (!) 113 16 100 % 80 3 kg (177 lb)     Vitals: Blood pressure 109/58, pulse 104, temperature 98 6 °F (37 °C), temperature source Oral, resp   rate 18, weight 80 3 kg (177 lb), last menstrual period 07/15/2021, SpO2 100 %, not currently breastfeeding  Body mass index is 32 37 kg/m²  Physical Exam  Constitutional:       General: She is not in acute distress  Appearance: Normal appearance  She is not ill-appearing  HENT:      Head: Normocephalic and atraumatic  Cardiovascular:      Rate and Rhythm: Regular rhythm  Tachycardia present  Heart sounds: Normal heart sounds  Pulmonary:      Effort: Pulmonary effort is normal  No respiratory distress  Breath sounds: Normal breath sounds  Abdominal:      Palpations: Abdomen is soft  Tenderness: There is no abdominal tenderness  There is no guarding or rebound  Comments: Pfannenstiel incision appears clean, dry, and intact; erythema surrounds the incision, without any underlying induration or fluctuance; no drainage expressed from incision, non-tender to palpation   Musculoskeletal:         General: Normal range of motion  Skin:     General: Skin is warm  Neurological:      Mental Status: She is alert and oriented to person, place, and time  Results from last 7 days   Lab Units 04/19/22  1329   WBC Thousand/uL 10 71*   NEUTROS ABS Thousands/µL 8 42*   HEMOGLOBIN g/dL 8 0*   MCV fL 88   PLATELETS Thousands/uL 624*     Results from last 7 days   Lab Units 04/19/22  1329   NEUTROS PCT % 78*   MONOS PCT % 6   EOS PCT % 1     Results from last 7 days   Lab Units 04/21/22  1059 04/19/22  1329   POTASSIUM mmol/L 4 6 3 7   CHLORIDE mmol/L  --  102   CO2 mmol/L  --  28   BUN mg/dL 9 7   CREATININE mg/dL 0 75 0 64   EGFR ml/min/1 73sq m 102 115         Results from last 7 days   Lab Units 04/19/22  1329   PLATELETS Thousands/uL 624*       Imaging, EKG, Pathology, and Other Studies: I have personally reviewed pertinent reports              Shani Sandoval MD  4/26/2022  1:49 AM

## 2022-04-26 NOTE — BRIEF OP NOTE (RAD/CATH)
INTERVENTIONAL RADIOLOGY PROCEDURE NOTE    Date: 4/26/2022    Procedure:      Preoperative diagnosis:   1  Sepsis ([address] )         Postoperative diagnosis: Same  Surgeon: Radha Saucedo MD     Assistant: None  No qualified resident was available  Blood loss:  Minimal    Specimens:  Serosanguineous fluid sent for cultures  Findings:   Sonographic evaluation of the right lower abdomen demonstrated a fluid collection with internal septations  Needle aspiration of this fluid collection showed serosanguineous fluid that was slightly cloudy  Based on this appearance, a 10 Frisian drainage catheter was placed  I have placed orders NOT TO FLUSH this drain and leave this drain to spontaneous suction drainage  If there is no significant output overnight, I will ask my team to cut and remove this drain tomorrow morning  Discussed with OB team     Complications: None immediate      Anesthesia: conscious sedation

## 2022-04-26 NOTE — ASSESSMENT & PLAN NOTE
Blood cultures 4/21 negative x 4 days  Blood cultures 4/26 negative x 24h   Echo 4/12/22 performed inpatient during initial delivery admission negative for vegetations  [ ] Repeat echo completed 4/27, f/u read

## 2022-04-26 NOTE — ASSESSMENT & PLAN NOTE
S/p 2u pRBC during initial admission, complicated by transfusion reaction/rapid response     Lab Results   Component Value Date/Time    HGB 7 9 (L) 04/27/2022 09:22 AM    HGB 8 8 (L) 04/26/2022 12:23 PM    HGB 8 8 (L) 04/26/2022 01:41 AM     S/p venofer x 2 4/26  Continue to trend

## 2022-04-26 NOTE — UTILIZATION REVIEW
Initial Clinical Review    Admission: Date/Time/Statement:   Admission Orders (From admission, onward)     Ordered        04/25/22 2354  Inpatient Admission  Once                      Orders Placed This Encounter   Procedures    Inpatient Admission     Standing Status:   Standing     Number of Occurrences:   1     Order Specific Question:   Level of Care     Answer:   Med Surg [16]     Order Specific Question:   Estimated length of stay     Answer:   More than 2 Midnights     Order Specific Question:   Certification     Answer:   I certify that inpatient services are medically necessary for this patient for a duration of greater than two midnights  See H&P and MD Progress Notes for additional information about the patient's course of treatment  ED Arrival Information     Expected Arrival Acuity    4/25/2022 4/25/2022 23:34 -         Means of arrival Escorted by Service Admission type    Walk-In Spouse OB/GYN Elective         Arrival complaint    OB surgical procedure        Chief Complaint   Patient presents with    OB Problem Re-Evaluation     pt reports giving birth about 2 weeks ago and now how abscess in uterus  pt states she is scheduled for surgery tomorrow and was told to come tonight for admission       Initial Presentation: 39 y o  female presented to L&S as inpatient admission for intra-abdominal fluid collection  S/p 1LTCS on 4/8/22, who presents today after having persistent fevers at home, and an outpatient CT scan concerning for intra-abdominal abscesses  Her immediate postpartum course was complicated by anemia, a subsequent transfusion reaction, and enterococcus bacteremia  She was initially treated with IV cefepime, vancomycin, and flagyl, and completed PO Linezolid treatment outpatient on 4/20/22  However, she continues to have persistent fevers at home and burning sensation and irritation along incision site, chills and back pain   On exam Uterus non tender at -2U Surrounding erythema on well healed pfannesteil- non tender to palpation, appears mildy desquamative- rash extends to the intertriginous folds and labia There is an erythematous rash, nontender to touch on intertriginous folds and labia  On speculum exam there is a moderate bloody/ milky discharge that was cultured, cervix appears normal otherwise  Mild LE edema bilaterally, negative Homans, no erythema, palpable cords or tenderness to palpation  Plan IVF IV  antibiotics, consult IR ID and supportive care  Date:22  patient NPO IVF, IV antibiotics   Plan IR for drainage   If there is suspicion for infection, a drainage catheter will be placed    Additionally, there is a fluid collection at the expected location of the anterior lower uterine incision and it is unclear whether this is postoperative hematoma or infected fluid      ED Triage Vitals [220]   Temperature Pulse Respirations Blood Pressure SpO2   98 1 °F (36 7 °C) (!) 113 16 127/80 100 %      Temp Source Heart Rate Source Patient Position - Orthostatic VS BP Location FiO2 (%)   Oral Monitor Sitting Right arm --      Pain Score       4          Wt Readings from Last 1 Encounters:   22 80 3 kg (177 lb)     Additional Vital Signs:     Date/Time Temp Pulse Resp BP SpO2 O2 Device Cardiac (WDL) Patient Position - Orthostatic VS   22 1216 99 4 °F (37 4 °C) 106 Abnormal  18 115/65 -- -- -- Sitting   22 0958 99 1 °F (37 3 °C) 107 Abnormal  18 121/72 -- -- -- Sitting   22 0930 -- -- -- -- -- None (Room air) -- --   22 0514 98 7 °F (37 1 °C) 101 18 117/70 -- -- -- --   22 0335 -- -- -- -- -- -- -- --   22 0020 98 6 °F (37 °C) 104 18 109/58 100 % None (Room air) WDL Sitting     Pertinent Labs/Diagnostic Test Results:   CT C/A/P   Complex fluid collection with irregular margins and intralesional gas in the anterior wall of the lower uterine segment of uterus, at the expected location of a  section scar   This may be a liquefying hematoma however infection is not excluded     This appears to communicate with a multilocular intermediate attenuation rim-enhancing collection in the left pelvis with numerous additional collections in the right upper abdomen, anterior to the liver, and in the anterior abdominal wall musculature   as detailed above   If there is concern for infection: Consider percutaneous fluid sampling of the collection anterior to the liver which is new from the April 9 CT and appears to communicate back to the pelvic collections adjacent to the uterus, and   extending into the anterior abdominal wall  Results from last 7 days   Lab Units 04/26/22  1223 04/26/22  0141   WBC Thousand/uL 8 94 11 98*   HEMOGLOBIN g/dL 8 8* 8 8*   HEMATOCRIT % 28 4* 28 6*   PLATELETS Thousands/uL 535* 588*   NEUTROS ABS Thousands/µL 6 43 7 97*         Results from last 7 days   Lab Units 04/26/22  1223 04/26/22  0141 04/21/22  1059   SODIUM mmol/L 135* 133*  --    POTASSIUM mmol/L 4 2 3 9 4 6   CHLORIDE mmol/L 99* 95*  --    CO2 mmol/L 29 30  --    ANION GAP mmol/L 7 8  --    BUN mg/dL 12 19 9   CREATININE mg/dL 0 75 0 99 0 75   EGFR ml/min/1 73sq m 102 73 102   CALCIUM mg/dL 9 5 9 6  --      Results from last 7 days   Lab Units 04/26/22  1223 04/26/22  0141   AST U/L 15 19   ALT U/L 22 26   ALK PHOS U/L 124* 132*   TOTAL PROTEIN g/dL 8 0 8 3*   ALBUMIN g/dL 2 7* 2 8*   TOTAL BILIRUBIN mg/dL 0 25 0 24     Results from last 7 days   Lab Units 04/26/22  1223 04/26/22  0141   GLUCOSE RANDOM mg/dL 98 105     Results from last 7 days   Lab Units 04/26/22  0141   LACTIC ACID mmol/L 0 6     Results from last 7 days   Lab Units 04/21/22  1059   NT-PRO BNP pg/mL 116     Results from last 7 days   Lab Units 04/26/22  0152 04/26/22  0143 04/21/22  1059   BLOOD CULTURE  Received in Microbiology Lab  Culture in Progress  Received in Microbiology Lab  Culture in Progress  No Growth After 4 Days                 ED Treatment:   Medication Administration from 04/25/2022 2308 to 04/26/2022 1442       Date/Time Order Dose Route Action     04/26/2022 0927 docusate sodium (COLACE) capsule 100 mg 100 mg Oral Given     04/26/2022 6586 clotrimazole-betamethasone (LOTRISONE) 2-6 65 % cream 1 application Topical Given     04/26/2022 0234 acetaminophen (TYLENOL) tablet 650 mg 650 mg Oral Given     04/26/2022 0307 cefTRIAXone (ROCEPHIN) 1,000 mg in dextrose 5 % 50 mL IVPB 1,000 mg Intravenous New Bag     04/26/2022 1325 metroNIDAZOLE (FLAGYL) IVPB (premix) 500 mg 100 mL 500 mg Intravenous New Bag     04/26/2022 0538 metroNIDAZOLE (FLAGYL) IVPB (premix) 500 mg 100 mL 500 mg Intravenous New Bag     04/26/2022 0302 lactated ringers infusion 75 mL/hr Intravenous New Bag     04/26/2022 0350 vancomycin (VANCOCIN) 1,250 mg in sodium chloride 0 9 % 250 mL IVPB 1,250 mg Intravenous New Bag     04/26/2022 0449 acetaminophen (TYLENOL) tablet 325 mg 325 mg Oral Given        Past Medical History:   Diagnosis Date    Abnormal Pap smear of cervix     Asthma     only with allergies, utilizes rescue inhaler     Cardiac arrhythmia     Last Assessed: 1/10/2017    Female infertility     Seasonal allergies      Present on Admission:   Intra-abdominal fluid collection   Anemia   Bilateral lower extremity edema      Admitting Diagnosis: Cyst of uterus [N85 8]  Age/Sex: 39 y o  female  Admission Orders:  Scheduled Medications:  cefTRIAXone, 1,000 mg, Intravenous, Q24H  clotrimazole-betamethasone, , Topical, BID  docusate sodium, 100 mg, Oral, BID  metroNIDAZOLE, 500 mg, Intravenous, Q8H  vancomycin, 20 mg/kg (Adjusted), Intravenous, Q12H      Continuous IV Infusions:  lactated ringers, 125 mL/hr, Intravenous, Continuous  lactated ringers, 75 mL/hr, Intravenous, Continuous      PRN Meds:  acetaminophen, 650 mg, Oral, Q6H PRN  calcium carbonate, 1,000 mg, Oral, Daily PRN  ondansetron, 4 mg, Intravenous, Q6H PRN  oxyCODONE, 5 mg, Oral, Q4H PRN  simethicone, 80 mg, Oral, 4x Daily PRN        INPATIENT CONSULT TO IR  IP CONSULT TO INFECTIOUS DISEASES  IP CONSULT TO PHARMACY  SCD  ECHO      Network Utilization Review Department  ATTENTION: Please call with any questions or concerns to [phone] and carefully listen to the prompts so that you are directed to the right person  All voicemails are confidential   Genaro Bar all requests for admission clinical reviews, approved or denied determinations and any other requests to dedicated fax number below belonging to the campus where the patient is receiving treatment   List of dedicated fax numbers for the Facilities:  [address] DENIALS (Administrative/Medical Necessity) [phone]   [address] (Maternity/NICU/Pediatrics) [phone]   [address]  [address] [facility] [place] [address] [id number] [address] Zahira Chavis [address] [facility] [place] [address] [phone]

## 2022-04-26 NOTE — ASSESSMENT & PLAN NOTE
S/p 1LTCS 22 for maternal request for prolonged induction of labor   Initial postpartum course complicated by blood transfusion reaction and enterococcus bacteremia/UTI,   Was discharged with PO course of Linezolid (last dose 22)    Re-presented with reported fevers at home  CT abd/pelvis :  "Complex fluid collection with irregular margins and intralesional gas in the anterior wall of the lower uterine segment of uterus, at the expected location of a  section scar  This may be a liquefying hematoma however infection is not excluded  This appears to communicate with a multilocular intermediate attenuation rim-enhancing collection in the left pelvis with numerous additional collections in the right upper abdomen, anterior to the liver, and in the anterior abdominal wall musculature as detailed above       Recent Labs     22  0141 22  1223 22  0922   WBC 11 98* 8 94 6 21   HGB 8 8* 8 8* 7 9*   * 535* 448*   LACTICACID 0 6  --   --      Low suspicion for sepsis at this time   Blood cultures  negative x 4 days   Blood cultures  negative x 24h  ITA drain fluid no growth on aerobic culture, Gram stain 1+ polys, no bacteria   [ ] F/u anaerobic culture  S/p ID consultation  - Continued IV antibiotics for another 24h; can likely d/c antibiotics today as no growth on cultures   S/p IR consultation  - S/p ITA drain placement , removed     Will follow up ID recommendations today  Will likely be discharged today

## 2022-04-26 NOTE — CONSULTS
Consultation - Infectious Disease   Fer King 39 y o  female MRN: [id number]  Unit/Bed#: -01 Encounter: [id number]    Extensive review of the medical records in Epic including review of the notes, radiographs, and laboratory results  IMPRESSION/RECOMMENDATIONS:   1  SIRS POA        Patient tachycardic with mild leukocytosis that resolved  Afebrile since admission, reported fever at home as high as 101 3  Lactic acid normal        Patient had recent history of Enterococcus bacteremia, treated initially with IV cefepime/vancomycin/Flagyl and transition to p o  Linezolid until 04/20/2022  Patient mentioned that she had low grade fever including while on abx        Repeated blood culture since 4/10 showed no growth  Unclear if the patient has any infection, but based on the pervious bacteremia, will continue abx for now  Plan:   · Continue Ceftriaxone, Flagyl and Vancomycin    CBC in am Monitor clinical response, temperature curve   Follow-up for blood culture and aspiration fluid     2  Abdominal and pelvic fluid collections             CT scan showed abdominal and pelvic fluid collections that might be postoperative hematomas/seroma or infected fluid  Patient had fluid aspiration and drain placement  Per IR notes, has serosanguineous fluid that was slightly cloudy   Follow-up for aspiration fluid results      3  History of Enterococcus bacteremia           Patient had UTI with Enterococcus faecalis and Enterococcus faecalis bacteremia on 04/10  Was initially treated with IV cefepime/vancomycin/Flagyl and continued linezolid as outpatient until 04/20/2022      4  Anemia      Blood loss during the previous admission, complicated by transfusion reaction/ rapid response  Hb 8 8 today  Monitor       My recommendations were discussed with the patient in detail who verbalized understanding  Thank you for allowing me to participate in the care of this patient    The ID service will follow  HISTORY OF PRESENT ILLNESS:  Reason for Consult: SIRS, hx of enterococcus bacteremia   CC: fever   HPI: Marcus Wade is a 39y o  year old female status post C section, UTI and enterococcus faecalis bacteremia 4/10/22  Was initially treated with IV cefepime/vancomycin/Flagyl and because of penicillin allergy continued linezolid as outpatient until 2022  Patient was admitted for persistent fever  Blood culture since bacteremia 4/10 showed no growth  CT CAP showed complex fluid collection with irregular margins and intralesional gas in the anterior wall of the lower uterine segment of uterus, at the expected location of a  section scar  This may be a liquefying hematoma however infection is not excluded  This appears to communicate with a multilocular intermediate attenuation rim-enhancing collection in the left pelvis with numerous additional collections in the right upper abdomen, anterior to the liver, and in the anterior abdominal wall musculature   as detailed above  Upon presentation, patient with tachycardia and leukocytosis  Was started on ceftriaxone, flagyl and vancomycin    IR consulted, patient had fluid aspiration and drain tube placed  REVIEW OF SYSTEMS:  Constitutional: positive for fever    HENT: Negative runny nose, sore throat, congestion  Eyes: Negative for change in vision  Respiratory: Negative cough, shortness of breath  Cardiovascular: Negative for chest pain, palpitations  Gastrointestinal: Negative for abdominal pain, nausea, vomiting, diarrhea  Genitourinary:  Negative for dysuria, hematuria  Musculoskeletal: Negative for arthritis, myalgias  Skin: Negative for rash, wound  Psychiatric/Behavioral: Negative for confusion, hallucination        PAST MEDICAL HISTORY:  Past Medical History:   Diagnosis Date    Abnormal Pap smear of cervix     Asthma     only with allergies, utilizes rescue inhaler     Cardiac arrhythmia     Last Assessed: 1/10/2017    Female infertility     Seasonal allergies      Past Surgical History:   Procedure Laterality Date    AUGMENTATION BREAST      CARDIAC CATHETERIZATION      Last Assessed: 1/10/2017    LASIK      Corneal  Last Assessed: 1/10/2017    IN  DELIVERY ONLY N/A 2022    Procedure:  SECTION (); Surgeon: Ira Watkins MD;  Location: [facility] ;  Service: Obstetrics       FAMILY HISTORY:  Negative for immunodeficiency    SOCIAL HISTORY:  Social History   Social History     Substance and Sexual Activity   Alcohol Use Yes     Social History     Substance and Sexual Activity   Drug Use Never     Social History     Tobacco Use   Smoking Status Never Smoker   Smokeless Tobacco Never Used       ALLERGIES:  Allergies   Allergen Reactions    Penicillins Rash     "poly cillin" lip swelling, rash as adult       MEDICATIONS:  All current active medications have been reviewed    Antibiotics: ceftriaxone, flagyl, vancomycin - day 1  Scheduled Meds:  Current Facility-Administered Medications   Medication Dose Route Frequency Provider Last Rate    acetaminophen  650 mg Oral Q6H PRN Nicole Marino MD      calcium carbonate  1,000 mg Oral Daily PRN Nicole Marino MD      cefTRIAXone  1,000 mg Intravenous Q24H Nicole Marino MD Stopped (22 6271)   [facility] clotrimazole-betamethasone   Topical BID Nicole Marino MD      docusate sodium  100 mg Oral BID Nicole Marino MD      fentanyl citrate (PF)   Intravenous Code/Trauma/Sedation Med Radha Saucedo MD      lactated ringers  125 mL/hr Intravenous Continuous Nicole Marino MD Stopped (22 0020)    lactated ringers  75 mL/hr Intravenous Continuous Nicole Marino MD 75 mL/hr (22 0302)    metroNIDAZOLE  500 mg Intravenous Q8H Nicole Marino  mg (22 1325)    midazolam    Code/Trauma/Sedation Med Radha Saucedo MD      ondansetron  4 mg Intravenous Q6H PRN Nicole Marino MD      oxyCODONE  5 mg Oral Q4H PRN Brenda Duarte MD      simethicone  80 mg Oral 4x Daily PRN Brenda Duarte MD      vancomycin  20 mg/kg (Adjusted) Intravenous Q12H Brenda Duarte MD Stopped (22 8128)     Continuous Infusions:lactated ringers, 125 mL/hr, Last Rate: Stopped (22 0020)  lactated ringers, 75 mL/hr, Last Rate: 75 mL/hr (22 0302)      PRN Meds:   acetaminophen    calcium carbonate    fentanyl citrate (PF)    midazolam    ondansetron    oxyCODONE    simethicone     PHYSICAL EXAM:  Temp:  [98 1 °F (36 7 °C)-99 4 °F (37 4 °C)] 99 4 °F (37 4 °C)  HR:  [101-114] 108  Resp:  [16-18] 18  BP: (100-127)/(58-80) 109/62  SpO2:  [96 %-100 %] 100 %  Temp (24hrs), Av 8 °F (37 1 °C), Min:98 1 °F (36 7 °C), Max:99 4 °F (37 4 °C)  Current: Temperature: 99 4 °F (37 4 °C)    Intake/Output Summary (Last 24 hours) at 2022 1558  Last data filed at 2022 1452  Gross per 24 hour   Intake 950 ml   Output 1400 ml   Net -450 ml     General Appearance:  Appearing well, nontoxic   Head:  Normocephalic, without obvious abnormality, atraumatic   Eyes:  EOMI, Conjunctiva pale and sclera anicteric, both eyes   Nose: Nares normal, mucosa normal, no drainage   Throat: Oropharynx moist    Neck: Supple   Lungs:  Clear to auscultation bilaterally, respirations unlabored   Heart:   S1, S2, regular rate,rhythm; tachycardia, no murmur   Abdomen: Soft, spontaneous tender status post drainage placement    :  No Stewart catheter present   Extremities:   No distal leg edema b/l   Skin: No rashes or lesions  Pale    Neurologic: Alert and oriented to person, surroundings, conversant, fluent speech   Psychiatric: Calm, cooperative with exam and interview         LABS, IMAGING, & OTHER STUDIES:  Lab Results:  I have personally reviewed pertinent labs and cultures    Results from last 7 days   Lab Units 22  1223 22  0141   WBC Thousand/uL 8 94 11 98*   HEMOGLOBIN g/dL 8 8* 8 8*   PLATELETS Thousands/uL 535* 588* Results from last 7 days   Lab Units 04/26/22  1223 04/26/22  0141 04/26/22  0141   SODIUM mmol/L 135*  --  133*   POTASSIUM mmol/L 4 2   < > 3 9   CHLORIDE mmol/L 99*   < > 95*   CO2 mmol/L 29   < > 30   BUN mg/dL 12   < > 19   CREATININE mg/dL 0 75   < > 0 99   EGFR ml/min/1 73sq m 102   < > 73   CALCIUM mg/dL 9 5   < > 9 6   AST U/L 15  --  19   ALT U/L 22   < > 26   ALK PHOS U/L 124*   < > 132*    < > = values in this interval not displayed  Results from last 7 days   Lab Units 04/26/22  0152 04/26/22  0143 04/21/22  1059   BLOOD CULTURE  Received in Microbiology Lab  Culture in Progress  Received in Microbiology Lab  Culture in Progress  No Growth After 5 Days  Imaging Studies:   I have personally reviewed pertinent imaging study reports and images in PACS

## 2022-04-26 NOTE — UTILIZATION REVIEW
NOTIFICATION OF INPATIENT ADMISSION/INPATIENT AUTHORIZATION REQUEST   SERVICING FACILITY:   [address]  Tax ID: [id number]  NPI: [id number]  Place of Service: Inpatient [address]  [address]  Place of Service Code: 24     ATTENDING PROVIDER:  Attending Name and NPI#: Valencia Zabala Md [[id number]]  Address: [address]  Phone: [phone]     UTILIZATION REVIEW CONTACT:  [facility] Utilization   Network Utilization Review Department  Phone: [phone]  Fax: [phone]  Email: [email or web address]     PHYSICIAN ADVISORY SERVICES:  FOR [id number] REVIEW - MEDICAL NECESSITY DENIAL  Phone: [phone]  Fax: [phone]  Email: [email or web address]     TYPE OF REQUEST:  Inpatient Status     ADMISSION INFORMATION:  ADMISSION DATE/TIME: 4/25/22 11:55 PM  PATIENT DIAGNOSIS CODE/DESCRIPTION:  Cyst of uterus [N85 8]  DISCHARGE DATE/TIME: No discharge date for patient encounter  IMPORTANT INFORMATION:  Please contact the [facility] directly with any questions or concerns regarding this request  Department voicemails are confidential     Send requests for admission clinical reviews, concurrent reviews, approvals, and administrative denials due to lack of clinical to fax [phone]

## 2022-04-26 NOTE — TELEMEDICINE
e-Consult (IPC)  - Interventional Radiology  Ariana King 39 y o  female MRN: [id number]  Unit/Bed#: -01 Encounter: [id number]          Interventional Radiology has been consulted to evaluate José Miguel Carballo    We were consulted by the [facility] service concerning this patient with abdominal and pelvic fluid collections  IP Consult to IR  Consult performed by: Navjot Palafox MD  Consult ordered by: Mimi Lamb MD        22    Assessment/Recommendation: This is a 63-year-old female status post  section with sepsis secondary to Enterococcus bacteremia/UTI a  CT scan showed abdominal and pelvic fluid collections  I had a discussion about patient with Dr Fay Cruz this morning  From a clinical standpoint, she is actually doing quite well  It is unclear whether these fluid collections are postoperative hematomas/seromas or infected fluid  After our discussion, a decision was made to perform needle aspiration of the right anterior subhepatic fluid collection and assess the gross appearance of the fluid  If there is suspicion for infection, a drainage catheter will be placed  Additionally, there is a fluid collection at the expected location of the anterior lower uterine incision and it is unclear whether this is postoperative hematoma or infected fluid  There is also a small collection at the cul-de-sac  The small collections do not appear to communicate with the right sided abdominal/perihepatic fluid collection  If the perihepatic fluid collection turns out to be infected, drainage of the cul-de-sac fluid collection may eventually be needed  The plan for today is ultrasound-guided right abdominal fluid aspiration with possible drain placement  Total time spent in review of data, discussion with requesting provider and rendering advice was 10 minutes     Thank you for allowing Interventional Radiology to participate in the care of José Miguel Carballo   Please don't hesitate to call or TigerText us with any questions       Carmel Layne MD

## 2022-04-27 ENCOUNTER — APPOINTMENT (INPATIENT)
Dept: NON INVASIVE DIAGNOSTICS | Facility: HOSPITAL | Age: 37
DRG: 776 | End: 2022-04-27
Payer: COMMERCIAL

## 2022-04-27 LAB
ANION GAP SERPL CALCULATED.3IONS-SCNC: 7 MMOL/L (ref 4–13)
BUN SERPL-MCNC: 12 MG/DL (ref 5–25)
CALCIUM SERPL-MCNC: 9 MG/DL (ref 8.3–10.1)
CHLORIDE SERPL-SCNC: 102 MMOL/L (ref 100–108)
CO2 SERPL-SCNC: 28 MMOL/L (ref 21–32)
CREAT SERPL-MCNC: 0.74 MG/DL (ref 0.6–1.3)
ERYTHROCYTE [DISTWIDTH] IN BLOOD BY AUTOMATED COUNT: 15.6 % (ref 11.6–15.1)
GFR SERPL CREATININE-BSD FRML MDRD: 104 ML/MIN/1.73SQ M
GLUCOSE SERPL-MCNC: 104 MG/DL (ref 65–140)
HCT VFR BLD AUTO: 25.8 % (ref 34.8–46.1)
HGB BLD-MCNC: 7.9 G/DL (ref 11.5–15.4)
MCH RBC QN AUTO: 26.2 PG (ref 26.8–34.3)
MCHC RBC AUTO-ENTMCNC: 30.6 G/DL (ref 31.4–37.4)
MCV RBC AUTO: 86 FL (ref 82–98)
PLATELET # BLD AUTO: 448 THOUSANDS/UL (ref 149–390)
PMV BLD AUTO: 8.4 FL (ref 8.9–12.7)
POTASSIUM SERPL-SCNC: 3.9 MMOL/L (ref 3.5–5.3)
RBC # BLD AUTO: 3.01 MILLION/UL (ref 3.81–5.12)
SODIUM SERPL-SCNC: 137 MMOL/L (ref 136–145)
VANCOMYCIN PEAK SERPL-MCNC: 26.5 UG/ML (ref 20–40)
VANCOMYCIN TROUGH SERPL-MCNC: 10.1 UG/ML (ref 10–20)
WBC # BLD AUTO: 6.21 THOUSAND/UL (ref 4.31–10.16)

## 2022-04-27 PROCEDURE — 85027 COMPLETE CBC AUTOMATED: CPT | Performed by: OBSTETRICS & GYNECOLOGY

## 2022-04-27 PROCEDURE — 99232 SBSQ HOSP IP/OBS MODERATE 35: CPT | Performed by: INTERNAL MEDICINE

## 2022-04-27 PROCEDURE — 80202 ASSAY OF VANCOMYCIN: CPT | Performed by: OBSTETRICS & GYNECOLOGY

## 2022-04-27 PROCEDURE — 93308 TTE F-UP OR LMTD: CPT

## 2022-04-27 PROCEDURE — 80048 BASIC METABOLIC PNL TOTAL CA: CPT | Performed by: OBSTETRICS & GYNECOLOGY

## 2022-04-27 PROCEDURE — 99232 SBSQ HOSP IP/OBS MODERATE 35: CPT | Performed by: OBSTETRICS & GYNECOLOGY

## 2022-04-27 RX ORDER — ACETAMINOPHEN 325 MG/1
975 TABLET ORAL EVERY 8 HOURS
Status: DISCONTINUED | OUTPATIENT
Start: 2022-04-27 | End: 2022-04-28 | Stop reason: HOSPADM

## 2022-04-27 RX ORDER — LIDOCAINE 50 MG/G
1 PATCH TOPICAL DAILY
Status: DISCONTINUED | OUTPATIENT
Start: 2022-04-27 | End: 2022-04-28 | Stop reason: HOSPADM

## 2022-04-27 RX ADMIN — ACETAMINOPHEN 975 MG: 325 TABLET ORAL at 12:41

## 2022-04-27 RX ADMIN — METRONIDAZOLE 500 MG: 500 INJECTION, SOLUTION INTRAVENOUS at 05:52

## 2022-04-27 RX ADMIN — METRONIDAZOLE 500 MG: 500 INJECTION, SOLUTION INTRAVENOUS at 15:26

## 2022-04-27 RX ADMIN — CLOTRIMAZOLE AND BETAMETHASONE DIPROPIONATE: 10; .64 CREAM TOPICAL at 09:59

## 2022-04-27 RX ADMIN — DOCUSATE SODIUM 100 MG: 100 CAPSULE, LIQUID FILLED ORAL at 18:21

## 2022-04-27 RX ADMIN — IBUPROFEN 600 MG: 600 TABLET ORAL at 08:25

## 2022-04-27 RX ADMIN — OXYCODONE HYDROCHLORIDE 10 MG: 10 TABLET ORAL at 05:03

## 2022-04-27 RX ADMIN — OXYCODONE HYDROCHLORIDE 10 MG: 10 TABLET ORAL at 18:33

## 2022-04-27 RX ADMIN — LIDOCAINE 5% 1 PATCH: 700 PATCH TOPICAL at 09:59

## 2022-04-27 RX ADMIN — OXYCODONE HYDROCHLORIDE 10 MG: 10 TABLET ORAL at 14:10

## 2022-04-27 RX ADMIN — CLOTRIMAZOLE AND BETAMETHASONE DIPROPIONATE: 10; .64 CREAM TOPICAL at 18:21

## 2022-04-27 RX ADMIN — IRON SUCROSE 200 MG: 20 INJECTION, SOLUTION INTRAVENOUS at 12:05

## 2022-04-27 RX ADMIN — VANCOMYCIN HYDROCHLORIDE 1250 MG: 5 INJECTION, POWDER, LYOPHILIZED, FOR SOLUTION INTRAVENOUS at 06:49

## 2022-04-27 RX ADMIN — CEFTRIAXONE 1000 MG: 1 INJECTION, POWDER, FOR SOLUTION INTRAMUSCULAR; INTRAVENOUS at 02:51

## 2022-04-27 RX ADMIN — OXYCODONE HYDROCHLORIDE 10 MG: 10 TABLET ORAL at 22:39

## 2022-04-27 RX ADMIN — ACETAMINOPHEN 975 MG: 325 TABLET ORAL at 04:09

## 2022-04-27 RX ADMIN — METRONIDAZOLE 500 MG: 500 INJECTION, SOLUTION INTRAVENOUS at 23:37

## 2022-04-27 RX ADMIN — OXYCODONE HYDROCHLORIDE 10 MG: 10 TABLET ORAL at 00:57

## 2022-04-27 RX ADMIN — DOCUSATE SODIUM 100 MG: 100 CAPSULE, LIQUID FILLED ORAL at 09:59

## 2022-04-27 RX ADMIN — ACETAMINOPHEN 975 MG: 325 TABLET ORAL at 20:11

## 2022-04-27 RX ADMIN — VANCOMYCIN HYDROCHLORIDE 1250 MG: 5 INJECTION, POWDER, LYOPHILIZED, FOR SOLUTION INTRAVENOUS at 18:20

## 2022-04-27 NOTE — PROGRESS NOTES
Vancomycin IV Pharmacy-to-Dose Consultation    Sukhjinder Peng is a 39 y o  female who is currently receiving Vancomycin IV with management by the Pharmacy Consult service  Assessment/Plan:  The patient was reviewed  Renal function is stable and no signs or symptoms of nephrotoxicity and/or infusion reactions were documented in the chart  Based on todays assessment (day #2) vancomycin trough was subtherapeutic at 10 1  Therefore dose will change from vancomycin  IV 1250 mg every 12 hours to vancomycin IV 1250 mg every 8 hours,  with a plan for trough to be drawn at 1730 on 4/28  We will continue to follow the patients culture results and clinical progress daily      Vega Robles, Pharmacist

## 2022-04-27 NOTE — PLAN OF CARE
Problem: PAIN - ADULT  Goal: Verbalizes/displays adequate comfort level or baseline comfort level  Description: Interventions:  - Encourage patient to monitor pain and request assistance  - Assess pain using appropriate pain scale  - Administer analgesics based on type and severity of pain and evaluate response  - Implement non-pharmacological measures as appropriate and evaluate response  - Consider cultural and social influences on pain and pain management  - Notify physician/advanced practitioner if interventions unsuccessful or patient reports new pain  Outcome: Progressing     Problem: INFECTION - ADULT  Goal: Absence or prevention of progression during hospitalization  Description: INTERVENTIONS:  - Assess and monitor for signs and symptoms of infection  - Monitor lab/diagnostic results  - Monitor all insertion sites, i e  indwelling lines, tubes, and drains  - Monitor endotracheal if appropriate and nasal secretions for changes in amount and color  - Stanton appropriate cooling/warming therapies per order  - Administer medications as ordered  - Instruct and encourage patient and family to use good hand hygiene technique  - Identify and instruct in appropriate isolation precautions for identified infection/condition  Outcome: Progressing  Goal: Absence of fever/infection during neutropenic period  Description: INTERVENTIONS:  - Monitor WBC    Outcome: Progressing     Problem: SAFETY ADULT  Goal: Patient will remain free of falls  Description: INTERVENTIONS:  - Educate patient/family on patient safety including physical limitations  - Instruct patient to call for assistance with activity   - Consult OT/PT to assist with strengthening/mobility   - Keep Call bell within reach  - Keep bed low and locked with side rails adjusted as appropriate  - Keep care items and personal belongings within reach  - Initiate and maintain comfort rounds  - Obtain necessary fall risk management equipment  - Apply yellow socks and bracelet for high fall risk patients  - Consider moving patient to room near nurses station  Outcome: Progressing  Goal: Maintain or return to baseline ADL function  Description: INTERVENTIONS:  -  Assess patient's ability to carry out ADLs; assess patient's baseline for ADL function and identify physical deficits which impact ability to perform ADLs (bathing, care of mouth/teeth, toileting, grooming, dressing, etc )  - Assess/evaluate cause of self-care deficits   - Assess range of motion  - Assess patient's mobility; develop plan if impaired  - Assess patient's need for assistive devices and provide as appropriate  - Encourage maximum independence but intervene and supervise when necessary  - Involve family in performance of ADLs  - Assess for home care needs following discharge   - Consider OT consult to assist with ADL evaluation and planning for discharge  - Provide patient education as appropriate  Outcome: Progressing  Goal: Maintains/Returns to pre admission functional level  Description: INTERVENTIONS:  - Perform BMAT or MOVE assessment daily    - Set and communicate daily mobility goal to care team and patient/family/caregiver     - Collaborate with rehabilitation services on mobility goals if consulted  - Out of bed for toileting  - Record patient progress and toleration of activity level   Outcome: Progressing     Problem: DISCHARGE PLANNING  Goal: Discharge to home or other facility with appropriate resources  Description: INTERVENTIONS:  - Identify barriers to discharge w/patient and caregiver  - Arrange for needed discharge resources and transportation as appropriate  - Identify discharge learning needs (meds, wound care, etc )  - Arrange for interpretive services to assist at discharge as needed  - Refer to Case Management Department for coordinating discharge planning if the patient needs post-hospital services based on physician/advanced practitioner order or complex needs related to functional status, cognitive ability, or social support system  Outcome: Progressing     Problem: Knowledge Deficit  Goal: Patient/family/caregiver demonstrates understanding of disease process, treatment plan, medications, and discharge instructions  Description: Complete learning assessment and assess knowledge base    Interventions:  - Provide teaching at level of understanding  - Provide teaching via preferred learning methods  Outcome: Progressing

## 2022-04-27 NOTE — PROGRESS NOTES
Progress Note - OB/GYN   Anton Gabriela King 39 y o  female MRN: [id number]  Unit/Bed#: -01 Encounter: [id number]    ASSESSMENT:  Ginna Don is a 39 y o  [place] [facility] female, re-admitted s/p 1LTCS at 38w1d on 22 for maternal request in the setting of prolonged induction of labor  Her initial postpartum course was complicated by blood transfusion reaction (received 2U pRBCs today this admission), in addition to sepsis secondary to enterococcus bacteremia/UTI  Now re-admitted with reported fevers at home, CT abd/pelvis showing multiple fluid collections throughout the abdomen  Clinically appears stable  Today is hospital day 2  PLAN:  * Intra-abdominal fluid collection  Assessment & Plan  S/p 1LTCS 22 for maternal request for prolonged induction of labor   Initial postpartum course complicated by blood transfusion reaction and enterococcus bacteremia/UTI,   Was discharged with PO course of Linezolid (last dose 22)    Re-presented with reported fevers at home  CT abd/pelvis :  "Complex fluid collection with irregular margins and intralesional gas in the anterior wall of the lower uterine segment of uterus, at the expected location of a  section scar  This may be a liquefying hematoma however infection is not excluded  This appears to communicate with a multilocular intermediate attenuation rim-enhancing collection in the left pelvis with numerous additional collections in the right upper abdomen, anterior to the liver, and in the anterior abdominal wall musculature as detailed above       Recent Labs     22  0141 22  1223   WBC 11 98* 8 94   HGB 8 8* 8 8*   * 535*   LACTICACID 0 6  --      Low suspicion for sepsis at this time   Blood cultures  negative x 4 days   [ ] F/u repeat blood cultures on admission   S/p ID consultation  - Continue IV antibiotics for now, will follow up recommendations  S/p IR consultation  - RUQ ITA drain now in place, has drained only 15 cc serous fluid overnight; will likely pull drain today     Consider transition to PO antibiotics today v tomorrow - will f/u with ID   Based on recommendations, consider discharge today or tomorrow     HIstory of Sepsis due to Enterococcus [facility])  Assessment & Plan  Blood cultures  negative x 4 days  [ ] F/u repeat blood cultures collected on admission   Echo 22 performed inpatient during initial delivery admission negative for vegetations  [ ] Repeat echo this admission to rule out development of vegetations    Bilateral lower extremity edema  Assessment & Plan  Initial postpartum course complicate by significant lower extremity edema   Seen by PCP outpatient after discharged, prescribed 20 mg lasix PO QD PRN   Swelling much improved since last admission   Hold PO lasix for now   IV fluids now off, patient tolerating PO   Monitor I/O    Anemia  Assessment & Plan  S/p 2u pRBC during initial admission, complicated by transfusion reaction/rapid response     Lab Results   Component Value Date/Time    HGB 8 8 (L) 2022 12:23 PM    HGB 8 8 (L) 2022 01:41 AM    HGB 8 0 (L) 2022 01:29 PM     Continue to trend     S/P  section  Assessment & Plan  S/p 1LTCS on 22 for maternal request in the setting of prolonged induction of labor   Initial postpartum course complicated by anemia, transfusion reaction, and enterococcus sepsis   Pfannenstiel incision c/d, some moisture noted in the area  Epidermis healed well; erythema present around incision, but without drainage, induration, fluctuance  Erythema secondary to adhesive reaction     Continue Lotrisone cream   Place telfa pad over top incision to keep dry   Continue ice packs   Continue to monitor       SUBJECTIVE:  Pt feels okay, no major issues overnight  Patient was a bit frustrated yesterday being NPO all day until IR procedure  Now has ITA drain in place in RUQ  States it is very uncomfortable   Otherwise denies significant pain  Denies headaches, chest pain, fever/chills, SOB, palpitations  Her postpartum vaginal bleeding is minimal  She is bottle feeding baby  She is tolerating PO, denies nausea/vomiting  Denies leg pain  States her swelling is much better from prior admission since being on PO lasix outpatient  Patient every eager to go home today to be with baby  OBJECTIVE:  Vitals:    04/26/22 2030 04/26/22 2130 04/26/22 2347 04/27/22 0410   BP: 111/54 107/57 109/59 103/58   BP Location: Left arm Left arm  Left arm   Pulse: 102 91 97 80   Resp: 18 18 18 18   Temp: 98 4 °F (36 9 °C) 98 2 °F (36 8 °C) 97 8 °F (36 6 °C) 97 7 °F (36 5 °C)   TempSrc: Oral Oral Oral Oral   SpO2: 96% 96% 97% 98%   Weight:           BP range last 24:  Systolic (09VWW), MCP:395 , Min:100 , EXK:071   Diastolic (50OSB), ZZC:99, Min:54, Max:72      Physical Exam:   Body mass index is 32 37 kg/m²  Physical Exam  Constitutional:       General: She is not in acute distress  Appearance: She is normal weight  She is not ill-appearing or diaphoretic  HENT:      Head: Normocephalic and atraumatic  Eyes:      Conjunctiva/sclera: Conjunctivae normal       Pupils: Pupils are equal, round, and reactive to light  Abdominal:      General: There is no distension  Palpations: Abdomen is soft  Tenderness: There is no abdominal tenderness  Comments: Pfannenstiel incision clean, intact  Some moisture noted in the area  Epidermis well healed with no evidence of separation, drainage, bleeding, induration, or fluctuance  Mild erythema present around healing incision - evidence of skin reaction to prior adhesive  RUQ ITA drain in place, 5 cc serous fluid in bulb  Currently on suction  Genitourinary:     Comments: Deferred   Musculoskeletal:         General: Normal range of motion  Right lower leg: No edema  Left lower leg: No edema  Skin:     General: Skin is warm and dry     Neurological:      General: No focal deficit present  Mental Status: She is alert and oriented to person, place, and time  Mental status is at baseline  Psychiatric:         Mood and Affect: Mood normal          Behavior: Behavior normal          Thought Content: Thought content normal          I/O       04/25 0701 04/26 0700 04/26 0701 04/27 0700    P  O   975    I V  (mL/kg)  916 3 (11 4)    IV Piggyback 450 750    Total Intake(mL/kg) 450 (5 6) 2641 3 (32 9)    Urine (mL/kg/hr) 1200 1300 (0 7)    Drains  15    Total Output 1200 1315    Net -750 +1326 3                Results:  Recent Labs     04/26/22  0141 04/26/22  1223   WBC 11 98* 8 94   HGB 8 8* 8 8*   HCT 28 6* 28 4*   * 535*       Recent Labs     04/26/22  0141 04/26/22  1223   K 3 9 4 2   CREATININE 0 99 0 75   AST 19 15   ALT 26 22       Recent Results (from the past 24 hour(s))   CBC and differential    Collection Time: 04/26/22 12:23 PM   Result Value Ref Range    WBC 8 94 4 31 - 10 16 Thousand/uL    RBC 3 34 (L) 3 81 - 5 12 Million/uL    Hemoglobin 8 8 (L) 11 5 - 15 4 g/dL    Hematocrit 28 4 (L) 34 8 - 46 1 %    MCV 85 82 - 98 fL    MCH 26 3 (L) 26 8 - 34 3 pg    MCHC 31 0 (L) 31 4 - 37 4 g/dL    RDW 15 7 (H) 11 6 - 15 1 %    MPV 8 4 (L) 8 9 - 12 7 fL    Platelets 348 (H) 906 - 390 Thousands/uL    nRBC 0 /100 WBCs    Neutrophils Relative 71 43 - 75 %    Immat GRANS % 1 0 - 2 %    Lymphocytes Relative 17 14 - 44 %    Monocytes Relative 7 4 - 12 %    Eosinophils Relative 3 0 - 6 %    Basophils Relative 1 0 - 1 %    Neutrophils Absolute 6 43 1 85 - 7 62 Thousands/µL    Immature Grans Absolute 0 10 0 00 - 0 20 Thousand/uL    Lymphocytes Absolute 1 50 0 60 - 4 47 Thousands/µL    Monocytes Absolute 0 59 0 17 - 1 22 Thousand/µL    Eosinophils Absolute 0 25 0 00 - 0 61 Thousand/µL    Basophils Absolute 0 07 0 00 - 0 10 Thousands/µL   Comprehensive metabolic panel    Collection Time: 04/26/22 12:23 PM   Result Value Ref Range    Sodium 135 (L) 136 - 145 mmol/L    Potassium 4 2 3 5 - 5 3 mmol/L    Chloride 99 (L) 100 - 108 mmol/L    CO2 29 21 - 32 mmol/L    ANION GAP 7 4 - 13 mmol/L    BUN 12 5 - 25 mg/dL    Creatinine 0 75 0 60 - 1 30 mg/dL    Glucose 98 65 - 140 mg/dL    Calcium 9 5 8 3 - 10 1 mg/dL    Corrected Calcium 10 5 (H) 8 3 - 10 1 mg/dL    AST 15 5 - 45 U/L    ALT 22 12 - 78 U/L    Alkaline Phosphatase 124 (H) 46 - 116 U/L    Total Protein 8 0 6 4 - 8 2 g/dL    Albumin 2 7 (L) 3 5 - 5 0 g/dL    Total Bilirubin 0 25 0 20 - 1 00 mg/dL    eGFR 102 ml/min/1 73sq m   C-reactive protein    Collection Time: 04/26/22 12:23 PM   Result Value Ref Range     3 (H) <3 0 mg/L       Princess Osvaldo MD  PGY-3, OB/GYN  4/27/2022 6:18 AM

## 2022-04-27 NOTE — PLAN OF CARE
Problem: PAIN - ADULT  Goal: Verbalizes/displays adequate comfort level or baseline comfort level  Description: Interventions:  - Encourage patient to monitor pain and request assistance  - Assess pain using appropriate pain scale  - Administer analgesics based on type and severity of pain and evaluate response  - Implement non-pharmacological measures as appropriate and evaluate response  - Consider cultural and social influences on pain and pain management  - Notify physician/advanced practitioner if interventions unsuccessful or patient reports new pain  Outcome: Progressing     Problem: INFECTION - ADULT  Goal: Absence or prevention of progression during hospitalization  Description: INTERVENTIONS:  - Assess and monitor for signs and symptoms of infection  - Monitor lab/diagnostic results  - Monitor all insertion sites, i e  indwelling lines, tubes, and drains  - Monitor endotracheal if appropriate and nasal secretions for changes in amount and color  - Bent appropriate cooling/warming therapies per order  - Administer medications as ordered  - Instruct and encourage patient and family to use good hand hygiene technique  - Identify and instruct in appropriate isolation precautions for identified infection/condition  Outcome: Progressing  Goal: Absence of fever/infection during neutropenic period  Description: INTERVENTIONS:  - Monitor WBC    Outcome: Progressing     Problem: SAFETY ADULT  Goal: Patient will remain free of falls  Description: INTERVENTIONS:  - Educate patient/family on patient safety including physical limitations  - Instruct patient to call for assistance with activity   - Consult OT/PT to assist with strengthening/mobility   - Keep Call bell within reach  - Keep bed low and locked with side rails adjusted as appropriate  - Keep care items and personal belongings within reach  - Initiate and maintain comfort rounds  - Make Fall Risk Sign visible to staff  - Apply yellow socks and bracelet for high fall risk patients  - Consider moving patient to room near nurses station  Outcome: Progressing  Goal: Maintain or return to baseline ADL function  Description: INTERVENTIONS:  -  Assess patient's ability to carry out ADLs; assess patient's baseline for ADL function and identify physical deficits which impact ability to perform ADLs (bathing, care of mouth/teeth, toileting, grooming, dressing, etc )  - Assess/evaluate cause of self-care deficits   - Assess range of motion  - Assess patient's mobility; develop plan if impaired  - Assess patient's need for assistive devices and provide as appropriate  - Encourage maximum independence but intervene and supervise when necessary  - Involve family in performance of ADLs  - Assess for home care needs following discharge   - Consider OT consult to assist with ADL evaluation and planning for discharge  - Provide patient education as appropriate  Outcome: Progressing  Goal: Maintains/Returns to pre admission functional level  Description: INTERVENTIONS:  - Perform BMAT or MOVE assessment daily    - Set and communicate daily mobility goal to care team and patient/family/caregiver     - Collaborate with rehabilitation services on mobility goals if consulted  - Out of bed for toileting  - Record patient progress and toleration of activity level   Outcome: Progressing     Problem: DISCHARGE PLANNING  Goal: Discharge to home or other facility with appropriate resources  Description: INTERVENTIONS:  - Identify barriers to discharge w/patient and caregiver  - Arrange for needed discharge resources and transportation as appropriate  - Identify discharge learning needs (meds, wound care, etc )  - Arrange for interpretive services to assist at discharge as needed  - Refer to Case Management Department for coordinating discharge planning if the patient needs post-hospital services based on physician/advanced practitioner order or complex needs related to functional status, cognitive ability, or social support system  Outcome: Progressing     Problem: Knowledge Deficit  Goal: Patient/family/caregiver demonstrates understanding of disease process, treatment plan, medications, and discharge instructions  Description: Complete learning assessment and assess knowledge base    Interventions:  - Provide teaching at level of understanding  - Provide teaching via preferred learning methods  Outcome: Progressing

## 2022-04-27 NOTE — UTILIZATION REVIEW
NOTIFICATION OF INPATIENT ADMISSION/INPATIENT AUTHORIZATION REQUEST   SERVICING FACILITY:   [facility]  Amanda VASQUEZ, [address] [place] [address]  Tax ID: [id number]  NPI: [id number]  Place of Service: Inpatient [facility]  [address]  Place of Service Code: 24     ATTENDING PROVIDER:  Attending Name and NPI#: Darrin Turk Md [[id number]]  Address: Amanda HERNÁNDEZ, [address] [place] [address]  Phone: [phone]     UTILIZATION REVIEW CONTACT:  Praveena Patton , Utilization   Network Utilization Review Department  Phone: [phone]  Fax: [phone]  Email: [email or web address]     PHYSICIAN ADVISORY SERVICES:  FOR [id number] REVIEW - MEDICAL NECESSITY DENIAL  Phone: [phone]  Fax: [phone]  Email: [email or web address]     TYPE OF REQUEST:  Inpatient Status     ADMISSION INFORMATION:  ADMISSION DATE/TIME: 4/25/22 11:55 PM  PATIENT DIAGNOSIS CODE/DESCRIPTION:  Cyst of uterus [N85 8]  DISCHARGE DATE/TIME: No discharge date for patient encounter  IMPORTANT INFORMATION:  Please contact the Praveena Patton directly with any questions or concerns regarding this request  Department voicemails are confidential     Send requests for admission clinical reviews, concurrent reviews, approvals, and administrative denials due to lack of clinical to fax [phone]         Initial Clinical Review    Admission: Date/Time/Statement:   Admission Orders (From admission, onward)     Ordered        04/25/22 2354  Inpatient Admission  Once                      Orders Placed This Encounter   Procedures    Inpatient Admission     Standing Status:   Standing     Number of Occurrences:   1     Order Specific Question:   Level of Care     Answer:   Med Surg [16]     Order Specific Question:   Estimated length of stay     Answer:   More than 2 Midnights     Order Specific Question:   Certification     Answer:   I certify that inpatient services are medically necessary for this patient for a duration of greater than two midnights  See H&P and MD Progress Notes for additional information about the patient's course of treatment  ED Arrival Information     Expected Arrival Acuity    4/25/2022 4/25/2022 23:34 -         Means of arrival Escorted by Service Admission type    Walk-In Spouse OB/GYN Elective         Arrival complaint    OB surgical procedure        Chief Complaint   Patient presents with    OB Problem Re-Evaluation     pt reports giving birth about 2 weeks ago and now how abscess in uterus  pt states she is scheduled for surgery tomorrow and was told to come tonight for admission       Initial Presentation: 39 y o  female presented to L&S as inpatient admission for intra-abdominal fluid collection  S/p 1LTCS on 4/8/22, who presents today after having persistent fevers at home, and an outpatient CT scan concerning for intra-abdominal abscesses  Her immediate postpartum course was complicated by anemia, a subsequent transfusion reaction, and enterococcus bacteremia  She was initially treated with IV cefepime, vancomycin, and flagyl, and completed PO Linezolid treatment outpatient on 4/20/22  However, she continues to have persistent fevers at home and burning sensation and irritation along incision site, chills and back pain  On exam Uterus non tender at -2U Surrounding erythema on well healed pfannesteil- non tender to palpation, appears mildy desquamative- rash extends to the intertriginous folds and labia There is an erythematous rash, nontender to touch on intertriginous folds and labia  On speculum exam there is a moderate bloody/ milky discharge that was cultured, cervix appears normal otherwise  Mild LE edema bilaterally, negative Homans, no erythema, palpable cords or tenderness to palpation  Plan IVF IV  antibiotics, consult IR ID and supportive care      Date:04-26-22  patient NPO IVF, IV antibiotics   Plan IR for drainage   If there is suspicion for infection, a drainage catheter will be placed    Additionally, there is a fluid collection at the expected location of the anterior lower uterine incision and it is unclear whether this is postoperative hematoma or infected fluid      ED Triage Vitals   Temperature Pulse Respirations Blood Pressure SpO2   22   98 1 °F (36 7 °C) (!) 113 16 127/80 100 %      Temp Source Heart Rate Source Patient Position - Orthostatic VS BP Location FiO2 (%)   22 1950   Oral Monitor Sitting Right arm 20      Pain Score       22       4          Wt Readings from Last 1 Encounters:   22 80 3 kg (177 lb)     Additional Vital Signs:     Date/Time Temp Pulse Resp BP SpO2 O2 Device Cardiac (WDL) Patient Position - Orthostatic VS   22 1216 99 4 °F (37 4 °C) 106 Abnormal  18 115/65 -- -- -- Sitting   22 0958 99 1 °F (37 3 °C) 107 Abnormal  18 121/72 -- -- -- Sitting   22 0930 -- -- -- -- -- None (Room air) -- --   22 0514 98 7 °F (37 1 °C) 101 18 117/70 -- -- -- --   22 0335 -- -- -- -- -- -- -- --   22 0020 98 6 °F (37 °C) 104 18 109/58 100 % None (Room air) WDL Sitting     Pertinent Labs/Diagnostic Test Results:   CT C/A/P   Complex fluid collection with irregular margins and intralesional gas in the anterior wall of the lower uterine segment of uterus, at the expected location of a  section scar   This may be a liquefying hematoma however infection is not excluded     This appears to communicate with a multilocular intermediate attenuation rim-enhancing collection in the left pelvis with numerous additional collections in the right upper abdomen, anterior to the liver, and in the anterior abdominal wall musculature   as detailed above   If there is concern for infection: Consider percutaneous fluid sampling of the collection anterior to the liver which is new from the April 9 CT and appears to communicate back to the pelvic collections adjacent to the uterus, and   extending into the anterior abdominal wall  Results from last 7 days   Lab Units 04/27/22  0922 04/26/22  1223 04/26/22  0141   WBC Thousand/uL 6 21 8 94 11 98*   HEMOGLOBIN g/dL 7 9* 8 8* 8 8*   HEMATOCRIT % 25 8* 28 4* 28 6*   PLATELETS Thousands/uL 448* 535* 588*   NEUTROS ABS Thousands/µL  --  6 43 7 97*         Results from last 7 days   Lab Units 04/27/22  0922 04/26/22  1223 04/26/22  0141 04/21/22  1059   SODIUM mmol/L 137 135* 133*  --    POTASSIUM mmol/L 3 9 4 2 3 9 4 6   CHLORIDE mmol/L 102 99* 95*  --    CO2 mmol/L 28 29 30  --    ANION GAP mmol/L 7 7 8  --    BUN mg/dL 12 12 19 9   CREATININE mg/dL 0 74 0 75 0 99 0 75   EGFR ml/min/1 73sq m 104 102 73 102   CALCIUM mg/dL 9 0 9 5 9 6  --      Results from last 7 days   Lab Units 04/26/22  1223 04/26/22  0141   AST U/L 15 19   ALT U/L 22 26   ALK PHOS U/L 124* 132*   TOTAL PROTEIN g/dL 8 0 8 3*   ALBUMIN g/dL 2 7* 2 8*   TOTAL BILIRUBIN mg/dL 0 25 0 24     Results from last 7 days   Lab Units 04/27/22  0922 04/26/22  1223 04/26/22  0141   GLUCOSE RANDOM mg/dL 104 98 105     Results from last 7 days   Lab Units 04/26/22  0141   LACTIC ACID mmol/L 0 6     Results from last 7 days   Lab Units 04/21/22  1059   NT-PRO BNP pg/mL 116     Results from last 7 days   Lab Units 04/26/22  1553 04/26/22  0152 04/26/22  0143 04/21/22  1059   BLOOD CULTURE   --  No Growth at 24 hrs  No Growth at 24 hrs  No Growth After 5 Days     GRAM STAIN RESULT  1+ Polys  No bacteria seen  --   --   --                ED Treatment:   Medication Administration from 04/25/2022 2308 to 04/26/2022 1442       Date/Time Order Dose Route Action     04/26/2022 0927 docusate sodium (COLACE) capsule 100 mg 100 mg Oral Given     04/26/2022 0928 clotrimazole-betamethasone (LOTRISONE) 7-5 25 % cream 1 application Topical Given     04/26/2022 0234 acetaminophen (TYLENOL) tablet 650 mg 650 mg Oral Given     04/26/2022 0307 cefTRIAXone (ROCEPHIN) 1,000 mg in dextrose 5 % 50 mL IVPB 1,000 mg Intravenous New Bag     04/26/2022 1325 metroNIDAZOLE (FLAGYL) IVPB (premix) 500 mg 100 mL 500 mg Intravenous New Bag     04/26/2022 0538 metroNIDAZOLE (FLAGYL) IVPB (premix) 500 mg 100 mL 500 mg Intravenous New Bag     04/26/2022 0302 lactated ringers infusion 75 mL/hr Intravenous New Bag     04/26/2022 0350 vancomycin (VANCOCIN) 1,250 mg in sodium chloride 0 9 % 250 mL IVPB 1,250 mg Intravenous New Bag     04/26/2022 0449 acetaminophen (TYLENOL) tablet 325 mg 325 mg Oral Given        Past Medical History:   Diagnosis Date    Abnormal Pap smear of cervix     Asthma     only with allergies, utilizes rescue inhaler     Cardiac arrhythmia     Last Assessed: 1/10/2017    Female infertility     Seasonal allergies      Present on Admission:   Intra-abdominal fluid collection   Anemia   Bilateral lower extremity edema   Transfusion reaction      Admitting Diagnosis: Cyst of uterus [N85 8]  Age/Sex: 39 y o  female  Admission Orders:  Scheduled Medications:  acetaminophen, 975 mg, Oral, Q8H [address]  cefTRIAXone, 1,000 mg, Intravenous, Q24H  clotrimazole-betamethasone, , Topical, BID  docusate sodium, 100 mg, Oral, BID  iron sucrose, 200 mg, Intravenous, Once  lidocaine, 1 patch, Topical, Daily  metroNIDAZOLE, 500 mg, Intravenous, Q8H  vancomycin, 20 mg/kg (Adjusted), Intravenous, Q12H      Continuous IV Infusions:     PRN Meds:  calcium carbonate, 1,000 mg, Oral, Daily PRN  ibuprofen, 600 mg, Oral, Q6H PRN  ondansetron, 4 mg, Intravenous, Q6H PRN  oxyCODONE, 10 mg, Oral, Q4H PRN  oxyCODONE, 5 mg, Oral, Q4H PRN  simethicone, 80 mg, Oral, 4x Daily PRN        INPATIENT CONSULT TO IR  IP CONSULT TO INFECTIOUS DISEASES  IP CONSULT TO PHARMACY  [facility]  ECHO      Network Utilization Review Department  ATTENTION: Please call with any questions or concerns to [phone] and carefully listen to the prompts so that you are directed to the right person  All voicemails are confidential   Estefani Guadalupe all requests for admission clinical reviews, approved or denied determinations and any other requests to dedicated fax number below belonging to the campus where the patient is receiving treatment  List of dedicated fax numbers for the Facilities:  [address] [place] [address] DENIALS (Administrative/Medical Necessity) [phone]   [address]  (Maternity/NICU/Pediatrics) [phone] [address] [place] [address]  [address] Medical [place] [address] [address] [place] [address] Josh Rodriges Penteado [address] [phone]       Continued Stay Review    Date: 04-27-22                        Current Patient Class: inpatient  Current Level of Care: M/S    HPI:36 y o  female initially admitted on *04-25-22    Assessment/Plan: 04-26-22  IR  Needle aspiration of this fluid collection showed serosanguineous fluid that was slightly cloudy  Based on this appearance, a 10 Greek drainage catheter was placed    ID consult   Impression/Recommendations:  1  SIRS, POA:  With tachycardia and mild leukocytosis that has resolved  Patient reports fevers at home but has been afebrile since hospital admission  Lactate is WNL  CT abdomen and pelvis demonstrates a complex multiloculated uterine and pelvic fluid collection  Status post aspirate and drainage of fluid collection today by Interventional Radiology Service    Patient does not appear septic or toxic at this time  · Follow aspirate cultures and blood culture results  Continue empiric antibiotic regimen of vancomycin, ceftriaxone and metronidazole pending culture results  Repeat CBC in a m   Monitor clinical response, temperature curve  2  Recent history of Enterococcus faecalis bacteremia:  Unclear etiology of Enterococcus bacteremia  Urine culture had growth of Enterococcus faecalis also but patient had no UTI symptoms in the postpartum setting  Patient was treated with vancomycin IV during her recent hospitalization and completed a treatment course of linezolid on April 20th  · Follow-up blood cultures from this admission    3  Obesity with BMI of 32: this is a risk factor for infection    Vital Signs:   Date/Time Temp Pulse Resp BP MAP (mmHg) SpO2 FiO2 (%) O2 Device Cardiac (WDL) Patient Position - Orthostatic VS   04/27/22 0810 98 5 °F (36 9 °C) 89 18 108/70 -- -- -- -- -- Sitting   04/27/22 0410 97 7 °F (36 5 °C) 80 18 103/58 -- 98 % -- None (Room air) -- Lying   04/26/22 2347 97 8 °F (36 6 °C) 97 18 109/59 -- 97 % -- -- -- --   04/26/22 2130 98 2 °F (36 8 °C) 91 18 107/57 -- 96 % -- None (Room air) -- Sitting   04/26/22 2030 98 4 °F (36 9 °C) 102 18 111/54 -- 96 % -- None (Room air) -- Sitting   04/26/22 1950 -- -- -- -- -- -- 20 None (Room air) WDL --   04/26/22 1930 98 4 °F (36 9 °C) 100 18 109/60 -- 96 % -- None (Room air) -- Sitting   04/26/22 1830 99 °F (37 2 °C) 94 18 115/62 -- 99 % -- None (Room air) -- Sitting   04/26/22 1725 -- 104 20 124/69 -- 96 % -- None (Room air) -- Sitting   04/26/22 1700 98 9 °F (37 2 °C) 104 20 -- -- 95 % -- None (Room air) -- --   04/26/22 1649 -- 108 Abnormal  -- 110/59 -- -- -- -- -- --   04/26/22 1557 -- 109 Abnormal  -- 110/62 81 100 % -- -- -- --   04/26/22 1555 -- 108 Abnormal  -- 109/62 79 100 % -- -- -- --   04/26/22 1552 -- 106 Abnormal  -- 109/62 79 100 % -- -- --      Pertinent Labs/Diagnostic Results:       Results from last 7 days   Lab Units 04/27/22  0922 04/26/22  1223 04/26/22  0141   WBC Thousand/uL 6 21 8 94 11 98*   HEMOGLOBIN g/dL 7 9* 8 8* 8 8*   HEMATOCRIT % 25 8* 28 4* 28 6*   PLATELETS Thousands/uL 448* 535* 588*   NEUTROS ABS Thousands/µL  --  6 43 7 97*         Results from last 7 days   Lab Units 04/27/22  0922 04/26/22  1223 04/26/22  0141 04/21/22  1059   SODIUM mmol/L 137 135* 133*  --    POTASSIUM mmol/L 3 9 4 2 3 9 4 6   CHLORIDE mmol/L 102 99* 95*  --    CO2 mmol/L 28 29 30  --    ANION GAP mmol/L 7 7 8  --    BUN mg/dL 12 12 19 9   CREATININE mg/dL 0 74 0 75 0 99 0 75   EGFR ml/min/1 73sq m 104 102 73 102   CALCIUM mg/dL 9 0 9 5 9 6  --      Results from last 7 days   Lab Units 04/26/22  1223 04/26/22  0141   AST U/L 15 19   ALT U/L 22 26   ALK PHOS U/L 124* 132*   TOTAL PROTEIN g/dL 8 0 8 3*   ALBUMIN g/dL 2 7* 2 8*   TOTAL BILIRUBIN mg/dL 0 25 0 24         Results from last 7 days   Lab Units 04/27/22  0922 04/26/22  1223 04/26/22  0141   GLUCOSE RANDOM mg/dL 104 98 105         Results from last 7 days   Lab Units 04/26/22  0141   LACTIC ACID mmol/L 0 6     Results from last 7 days   Lab Units 04/21/22  1059   NT-PRO BNP pg/mL 116     Results from last 7 days   Lab Units 04/26/22  1223   CRP mg/L 149 3*       Results from last 7 days   Lab Units 04/26/22  1553 04/26/22  0152 04/26/22  0143 04/21/22  1059   BLOOD CULTURE   --  No Growth at 24 hrs  No Growth at 24 hrs  No Growth After 5 Days     GRAM STAIN RESULT  1+ Polys  No bacteria seen  --   --   --                  Medications:   Scheduled Medications:  acetaminophen, 975 mg, Oral, Q8H CRYSTAL  cefTRIAXone, 1,000 mg, Intravenous, Q24H  clotrimazole-betamethasone, , Topical, BID  docusate sodium, 100 mg, Oral, BID  iron sucrose, 200 mg, Intravenous, Once  lidocaine, 1 patch, Topical, Daily  metroNIDAZOLE, 500 mg, Intravenous, Q8H  vancomycin, 20 mg/kg (Adjusted), Intravenous, Q12H      Continuous IV Infusions:     PRN Meds:  calcium carbonate, 1,000 mg, Oral, Daily PRN  ibuprofen, 600 mg, Oral, Q6H PRN  ondansetron, 4 mg, Intravenous, Q6H PRN  oxyCODONE, 10 mg, Oral, Q4H PRN  oxyCODONE, 5 mg, Oral, Q4H PRN  simethicone, 80 mg, Oral, 4x Daily PRN        Discharge Plan: home when medically clear    Network Utilization Review Department  ATTENTION: Please call with any questions or concerns to [phone] and carefully listen to the prompts so that you are directed to the right person  All voicemails are confidential   Cricket Coates all requests for admission clinical reviews, approved or denied determinations and any other requests to dedicated fax number below belonging to the campus where the patient is receiving treatment   List of dedicated fax numbers for the Facilities:  [address] DENIALS (Administrative/Medical Necessity) [phone]   [address] (Maternity/NICU/Pediatrics) [phone]   [address]  [address] Medical [place] [address] [address] [place] [address] Josh Chavis 14[address] [facility] [place] [address] [phone]

## 2022-04-27 NOTE — UTILIZATION REVIEW
Continued Stay Review    Date: 04-27-22                        Current Patient Class: inpatient  Current Level of Care: M/S    HPI:36 y o  female initially admitted on *04-25-22    Assessment/Plan: 04-26-22  IR  Needle aspiration of this fluid collection showed serosanguineous fluid that was slightly cloudy  Based on this appearance, a 10 Somali drainage catheter was placed    ID consult   Impression/Recommendations:  1  SIRS, POA:  With tachycardia and mild leukocytosis that has resolved  Patient reports fevers at home but has been afebrile since hospital admission  Lactate is WNL  CT abdomen and pelvis demonstrates a complex multiloculated uterine and pelvic fluid collection  Status post aspirate and drainage of fluid collection today by Interventional Radiology Service  Patient does not appear septic or toxic at this time  · Follow aspirate cultures and blood culture results  Continue empiric antibiotic regimen of vancomycin, ceftriaxone and metronidazole pending culture results  Repeat CBC in a m   Monitor clinical response, temperature curve  2  Recent history of Enterococcus faecalis bacteremia:  Unclear etiology of Enterococcus bacteremia  Urine culture had growth of Enterococcus faecalis also but patient had no UTI symptoms in the postpartum setting  Patient was treated with vancomycin IV during her recent hospitalization and completed a treatment course of linezolid on April 20th  · Follow-up blood cultures from this admission    3  Obesity with BMI of 32: this is a risk factor for infection    Vital Signs:   Date/Time Temp Pulse Resp BP MAP (mmHg) SpO2 FiO2 (%) O2 Device Cardiac (WDL) Patient Position - Orthostatic VS   04/27/22 0810 98 5 °F (36 9 °C) 89 18 108/70 -- -- -- -- -- Sitting   04/27/22 0410 97 7 °F (36 5 °C) 80 18 103/58 -- 98 % -- None (Room air) -- Lying   04/26/22 2347 97 8 °F (36 6 °C) 97 18 109/59 -- 97 % -- -- -- --   04/26/22 2130 98 2 °F (36 8 °C) 91 18 107/57 -- 96 % -- None (Room air) -- Sitting   04/26/22 2030 98 4 °F (36 9 °C) 102 18 111/54 -- 96 % -- None (Room air) -- Sitting   04/26/22 1950 -- -- -- -- -- -- 20 None (Room air) WDL --   04/26/22 1930 98 4 °F (36 9 °C) 100 18 109/60 -- 96 % -- None (Room air) -- Sitting   04/26/22 1830 99 °F (37 2 °C) 94 18 115/62 -- 99 % -- None (Room air) -- Sitting   04/26/22 1725 -- 104 20 124/69 -- 96 % -- None (Room air) -- Sitting   04/26/22 1700 98 9 °F (37 2 °C) 104 20 -- -- 95 % -- None (Room air) -- --   04/26/22 1649 -- 108 Abnormal  -- 110/59 -- -- -- -- -- --   04/26/22 1557 -- 109 Abnormal  -- 110/62 81 100 % -- -- -- --   04/26/22 1555 -- 108 Abnormal  -- 109/62 79 100 % -- -- -- --   04/26/22 1552 -- 106 Abnormal  -- 109/62 79 100 % -- -- --      Pertinent Labs/Diagnostic Results:       Results from last 7 days   Lab Units 04/26/22  1223 04/26/22  0141   WBC Thousand/uL 8 94 11 98*   HEMOGLOBIN g/dL 8 8* 8 8*   HEMATOCRIT % 28 4* 28 6*   PLATELETS Thousands/uL 535* 588*   NEUTROS ABS Thousands/µL 6 43 7 97*         Results from last 7 days   Lab Units 04/26/22  1223 04/26/22  0141 04/21/22  1059   SODIUM mmol/L 135* 133*  --    POTASSIUM mmol/L 4 2 3 9 4 6   CHLORIDE mmol/L 99* 95*  --    CO2 mmol/L 29 30  --    ANION GAP mmol/L 7 8  --    BUN mg/dL 12 19 9   CREATININE mg/dL 0 75 0 99 0 75   EGFR ml/min/1 73sq m 102 73 102   CALCIUM mg/dL 9 5 9 6  --      Results from last 7 days   Lab Units 04/26/22  1223 04/26/22  0141   AST U/L 15 19   ALT U/L 22 26   ALK PHOS U/L 124* 132*   TOTAL PROTEIN g/dL 8 0 8 3*   ALBUMIN g/dL 2 7* 2 8*   TOTAL BILIRUBIN mg/dL 0 25 0 24         Results from last 7 days   Lab Units 04/26/22  1223 04/26/22  0141   GLUCOSE RANDOM mg/dL 98 105         Results from last 7 days   Lab Units 04/26/22  0141   LACTIC ACID mmol/L 0 6     Results from last 7 days   Lab Units 04/21/22  1059   NT-PRO BNP pg/mL 116     Results from last 7 days   Lab Units 04/26/22  1223   CRP mg/L 149 3*       Results from last 7 days   Lab Units 04/26/22  1553 04/26/22  0152 04/26/22  0143 04/21/22  1059   BLOOD CULTURE   --  No Growth at 24 hrs  No Growth at 24 hrs  No Growth After 5 Days  GRAM STAIN RESULT  1+ Polys  No bacteria seen  --   --   --                  Medications:   Scheduled Medications:  acetaminophen, 975 mg, Oral, Q8H CRYSTAL  cefTRIAXone, 1,000 mg, Intravenous, Q24H  clotrimazole-betamethasone, , Topical, BID  docusate sodium, 100 mg, Oral, BID  lidocaine, 1 patch, Topical, Daily  metroNIDAZOLE, 500 mg, Intravenous, Q8H  vancomycin, 20 mg/kg (Adjusted), Intravenous, Q12H      Continuous IV Infusions:     PRN Meds:  calcium carbonate, 1,000 mg, Oral, Daily PRN  ibuprofen, 600 mg, Oral, Q6H PRN  ondansetron, 4 mg, Intravenous, Q6H PRN  oxyCODONE, 10 mg, Oral, Q4H PRN  oxyCODONE, 5 mg, Oral, Q4H PRN  simethicone, 80 mg, Oral, 4x Daily PRN        Discharge Plan: home when medically clear    Network Utilization Review Department  ATTENTION: Please call with any questions or concerns to [phone] and carefully listen to the prompts so that you are directed to the right person  All voicemails are confidential   Marcia Messina all requests for admission clinical reviews, approved or denied determinations and any other requests to dedicated fax number below belonging to the campus where the patient is receiving treatment   List of dedicated fax numbers for the Facilities:  [address] DENIALS (Administrative/Medical Necessity) [phone]   [address] (Maternity/NICU/Pediatrics) [phone]   [address] [place] [address] [address] [place] [address] [address] [facility] Gisele Acharyaa Rodriges [address] [phone]

## 2022-04-27 NOTE — PROGRESS NOTES
Progress Note - Infectious Disease   Tyler King 39 y o  female MRN: [id number]  Unit/Bed#: -01 Encounter: [id number]    Impression/Recommendations:  1  SIRS, POA:  With tachycardia and mild leukocytosis that has resolved  Patient reports fevers at home but has been afebrile since hospital admission  Lactate is WNL  CT abdomen and pelvis demonstrates a complex multiloculated uterine and pelvic fluid collection  Status post aspirate and drainage of fluid collection on 4/26 by Interventional Radiology Service  Patient does not appear septic or toxic at this time  · Follow aspirate cultures and blood culture results  · Continue empiric antibiotic regimen of vancomycin, ceftriaxone and metronidazole pending culture results  · If culture results remain negative by tomorrow, will likely stop antibiotics  · Monitor clinical response, temperature curve  2  Recent history of Enterococcus faecalis bacteremia:  Unclear etiology of Enterococcus bacteremia  Urine culture had growth of Enterococcus faecalis also but patient had no UTI symptoms in the postpartum setting  Patient was treated with vancomycin IV during her recent hospitalization and completed a treatment course of linezolid on April 20th  · Follow-up blood cultures from this admission: ngtd    3  Obesity with BMI of 32: this is a risk factor for infection    4  History of penicillin allergy:  Lip swelling, rash reported  · Patient has tolerated ceftriaxone during this admission  · Monitor for cross reactive reaction while on cephalosporin therapy    My recommendations were discussed with the patient and her  who verbalized understanding  Their questions and concerns were addressed by me  My recommendations were discussed with Dr Stuart López from the primary service via secure text  Thank you for allowing me to participate in the care of this patient  The ID service will follow      Antibiotics: vanco, ceftriaxone, metronidazole since   Scheduled Meds:  Current Facility-Administered Medications   Medication Dose Route Frequency Provider Last Rate    acetaminophen  975 mg Oral [facility] Michelle Hernandez MD      calcium carbonate  1,000 mg Oral Daily PRN Michelle Hernandez MD      cefTRIAXone  1,000 mg Intravenous Q24H Michelle Hernandez MD Stopped (22 0325)   [facility] clotrimazole-betamethasone   Topical BID Michelle Hernandez MD      docusate sodium  100 mg Oral BID Michelle Hernandez MD      ibuprofen  600 mg Oral Q6H PRN Michelle Hernandez MD      iron sucrose  200 mg Intravenous Once Meet De Guzman MD      lidocaine  1 patch Topical Daily Mat Leo MD      metroNIDAZOLE  500 mg Intravenous Kristina Hollis  mg (22 6769)    ondansetron  4 mg Intravenous Q6H PRN Michelle Hernandez MD      oxyCODONE  10 mg Oral Q4H PRN Michelle Hernandez MD      oxyCODONE  5 mg Oral Q4H PRN Michelle Hernandez MD      simethicone  80 mg Oral 4x Daily PRN Michelle Hernandez MD      vancomycin  20 mg/kg (Adjusted) Intravenous Q12H Michelle Hernandez MD 1,250 mg (22 3447)     Continuous Infusions:   PRN Meds: calcium carbonate    ibuprofen    ondansetron    oxyCODONE    oxyCODONE    simethicone    Subjective:  Patient says she is feeling better  Her abdominal/pelvic drain was removed this morning  She denies fever, chills, vomiting, diarrhea, shortness of breath, rash  No pain is reported at this time  No new symptoms      Objective:  Vitals:  Temp:  [97 7 °F (36 5 °C)-99 4 °F (37 4 °C)] 98 5 °F (36 9 °C)  HR:  [] 89  Resp:  [18-20] 18  BP: (100-124)/(54-70) 108/70  SpO2:  [95 %-100 %] 98 %  Temp (24hrs), Av 5 °F (36 9 °C), Min:97 7 °F (36 5 °C), Max:99 4 °F (37 4 °C)  Current: Temperature: 98 5 °F (36 9 °C)  Physical Exam:   General Appearance:  Alert, awake, nontoxic, no acute distress   ENT: Oropharynx moist without lesions   Lungs:   Clear to auscultation bilaterally; respirations unlabored   Heart: S1, S2, RRR, no murmur   Abdomen:   ITA drain has been removed  Soft, non-tender, non-distended  : Healing transverse C-secition scar without erythema or drainage  No Stewart catheter present   Extremities: No distal leg edema b/l   Neurologic: AAO to person, surroundings, conversant, fluent speech   Skin: No rash     Labs, Cultures, Imaging, & Other studies:   All pertinent labs, cultures and imaging studies were personally reviewed  Results from last 7 days   Lab Units 22  0922 22  1223 22  0141   WBC Thousand/uL 6 21 8 94 11 98*   HEMOGLOBIN g/dL 7 9* 8 8* 8 8*   PLATELETS Thousands/uL 448* 535* 588*     Results from last 7 days   Lab Units 22  0922 22  1223 22  1223 22  0141 22  0141   SODIUM mmol/L 137  --  135*  --  133*   POTASSIUM mmol/L 3 9   < > 4 2   < > 3 9   CHLORIDE mmol/L 102   < > 99*   < > 95*   CO2 mmol/L 28   < > 29   < > 30   BUN mg/dL 12   < > 12   < > 19   CREATININE mg/dL 0 74   < > 0 75   < > 0 99   EGFR ml/min/1 73sq m 104   < > 102   < > 73   CALCIUM mg/dL 9 0   < > 9 5   < > 9 6   AST U/L  --   --  15  --  19   ALT U/L  --   --  22   < > 26   ALK PHOS U/L  --   --  124*   < > 132*    < > = values in this interval not displayed  Results from last 7 days   Lab Units 22  1553 22  0152 22  0143 22  1059   BLOOD CULTURE   --  No Growth at 24 hrs  No Growth at 24 hrs  No Growth After 5 Days  GRAM STAIN RESULT  1+ Polys  No bacteria seen  --   --   --          Results from last 7 days   Lab Units 22  1223   CRP mg/L 149 3*     Imagin/25 CT abd/pelv:  Complex fluid collection anterior wall of lower uterine segment of uterus  This appears to communicate with a multi loculated collection in the left pelvis with numerous additional collections in right upper abdomen, anterior to liver and anterior abdominal wall musculature

## 2022-04-28 VITALS
HEIGHT: 62 IN | OXYGEN SATURATION: 98 % | WEIGHT: 177 LBS | HEART RATE: 84 BPM | SYSTOLIC BLOOD PRESSURE: 108 MMHG | BODY MASS INDEX: 32.57 KG/M2 | RESPIRATION RATE: 18 BRPM | DIASTOLIC BLOOD PRESSURE: 51 MMHG | TEMPERATURE: 98.3 F

## 2022-04-28 DIAGNOSIS — F41.9 ANXIETY: Primary | ICD-10-CM

## 2022-04-28 DIAGNOSIS — R60.9 EDEMA, UNSPECIFIED TYPE: Primary | ICD-10-CM

## 2022-04-28 LAB
ERYTHROCYTE [DISTWIDTH] IN BLOOD BY AUTOMATED COUNT: 15.5 % (ref 11.6–15.1)
HCT VFR BLD AUTO: 27.6 % (ref 34.8–46.1)
HGB BLD-MCNC: 8.3 G/DL (ref 11.5–15.4)
MCH RBC QN AUTO: 25.9 PG (ref 26.8–34.3)
MCHC RBC AUTO-ENTMCNC: 30.1 G/DL (ref 31.4–37.4)
MCV RBC AUTO: 86 FL (ref 82–98)
PA SYSTOLIC PRESSURE: 40 MMHG
PLATELET # BLD AUTO: 484 THOUSANDS/UL (ref 149–390)
PMV BLD AUTO: 8.4 FL (ref 8.9–12.7)
RBC # BLD AUTO: 3.2 MILLION/UL (ref 3.81–5.12)
SL CV LV EF: 60
TR MAX PG: 38 MMHG
TR PEAK VELOCITY: 3.1 M/S
TRICUSPID VALVE PEAK REGURGITATION VELOCITY: 3.07 M/S
WBC # BLD AUTO: 7.41 THOUSAND/UL (ref 4.31–10.16)

## 2022-04-28 PROCEDURE — 85027 COMPLETE CBC AUTOMATED: CPT | Performed by: OBSTETRICS & GYNECOLOGY

## 2022-04-28 PROCEDURE — 99238 HOSP IP/OBS DSCHRG MGMT 30/<: CPT | Performed by: OBSTETRICS & GYNECOLOGY

## 2022-04-28 PROCEDURE — 99231 SBSQ HOSP IP/OBS SF/LOW 25: CPT | Performed by: INTERNAL MEDICINE

## 2022-04-28 PROCEDURE — 93325 DOPPLER ECHO COLOR FLOW MAPG: CPT | Performed by: INTERNAL MEDICINE

## 2022-04-28 PROCEDURE — NC001 PR NO CHARGE: Performed by: OBSTETRICS & GYNECOLOGY

## 2022-04-28 PROCEDURE — 93321 DOPPLER ECHO F-UP/LMTD STD: CPT | Performed by: INTERNAL MEDICINE

## 2022-04-28 PROCEDURE — 93308 TTE F-UP OR LMTD: CPT | Performed by: INTERNAL MEDICINE

## 2022-04-28 RX ORDER — LIDOCAINE 50 MG/G
1 PATCH TOPICAL DAILY
Qty: 5 PATCH | Refills: 0 | Status: SHIPPED | OUTPATIENT
Start: 2022-04-29 | End: 2022-05-16 | Stop reason: ALTCHOICE

## 2022-04-28 RX ORDER — METRONIDAZOLE 500 MG/1
500 TABLET ORAL EVERY 8 HOURS
Status: DISCONTINUED | OUTPATIENT
Start: 2022-04-28 | End: 2022-04-28

## 2022-04-28 RX ORDER — ALPRAZOLAM 0.25 MG/1
0.25 TABLET ORAL 3 TIMES DAILY PRN
Qty: 60 TABLET | Refills: 0 | Status: SHIPPED | OUTPATIENT
Start: 2022-04-28 | End: 2022-05-28

## 2022-04-28 RX ORDER — CLOTRIMAZOLE AND BETAMETHASONE DIPROPIONATE 10; .64 MG/G; MG/G
CREAM TOPICAL 2 TIMES DAILY
Qty: 30 G | Refills: 0
Start: 2022-04-28

## 2022-04-28 RX ADMIN — OXYCODONE HYDROCHLORIDE 10 MG: 10 TABLET ORAL at 02:39

## 2022-04-28 RX ADMIN — VANCOMYCIN HYDROCHLORIDE 1250 MG: 1 INJECTION, POWDER, LYOPHILIZED, FOR SOLUTION INTRAVENOUS at 05:56

## 2022-04-28 RX ADMIN — ACETAMINOPHEN 975 MG: 325 TABLET ORAL at 04:07

## 2022-04-28 RX ADMIN — CLOTRIMAZOLE AND BETAMETHASONE DIPROPIONATE: 10; .64 CREAM TOPICAL at 08:06

## 2022-04-28 RX ADMIN — METRONIDAZOLE 500 MG: 500 INJECTION, SOLUTION INTRAVENOUS at 07:56

## 2022-04-28 RX ADMIN — DOCUSATE SODIUM 100 MG: 100 CAPSULE, LIQUID FILLED ORAL at 07:56

## 2022-04-28 RX ADMIN — OXYCODONE HYDROCHLORIDE 10 MG: 10 TABLET ORAL at 07:28

## 2022-04-28 RX ADMIN — LIDOCAINE 5% 1 PATCH: 700 PATCH TOPICAL at 08:05

## 2022-04-28 RX ADMIN — CEFTRIAXONE 1000 MG: 1 INJECTION, POWDER, FOR SOLUTION INTRAMUSCULAR; INTRAVENOUS at 02:40

## 2022-04-28 NOTE — PROGRESS NOTES
The metronidazole has been converted to Oral per [facility] IV-to-PO Auto-Conversion Protocol for Adults as approved by the Pharmacy and Therapeutics Committee  The patient met all eligible criteria:    3 Age = 25years old   2) Received at least one dose of the IV form   3) Receiving at least one other scheduled oral/enteral medication   4) Tolerating an oral/enteral diet     and did not have any exclusions:     1) Critical care patient   2) Active GI bleed (IF assessing H2RAs or PPIs)   3) Continuous tube feeding (IF assessing cipro, doxycycline, levofloxacin, minocycline, rifampin, or voriconazole)   4) Receiving PO vancomycin (IF assessing metronidazole)   5) Persistent nausea and/or vomiting   6) Ileus or gastrointestinal obstruction   7) Joyce/nasogastric tube set for continuous suction   8) Specific order not to automatically convert to PO (in the order's comments or if discussed in the most recent Infectious Disease or primary team's progress notes)

## 2022-04-28 NOTE — UTILIZATION REVIEW
Elvia Flores MD   Physician   OB/GYN   Discharge Summary      Signed   Date of Service:  2022  4:45 AM         Related encounter: Admission (Discharged) from 2022 in [facility] and Delivery           Signed                 Discharge Summary - Agnes King 39 y o  female MRN: [id number]     Unit/Bed#: LD -01 Encounter: [id number]     Admission Date: 2022      Discharge Date: 22     Admitting Attending: Dr Kadeem Tolbert  Delivering Attending: Dr Shalom Stapleton  Discharge Attending: Shalom Stapleton     Diagnosis:  1  Pregnancy conceived by IVF  2  Obesity, BMI 37     Procedures: primary low transverse  section      Complications: none apparent      Marva Ambrose is a 38 yo  who was admitted for an induction of labor for non-reassuring fetal heart tones at 37w5d  Her induction was started with a pitocin titration  Multiple attempt at kessler balloon placement were made, which were eventually successful  She received an epidural for analgesia, and was artificially ruptured for clear fluid  After failing to make change for several hours, the patient elected to undergo a primary  delivery  She underwent an uncomplicated  delivery  She delivered a viable male  at 0 on 2022  Weight was 6lbs 8oz (2955g) with APGARs of 8 and 8 at 1 and 5 minutes  Her preoperative Hb was 11 2  Her postoperative Hb was 6 7  Her postoperative course was complicated by possible transfusion reaction and enterococcus in urine and blood, more recent blood cultures negative  Patient had iv antibiotics and was seen by cardiology for increased heart rate and ID for infection       Condition at discharge: good      On day of discharge pain was well controlled, patient was tolerating PO, passing flatus, had a bowel movement   She was discharged with standard post partum/ post operative instructions to follow up with her physician in 1 week for an incision check and in 3-6 weeks for a postpartum appointment       Discharge instructions/Information to patient and family:   -Do not place anything (no partner, tampons or douche) in your vagina for 6 weeks  -You may walk for exercise for the first 6 weeks then gradually return to your usual activities    -Please do not drive for 1 week if you have no stitches and for 2 weeks if you have stitches or underwent a  delivery     -You may take baths or shower per your preference    -Please look at your bust (breasts) in the mirror daily and call for redness or tenderness or increased warmth    -Please call us for temperature > 100 4*F or 38* C, worsening pain or a foul discharge        Discharge Medications:   Prenatal vitamin daily for 6 months or the duration of nursing whichever is longer  Motrin 600 mg orally every 6 hours as needed for pain  Tylenol (over the counter) per bottle directions as needed for pain: do NOT use with percocet  Hydrocortisone cream 1% (over the counter) applied 1-2x daily to hemorrhoids as needed  Percocet as needed  Zyvox  Lasix  Potassium  Iron     Provisions for Follow-Up Care:   Follow up with your doctor in 1 week for incision site check       Planned Readmission: no                                  Revision History

## 2022-04-28 NOTE — PLAN OF CARE
Problem: PAIN - ADULT  Goal: Verbalizes/displays adequate comfort level or baseline comfort level  Description: Interventions:  - Encourage patient to monitor pain and request assistance  - Assess pain using appropriate pain scale  - Administer analgesics based on type and severity of pain and evaluate response  - Implement non-pharmacological measures as appropriate and evaluate response  - Consider cultural and social influences on pain and pain management  - Notify physician/advanced practitioner if interventions unsuccessful or patient reports new pain  Outcome: Progressing     Problem: INFECTION - ADULT  Goal: Absence or prevention of progression during hospitalization  Description: INTERVENTIONS:  - Assess and monitor for signs and symptoms of infection  - Monitor lab/diagnostic results  - Monitor all insertion sites, i e  indwelling lines, tubes, and drains  - Monitor endotracheal if appropriate and nasal secretions for changes in amount and color  - [place] appropriate cooling/warming therapies per order  - Administer medications as ordered  - Instruct and encourage patient and family to use good hand hygiene technique  - Identify and instruct in appropriate isolation precautions for identified infection/condition  Outcome: Progressing  Goal: Absence of fever/infection during neutropenic period  Description: INTERVENTIONS:  - Monitor WBC    Outcome: Progressing     Problem: SAFETY ADULT  Goal: Patient will remain free of falls  Description: INTERVENTIONS:  - Educate patient/family on patient safety including physical limitations  - Instruct patient to call for assistance with activity   - Consult OT/PT to assist with strengthening/mobility   - Keep Call bell within reach  - Keep bed low and locked with side rails adjusted as appropriate  - Keep care items and personal belongings within reach  - Initiate and maintain comfort rounds  - Make Fall Risk Sign visible to staff  - Offer Toileting every  Hours, in advance of need  - Initiate/Maintain alarm  - Obtain necessary fall risk management equipment:   - Apply yellow socks and bracelet for high fall risk patients  - Consider moving patient to room near nurses station  Outcome: Progressing  Goal: Maintain or return to baseline ADL function  Description: INTERVENTIONS:  -  Assess patient's ability to carry out ADLs; assess patient's baseline for ADL function and identify physical deficits which impact ability to perform ADLs (bathing, care of mouth/teeth, toileting, grooming, dressing, etc )  - Assess/evaluate cause of self-care deficits   - Assess range of motion  - Assess patient's mobility; develop plan if impaired  - Assess patient's need for assistive devices and provide as appropriate  - Encourage maximum independence but intervene and supervise when necessary  - Involve family in performance of ADLs  - Assess for home care needs following discharge   - Consider OT consult to assist with ADL evaluation and planning for discharge  - Provide patient education as appropriate  Outcome: Progressing  Goal: Maintains/Returns to pre admission functional level  Description: INTERVENTIONS:  - Perform BMAT or MOVE assessment daily    - Set and communicate daily mobility goal to care team and patient/family/caregiver  - Collaborate with rehabilitation services on mobility goals if consulted  - Perform Range of Motion  times a day    - Out of bed for toileting  - Record patient progress and toleration of activity level   Outcome: Progressing     Problem: DISCHARGE PLANNING  Goal: Discharge to home or other facility with appropriate resources  Description: INTERVENTIONS:  - Identify barriers to discharge w/patient and caregiver  - Arrange for needed discharge resources and transportation as appropriate  - Identify discharge learning needs (meds, wound care, etc )  - Arrange for interpretive services to assist at discharge as needed  - Refer to Case Management Department for coordinating discharge planning if the patient needs post-hospital services based on physician/advanced practitioner order or complex needs related to functional status, cognitive ability, or social support system  Outcome: Progressing     Problem: Knowledge Deficit  Goal: Patient/family/caregiver demonstrates understanding of disease process, treatment plan, medications, and discharge instructions  Description: Complete learning assessment and assess knowledge base    Interventions:  - Provide teaching at level of understanding  - Provide teaching via preferred learning methods  Outcome: Progressing

## 2022-04-28 NOTE — PROGRESS NOTES
Progress Note - Infectious Disease   Kashmir King 39 y o  female MRN: [id number]  Unit/Bed#: -01 Encounter: [id number]    Impression/Recommendations:  1  SIRS, POA:  With tachycardia and mild leukocytosis that has resolved  Patient reports fevers at home but has been afebrile since hospital admission  Lactate is WNL  CT abdomen and pelvis demonstrates a complex multiloculated uterine and pelvic fluid collection  Status post aspirate and drainage of fluid collection on 4/26 by Interventional Radiology Service  Patient does not appear septic or toxic at this time  Aspirate cultures and blood cultures: ngtd  · Discontinue vancomycin, ceftriaxone and metronidazole as cultures remain negative  · No oral antibiotic therapy is advised at this time  · [id number] [address] for discharge from ID standpoint  2  Recent history of Enterococcus faecalis bacteremia:  Unclear etiology of Enterococcus bacteremia  Urine culture had growth of Enterococcus faecalis also, but patient had no UTI symptoms in the postpartum setting  Patient was treated with vancomycin IV during her recent hospitalization and completed a treatment course of linezolid on April 20th  · Follow-up blood cultures from this admission: ngtd    3  Obesity with BMI of 32: this is a risk factor for infection    4  History of penicillin allergy:  Lip swelling, rash reported  · Patient has tolerated ceftriaxone during this admission    My recommendations were discussed with the patient and her  who verbalized understanding  Their questions and concerns were addressed by me  My recommendations were discussed with Ob resident Dr Bhumika Shearer from the primary service via secure text  Thank you for allowing me to participate in the care of this patient       Antibiotics: vanco, ceftriaxone, metronidazole since 4/25  Scheduled Meds:  Current Facility-Administered Medications   Medication Dose Route Frequency Provider Last Rate    acetaminophen  975 mg Oral Logan Pearson MD      calcium carbonate  1,000 mg Oral Daily PRN Morena Quijano MD      clotrimazole-betamethasone   Topical BID Morena Quijano MD      docusate sodium  100 mg Oral BID Morena Quijano MD      ibuprofen  600 mg Oral Q6H PRN Morena Quijano MD      lidocaine  1 patch Topical Daily Shawn Boas, MD      ondansetron  4 mg Intravenous Q6H PRN Morena Quijano MD      oxyCODONE  10 mg Oral Q4H PRN Morena Quijano MD      oxyCODONE  5 mg Oral Q4H PRN Morena Quijano MD      simethicone  80 mg Oral 4x Daily PRN Morena Quijano MD       Continuous Infusions:   PRN Meds: calcium carbonate    ibuprofen    ondansetron    oxyCODONE    oxyCODONE    simethicone    Subjective:  Patient says she is feeling better  Her abdominal/pelvic drain was removed yesterday morning  She denies fever, chills, vomiting, diarrhea, shortness of breath, rash  No pain is reported at this time  No new symptoms  Objective:  Vitals:  Temp:  [98 °F (36 7 °C)-98 8 °F (37 1 °C)] 98 3 °F (36 8 °C)  HR:  [] 84  Resp:  [18] 18  BP: ()/(50-72) 108/51  SpO2:  [98 %-99 %] 98 %  Temp (24hrs), Av 4 °F (36 9 °C), Min:98 °F (36 7 °C), Max:98 8 °F (37 1 °C)  Current: Temperature: 98 3 °F (36 8 °C)  Physical Exam:   General Appearance:  Alert, awake, nontoxic, no acute distress   ENT: Oropharynx moist without lesions   Lungs:   Clear to auscultation bilaterally; respirations unlabored   Heart:  S1, S2, RRR, no murmur   Abdomen:   ITA drain has been removed  Soft, non-tender, non-distended  : Healing transverse C-secition scar without erythema or drainage   No Stewart catheter present   Extremities: No distal leg edema b/l   Neurologic: AAO to person, surroundings, conversant, fluent speech   Skin: No rash     Labs, Cultures, Imaging, & Other studies:   All pertinent labs, cultures and imaging studies were personally reviewed  Results from last 7 days   Lab Units 22  0978 22  0922 22  1223   WBC Thousand/uL 7 41 6 21 8 94   HEMOGLOBIN g/dL 8 3* 7 9* 8 8*   PLATELETS Thousands/uL 484* 448* 535*     Results from last 7 days   Lab Units 22  0922 22  1223 22  1223 22  0141 22  0141   SODIUM mmol/L 137  --  135*  --  133*   POTASSIUM mmol/L 3 9   < > 4 2   < > 3 9   CHLORIDE mmol/L 102   < > 99*   < > 95*   CO2 mmol/L 28   < > 29   < > 30   BUN mg/dL 12   < > 12   < > 19   CREATININE mg/dL 0 74   < > 0 75   < > 0 99   EGFR ml/min/1 73sq m 104   < > 102   < > 73   CALCIUM mg/dL 9 0   < > 9 5   < > 9 6   AST U/L  --   --  15  --  19   ALT U/L  --   --  22   < > 26   ALK PHOS U/L  --   --  124*   < > 132*    < > = values in this interval not displayed  Results from last 7 days   Lab Units 22  1553 22  0152 22  0143   BLOOD CULTURE   --  No Growth at 48 hrs  No Growth at 48 hrs  GRAM STAIN RESULT  1+ Polys  No bacteria seen  --   --    BODY FLUID CULTURE, STERILE  No growth  --   --          Results from last 7 days   Lab Units 22  1223   CRP mg/L 149 3*     Imagin/25 CT abd/pelv:  Complex fluid collection anterior wall of lower uterine segment of uterus  This appears to communicate with a multi loculated collection in the left pelvis with numerous additional collections in right upper abdomen, anterior to liver and anterior abdominal wall musculature

## 2022-04-28 NOTE — PROGRESS NOTES
Vancomycin IV Pharmacy-to-Dose Consultation    Anthony Hood is a 39 y o  female who is currently receiving Vancomycin IV with management by the Pharmacy Consult service for the treatment of intra-abdominal infection  Assessment/Plan:    The patient's chart was reviewed  There are no signs or symptoms of nephrotoxicity and/or infusion reactions documented  Based on today's assessment, will continue current vancomycin (Day # 3) dosing of 1250mg IV every 8 hours, with a plan for trough to be drawn at 1730 on 4/28  We will continue to follow the patient's culture results and clinical progress daily        Mirtha Giraldo, PharmD   Pharmacist

## 2022-04-28 NOTE — DISCHARGE SUMMARY
Discharge Summary - Sade King 39 y o  female MRN: [id number]    Unit/Bed#: -01 Encounter: [id number]    Admission Date: 4/25/2022     Discharge Date: 4/28/2022    Admitting Attending: Dr Edgar Henderson   Discharge Attending: Dr Jaz Andrews    Diagnosis:  1LTCS at 38w1d   Bilateral lower extremity edema   Anemia   Intra-abdominal fluid collection   Hx transfusion reaction   Hx sepsis due to Enterococcus bacteriemia   Anxiety   IVF pregnancy     Procedures: ITA drain placement and removal     Complications: none apparent     Pt is a 39 y o  Jan Ford s/p 1LTCS at 38w1d on 4/8/22 for maternal request in the setting of prolonged induction of labor, who was re-admitted on 4/25/2022 for reported fevers at home and finding of multiple intra-abdominal fluid collections on CT abd/pelvis  She was admitted for further workup and evaluation  Patient was found to be clinically stable, however did c/o rash around her C/S incision and had some pain  Patient was evaluated by Infectious disease and started on IV vancomycin/cefepime/metronidazole  On Hospital Day 2 she was evaluated by Interventional Radiology and had a ITA drain placed in the RUQ  This was noted to drain serosanguinous fluid  The drain was removed on hospital day 3  She was continued on IV antibiotics  A repeat echo was performed due to history of Enterococcus bacteremia, and was found to be negative for vegetations  She was given one dose of IV venofer for post-op anemia  She was given ice packs, Lotrisone, and telfa pad to treat rash around her incision  On hospital day 4 her cultures were confirmed to be negative for growth, as were her fluid studies  Antibiotics were discontinued  The patient was discharged in stable condition  A course of antibiotics on discharge was not indicated per ID       Condition at discharge: good     Discharge instructions/Information to patient and family:   Please see AVS    Discharge Medications:   Please see AVS     Provisions for Follow-Up Care: Follow up with your doctor in 1 week for routine post partum care       Planned Readmission: no

## 2022-04-28 NOTE — PROGRESS NOTES
Progress Note - OB/GYN   Josue Soulier Bilotti 39 y o  female MRN: [id number]  Unit/Bed#: -01 Encounter: [id number]    ASSESSMENT:  Patti Worley is a 39 y o  [address] female, re-admitted s/p 1LTCS at 38w1d on 22 for maternal request in the setting of prolonged induction of labor  Her initial postpartum course was complicated by blood transfusion reaction (received 2U pRBCs today this admission), in addition to sepsis secondary to enterococcus bacteremia/UTI  Now re-admitted with reported fevers at home, CT abd/pelvis showing multiple fluid collections throughout the abdomen  Clinically stable  Today is hospital day 3  PLAN:  * Intra-abdominal fluid collection  Assessment & Plan  S/p 1LTCS 22 for maternal request for prolonged induction of labor   Initial postpartum course complicated by blood transfusion reaction and enterococcus bacteremia/UTI,   Was discharged with PO course of Linezolid (last dose 22)    Re-presented with reported fevers at home  CT abd/pelvis :  "Complex fluid collection with irregular margins and intralesional gas in the anterior wall of the lower uterine segment of uterus, at the expected location of a  section scar  This may be a liquefying hematoma however infection is not excluded  This appears to communicate with a multilocular intermediate attenuation rim-enhancing collection in the left pelvis with numerous additional collections in the right upper abdomen, anterior to the liver, and in the anterior abdominal wall musculature as detailed above       Recent Labs     22  0141 22  1223 22  0922   WBC 11 98* 8 94 6 21   HGB 8 8* 8 8* 7 9*   * 535* 448*   LACTICACID 0 6  --   --      Low suspicion for sepsis at this time   Blood cultures  negative x 4 days   Blood cultures  negative x 24h  ITA drain fluid no growth on aerobic culture, Gram stain 1+ polys, no bacteria   [ ] F/u anaerobic culture  S/p ID consultation  - Continued IV antibiotics for another 24h; can likely d/c antibiotics today as no growth on cultures   S/p IR consultation  - S/p ITA drain placement , removed     Will follow up ID recommendations today  Will likely be discharged today     HIstory of Sepsis due to Enterococcus [facility])  Assessment & Plan  Blood cultures  negative x 4 days  Blood cultures  negative x 24h   Echo 22 performed inpatient during initial delivery admission negative for vegetations  [ ] Repeat echo completed , f/u read     Bilateral lower extremity edema  Assessment & Plan  Initial postpartum course complicate by significant lower extremity edema   Seen by PCP outpatient after discharged, prescribed 20 mg lasix PO QD PRN   Swelling much improved since last admission   Hold PO lasix for now   IV fluids now off, patient tolerating PO   Monitor I/O    Anemia  Assessment & Plan  S/p 2u pRBC during initial admission, complicated by transfusion reaction/rapid response     Lab Results   Component Value Date/Time    HGB 7 9 (L) 2022 09:22 AM    HGB 8 8 (L) 2022 12:23 PM    HGB 8 8 (L) 2022 01:41 AM     S/p venofer x 2   Continue to trend     S/P  section  Assessment & Plan  S/p 1LTCS on 22 for maternal request in the setting of prolonged induction of labor   Initial postpartum course complicated by anemia, transfusion reaction, and enterococcus sepsis   Pfannenstiel incision c/d, some moisture noted in the area  Epidermis healed well; erythema present around incision, but without drainage, induration, fluctuance  Erythema secondary to adhesive reaction     Continue Lotrisone cream   Place telfa pad over top incision to keep dry   Continue ice packs   Continue to monitor       SUBJECTIVE:  Pt feels good, no major issues overnight  Patient had ITA drain removed yesterday  Denies significant pain  Denies headaches, chest pain, fever/chills, SOB, palpitations   Her postpartum vaginal bleeding is minimal  She is bottle feeding baby  She is tolerating PO, denies nausea/vomiting  Denies leg pain  Notes some increased lower extremity swelling  Patient every eager to go home today to be with baby  OBJECTIVE:  Vitals:    04/27/22 1600 04/27/22 2048 04/28/22 0000 04/28/22 0407   BP: 126/58 135/67 101/55 96/50   BP Location: Left arm Left arm Right arm Right arm   Pulse: 91 102 91 90   Resp: 18 18 18 18   Temp: 98 2 °F (36 8 °C) 98 4 °F (36 9 °C) 98 5 °F (36 9 °C) 98 8 °F (37 1 °C)   TempSrc: Oral Oral Oral Oral   SpO2: 99% 98%     Weight:       Height:           BP range last 24:  Systolic (85RQR), DIZ:850 , Min:96 , DVY:865   Diastolic (21MPS), JYJ:04, Min:50, Max:72      Physical Exam:   Body mass index is 32 37 kg/m²  Physical Exam  Constitutional:       General: She is not in acute distress  Appearance: She is normal weight  She is not ill-appearing or diaphoretic  HENT:      Head: Normocephalic and atraumatic  Eyes:      Conjunctiva/sclera: Conjunctivae normal       Pupils: Pupils are equal, round, and reactive to light  Abdominal:      General: There is no distension  Palpations: Abdomen is soft  Tenderness: There is no abdominal tenderness  Comments: Pfannenstiel incision clean, intact  Some moisture noted in the area  Epidermis well healed with no evidence of separation, drainage, bleeding, induration, or fluctuance  Mild erythema present around healing incision - evidence of skin reaction to prior adhesive  RUQ ITA drain in place, 5 cc serous fluid in bulb  Currently on suction  Genitourinary:     Comments: Deferred   Musculoskeletal:         General: Normal range of motion  Right lower leg: No edema  Left lower leg: No edema  Skin:     General: Skin is warm and dry  Neurological:      General: No focal deficit present  Mental Status: She is alert and oriented to person, place, and time  Mental status is at baseline     Psychiatric: Mood and Affect: Mood normal          Behavior: Behavior normal          Thought Content: Thought content normal          I/O       04/26 0701 04/27 0700 04/27 0701 04/28 0700    P  O  975 480    I V  (mL/kg) 916 3 (11 4)     IV Piggyback 750 150    Total Intake(mL/kg) 2641 3 (32 9) 630 (7 8)    Urine (mL/kg/hr) 1500 (0 8) 2100 (1 1)    Drains 15     Total Output 1515 2100    Net +1126 3 -1470                Results:  Recent Labs     04/26/22  1223 04/27/22 0922   WBC 8 94 6 21   HGB 8 8* 7 9*   HCT 28 4* 25 8*   * 448*       Recent Labs     04/26/22  0141 04/26/22  0141 04/26/22  1223 04/27/22 0922   K 3 9   < > 4 2 3 9   CREATININE 0 99   < > 0 75 0 74   AST 19  --  15  --    ALT 26  --  22  --     < > = values in this interval not displayed         Recent Results (from the past 24 hour(s))   CBC    Collection Time: 04/27/22  9:22 AM   Result Value Ref Range    WBC 6 21 4 31 - 10 16 Thousand/uL    RBC 3 01 (L) 3 81 - 5 12 Million/uL    Hemoglobin 7 9 (L) 11 5 - 15 4 g/dL    Hematocrit 25 8 (L) 34 8 - 46 1 %    MCV 86 82 - 98 fL    MCH 26 2 (L) 26 8 - 34 3 pg    MCHC 30 6 (L) 31 4 - 37 4 g/dL    RDW 15 6 (H) 11 6 - 15 1 %    Platelets 875 (H) 792 - 390 Thousands/uL    MPV 8 4 (L) 8 9 - 12 7 fL   Basic metabolic panel    Collection Time: 04/27/22  9:22 AM   Result Value Ref Range    Sodium 137 136 - 145 mmol/L    Potassium 3 9 3 5 - 5 3 mmol/L    Chloride 102 100 - 108 mmol/L    CO2 28 21 - 32 mmol/L    ANION GAP 7 4 - 13 mmol/L    BUN 12 5 - 25 mg/dL    Creatinine 0 74 0 60 - 1 30 mg/dL    Glucose 104 65 - 140 mg/dL    Calcium 9 0 8 3 - 10 1 mg/dL    eGFR 104 ml/min/1 73sq m   Vancomycin, peak    Collection Time: 04/27/22  9:27 AM   Result Value Ref Range    Vancomycin Pk 26 5 20 0 - 40 0 ug/mL   Echo follow up/limited w/ contrast if indicated    Collection Time: 04/27/22  3:40 PM   Result Value Ref Range    Triscuspid Valve Regurgitation Peak Gradient 38 0 mmHg    Tricuspid valve peak regurgitation velocity 3 07 m/s    TR Peak Howard 3 1 m/s   Vancomycin, trough Draw level peripherally  Give dose immediately after trough (do NOT hold dose)  Call Pharmacy with any questions/concerns (Pharm Consult)      Collection Time: 04/27/22  5:34 PM   Result Value Ref Range    Vancomycin Tr 10 1 10 0 - 20 0 ug/mL       Kayce Morales MD  PGY-3, OB/GYN  4/28/2022 7:05 AM

## 2022-04-28 NOTE — PLAN OF CARE
Problem: PAIN - ADULT  Goal: Verbalizes/displays adequate comfort level or baseline comfort level  Description: Interventions:  - Encourage patient to monitor pain and request assistance  - Assess pain using appropriate pain scale  - Administer analgesics based on type and severity of pain and evaluate response  - Implement non-pharmacological measures as appropriate and evaluate response  - Consider cultural and social influences on pain and pain management  - Notify physician/advanced practitioner if interventions unsuccessful or patient reports new pain  Outcome: Progressing     Problem: INFECTION - ADULT  Goal: Absence or prevention of progression during hospitalization  Description: INTERVENTIONS:  - Assess and monitor for signs and symptoms of infection  - Monitor lab/diagnostic results  - Monitor all insertion sites, i e  indwelling lines, tubes, and drains  - Monitor endotracheal if appropriate and nasal secretions for changes in amount and color  - [place] appropriate cooling/warming therapies per order  - Administer medications as ordered  - Instruct and encourage patient and family to use good hand hygiene technique  - Identify and instruct in appropriate isolation precautions for identified infection/condition  Outcome: Progressing  Goal: Absence of fever/infection during neutropenic period  Description: INTERVENTIONS:  - Monitor WBC    Outcome: Progressing     Problem: SAFETY ADULT  Goal: Patient will remain free of falls  Description: INTERVENTIONS:  - Educate patient/family on patient safety including physical limitations  - Instruct patient to call for assistance with activity   - Consult OT/PT to assist with strengthening/mobility   - Keep Call bell within reach  - Keep bed low and locked with side rails adjusted as appropriate  - Keep care items and personal belongings within reach  - Initiate and maintain comfort rounds  - Obtain necessary fall risk management equipment  - Apply yellow socks and bracelet for high fall risk patients  - Consider moving patient to room near nurses station  Outcome: Progressing  Goal: Maintain or return to baseline ADL function  Description: INTERVENTIONS:  -  Assess patient's ability to carry out ADLs; assess patient's baseline for ADL function and identify physical deficits which impact ability to perform ADLs (bathing, care of mouth/teeth, toileting, grooming, dressing, etc )  - Assess/evaluate cause of self-care deficits   - Assess range of motion  - Assess patient's mobility; develop plan if impaired  - Assess patient's need for assistive devices and provide as appropriate  - Encourage maximum independence but intervene and supervise when necessary  - Involve family in performance of ADLs  - Assess for home care needs following discharge   - Consider OT consult to assist with ADL evaluation and planning for discharge  - Provide patient education as appropriate  Outcome: Progressing  Goal: Maintains/Returns to pre admission functional level  Description: INTERVENTIONS:  - Perform BMAT or MOVE assessment daily    - Set and communicate daily mobility goal to care team and patient/family/caregiver     - Collaborate with rehabilitation services on mobility goals if consulted  - Out of bed for toileting  - Record patient progress and toleration of activity level   Outcome: Progressing     Problem: DISCHARGE PLANNING  Goal: Discharge to home or other facility with appropriate resources  Description: INTERVENTIONS:  - Identify barriers to discharge w/patient and caregiver  - Arrange for needed discharge resources and transportation as appropriate  - Identify discharge learning needs (meds, wound care, etc )  - Arrange for interpretive services to assist at discharge as needed  - Refer to Case Management Department for coordinating discharge planning if the patient needs post-hospital services based on physician/advanced practitioner order or complex needs related to functional status, cognitive ability, or social support system  Outcome: Progressing     Problem: Knowledge Deficit  Goal: Patient/family/caregiver demonstrates understanding of disease process, treatment plan, medications, and discharge instructions  Description: Complete learning assessment and assess knowledge base    Interventions:  - Provide teaching at level of understanding  - Provide teaching via preferred learning methods  Outcome: Progressing

## 2022-04-29 ENCOUNTER — TELEPHONE (OUTPATIENT)
Dept: FAMILY MEDICINE CLINIC | Facility: CLINIC | Age: 37
End: 2022-04-29

## 2022-04-29 ENCOUNTER — TRANSITIONAL CARE MANAGEMENT (OUTPATIENT)
Dept: FAMILY MEDICINE CLINIC | Facility: CLINIC | Age: 37
End: 2022-04-29

## 2022-04-29 LAB
BACTERIA GENITAL AEROBE CULT: NORMAL
BACTERIA SPEC ANAEROBE CULT: NO GROWTH
BACTERIA SPEC BFLD CULT: NO GROWTH
GRAM STN SPEC: NORMAL
GRAM STN SPEC: NORMAL

## 2022-04-29 NOTE — TELEPHONE ENCOUNTER
Patient called for a [place] [place] appointment    She was discharged from [facility] on 4/28

## 2022-05-01 LAB
BACTERIA BLD CULT: NORMAL
BACTERIA BLD CULT: NORMAL

## 2022-05-02 ENCOUNTER — APPOINTMENT (OUTPATIENT)
Dept: LAB | Facility: CLINIC | Age: 37
End: 2022-05-02
Payer: COMMERCIAL

## 2022-05-02 DIAGNOSIS — R60.9 EDEMA, UNSPECIFIED TYPE: ICD-10-CM

## 2022-05-02 LAB
ANION GAP SERPL CALCULATED.3IONS-SCNC: 1 MMOL/L (ref 4–13)
BUN SERPL-MCNC: 14 MG/DL (ref 5–25)
CALCIUM SERPL-MCNC: 9.7 MG/DL (ref 8.3–10.1)
CHLORIDE SERPL-SCNC: 103 MMOL/L (ref 100–108)
CO2 SERPL-SCNC: 33 MMOL/L (ref 21–32)
CREAT SERPL-MCNC: 0.81 MG/DL (ref 0.6–1.3)
GFR SERPL CREATININE-BSD FRML MDRD: 93 ML/MIN/1.73SQ M
GLUCOSE SERPL-MCNC: 99 MG/DL (ref 65–140)
NT-PROBNP SERPL-MCNC: 58 PG/ML
POTASSIUM SERPL-SCNC: 4.2 MMOL/L (ref 3.5–5.3)
SODIUM SERPL-SCNC: 137 MMOL/L (ref 136–145)

## 2022-05-02 PROCEDURE — 80048 BASIC METABOLIC PNL TOTAL CA: CPT

## 2022-05-02 PROCEDURE — 36415 COLL VENOUS BLD VENIPUNCTURE: CPT

## 2022-05-02 PROCEDURE — 83880 ASSAY OF NATRIURETIC PEPTIDE: CPT

## 2022-05-02 NOTE — PROGRESS NOTES
Patient ID: Stephanie Saravia is a 39 y o  female  HPI: 39 y  o female presents for jospital folllowup after admission frpm enterpcpccis bacteremia  She is at least 15 pounds fluid overloaded and hypokalemic with a potassium of 2   9  SUBJECTIVE    Family History   Problem Relation Age of Onset    Lung cancer Mother     Cancer Mother     Heart disease Maternal Grandmother     Cancer Sister      Social History     Socioeconomic History    Marital status: /Civil Union     Spouse name: Not on file    Number of children: Not on file    Years of education: Not on file    Highest education level: Not on file   Occupational History    Not on file   Tobacco Use    Smoking status: Never Smoker    Smokeless tobacco: Never Used   Vaping Use    Vaping Use: Never used   Substance and Sexual Activity    Alcohol use: Yes    Drug use: Never    Sexual activity: Yes     Partners: Male     Birth control/protection: None   Other Topics Concern    Not on file   Social History Narrative    Not on file     Social Determinants of Health     Financial Resource Strain: Not on file   Food Insecurity: Not on file   Transportation Needs: Not on file   Physical Activity: Not on file   Stress: Not on file   Social Connections: Not on file   Intimate Partner Violence: Not on file   Housing Stability: Not on file     Past Medical History:   Diagnosis Date    Abnormal Pap smear of cervix     Asthma     only with allergies, utilizes rescue inhaler     Cardiac arrhythmia     Last Assessed: 1/10/2017    Female infertility     Seasonal allergies      Past Surgical History:   Procedure Laterality Date    AUGMENTATION BREAST      CARDIAC CATHETERIZATION      Last Assessed: 1/10/2017    IR DRAINAGE TUBE PLACEMENT  2022    LASIK      Corneal  Last Assessed: 1/10/2017    [facility]  DELIVERY ONLY N/A 2022    Procedure:  SECTION ();   Surgeon: Moraima Stoddard MD;  Location: [facility] ;  Service: Obstetrics     Allergies   Allergen Reactions    Penicillins Rash     "poly cillin" lip swelling, rash as adult       Current Outpatient Medications:     acetaminophen (TYLENOL) 325 mg tablet, Take 2 tablets (650 mg total) by mouth every 6 (six) hours as needed for mild pain, headaches or fever, Disp: 30 tablet, Rfl: 0    albuterol (ProAir HFA) 90 mcg/act inhaler, Inhale 2 puffs every 6 (six) hours as needed for wheezing, Disp: 1 Inhaler, Rfl: 2    benzocaine-menthol-lanolin-aloe (DERMOPLAST) 20-0 5 % topical spray, Apply 1 application topically every 6 (six) hours as needed for mild pain or irritation (Patient not taking: Reported on 4/25/2022 ), Disp: , Rfl: 0    calcium carbonate (TUMS) 500 mg chewable tablet, Chew 2 tablets (1,000 mg total) 2 (two) times a day as needed for indigestion or heartburn (Patient not taking: Reported on 4/25/2022 ), Disp: , Rfl: 0    docusate sodium (COLACE) 100 mg capsule, Take 1 capsule (100 mg total) by mouth 2 (two) times a day, Disp: 10 capsule, Rfl: 0    Esomeprazole Magnesium (NEXIUM PO), Take by mouth (Patient not taking: Reported on 4/25/2022 ), Disp: , Rfl:     ferrous sulfate 324 (65 Fe) mg, Take 1 tablet (324 mg total) by mouth in the morning, Disp: 60 tablet, Rfl: 0    furosemide (LASIX) 40 mg tablet, Take 0 5 tablets (20 mg total) by mouth daily as needed (swelling), Disp: 30 tablet, Rfl: 3    ibuprofen (MOTRIN) 600 mg tablet, Take 1 tablet (600 mg total) by mouth every 6 (six) hours as needed for moderate pain or headaches (cramping), Disp: 30 tablet, Rfl: 0    potassium chloride (K-DUR,KLOR-CON) 20 mEq tablet, Take 1 tablet (20 mEq total) by mouth daily as needed (swelling), Disp: 30 tablet, Rfl: 3    Prenatal Vit-Fe Fumarate-FA (PRENATAL PO), Take by mouth (Patient not taking: Reported on 4/25/2022 ), Disp: , Rfl:     ALPRAZolam (XANAX) 0 25 mg tablet, Take 1 tablet (0 25 mg total) by mouth 3 (three) times a day as needed for anxiety, Disp: 60 tablet, Rfl: 0    clotrimazole-betamethasone (LOTRISONE) 1-0 05 % cream, Apply topically 2 (two) times a day, Disp: 30 g, Rfl: 0    clotrimazole-betamethasone (LOTRISONE) 1-0 05 % cream, Apply topically 2 (two) times a day, Disp: 30 g, Rfl: 0    lidocaine (LIDODERM) 5 %, Apply 1 patch topically daily Remove & Discard patch within 12 hours or as directed by MD, Disp: 5 patch, Rfl: 0    potassium chloride (K-DUR,KLOR-CON) 20 mEq tablet, Take 2 tablets (40 mEq total) by mouth daily, Disp: 60 tablet, Rfl: 5    Review of Systems  Constitutional:     Denies fever, chills ,fatigue ,weakness ,weight loss, weight gain     ENT: Denies earache ,loss of hearing ,nosebleed, nasal discharge,nasal congestion ,sore throat ,hoarseness  Pulmonary: Denies shortness of breath ,cough  ,dyspnea on exertion, orthopnea  ,PND   Cardiovascular:  Denies bradycardia , tachycardia  ,palpations,+ lower extremity edema leg, claudication  Breast:  Denies new or changing breast lumps ,nipple discharge ,nipple changes  Abdomen:  Denies abdominal pain , anorexia , indigestion, nausea, vomiting, constipation, diarrhea  Musculoskeletal: Denies myalgias, arthralgias, joint swelling, joint stiffness , limb pain, limb swelling  Gu: denies dysuria, polyuria  Skin: Denies skin rash, skin lesion, skin wound, itching, dry skin  Neuro: Denies headache, numbness, tingling, confusion, loss of consciousness, dizziness, vertigo  Psychiatric: Denies feelings of depression, suicidal ideation, anxiety, sleep disturbances    OBJECTIVE  /78   Pulse 84   Temp 98 2 °F (36 8 °C)   Ht 5' 2" (1 575 m)   Wt 89 4 kg (197 lb)   LMP 07/15/2021   BMI 36 03 kg/m²   Constitutional:   NAD, well appearing and well nourished      ENT:   Conjunctiva and lids: no injection, edema, or discharge     Pupils and iris: CASANDRA bilaterally    External inspection of ears and nose: normal without deformities or discharge  Otoscopic exam: Canals patent without erythema         Nasal mucosa, septum and turbinates: Normal or edema or discharge         Oropharynx:  Moist mucosa, normal tongue and tonsils without lesions  No erythema        Pulmonary:Respiratory effort normal rate and rhythm, no increased work of breathing  Auscultation of lungs:  Clear bilaterally with no adventitious breath sounds       Cardiovascular: regular rate and rhythm, S1 and S2, no murmur, +2 pitting edema  of LE     Abdomen: Soft and non-distended     Positive bowel sounds      No heptomegaly or splenomegaly      Gu: no suprapubic tenderness or CVA tenderness, no urethral discharge  Lymphatic:  No anterior or posterior cervical lymphadenopathy         Musculoskeletal:  Gait and station: Normal gait      Digits and nails normal without clubbing or cyanosis       Inspection/palpation of joints, bones, and muscles:  No joint tenderness, swelling, full active and passive range of motion       Skin: Normal skin turgor and no rashes      Neuro:       Normal reflexes      Psych:   alert and oriented to person, place and time     normal mood and affect       Assessment/Plan:Diagnoses and all orders for this visit:    Edema, unspecified type  -     Potassium; Future  -     NT-BNP PRO; Future  -     BUN; Future  -     Creatinine, serum; Future    Bilateral lower extremity edema  -     furosemide (LASIX) 40 mg tablet; Take 0 5 tablets (20 mg total) by mouth daily as needed (swelling)  -     potassium chloride (K-DUR,KLOR-CON) 20 mEq tablet; Take 2 tablets (40 mEq total) by mouth daily    Bacteremia due to Enterococcus  -     Blood culture; Future    Hypokalemia  Comments:  Continue wiht K+ replacement  Reviewed with patient plan to treat with above plan      Patient instructed to call in 72 hours if not feeling better or if symptoms worsen

## 2022-05-03 ENCOUNTER — OFFICE VISIT (OUTPATIENT)
Dept: FAMILY MEDICINE CLINIC | Facility: CLINIC | Age: 37
End: 2022-05-03
Payer: COMMERCIAL

## 2022-05-03 VITALS
HEIGHT: 62 IN | SYSTOLIC BLOOD PRESSURE: 108 MMHG | TEMPERATURE: 98.1 F | HEART RATE: 99 BPM | BODY MASS INDEX: 31.73 KG/M2 | OXYGEN SATURATION: 97 % | WEIGHT: 172.4 LBS | DIASTOLIC BLOOD PRESSURE: 58 MMHG

## 2022-05-03 DIAGNOSIS — R18.8 INTRA-ABDOMINAL FLUID COLLECTION: ICD-10-CM

## 2022-05-03 DIAGNOSIS — R60.9 PERIPHERAL EDEMA: ICD-10-CM

## 2022-05-03 DIAGNOSIS — D64.9 ANEMIA, UNSPECIFIED TYPE: Primary | ICD-10-CM

## 2022-05-03 PROCEDURE — 99215 OFFICE O/P EST HI 40 MIN: CPT | Performed by: FAMILY MEDICINE

## 2022-05-03 PROCEDURE — 1036F TOBACCO NON-USER: CPT | Performed by: FAMILY MEDICINE

## 2022-05-09 NOTE — UTILIZATION REVIEW
Armin Diana MD   Physician   OB/GYN   Discharge Summary      Signed   Date of Service:  2022  4:45 AM                 Signed                 Discharge Summary - Laura King 39 y o  female MRN: [id number]     Unit/Bed#: LD -01 Encounter: [id number]     Admission Date: 2022      Discharge Date: 22     Admitting Attending: Dr Ben Layne  Delivering Attending: Dr Marivel Alonzo  Discharge Attending: Marivel Alonzo     Diagnosis:  1  Pregnancy conceived by IVF  2  Obesity, BMI 37     Procedures: primary low transverse  section      Complications: none apparent      Emilio Ormond is a 38 yo  who was admitted for an induction of labor for non-reassuring fetal heart tones at 37w5d  Her induction was started with a pitocin titration  Multiple attempt at kessler balloon placement were made, which were eventually successful  She received an epidural for analgesia, and was artificially ruptured for clear fluid  After failing to make change for several hours, the patient elected to undergo a primary  delivery  She underwent an uncomplicated  delivery  She delivered a viable male  at 0 on 2022  Weight was 6lbs 8oz (2955g) with APGARs of 8 and 8 at 1 and 5 minutes  Her preoperative Hb was 11 2  Her postoperative Hb was 6 7  Her postoperative course was complicated by possible transfusion reaction and enterococcus in urine and blood, more recent blood cultures negative  Patient had iv antibiotics and was seen by cardiology for increased heart rate and ID for infection       Condition at discharge: good      On day of discharge pain was well controlled, patient was tolerating PO, passing flatus, had a bowel movement   She was discharged with standard post partum/ post operative instructions to follow up with her physician in 1 week for an incision check and in 3-6 weeks for a postpartum appointment       Discharge instructions/Information to patient and family:   -Do not place anything (no partner, tampons or douche) in your vagina for 6 weeks  -You may walk for exercise for the first 6 weeks then gradually return to your usual activities    -Please do not drive for 1 week if you have no stitches and for 2 weeks if you have stitches or underwent a  delivery     -You may take baths or shower per your preference    -Please look at your bust (breasts) in the mirror daily and call for redness or tenderness or increased warmth    -Please call us for temperature > 100 4*F or 38* C, worsening pain or a foul discharge        Discharge Medications:   Prenatal vitamin daily for 6 months or the duration of nursing whichever is longer  Motrin 600 mg orally every 6 hours as needed for pain  Tylenol (over the counter) per bottle directions as needed for pain: do NOT use with percocet  Hydrocortisone cream 1% (over the counter) applied 1-2x daily to hemorrhoids as needed  Percocet as needed  Zyvox  Lasix  Potassium  Iron     Provisions for Follow-Up Care: Follow up with your doctor in 1 week for incision site check       Planned Readmission: no                                  Revision History                                            Notification of Discharge   This is a Notification of Discharge from our facility [address]  Please be advised that this patient has been discharge from our facility  Below you will find the admission and discharge date and time including the patients disposition  UTILIZATION REVIEW CONTACT:  Doe Murray  Utilization   Network Utilization Review Department  Phone: [phone] x carefully listen to the prompts  All voicemails are confidential   Email: [email or web address]     PHYSICIAN ADVISORY SERVICES:  FOR [id number] REVIEW - MEDICAL NECESSITY DENIAL  Phone: [phone]  Fax: [phone]  Email: [email or web address] PRESENTATION DATE: 4/5/2022 10:34 AM  OBERVATION ADMISSION DATE:   INPATIENT ADMISSION DATE: 4/6/22  9:30 AM   DISCHARGE DATE: 4/14/2022 12:52 PM  DISPOSITION: Home/Self Care Home/Self Care      IMPORTANT INFORMATION:  Send all requests for admission clinical reviews, approved or denied determinations and any other requests to dedicated fax number below belonging to the campus where the patient is receiving treatment   List of dedicated fax numbers:  [address] DENIALS (Administrative/Medical Necessity) [phone]   [address] (Maternity/NICU/Pediatrics) [phone]   Lobo Courser [phone]   130 [facility] [phone]   [address] [place] [address] [phone]   [address] [place] [address] [address] [place] [address],[address] [place] [address] [phone]   [facility]  [phone]   2205 [facility]  [address] [phone]

## 2022-05-09 NOTE — UTILIZATION REVIEW
Continued Stay Review    Date: 04-08-22                       Current Patient Class: inpatient   Current Level of Care: M/S    HPI:36 y o  female initially admitted on 04-06-22 for NRFHT and IOL @ 37 6/7 weeks     Assessment/Plan: 04-06-22  @ 1300  Contraction Frequency (minutes): none  Contraction Quality: Mild  Tachysystole: No   Cervical Dilation: Fingertip  Cervical Effacement: 30  Fetal Station: -3  Baseline Rate: 125 bpm  Fetal Heart Rate: 123 BPM  FHR Category: Category I  for spontaneous 6-minute decel nadiring to 60s    IV Pitocin started @ 1340    @ 1715  Dose (kori-units/min) Oxytocin: 8 kori-units/min  Contraction Frequency (minutes): irregular/irritability  Contraction Quality: Mild  Tachysystole: No   Cervical Dilation: 1-2  Cervical Effacement: 50  Fetal Station: -3  Baseline Rate: 120 bpm  Fetal Heart Rate: 138 BPM  FHR Category: Category I    @ 2130  Dose (kori-units/min) Oxytocin: 14 kori-units/min  Contraction Frequency (minutes): 3-4  Contraction Quality: Mild  Tachysystole: No   Cervical Dilation: 1-2  Cervical Effacement: 60  Fetal Station: -3  Baseline Rate: 115 bpm  Fetal Heart Rate: 137 BPM    04-07-22  @0200  Dose (kori-units/min) Oxytocin: 22 kori-units/min  Contraction Frequency (minutes): 1 5-6  Contraction Quality: Mild,Moderate  Tachysystole: No   Cervical Dilation: 1-2  Cervical Effacement: 60  Fetal Station: -3  Baseline Rate: 110 bpm  Fetal Heart Rate: 140 BPM  FHR Category: Category I    @ 0915  Dose (kori-units/min) Oxytocin: 0 kori-units/min  Contraction Frequency (minutes): 1 5-2 5  Contraction Quality: Mild  Tachysystole: No   Cervical Dilation: 2  Cervical Effacement: 60  Fetal Station: -3  Baseline Rate: 115 bpm  Fetal Heart Rate: 118 BPM  FHR Category: Category I    @ 1515   No change in cervical dilation   Epidural     @ 1700  Dose (kori-units/min) Oxytocin: 14 kori-units/min  Contraction Frequency (minutes): irreg on toco  Contraction Quality: Mild  Tachysystole: No   Cervical Dilation: 4  Cervical Effacement: 70  Fetal Station: -3  Baseline Rate: 130 bpm  Fetal Heart Rate: 130 BPM  FHR Category: Category I    @1915  Dose (kori-units/min) Oxytocin: 18 kori-units/min  Contraction Frequency (minutes): 3-4  Contraction Quality: Mild  Tachysystole: No   Cervical Dilation: 4  Cervical Effacement: 70  Fetal Station: -3  Baseline Rate: 120 bpm  Fetal Heart Rate: 122 BPM  FHR Category: Category I    @ 2245  Dose (kori-units/min) Oxytocin: 22 kori-units/min  Contraction Frequency (minutes): 3-3 5  Contraction Quality: Mild,Moderate  Tachysystole: No   Cervical Dilation: 4  Cervical Effacement: 70  Fetal Station: -3  Baseline Rate: 120 bpm  Fetal Heart Rate: 125 BPM  FHR Category: Category II    22  @ 0200  Dose (kori-units/min) Oxytocin: 0 kori-units/min  Contraction Frequency (minutes): 1 5-4  Contraction Quality: Moderate  Tachysystole: No   Cervical Dilation: 4  Cervical Effacement: 70  Fetal Station: -3  Baseline Rate: 125 bpm  Fetal Heart Rate: 135 BPM  FHR Category: Category I  FHT noted to have recurrent late decelerations with prolonged deceleration lasting 2 mins with kimberly of 80s immediately after  Pitocin held, IVF bolus started, O2 given, and maternal positioning changed with return to baseline and resolution of decelerations    @ 0519 38 1/7 weeks   Male  Apgar 8/8  Wt  2955 grams  Infant [facility] for routine new born care  Pertinent Labs/Diagnostic Results:                           Medications:   Scheduled Medications:  No current facility-administered medications for this encounter  Continuous IV Infusions:  No current facility-administered medications for this encounter  PRN Meds:  No current facility-administered medications for this encounter        Discharge Plan: home    Network Utilization Review Department  ATTENTION: Please call with any questions or concerns to [phone] and carefully listen to the prompts so that you are directed to the right person  All voicemails are confidential   Marcia Messina all requests for admission clinical reviews, approved or denied determinations and any other requests to dedicated fax number below belonging to the campus where the patient is receiving treatment  List of dedicated fax numbers for the Facilities:  [address] DENIALS (Administrative/Medical Necessity) [phone]   [address] (Maternity/NICU/Pediatrics) [phone] [address] [place] [address] 150 [address] [place] [address] [address] [facility] 28 [address] [place] [address] [address] [facility] [phone]     Continued Stay Review    Date: 22                          Current Patient Class: inpatient   Current Level of Care: M/S    HPI:36 y o  female initially admitted on *22 for the need for prolong external fetal heart monitoring   Assessment/Plan: POD # 4  Primary low transverse  section at 38w1d for maternal request in the setting of prolonged induction of labor  Postpartum course complicated by blood transfusion reaction (received 2U pRBCs today this admission), in addition to sepsis  Sepsis resolving with antibiotic treatment    (_) 1 pitting edema b/l lower legs   Blood cultures 4/10: Enterococcus bacteremia   S/p ID consult 22; appreciate recommendations   F/u echocardiogram today  to r/o vegetations in the setting of Enterococcus   F/u repeat blood cultures drawn 4/12        Vital Signs:   Date/Time Temp Pulse Resp BP SpO2 O2 Device Cardiac (WDL) Patient Position - Orthostatic VS   04/12/22 1207 98 2 °F (36 8 °C) 102 18 112/61 98 % None (Room air) -- Sitting   04/12/22 1025 98 2 °F (36 8 °C) 106 Abnormal  20 115/69 97 % None (Room air) -- Sitting   04/12/22 0845 -- -- -- -- -- None (Room air) X --   04/12/22 0824 98 °F (36 7 °C) 113 Abnormal  20 117/76 96 % None (Room air) -- Sitting   04/12/22 0553 98 7 °F (37 1 °C) 113 Abnormal  22 112/61 97 % -- -- Sitting   04/12/22 0400 98 4 °F (36 9 °C) 124 Abnormal  20 125/58 94 % -- -- Sitting   04/12/22 0200 98 7 °F (37 1 °C) 116 Abnormal  19 119/60 96 % None (Room air) -- Sitting   04/12/22 0000 100 3 °F (37 9 °C)  124 Abnormal  22 121/61 97 % -- -- Sitting   04/11/22 2224 -- -- -- -- -- None (Room air) X --   04/11/22 2211 99 2 °F (37 3 °C) 118 Abnormal  20 110/64 97 % None (Room air) -- Sitting   04/11/22 2004 98 9 °F (37 2 °C) 122 Abnormal  20 123/65 96 % None (Room air) -- Sitting   04/11/22 1802 98 4 °F (36 9 °C) 123 Abnormal  20 115/56 95 % None (Room air) -- Sitting   04/11/22 1601 98 7 °F (37 1 °C) 127 Abnormal  20 121/56 100 % None (Room air) -- Sitting   04/11/22 1540 99 2 °F (37 3 °C) -- -- -- -- -- -- --   04/11/22 1400 98 7 °F (37 1 °C) 120 Abnormal  18 110/58 -- -- -- Sitting   04/11/22 1214 -- -- -- -- 96 % None (Room air) -- --   04/11/22 1205 98 7 °F (37 1 °C) 127 Abnormal  20 122/56 96 % None (Room air) -- Sitting   04/11/22 1023 -- -- -- -- -- None (Room air) WDL --   04/11/22 1004 101 °F (38 3 °C) Abnormal   135 Abnormal   20 138/61 96 % None (Room air) -- Sitting       Pertinent Labs/Diagnostic Results:                                                         Medications:   Scheduled Medications:  No current facility-administered medications for this encounter  Continuous IV Infusions:  No current facility-administered medications for this encounter      PRN Meds:  No current facility-administered medications for this encounter  Discharge Plan: home when medically stable    Network Utilization Review Department  ATTENTION: Please call with any questions or concerns to [phone] and carefully listen to the prompts so that you are directed to the right person  All voicemails are confidential   Isabel Patton all requests for admission clinical reviews, approved or denied determinations and any other requests to dedicated fax number below belonging to the campus where the patient is receiving treatment  List of dedicated fax numbers for the Facilities:  [address] [place] [address] DENIALS (Administrative/Medical Necessity) [phone]   [address] (Maternity/NICU/Pediatrics) [phone]   [address] [place] [address]  [address] [place] [address] [address] [facility] 28 Josh Rodriges [address] 1481 [address] [phone]     Continued Stay Review    Date: 22                      Current Patient Class: inpatient  Current Level of Care: M/S    HPI:36 y o  female initially admitted on 22 for PPROM    Assessment/Plan: 22  POD # 5   For prolong labor    PLAN:  * Sepsis ([facility] [address] )  Assessment & Plan  § Currently on  vancomycin () D/C by ID and placed on oral linezolid 600mg q12 hours until   Blood cultures 4/10: Enterococcus bacteremia   S/p ID consult 22; appreciate recommendations   TT Echocardiogram  negative for vegetations   Urine culture 70-79K Enterococcos faecalis   F/u repeat blood cultures drawn 4/12 awaiting 48 hr results    Vital Signs:     Date/Time Temp Pulse Resp BP MAP (mmHg) SpO2 O2 Device Cardiac (WDL) Patient Position - Orthostatic VS   04/14/22 0837 98 5 °F (36 9 °C) 97 18 127/55 -- -- -- -- --   04/14/22 0044 99 1 °F (37 3 °C) 101 18 109/66 -- 98 % -- -- --   04/13/22 2031 98 9 °F (37 2 °C) 101 18 114/57 -- 97 % None (Room air) -- Sitting   04/13/22 1945 -- -- -- -- -- -- None (Room air) WDL --   04/13/22 1652 98 9 °F (37 2 °C) 104 18 122/58 -- 100 % None (Room air) -- Sitting   04/13/22 1237 97 9 °F (36 6 °C) 95 18 117/62 -- -- -- -- --   04/13/22 0855 97 9 °F (36 6 °C) 101 18 115/77 -- -- -- -- --   04/13/22 0813 -- -- -- -- -- -- None (Room air) WDL --   04/13/22 0400 97 9 °F (36 6 °C) 105 18 119/68 -- 97 % -- -- Sitting   04/13/22 0012 97 8 °F (36 6 °C) 108 Abnormal  20 112/56 -- 98 % -- -- --         Pertinent Labs/Diagnostic Results:                                                                               Medications:   Scheduled Medications:  No current facility-administered medications for this encounter  Continuous IV Infusions:  No current facility-administered medications for this encounter  PRN Meds:  No current facility-administered medications for this encounter  Discharge Plan:  Home when medically stable   Network Utilization Review Department  ATTENTION: Please call with any questions or concerns to [phone] and carefully listen to the prompts so that you are directed to the right person  All voicemails are confidential   Finis Feeling all requests for admission clinical reviews, approved or denied determinations and any other requests to dedicated fax number below belonging to the campus where the patient is receiving treatment   List of dedicated fax numbers for the Facilities:  FACILITY NAME UR FAX NUMBER   ADMISSION DENIALS (Administrative/Medical Necessity) [phone]   [address] (Maternity/NICU/Pediatrics) 261 [facility],7[facility] [address]  [phone]   [facility] 50 150 [address] [place] [address] [place] [address] [address] [facility] [place] [address] [phone]

## 2022-05-10 NOTE — UTILIZATION REVIEW
Micki Spring MD   Physician   OB/GYN   Discharge Summary      Signed   Date of Service:  2022  4:45 AM                 Signed                 Discharge Summary - Yasmine King 39 y o  female MRN: [id number]     Unit/Bed#: LD - Encounter: [id number]     Admission Date: 2022      Discharge Date: 22     Admitting Attending: Dr Dariela Rosas  Delivering Attending: Dr Yandy Garcia  Discharge Attending: Yandy Garcia     Diagnosis:  1  Pregnancy conceived by IVF  2  Obesity, BMI 37     Procedures: primary low transverse  section      Complications: none apparent      Jax Hernandez is a 40 yo  who was admitted for an induction of labor for non-reassuring fetal heart tones at 37w5d  Her induction was started with a pitocin titration  Multiple attempt at kessler balloon placement were made, which were eventually successful  She received an epidural for analgesia, and was artificially ruptured for clear fluid  After failing to make change for several hours, the patient elected to undergo a primary  delivery  She underwent an uncomplicated  delivery  She delivered a viable male  at 0 on 2022  Weight was 6lbs 8oz (2955g) with APGARs of 8 and 8 at 1 and 5 minutes  Her preoperative Hb was 11 2  Her postoperative Hb was 6 7  Her postoperative course was complicated by possible transfusion reaction and enterococcus in urine and blood, more recent blood cultures negative  Patient had iv antibiotics and was seen by cardiology for increased heart rate and ID for infection       Condition at discharge: good      On day of discharge pain was well controlled, patient was tolerating PO, passing flatus, had a bowel movement   She was discharged with standard post partum/ post operative instructions to follow up with her physician in 1 week for an incision check and in 3-6 weeks for a postpartum appointment       Discharge instructions/Information to patient and family:   -Do not place anything (no partner, tampons or douche) in your vagina for 6 weeks  -You may walk for exercise for the first 6 weeks then gradually return to your usual activities    -Please do not drive for 1 week if you have no stitches and for 2 weeks if you have stitches or underwent a  delivery     -You may take baths or shower per your preference    -Please look at your bust (breasts) in the mirror daily and call for redness or tenderness or increased warmth    -Please call us for temperature > 100 4*F or 38* C, worsening pain or a foul discharge        Discharge Medications:   Prenatal vitamin daily for 6 months or the duration of nursing whichever is longer  Motrin 600 mg orally every 6 hours as needed for pain  Tylenol (over the counter) per bottle directions as needed for pain: do NOT use with percocet  Hydrocortisone cream 1% (over the counter) applied 1-2x daily to hemorrhoids as needed  Percocet as needed  Zyvox  Lasix  Potassium  Iron     Provisions for Follow-Up Care:   Follow up with your doctor in 1 week for incision site check       Planned Readmission: no                                  Revision History

## 2022-05-12 ENCOUNTER — POSTPARTUM VISIT (OUTPATIENT)
Dept: OBGYN CLINIC | Facility: CLINIC | Age: 37
End: 2022-05-12

## 2022-05-12 DIAGNOSIS — Z98.891 S/P CESAREAN SECTION: ICD-10-CM

## 2022-05-12 PROCEDURE — 99024 POSTOP FOLLOW-UP VISIT: CPT | Performed by: OBSTETRICS & GYNECOLOGY

## 2022-05-12 NOTE — PROGRESS NOTES
Assessment/Plan:    Normal postpartum/postoperative exam  Good recovery to date after complicated postoperative course  Released for normal activity  Return to office in 3-4 months for annual exam, earlier as needed       Problem List Items Addressed This Visit        Other    Postpartum care following  delivery - Primary    RESOLVED: S/P  section            Subjective:      Patient ID: Ranjan Youssef is a 39 y o  female  Corrie Tong is here today for her postpartum/postoperative check  She had a complicated postoperative course  She delivered by  section on 2022  She had tried to induction of labor and was attempting that for several days prior to proceeding with a  section  After  section she was found to have sepsis and was kept in the hospital respect to that  She did end up having a positive blood culture which was treated treated as an outpatient with 2 weeks of antibiotics  She then presented with continued low-grade fevers and on CT scan was found to have some fluid-filled pockets  She was readmitted  These were aspirated and found to be sterile  And she was discharged to home  Since that last admission she has been doing well  She is currently bottle-feeding having had issues trying to breastfeed  Bowels and bladder are normal she has no further lochia  And she is unsure which contraception she plans to use  The following portions of the patient's history were reviewed and updated as appropriate: allergies, current medications, past family history, past medical history, past social history, past surgical history and problem list     Review of Systems   Constitutional: Negative for chills, fatigue, fever and unexpected weight change  Respiratory: Negative for cough, shortness of breath and wheezing  Cardiovascular: Negative for chest pain and leg swelling  Gastrointestinal: Negative for constipation, diarrhea, nausea and vomiting  Genitourinary: Negative for decreased urine volume, difficulty urinating, flank pain, frequency, hematuria, pelvic pain, urgency and vaginal discharge  Musculoskeletal: Negative for back pain and joint swelling  Allergic/Immunologic: Negative for environmental allergies  Neurological: Negative for dizziness and headaches  Psychiatric/Behavioral: Negative for dysphoric mood  The patient is not nervous/anxious  Objective:      /72 (BP Location: Left arm, Patient Position: Sitting, Cuff Size: Standard)   Ht 5' 2" (1 575 m)   Wt 78 9 kg (174 lb)   LMP 07/15/2021   BMI 31 83 kg/m²          Physical Exam  Vitals reviewed  Constitutional:       General: She is not in acute distress  Appearance: Normal appearance  She is well-developed and normal weight  She is not ill-appearing  Comments: Young white female   HENT:      Head: Normocephalic and atraumatic  Abdominal:      General: There is no distension  Palpations: Abdomen is soft  There is no mass  Tenderness: There is no abdominal tenderness  There is no guarding or rebound  Comments: Pfannenstiel incision healed well   Genitourinary:     Comments: Perineum intact  Cervix closed  Uterus well involuted, mobile and nontender  Musculoskeletal:         General: Normal range of motion  Neurological:      General: No focal deficit present  Mental Status: She is alert and oriented to person, place, and time  Psychiatric:         Mood and Affect: Mood normal          Behavior: Behavior normal          Thought Content: Thought content normal          Judgment: Judgment normal       Comments: Postpartum depression score equal 7

## 2022-05-16 VITALS
SYSTOLIC BLOOD PRESSURE: 116 MMHG | WEIGHT: 174 LBS | DIASTOLIC BLOOD PRESSURE: 72 MMHG | HEIGHT: 62 IN | BODY MASS INDEX: 32.02 KG/M2

## 2022-05-16 PROBLEM — Z3A.37 37 WEEKS GESTATION OF PREGNANCY: Status: RESOLVED | Noted: 2021-11-18 | Resolved: 2022-05-16

## 2022-05-16 PROBLEM — Z98.891 S/P CESAREAN SECTION: Status: RESOLVED | Noted: 2022-04-08 | Resolved: 2022-05-16

## 2022-05-16 PROBLEM — T80.92XA TRANSFUSION REACTION: Status: RESOLVED | Noted: 2022-04-11 | Resolved: 2022-05-16

## 2022-05-16 PROBLEM — R18.8 INTRA-ABDOMINAL FLUID COLLECTION: Status: RESOLVED | Noted: 2022-04-11 | Resolved: 2022-05-16

## 2022-05-16 NOTE — PROGRESS NOTES
Patient ID: Gabriella Hoffman is a 39 y o  female  HPI: 39 y  o female presents for posthospital check after hospitalization for drainage of intraabdominal fluid collection  She is anemic and has recurrent edema of lower legs  SUBJECTIVE    Family History   Problem Relation Age of Onset    Lung cancer Mother     Cancer Mother     Heart disease Maternal Grandmother     Cancer Sister      Social History     Socioeconomic History    Marital status: /Civil Union     Spouse name: Not on file    Number of children: Not on file    Years of education: Not on file    Highest education level: Not on file   Occupational History    Not on file   Tobacco Use    Smoking status: Never Smoker    Smokeless tobacco: Never Used   Vaping Use    Vaping Use: Never used   Substance and Sexual Activity    Alcohol use: Yes    Drug use: Never    Sexual activity: Yes     Partners: Male     Birth control/protection: None   Other Topics Concern    Not on file   Social History Narrative    Not on file     Social Determinants of Health     Financial Resource Strain: Not on file   Food Insecurity: Not on file   Transportation Needs: Not on file   Physical Activity: Not on file   Stress: Not on file   Social Connections: Not on file   Intimate Partner Violence: Not on file   Housing Stability: Not on file     Past Medical History:   Diagnosis Date    Abnormal Pap smear of cervix     Asthma     only with allergies, utilizes rescue inhaler     Cardiac arrhythmia     Last Assessed: 1/10/2017    Female infertility     Seasonal allergies      Past Surgical History:   Procedure Laterality Date    AUGMENTATION BREAST      CARDIAC CATHETERIZATION      Last Assessed: 1/10/2017    IR DRAINAGE TUBE PLACEMENT  2022    LASIK      Corneal  Last Assessed: 1/10/2017    ME  DELIVERY ONLY N/A 2022    Procedure:  SECTION ();   Surgeon: Richard Valles MD;  Location: [facility] ;  Service: Obstetrics     Allergies   Allergen Reactions    Penicillins Rash     "poly cillin" lip swelling, rash as adult       Current Outpatient Medications:     acetaminophen (TYLENOL) 325 mg tablet, Take 2 tablets (650 mg total) by mouth every 6 (six) hours as needed for mild pain, headaches or fever (Patient not taking: Reported on 5/12/2022), Disp: 30 tablet, Rfl: 0    albuterol (ProAir HFA) 90 mcg/act inhaler, Inhale 2 puffs every 6 (six) hours as needed for wheezing, Disp: 1 Inhaler, Rfl: 2    ALPRAZolam (XANAX) 0 25 mg tablet, Take 1 tablet (0 25 mg total) by mouth 3 (three) times a day as needed for anxiety, Disp: 60 tablet, Rfl: 0    docusate sodium (COLACE) 100 mg capsule, Take 1 capsule (100 mg total) by mouth 2 (two) times a day, Disp: 10 capsule, Rfl: 0    furosemide (LASIX) 40 mg tablet, Take 0 5 tablets (20 mg total) by mouth daily as needed (swelling) (Patient not taking: Reported on 5/12/2022), Disp: 30 tablet, Rfl: 3    potassium chloride (K-DUR,KLOR-CON) 20 mEq tablet, Take 1 tablet (20 mEq total) by mouth daily as needed (swelling) (Patient not taking: Reported on 5/12/2022), Disp: 30 tablet, Rfl: 3    benzocaine-menthol-lanolin-aloe (DERMOPLAST) 20-0 5 % topical spray, Apply 1 application topically every 6 (six) hours as needed for mild pain or irritation (Patient not taking: No sig reported), Disp: , Rfl: 0    calcium carbonate (TUMS) 500 mg chewable tablet, Chew 2 tablets (1,000 mg total) 2 (two) times a day as needed for indigestion or heartburn (Patient not taking: No sig reported), Disp: , Rfl: 0    clotrimazole-betamethasone (LOTRISONE) 1-0 05 % cream, Apply topically 2 (two) times a day (Patient not taking: No sig reported), Disp: 30 g, Rfl: 0    clotrimazole-betamethasone (LOTRISONE) 1-0 05 % cream, Apply topically 2 (two) times a day, Disp: 30 g, Rfl: 0    Esomeprazole Magnesium (NEXIUM PO), Take by mouth (Patient not taking: No sig reported), Disp: , Rfl:     ferrous sulfate 324 (65 Fe) mg, Take 1 tablet (324 mg total) by mouth in the morning, Disp: 60 tablet, Rfl: 0    ibuprofen (MOTRIN) 600 mg tablet, Take 1 tablet (600 mg total) by mouth every 6 (six) hours as needed for moderate pain or headaches (cramping) (Patient not taking: No sig reported), Disp: 30 tablet, Rfl: 0    lidocaine (LIDODERM) 5 %, Apply 1 patch topically daily Remove & Discard patch within 12 hours or as directed by MD (Patient not taking: Reported on 5/12/2022), Disp: 5 patch, Rfl: 0    Prenatal Vit-Fe Fumarate-FA (PRENATAL PO), Take by mouth (Patient not taking: No sig reported), Disp: , Rfl:     Review of Systems  Constitutional:     Denies fever, chills ,+fatigue denies any weakness ,weight loss, +weight gain     ENT: Denies earache ,loss of hearing ,nosebleed, nasal discharge,nasal congestion ,sore throat ,hoarseness  Pulmonary: Denies shortness of breath ,cough  ,dyspnea on exertion, orthopnea  ,PND   Cardiovascular:  Denies bradycardia , tachycardia  ,palpations,+ lower extremity edema leg, no claudication  Breast:  Denies new or changing breast lumps ,nipple discharge ,nipple changes  Abdomen:  Denies abdominal pain , anorexia , indigestion, nausea, vomiting, constipation, diarrhea  Musculoskeletal: Denies myalgias, arthralgias, joint swelling, joint stiffness , limb pain, limb swelling  Gu: denies dysuria, polyuria  Skin: Denies skin rash, skin lesion, skin wound, itching, dry skin  Neuro: Denies headache, numbness, tingling, confusion, loss of consciousness, dizziness, vertigo  Psychiatric: Denies feelings of depression, suicidal ideation, anxiety, sleep disturbances    OBJECTIVE  /58   Pulse 99   Temp 98 1 °F (36 7 °C)   Ht 5' 2" (1 575 m)   Wt 78 2 kg (172 lb 6 4 oz)   LMP 07/15/2021   SpO2 97%   BMI 31 53 kg/m²   Constitutional:   NAD, well appearing and well nourished      ENT:   Conjunctiva and lids: no injection, edema, or discharge     Pupils and iris: CASANDRA bilaterally  External inspection of ears and nose: normal without deformities or discharge  Otoscopic exam: Canals patent without erythema  Nasal mucosa, septum and turbinates: Normal or edema or discharge         Oropharynx:  Moist mucosa, normal tongue and tonsils without lesions  No erythema        Pulmonary:Respiratory effort normal rate and rhythm, no increased work of breathing  Auscultation of lungs:  Clear bilaterally with no adventitious breath sounds       Cardiovascular: regular rate and rhythm, S1 and S2, no murmur, +1 pitting  edema  of LE      Abdomen: Soft and non-distended   Positive bowel sounds      No heptomegaly or splenomegaly      Gu: no suprapubic tenderness or CVA tenderness, no urethral discharge  Lymphatic:  No anterior or posterior cervical lymphadenopathy         Musculoskeletal:  Gait and station: Normal gait      Digits and nails normal without clubbing or cyanosis       Inspection/palpation of joints, bones, and muscles:  No joint tenderness, swelling, full active and passive range of motion     Skin: Normal skin turgor and no rashes      Neuro:     Normal reflexes     Psych:   alert and oriented to person, place and time     normal mood and affect       Assessment/Plan:Diagnoses and all orders for this visit:    Anemia, unspecified type  Comments:  Continue high iron diet and check hgb in 6 weeks  Orders:  -     Hemoglobin; Future    Peripheral edema  Comments:  Continue diuretics and check bnp  Intra-abdominal fluid collection  Comments:  s/p drainage and removal of drain  Reviewed with patient plan to treat with above plan      Patient instructed to call in 72 hours if not feeling better or if symptoms worsen

## 2022-05-17 ENCOUNTER — POSTPARTUM VISIT (OUTPATIENT)
Dept: OBGYN CLINIC | Facility: CLINIC | Age: 37
End: 2022-05-17

## 2022-05-17 ENCOUNTER — TELEPHONE (OUTPATIENT)
Dept: FAMILY MEDICINE CLINIC | Facility: CLINIC | Age: 37
End: 2022-05-17

## 2022-05-17 ENCOUNTER — TELEPHONE (OUTPATIENT)
Dept: OBGYN CLINIC | Facility: CLINIC | Age: 37
End: 2022-05-17

## 2022-05-17 ENCOUNTER — HOSPITAL ENCOUNTER (OUTPATIENT)
Dept: ULTRASOUND IMAGING | Facility: HOSPITAL | Age: 37
Discharge: HOME | End: 2022-05-17
Payer: COMMERCIAL

## 2022-05-17 VITALS
WEIGHT: 171 LBS | HEIGHT: 62 IN | BODY MASS INDEX: 31.47 KG/M2 | SYSTOLIC BLOOD PRESSURE: 130 MMHG | DIASTOLIC BLOOD PRESSURE: 76 MMHG

## 2022-05-17 DIAGNOSIS — R10.2 PELVIC PAIN IN FEMALE: ICD-10-CM

## 2022-05-17 DIAGNOSIS — R10.2 PELVIC PAIN: Primary | ICD-10-CM

## 2022-05-17 PROCEDURE — 3008F BODY MASS INDEX DOCD: CPT | Performed by: FAMILY MEDICINE

## 2022-05-17 PROCEDURE — 99024 POSTOP FOLLOW-UP VISIT: CPT | Performed by: PHYSICIAN ASSISTANT

## 2022-05-17 PROCEDURE — 76705 ECHO EXAM OF ABDOMEN: CPT

## 2022-05-17 RX ORDER — FERROUS SULFATE 325(65) MG
325 TABLET ORAL
COMMUNITY

## 2022-05-17 NOTE — TELEPHONE ENCOUNTER
Pt called as she is concerned she has had increased left lower abd pain especially with movement this past week, prior the pain had mostly resolved, and she now notices a hard lump left side at incision  She was in office 5/12 for pp visit and incision was fine then but now it is red  Denies fever, odor, drainage  Does have some intermittent nausea  She is going to try and send a picture of incision via MyChart  But wants to be noted she had a reaction prior at incision site and was given cream and has loss of pigmentation at the site from that  Pt aware will review with on call provider  Tigertext sent to on call provider at this time

## 2022-05-17 NOTE — PROGRESS NOTES
Assessment/Plan:    No problem-specific Assessment & Plan notes found for this encounter  Problem List Items Addressed This Visit    None     Visit Diagnoses     Pelvic pain    -  Primary    Relevant Orders    US pelvis transabdominal only          Given history will plan TA US to rule out seroma or fluid collection  Will follow up as needed based on results  Subjective:      Patient ID: José Miguel Carballo is a 39 y o  female  Pt presents today with an increase in her LLQ discomfort and she thinks she feels a lump at the side of her incision  Notes pain in her lower back as well  Denies fevers or chills  Denies leakage or discharge from the incision site  No bowel or bladder complaints  S/p IR drainage of perihepatic loculation of fluid that did not show any growth in the lab on   Now currently 6 weeks post op priamry   Pt does note hypopigmentation noted after application of lotrimin cream  She has since stopped the cream as her allergic reaction has subsided  The following portions of the patient's history were reviewed and updated as appropriate:   She  has a past medical history of Abnormal Pap smear of cervix, Asthma, Cardiac arrhythmia, Female infertility, and Seasonal allergies  She   Patient Active Problem List    Diagnosis Date Noted    Postpartum care following  delivery 2022    HIstory of Sepsis due to Enterococcus [facility]) 2022    Anemia 2022    ASCUS with positive high risk HPV cervical 2019    Non-seasonal allergic rhinitis due to pollen 2019    Hyperlipidemia 2017    Anxiety 01/10/2017    Asthma 01/10/2017     She  has a past surgical history that includes Cardiac catheterization; LASIK; AUGMENTATION BREAST; pr  delivery only (N/A, 2022); and IR drainage tube placement (2022)  Her family history includes Cancer in her mother and sister;  Heart disease in her maternal grandmother; Lung cancer in her mother  She  reports that she has never smoked  She has never used smokeless tobacco  She reports current alcohol use  She reports that she does not use drugs  Current Outpatient Medications   Medication Sig Dispense Refill    albuterol (ProAir HFA) 90 mcg/act inhaler Inhale 2 puffs every 6 (six) hours as needed for wheezing 1 Inhaler 2    ALPRAZolam (XANAX) 0 25 mg tablet Take 1 tablet (0 25 mg total) by mouth 3 (three) times a day as needed for anxiety 60 tablet 0    clotrimazole-betamethasone (LOTRISONE) 1-0 05 % cream Apply topically 2 (two) times a day 30 g 0    docusate sodium (COLACE) 100 mg capsule Take 1 capsule (100 mg total) by mouth 2 (two) times a day 10 capsule 0    ferrous sulfate 325 (65 Fe) mg tablet Take 325 mg by mouth daily with breakfast      azithromycin (ZITHROMAX) 250 mg tablet Take 2 tablets today then 1 tablet daily x 4 days 6 tablet 0     No current facility-administered medications for this visit  Current Outpatient Medications on File Prior to Visit   Medication Sig    albuterol (ProAir HFA) 90 mcg/act inhaler Inhale 2 puffs every 6 (six) hours as needed for wheezing    ALPRAZolam (XANAX) 0 25 mg tablet Take 1 tablet (0 25 mg total) by mouth 3 (three) times a day as needed for anxiety    clotrimazole-betamethasone (LOTRISONE) 1-0 05 % cream Apply topically 2 (two) times a day    docusate sodium (COLACE) 100 mg capsule Take 1 capsule (100 mg total) by mouth 2 (two) times a day    ferrous sulfate 325 (65 Fe) mg tablet Take 325 mg by mouth daily with breakfast     No current facility-administered medications on file prior to visit  She is allergic to penicillins       Review of Systems      Objective:      /76   Ht 5' 2" (1 575 m)   Wt 77 6 kg (171 lb)   BMI 31 28 kg/m²          Physical Exam  Vitals reviewed  Cardiovascular:      Rate and Rhythm: Normal rate and regular rhythm     Pulmonary:      Effort: Pulmonary effort is normal       Breath sounds: Normal breath sounds  Abdominal:      General: Abdomen is flat  Bowel sounds are normal       Palpations: Abdomen is soft  Tenderness: There is no right CVA tenderness or left CVA tenderness  Comments: With mild prominence present along the left side  Of her incision across the midline  Not fluctuant  No erythema  Thickened linear area likely c/w fascial suture palpated on exam as well  No drainage noted  No openings noted  Pt also examined by Dr Yovana Biggs   Neurological:      Mental Status: She is alert

## 2022-05-18 ENCOUNTER — APPOINTMENT (OUTPATIENT)
Dept: LAB | Facility: CLINIC | Age: 37
End: 2022-05-18
Payer: COMMERCIAL

## 2022-05-18 DIAGNOSIS — D64.9 ANEMIA, UNSPECIFIED TYPE: Primary | ICD-10-CM

## 2022-05-18 DIAGNOSIS — D64.9 ANEMIA, UNSPECIFIED TYPE: ICD-10-CM

## 2022-05-18 DIAGNOSIS — T88.8XXD FLUID COLLECTION AT SURGICAL SITE, SUBSEQUENT ENCOUNTER: ICD-10-CM

## 2022-05-18 LAB
ERYTHROCYTE [DISTWIDTH] IN BLOOD BY AUTOMATED COUNT: 16 % (ref 11.6–15.1)
HCT VFR BLD AUTO: 35.9 % (ref 34.8–46.1)
HGB BLD-MCNC: 10.9 G/DL (ref 11.5–15.4)
MCH RBC QN AUTO: 25.8 PG (ref 26.8–34.3)
MCHC RBC AUTO-ENTMCNC: 30.4 G/DL (ref 31.4–37.4)
MCV RBC AUTO: 85 FL (ref 82–98)
PLATELET # BLD AUTO: 276 THOUSANDS/UL (ref 149–390)
PMV BLD AUTO: 11.2 FL (ref 8.9–12.7)
RBC # BLD AUTO: 4.23 MILLION/UL (ref 3.81–5.12)
WBC # BLD AUTO: 6.12 THOUSAND/UL (ref 4.31–10.16)

## 2022-05-18 PROCEDURE — 36415 COLL VENOUS BLD VENIPUNCTURE: CPT

## 2022-05-18 PROCEDURE — 85027 COMPLETE CBC AUTOMATED: CPT

## 2022-05-19 DIAGNOSIS — A41.81 SEPSIS DUE TO ENTEROCOCCUS (HCC): Primary | ICD-10-CM

## 2022-05-20 ENCOUNTER — HOSPITAL ENCOUNTER (OUTPATIENT)
Dept: RADIOLOGY | Facility: HOSPITAL | Age: 37
Discharge: HOME | End: 2022-05-20
Attending: RADIOLOGY | Admitting: RADIOLOGY
Payer: COMMERCIAL

## 2022-05-20 DIAGNOSIS — A41.81 SEPSIS DUE TO ENTEROCOCCUS (HCC): ICD-10-CM

## 2022-05-20 PROCEDURE — 87205 SMEAR GRAM STAIN: CPT | Performed by: NURSE PRACTITIONER

## 2022-05-20 PROCEDURE — 49406 IMAGE CATH FLUID PERI/RETRO: CPT

## 2022-05-20 PROCEDURE — 76942 ECHO GUIDE FOR BIOPSY: CPT | Performed by: RADIOLOGY

## 2022-05-20 PROCEDURE — 10160 PNXR ASPIR ABSC HMTMA BULLA: CPT | Performed by: RADIOLOGY

## 2022-05-20 PROCEDURE — 87070 CULTURE OTHR SPECIMN AEROBIC: CPT | Performed by: NURSE PRACTITIONER

## 2022-05-20 RX ORDER — LIDOCAINE WITH 8.4% SOD BICARB 0.9%(10ML)
SYRINGE (ML) INJECTION CODE/TRAUMA/SEDATION MEDICATION
Status: COMPLETED | OUTPATIENT
Start: 2022-05-20 | End: 2022-05-20

## 2022-05-20 RX ADMIN — Medication 10 ML: at 12:52

## 2022-05-20 RX ADMIN — Medication 10 ML: at 12:39

## 2022-05-20 NOTE — DISCHARGE INSTRUCTIONS
Abscess/fluid collection Aspiration      WHAT YOU NEED TO KNOW:     Today you underwent a fluid collection/abscess aspiration  Usually the fluid is sent to the lab for culture  You may have some pain associated with the puncture site  The Procedure is performed with Local anesthesia (Lidocaine)  After you go Home:    Home care  These tips can help your wound heal:  The wound may drain for the first 2 days  Cover the wound with a clean dry dressing  Change the dressing if it becomes soaked with blood or pus  If a gauze packing was placed inside the abscess pocket, you may be told to remove it yourself  You may do this in the shower  Once the packing is removed, you should wash the area in the shower, or clean the area as directed by your provider  Continue to do this until the skin opening has closed  Make sure you wash your hands after changing the packing or cleaning the wound  If you were prescribed antibiotics, take them as directed until they are all gone  You may use acetaminophen or ibuprofen to control pain, unless another pain medicine was prescribed  If you have liver disease or ever had a stomach ulcer, talk with your doctor before using these medicines  Follow-up care  Follow up with your healthcare provider, or as advised  If a gauze packing was put in your wound, it should be removed in 1 to 2 days  Check your wound every day for any signs that the infection is getting worse  The signs are listed below  When to seek medical advice  Call your healthcare provider right away if any of these occur:   Increasing redness or swelling  Red streaks in the skin leading away from the wound  Increasing local pain or swelling  Continued pus draining from the wound 2 days after treatment  Fever of 100 4ºF (38ºC) or higher, or as directed by your healthcare provider  Abscess  returns when you are at home         Contact Interventional Radiology at [phone] [place] PATIENTS: Contact Interventional Radiology at [phone]) ([address]: Contact Interventional Radiology at [phone])

## 2022-05-20 NOTE — SEDATION DOCUMENTATION
Fluid aspiration done by PASCUAL Garcia  First aspiration drained 1-2 mL dark bloody drainage  Second aspiration did not not produce any fluid  Bandaids placed over each puncture site  Patient tolerated well and discharged home with

## 2022-05-20 NOTE — BRIEF OP NOTE (RAD/CATH)
IR DRAINAGE TUBE PLACEMENT Procedure Note    PATIENT NAME: Balwinder Isaac  : 1985  MRN: [id number]    Pre-op Diagnosis:   1  HIstory of Sepsis due to Enterococcus ([facility] [address] )      Post-op Diagnosis:   1  HIstory of Sepsis due to Enterococcus [facility])        Provider:   Jasbir W William Ayers  Assistants:     No qualified resident was available, Resident is only observing    Estimated Blood Loss: none    Findings: Attempt at aspiration of 2 small collections as seen on diagnostic ultrasound - hematomas, unable to aspirate     Specimens: sent for culture    Complications:  None immediate     Anesthesia: local    [address]      Date: 2022  Time: 3:20 PM

## 2022-05-23 LAB
BACTERIA SPEC BFLD CULT: NO GROWTH
GRAM STN SPEC: NORMAL
GRAM STN SPEC: NORMAL

## 2022-06-22 DIAGNOSIS — B37.3 VAGINAL YEAST INFECTION: Primary | ICD-10-CM

## 2022-06-22 RX ORDER — FLUCONAZOLE 150 MG/1
TABLET ORAL
Qty: 2 TABLET | Refills: 0 | Status: SHIPPED | OUTPATIENT
Start: 2022-06-22 | End: 2022-06-25

## 2022-07-11 DIAGNOSIS — R10.2 PELVIC PAIN: Primary | ICD-10-CM

## 2022-07-14 ENCOUNTER — HOSPITAL ENCOUNTER (OUTPATIENT)
Dept: ULTRASOUND IMAGING | Facility: HOSPITAL | Age: 37
Discharge: HOME | End: 2022-07-14
Payer: COMMERCIAL

## 2022-07-14 DIAGNOSIS — R10.2 PELVIC PAIN: ICD-10-CM

## 2022-07-14 PROCEDURE — 76705 ECHO EXAM OF ABDOMEN: CPT

## 2022-08-12 DIAGNOSIS — F41.9 ANXIETY: ICD-10-CM

## 2022-08-12 RX ORDER — ALPRAZOLAM 0.25 MG/1
0.25 TABLET ORAL 3 TIMES DAILY PRN
Qty: 60 TABLET | Refills: 2 | Status: SHIPPED | OUTPATIENT
Start: 2022-08-12 | End: 2022-10-06

## 2022-08-19 DIAGNOSIS — B96.89 BACTERIAL VAGINOSIS: Primary | ICD-10-CM

## 2022-08-19 DIAGNOSIS — N76.0 BACTERIAL VAGINOSIS: Primary | ICD-10-CM

## 2022-08-19 RX ORDER — DOXYCYCLINE HYCLATE 100 MG/1
100 CAPSULE ORAL EVERY 12 HOURS SCHEDULED
Qty: 20 CAPSULE | Refills: 0 | Status: SHIPPED | OUTPATIENT
Start: 2022-08-19 | End: 2022-08-29

## 2022-08-19 RX ORDER — METRONIDAZOLE 7.5 MG/G
1 GEL VAGINAL 2 TIMES DAILY
Qty: 70 G | Refills: 0 | Status: SHIPPED | OUTPATIENT
Start: 2022-08-19 | End: 2022-08-24

## 2022-09-06 ENCOUNTER — APPOINTMENT (OUTPATIENT)
Dept: LAB | Facility: CLINIC | Age: 37
End: 2022-09-06
Payer: COMMERCIAL

## 2022-09-06 DIAGNOSIS — D64.9 ANEMIA, UNSPECIFIED TYPE: ICD-10-CM

## 2022-09-06 DIAGNOSIS — L65.9 ALOPECIA: Primary | ICD-10-CM

## 2022-09-06 DIAGNOSIS — L65.9 ALOPECIA: ICD-10-CM

## 2022-09-06 LAB
HGB BLD-MCNC: 13.3 G/DL (ref 11.5–15.4)
TSH SERPL DL<=0.05 MIU/L-ACNC: 1.05 UIU/ML (ref 0.45–4.5)

## 2022-09-06 PROCEDURE — 86800 THYROGLOBULIN ANTIBODY: CPT

## 2022-09-06 PROCEDURE — 84443 ASSAY THYROID STIM HORMONE: CPT

## 2022-09-06 PROCEDURE — 36415 COLL VENOUS BLD VENIPUNCTURE: CPT

## 2022-09-06 PROCEDURE — 84432 ASSAY OF THYROGLOBULIN: CPT

## 2022-09-06 PROCEDURE — 85018 HEMOGLOBIN: CPT

## 2022-09-07 LAB
THYROGLOB AB SERPL-ACNC: <1 IU/ML (ref 0–0.9)
THYROGLOB SERPL-MCNC: 10 NG/ML (ref 1.5–38.5)

## 2022-09-09 DIAGNOSIS — R60.9 EDEMA, UNSPECIFIED TYPE: Primary | ICD-10-CM

## 2022-09-09 RX ORDER — TRIAMTERENE AND HYDROCHLOROTHIAZIDE 37.5; 25 MG/1; MG/1
1 CAPSULE ORAL DAILY PRN
Qty: 30 CAPSULE | Refills: 1 | Status: SHIPPED | OUTPATIENT
Start: 2022-09-09 | End: 2022-10-10 | Stop reason: ALTCHOICE

## 2022-09-26 ENCOUNTER — TELEPHONE (OUTPATIENT)
Dept: FAMILY MEDICINE CLINIC | Facility: CLINIC | Age: 37
End: 2022-09-26

## 2022-09-27 ENCOUNTER — TELEMEDICINE (OUTPATIENT)
Dept: FAMILY MEDICINE CLINIC | Facility: CLINIC | Age: 37
End: 2022-09-27
Payer: COMMERCIAL

## 2022-09-27 ENCOUNTER — TELEPHONE (OUTPATIENT)
Dept: FAMILY MEDICINE CLINIC | Facility: CLINIC | Age: 37
End: 2022-09-27

## 2022-09-27 DIAGNOSIS — U07.1 COVID-19: Primary | ICD-10-CM

## 2022-09-27 PROCEDURE — 99213 OFFICE O/P EST LOW 20 MIN: CPT | Performed by: FAMILY MEDICINE

## 2022-09-27 RX ORDER — ALBUTEROL SULFATE 90 UG/1
2 AEROSOL, METERED RESPIRATORY (INHALATION) EVERY 6 HOURS PRN
Qty: 8 G | Refills: 1 | Status: SHIPPED | OUTPATIENT
Start: 2022-09-27

## 2022-09-27 RX ORDER — NIRMATRELVIR AND RITONAVIR 300-100 MG
3 KIT ORAL 2 TIMES DAILY
Qty: 30 TABLET | Refills: 0 | Status: SHIPPED | OUTPATIENT
Start: 2022-09-27 | End: 2022-10-02

## 2022-09-27 RX ORDER — ONDANSETRON HYDROCHLORIDE 8 MG/1
8 TABLET, FILM COATED ORAL EVERY 8 HOURS PRN
Qty: 30 TABLET | Refills: 0 | Status: SHIPPED | OUTPATIENT
Start: 2022-09-27 | End: 2022-10-06 | Stop reason: ALTCHOICE

## 2022-09-27 RX ORDER — BROMPHENIRAMINE MALEATE, PSEUDOEPHEDRINE HYDROCHLORIDE, AND DEXTROMETHORPHAN HYDROBROMIDE 2; 30; 10 MG/5ML; MG/5ML; MG/5ML
10 SYRUP ORAL 4 TIMES DAILY PRN
Qty: 180 ML | Refills: 0 | Status: SHIPPED | OUTPATIENT
Start: 2022-09-27 | End: 2022-10-06 | Stop reason: SDUPTHER

## 2022-09-27 NOTE — PROGRESS NOTES
COVID-19 Outpatient Progress Note    Assessment/Plan:    Problem List Items Addressed This Visit    None        Disposition:     Patient has asymptomatic or mild COVID-19 infection  Based off CDC guidelines, they were recommended to isolate for 5 days  If they are asymptomatic or symptoms are improving with no fevers in the past 24 hours, isolation may be ended followed by 5 days of wearing a mask when around othes to minimize risk of infecting others  If still have a fever or other symptoms have not improved, continue to isolate until they improve  Regardless of when they end isolation, avoid being around people who are more likely to get very sick from COVID-19 until at least day 11  I have spent 15 minutes directly with the patient  Greater than 50% of this time was spent in counseling/coordination of care regarding: diagnostic results, prognosis, risks and benefits of treatment options, instructions for management, patient and family education, importance of treatment compliance, risk factor reductions and impressions  Encounter provider: Jayjay Coulter DO     Provider located at: [address] [place] [address]  [address] [id number]     Recent Visits  Date Type Provider Dept   09/26/22 Telephone Sunita Bright, [address] [place] [address] recent visits within past 7 days and meeting all other requirements  Today's Visits  Date Type Provider Dept   09/27/22 Telemedicine Sunita Bright DO [facility] [address]   09/27/22 Telephone Sunita Bright DO [facility] [address] [address] today's visits and meeting all other requirements  Future Appointments  No visits were found meeting these conditions  Showing future appointments within next 150 days and meeting all other requirements     This virtual check-in was done via [facility] and patient was informed that this is a secure, HIPAA-compliant platform   She agrees to proceed  Patient agrees to participate in a virtual check in via telephone or video visit instead of presenting to the office to address urgent/immediate medical needs  Patient is aware this is a billable service  She acknowledged consent and understanding of privacy and security of the video platform  The patient has agreed to participate and understands they can discontinue the visit at any time  After connecting through [facility], the patient was identified by name and date of birth  Nallely Chandra was informed that this was a telemedicine visit and that the exam was being conducted confidentially over secure lines  My office door was closed  No one else was in the room  Nallely Chandra acknowledged consent and understanding of privacy and security of the telemedicine visit  I informed the patient that I have reviewed her record in Epic and presented the opportunity for her to ask any questions regarding the visit today  The patient agreed to participate  Verification of patient location:  Patient is located in the following state in which I hold an active license: [place]    Subjective:   Nallely Chandra is a 40 y o  female who has been screened for COVID-19  Symptom change since last report: unchanged  Patient's symptoms include fever, nasal congestion, cough, nausea and headache  Patient denies chills, fatigue, malaise, rhinorrhea, sore throat, anosmia, loss of taste, shortness of breath, chest tightness, abdominal pain, vomiting, diarrhea and myalgias  - Date of symptom onset: 9/27/2022  - Date of positive COVID-19 test: 9/27/2022  COVID-19 vaccination status: Fully vaccinated with booster    Tolar Gonzalez has been staying home and has isolated themselves in her home  She is taking care to not share personal items and is cleaning all surfaces that are touched often, like counters, tabletops, and doorknobs using household cleaning sprays or wipes   She is wearing a mask when she leaves her room  Lab Results   Component Value Date    SARSCOV2 Negative 04/10/2022    SARSCOVAG Negative 05/17/2022       Review of Systems   Constitutional: Positive for fever  Negative for chills and fatigue  HENT: Positive for congestion  Negative for rhinorrhea and sore throat  Respiratory: Positive for cough  Negative for chest tightness and shortness of breath  Gastrointestinal: Positive for nausea  Negative for abdominal pain, diarrhea and vomiting  Musculoskeletal: Negative for myalgias  Neurological: Positive for headaches  All other systems reviewed and are negative  Current Outpatient Medications on File Prior to Visit   Medication Sig    albuterol (ProAir HFA) 90 mcg/act inhaler Inhale 2 puffs every 6 (six) hours as needed for wheezing    ALPRAZolam (XANAX) 0 25 mg tablet Take 1 tablet (0 25 mg total) by mouth 3 (three) times a day as needed for anxiety    clotrimazole-betamethasone (LOTRISONE) 1-0 05 % cream Apply topically 2 (two) times a day    docusate sodium (COLACE) 100 mg capsule Take 1 capsule (100 mg total) by mouth 2 (two) times a day    ferrous sulfate 325 (65 Fe) mg tablet Take 325 mg by mouth daily with breakfast    terconazole (TERAZOL 3) 0 8 % vaginal cream May apply externally bid to external vaginal area   triamterene-hydrochlorothiazide (DYAZIDE) 37 5-25 mg per capsule Take 1 capsule by mouth daily as needed (swellilng)       Objective: There were no vitals taken for this visit  Physical Exam  Vitals reviewed  Constitutional:       Appearance: Normal appearance  She is ill-appearing  Eyes:      General:         Right eye: No discharge  Left eye: No discharge  Pulmonary:      Effort: No respiratory distress  Skin:     Coloration: Skin is pale  Findings: No rash  Neurological:      Mental Status: She is alert and oriented to person, place, and time  Mental status is at baseline     Psychiatric:         Mood and Affect: Mood normal  Behavior: Behavior normal          Thought Content:  Thought content normal          Judgment: Judgment normal        Hermila Saleem, DO

## 2022-10-06 ENCOUNTER — OFFICE VISIT (OUTPATIENT)
Dept: FAMILY MEDICINE CLINIC | Facility: CLINIC | Age: 37
End: 2022-10-06
Payer: COMMERCIAL

## 2022-10-06 ENCOUNTER — TELEPHONE (OUTPATIENT)
Dept: FAMILY MEDICINE CLINIC | Facility: CLINIC | Age: 37
End: 2022-10-06

## 2022-10-06 VITALS
OXYGEN SATURATION: 97 % | BODY MASS INDEX: 28.52 KG/M2 | RESPIRATION RATE: 16 BRPM | TEMPERATURE: 96.6 F | WEIGHT: 155 LBS | HEIGHT: 62 IN | SYSTOLIC BLOOD PRESSURE: 112 MMHG | HEART RATE: 86 BPM | DIASTOLIC BLOOD PRESSURE: 70 MMHG

## 2022-10-06 DIAGNOSIS — U07.1 COVID-19: Primary | ICD-10-CM

## 2022-10-06 DIAGNOSIS — U07.1 COVID-19: ICD-10-CM

## 2022-10-06 PROCEDURE — 99213 OFFICE O/P EST LOW 20 MIN: CPT | Performed by: FAMILY MEDICINE

## 2022-10-06 RX ORDER — BROMPHENIRAMINE MALEATE, PSEUDOEPHEDRINE HYDROCHLORIDE, AND DEXTROMETHORPHAN HYDROBROMIDE 2; 30; 10 MG/5ML; MG/5ML; MG/5ML
10 SYRUP ORAL 4 TIMES DAILY PRN
Qty: 180 ML | Refills: 0 | Status: SHIPPED | OUTPATIENT
Start: 2022-10-06 | End: 2022-10-10 | Stop reason: ALTCHOICE

## 2022-10-06 RX ORDER — FLUTICASONE PROPIONATE 220 UG/1
2 AEROSOL, METERED RESPIRATORY (INHALATION) 2 TIMES DAILY
Qty: 12 G | Refills: 0 | Status: SHIPPED | OUTPATIENT
Start: 2022-10-06 | End: 2022-10-07

## 2022-10-06 NOTE — PROGRESS NOTES
Name: Pattie Joseph      : 1985      MRN: [id number]  Encounter Provider: Everett Quiroga MD  Encounter Date: 10/6/2022   Encounter department: [address]     1  COVID-19  -     fluticasone (Flovent HFA) 220 mcg/act inhaler; Inhale 2 puffs 2 (two) times a day Rinse mouth after use  -     brompheniramine-pseudoephedrine-DM 30-2-10 MG/5ML syrup; Take 10 mL by mouth 4 (four) times a day as needed for congestion or cough  Symptoms are consistent with normal course of acute Covid illness  Continue supportive care for 4 weeks, if bothersome symptoms continue to persist at that time, return to office for evaluation        Subjective       tested,  she started with symptoms  Initial Covid: had headaches, vomiting, fever 103, fatigue, congestion, mild cough, sore throat, body aches, diarrhea  Now still SOB, cough, and heart racing  Using albuterol - it does help her breathing  Feels easily SOB going up stairs or doing chores  Also feels palpitations or faster heart rate without other associated symptoms  Shortness of Breath  Associated symptoms include coughing, fatigue and palpitations  Pertinent negatives include no chest pain, dizziness, rhinorrhea, sore throat or wheezing  Review of Systems   Constitutional: Positive for activity change and fatigue  Negative for chills and fever  HENT: Negative for congestion, rhinorrhea and sore throat  Eyes: Negative for visual disturbance  Respiratory: Positive for cough and shortness of breath  Negative for wheezing  Cardiovascular: Positive for palpitations  Negative for chest pain  Gastrointestinal: Negative for abdominal pain, blood in stool, constipation, diarrhea, nausea and vomiting  Genitourinary: Negative for dysuria  Musculoskeletal: Negative for arthralgias and myalgias  Skin: Negative for rash  Neurological: Negative for dizziness, weakness and headaches     All other systems reviewed and are negative  Current Outpatient Medications on File Prior to Visit   Medication Sig    albuterol (ProAir HFA) 90 mcg/act inhaler Inhale 2 puffs every 6 (six) hours as needed for wheezing    ALPRAZolam (XANAX) 0 25 mg tablet Take 1 tablet (0 25 mg total) by mouth 3 (three) times a day as needed for anxiety    clotrimazole-betamethasone (LOTRISONE) 1-0 05 % cream Apply topically 2 (two) times a day    triamterene-hydrochlorothiazide (DYAZIDE) 37 5-25 mg per capsule Take 1 capsule by mouth daily as needed (swellilng) (Patient not taking: Reported on 10/6/2022)    [DISCONTINUED] brompheniramine-pseudoephedrine-DM 30-2-10 MG/5ML syrup Take 10 mL by mouth 4 (four) times a day as needed for congestion or cough (Patient not taking: Reported on 10/6/2022)    [DISCONTINUED] docusate sodium (COLACE) 100 mg capsule Take 1 capsule (100 mg total) by mouth 2 (two) times a day (Patient not taking: Reported on 10/6/2022)    [DISCONTINUED] ferrous sulfate 325 (65 Fe) mg tablet Take 325 mg by mouth daily with breakfast (Patient not taking: Reported on 10/6/2022)    [DISCONTINUED] ondansetron (ZOFRAN) 8 mg tablet Take 1 tablet (8 mg total) by mouth every 8 (eight) hours as needed for nausea or vomiting (Patient not taking: Reported on 10/6/2022)    [DISCONTINUED] terconazole (TERAZOL 3) 0 8 % vaginal cream May apply externally bid to external vaginal area  (Patient not taking: Reported on 10/6/2022)       Objective     /70   Pulse 86   Temp (!) 96 6 °F (35 9 °C)   Resp 16   Ht 5' 2" (1 575 m)   Wt 70 3 kg (155 lb)   LMP 09/20/2022 (Exact Date)   SpO2 97%   Breastfeeding No   BMI 28 35 kg/m²     Physical Exam  Vitals and nursing note reviewed  Constitutional:       General: She is not in acute distress  Appearance: Normal appearance  She is well-developed  She is not ill-appearing or diaphoretic  HENT:      Head: Normocephalic and atraumatic        Right Ear: External ear normal  Left Ear: External ear normal       Nose: Nose normal    Eyes:      General: Lids are normal       Conjunctiva/sclera: Conjunctivae normal    Neck:      Vascular: No JVD  Trachea: No tracheal deviation  Cardiovascular:      Rate and Rhythm: Normal rate and regular rhythm  Pulses: Normal pulses  Heart sounds: No murmur heard  Pulmonary:      Effort: Pulmonary effort is normal  No accessory muscle usage or respiratory distress  Breath sounds: Normal breath sounds  No decreased breath sounds, wheezing, rhonchi or rales  Lymphadenopathy:      Cervical: No cervical adenopathy  Skin:     Findings: No rash  Neurological:      Mental Status: She is alert         Wally Lawrence MD

## (undated) DEVICE — TELFA NON-ADHERENT ABSORBENT DRESSING: Brand: TELFA

## (undated) DEVICE — SUT VICRYL 4-0 PS-2 27 IN J426H

## (undated) DEVICE — ABDOMINAL PAD: Brand: DERMACEA

## (undated) DEVICE — LARGE, DISPOSABLE ALEXIS O C-SECTION PROTECTOR - RETRACTOR: Brand: ALEXIS ® O C-SECTION PROTECTOR - RETRACTOR

## (undated) DEVICE — GLOVE INDICATOR PI UNDERGLOVE SZ 7 BLUE

## (undated) DEVICE — GLOVE SRG LF STRL BGL SKNSNS 7 PF

## (undated) DEVICE — Device

## (undated) DEVICE — PACK C-SECTION PBDS

## (undated) DEVICE — CHLORAPREP HI-LITE 26ML ORANGE

## (undated) DEVICE — SUT MONOCRYL 0 CTX 36 IN Y398H

## (undated) DEVICE — SKIN MARKER DUAL TIP WITH RULER CAP, FLEXIBLE RULER AND LABELS: Brand: DEVON

## (undated) DEVICE — ADHESIVE SKIN HIGH VISCOSITY EXOFIN 1ML

## (undated) DEVICE — GAUZE SPONGES,16 PLY: Brand: CURITY

## (undated) DEVICE — SUT VICRYL 0 CT-1 27 IN J260H